# Patient Record
Sex: MALE | Race: OTHER | HISPANIC OR LATINO | Employment: OTHER | URBAN - METROPOLITAN AREA
[De-identification: names, ages, dates, MRNs, and addresses within clinical notes are randomized per-mention and may not be internally consistent; named-entity substitution may affect disease eponyms.]

---

## 2017-12-11 ENCOUNTER — APPOINTMENT (EMERGENCY)
Dept: RADIOLOGY | Facility: HOSPITAL | Age: 82
End: 2017-12-11

## 2017-12-11 ENCOUNTER — HOSPITAL ENCOUNTER (EMERGENCY)
Facility: HOSPITAL | Age: 82
Discharge: HOME | End: 2017-12-11

## 2017-12-11 VITALS
TEMPERATURE: 99 F | WEIGHT: 218 LBS | OXYGEN SATURATION: 95 % | HEART RATE: 84 BPM | RESPIRATION RATE: 24 BRPM | BODY MASS INDEX: 34.21 KG/M2 | SYSTOLIC BLOOD PRESSURE: 137 MMHG | HEIGHT: 67 IN | DIASTOLIC BLOOD PRESSURE: 63 MMHG

## 2017-12-11 DIAGNOSIS — J40 BRONCHITIS: Primary | ICD-10-CM

## 2017-12-11 LAB
ALBUMIN SERPL BCP-MCNC: 3.1 G/DL (ref 3.5–5)
ALP SERPL-CCNC: 71 U/L (ref 46–116)
ALT SERPL W P-5'-P-CCNC: 39 U/L (ref 12–78)
ANION GAP SERPL CALCULATED.3IONS-SCNC: 10 MMOL/L (ref 4–13)
AST SERPL W P-5'-P-CCNC: 14 U/L (ref 5–45)
BASOPHILS # BLD AUTO: 0.04 THOUSANDS/ΜL (ref 0–0.1)
BASOPHILS NFR BLD AUTO: 0 % (ref 0–1)
BILIRUB SERPL-MCNC: 0.4 MG/DL (ref 0.2–1)
BUN SERPL-MCNC: 12 MG/DL (ref 5–25)
CALCIUM SERPL-MCNC: 9.2 MG/DL (ref 8.3–10.1)
CHLORIDE SERPL-SCNC: 100 MMOL/L (ref 100–108)
CO2 SERPL-SCNC: 27 MMOL/L (ref 21–32)
CREAT SERPL-MCNC: 0.83 MG/DL (ref 0.6–1.3)
EOSINOPHIL # BLD AUTO: 0.39 THOUSAND/ΜL (ref 0–0.61)
EOSINOPHIL NFR BLD AUTO: 4 % (ref 0–6)
ERYTHROCYTE [DISTWIDTH] IN BLOOD BY AUTOMATED COUNT: 13.5 % (ref 11.6–15.1)
GFR SERPL CREATININE-BSD FRML MDRD: 83 ML/MIN/1.73SQ M
GLUCOSE SERPL-MCNC: 192 MG/DL (ref 65–140)
GLUCOSE SERPL-MCNC: 280 MG/DL (ref 65–140)
HCT VFR BLD AUTO: 36.9 % (ref 36.5–49.3)
HGB BLD-MCNC: 12.4 G/DL (ref 12–17)
LYMPHOCYTES # BLD AUTO: 1.3 THOUSANDS/ΜL (ref 0.6–4.47)
LYMPHOCYTES NFR BLD AUTO: 15 % (ref 14–44)
MCH RBC QN AUTO: 30.2 PG (ref 26.8–34.3)
MCHC RBC AUTO-ENTMCNC: 33.6 G/DL (ref 31.4–37.4)
MCV RBC AUTO: 90 FL (ref 82–98)
MONOCYTES # BLD AUTO: 1.06 THOUSAND/ΜL (ref 0.17–1.22)
MONOCYTES NFR BLD AUTO: 12 % (ref 4–12)
NEUTROPHILS # BLD AUTO: 6.1 THOUSANDS/ΜL (ref 1.85–7.62)
NEUTS SEG NFR BLD AUTO: 69 % (ref 43–75)
NT-PROBNP SERPL-MCNC: 443 PG/ML
PLATELET # BLD AUTO: 373 THOUSANDS/UL (ref 149–390)
PMV BLD AUTO: 10 FL (ref 8.9–12.7)
POTASSIUM SERPL-SCNC: 4.4 MMOL/L (ref 3.5–5.3)
PROT SERPL-MCNC: 7.8 G/DL (ref 6.4–8.2)
RBC # BLD AUTO: 4.11 MILLION/UL (ref 3.88–5.62)
SODIUM SERPL-SCNC: 137 MMOL/L (ref 136–145)
SPECIMEN SOURCE: NORMAL
TROPONIN I BLD-MCNC: 0 NG/ML (ref 0–0.08)
WBC # BLD AUTO: 8.89 THOUSAND/UL (ref 4.31–10.16)

## 2017-12-11 PROCEDURE — 99285 EMERGENCY DEPT VISIT HI MDM: CPT

## 2017-12-11 PROCEDURE — 71020 HB CHEST X-RAY 2VW FRONTAL&LATL: CPT

## 2017-12-11 PROCEDURE — 93005 ELECTROCARDIOGRAM TRACING: CPT

## 2017-12-11 PROCEDURE — 82948 REAGENT STRIP/BLOOD GLUCOSE: CPT

## 2017-12-11 PROCEDURE — 85025 COMPLETE CBC W/AUTO DIFF WBC: CPT | Performed by: NURSE PRACTITIONER

## 2017-12-11 PROCEDURE — 36415 COLL VENOUS BLD VENIPUNCTURE: CPT | Performed by: NURSE PRACTITIONER

## 2017-12-11 PROCEDURE — 80053 COMPREHEN METABOLIC PANEL: CPT | Performed by: NURSE PRACTITIONER

## 2017-12-11 PROCEDURE — 84484 ASSAY OF TROPONIN QUANT: CPT

## 2017-12-11 PROCEDURE — 83880 ASSAY OF NATRIURETIC PEPTIDE: CPT | Performed by: NURSE PRACTITIONER

## 2017-12-11 RX ORDER — AZITHROMYCIN 250 MG/1
250 TABLET, FILM COATED ORAL EVERY 24 HOURS
Qty: 6 TABLET | Refills: 0 | Status: SHIPPED | OUTPATIENT
Start: 2017-12-11 | End: 2017-12-16

## 2017-12-11 RX ORDER — ALBUTEROL SULFATE 90 UG/1
2 AEROSOL, METERED RESPIRATORY (INHALATION) EVERY 4 HOURS PRN
Qty: 1 INHALER | Refills: 0 | Status: SHIPPED | OUTPATIENT
Start: 2017-12-11

## 2017-12-11 RX ORDER — CLOPIDOGREL BISULFATE 75 MG/1
75 TABLET ORAL DAILY
COMMUNITY
End: 2020-11-03 | Stop reason: SDUPTHER

## 2017-12-11 RX ORDER — TAMSULOSIN HYDROCHLORIDE 0.4 MG/1
0.4 CAPSULE ORAL
COMMUNITY
End: 2020-07-24 | Stop reason: SDUPTHER

## 2017-12-11 RX ORDER — IRBESARTAN 150 MG/1
150 TABLET ORAL
COMMUNITY
End: 2020-11-03 | Stop reason: SDUPTHER

## 2017-12-11 RX ORDER — GLIPIZIDE 5 MG/1
5 TABLET ORAL
Status: ON HOLD | COMMUNITY
End: 2020-08-16

## 2017-12-11 RX ORDER — RANITIDINE 150 MG/1
150 TABLET ORAL 2 TIMES DAILY
Status: ON HOLD | COMMUNITY
End: 2020-08-16

## 2017-12-11 RX ORDER — GABAPENTIN 300 MG/1
100 CAPSULE ORAL 3 TIMES DAILY
Status: ON HOLD | COMMUNITY
End: 2020-08-16 | Stop reason: DRUGHIGH

## 2017-12-11 RX ORDER — AMLODIPINE BESYLATE 5 MG/1
5 TABLET ORAL DAILY
Status: ON HOLD | COMMUNITY
End: 2020-08-16

## 2017-12-11 RX ORDER — ASPIRIN 325 MG
325 TABLET ORAL DAILY
COMMUNITY
End: 2020-09-29 | Stop reason: ALTCHOICE

## 2017-12-11 RX ORDER — FINASTERIDE 1 MG/1
1 TABLET, FILM COATED ORAL DAILY
COMMUNITY
End: 2020-07-24

## 2017-12-11 RX ORDER — CARVEDILOL 12.5 MG/1
12.5 TABLET ORAL 2 TIMES DAILY WITH MEALS
Status: ON HOLD | COMMUNITY
End: 2020-08-16 | Stop reason: DRUGHIGH

## 2017-12-11 RX ORDER — FUROSEMIDE 20 MG/1
20 TABLET ORAL 2 TIMES DAILY
COMMUNITY
End: 2020-09-21 | Stop reason: HOSPADM

## 2017-12-11 RX ORDER — MAG HYDROX/ALUMINUM HYD/SIMETH 400-400-40
1 SUSPENSION, ORAL (FINAL DOSE FORM) ORAL
COMMUNITY

## 2017-12-11 RX ORDER — GUAIFENESIN/DEXTROMETHORPHAN 100-10MG/5
5 SYRUP ORAL 3 TIMES DAILY PRN
Qty: 118 ML | Refills: 0 | Status: ON HOLD | OUTPATIENT
Start: 2017-12-11 | End: 2020-08-16

## 2017-12-11 RX ORDER — ALBUTEROL SULFATE 2.5 MG/3ML
2.5 SOLUTION RESPIRATORY (INHALATION) EVERY 6 HOURS PRN
Qty: 75 ML | Refills: 0 | Status: ON HOLD | OUTPATIENT
Start: 2017-12-11 | End: 2020-08-16

## 2017-12-11 RX ORDER — GUAIFENESIN/DEXTROMETHORPHAN 100-10MG/5
10 SYRUP ORAL ONCE
Status: COMPLETED | OUTPATIENT
Start: 2017-12-11 | End: 2017-12-11

## 2017-12-11 RX ADMIN — GUAIFENESIN AND DEXTROMETHORPHAN 10 ML: 100; 10 SYRUP ORAL at 13:16

## 2017-12-11 NOTE — ED PROVIDER NOTES
History  Chief Complaint   Patient presents with    Shortness of Breath     Patient reports SOB x20 days  States that he recently traveled from [place] (3 days ago) and had "a cold vent blowing on me " admits to cough productive of yellow sputum  Denies CP, n/v/d, fever  Reports left sided rib pain with cough      [de-identified]year old male with a PMH significant for MI x 3, DM, and asthma presents with a 20 day history of cough with green mucus production, SOB , denies chest pain, syncope, dizziness  C/o LUQ pain in abdomen with mild nausea intermittently  Just came 20 days ago from [place]  Has been using inhaler with no improvement  Denies fever             Prior to Admission Medications   Prescriptions Last Dose Informant Patient Reported? Taking?    Cholecalciferol (VITAMIN D3) 5000 units CAPS   Yes Yes   Sig: Take 1 capsule by mouth   Cyanocobalamin (VITAMIN B12 PO)   Yes Yes   Sig: Take 5,000 mcg by mouth   amLODIPine (NORVASC) 5 mg tablet   Yes Yes   Sig: Take 5 mg by mouth daily   aspirin 325 mg tablet   Yes Yes   Sig: Take 325 mg by mouth daily   carvedilol (COREG) 12 5 mg tablet   Yes Yes   Sig: Take 12 5 mg by mouth 2 (two) times a day with meals   clopidogrel (PLAVIX) 75 mg tablet   Yes Yes   Sig: Take 75 mg by mouth daily   finasteride (PROPECIA) 1 MG tablet   Yes Yes   Sig: Take 1 mg by mouth daily   furosemide (LASIX) 20 mg tablet   Yes Yes   Sig: Take 20 mg by mouth 2 (two) times a day   gabapentin (NEURONTIN) 300 mg capsule   Yes Yes   Sig: Take 100 mg by mouth 3 (three) times a day   glipiZIDE (GLUCOTROL) 5 mg tablet   Yes Yes   Sig: Take 5 mg by mouth 2 (two) times a day before meals   irbesartan (AVAPRO) 150 mg tablet   Yes Yes   Sig: Take 150 mg by mouth daily at bedtime   metFORMIN (GLUCOPHAGE) 1000 MG tablet   Yes Yes   Sig: Take 1,000 mg by mouth 2 (two) times a day with meals   ranitidine (ZANTAC) 150 mg tablet   Yes Yes   Sig: Take 150 mg by mouth 2 (two) times a day   tamsulosin (FLOMAX) 0 4 mg   Yes Yes   Sig: Take 0 4 mg by mouth daily with dinner      Facility-Administered Medications: None       Past Medical History:   Diagnosis Date    Cardiac disease     CHF (congestive heart failure) (HCC)     Diabetes mellitus (HCC)     GERD (gastroesophageal reflux disease)     Hyperlipidemia     Hypertension     MI (myocardial infarction)        Past Surgical History:   Procedure Laterality Date    EYE SURGERY         History reviewed  No pertinent family history  I have reviewed and agree with the history as documented  Social History   Substance Use Topics    Smoking status: Never Smoker    Smokeless tobacco: Never Used    Alcohol use No        Review of Systems   Respiratory: Positive for cough and shortness of breath  Negative for wheezing  Cardiovascular: Negative for chest pain and leg swelling  Gastrointestinal: Positive for abdominal pain  Negative for constipation, diarrhea and vomiting  Neurological: Negative for dizziness, syncope and weakness  Physical Exam  ED Triage Vitals [12/11/17 1130]   Temperature Pulse Respirations Blood Pressure SpO2   99 °F (37 2 °C) 97 19 158/76 95 %      Temp Source Heart Rate Source Patient Position - Orthostatic VS BP Location FiO2 (%)   Tympanic Monitor Lying Right arm --      Pain Score       6           Orthostatic Vital Signs  Vitals:    12/11/17 1130 12/11/17 1145 12/11/17 1215   BP: 158/76 120/56 130/70   Pulse: 97 88 84   Patient Position - Orthostatic VS: Lying         Physical Exam   Constitutional: He is oriented to person, place, and time  He appears well-developed and well-nourished  HENT:   Head: Normocephalic and atraumatic  Eyes: EOM are normal  Pupils are equal, round, and reactive to light  Neck: Normal range of motion  Neck supple  Cardiovascular: Normal rate and regular rhythm  Pulmonary/Chest: Effort normal    Abdominal: Soft  Musculoskeletal: Normal range of motion     Neurological: He is alert and oriented to person, place, and time  Skin: Skin is warm and dry  ED Medications  Medications   dextromethorphan-guaiFENesin (ROBITUSSIN DM)  mg/5 mL oral syrup 10 mL (not administered)       Diagnostic Studies  Results Reviewed     Procedure Component Value Units Date/Time    B-type natriuretic peptide [[id number]]  (Normal) Collected:  12/11/17 1143    Lab Status:  Final result Specimen:  Blood from Arm, Left Updated:  12/11/17 1225     NT-proBNP 443 pg/mL     Comprehensive metabolic panel [[id number]]  (Abnormal) Collected:  12/11/17 1143    Lab Status:  Final result Specimen:  Blood from Arm, Left Updated:  12/11/17 1220     Sodium 137 mmol/L      Potassium 4 4 mmol/L      Chloride 100 mmol/L      CO2 27 mmol/L      Anion Gap 10 mmol/L      BUN 12 mg/dL      Creatinine 0 83 mg/dL      Glucose 280 (H) mg/dL      Calcium 9 2 mg/dL      AST 14 U/L      ALT 39 U/L      Alkaline Phosphatase 71 U/L      Total Protein 7 8 g/dL      Albumin 3 1 (L) g/dL      Total Bilirubin 0 40 mg/dL      eGFR 83 ml/min/1 73sq m     Narrative:         National Kidney Disease Education Program recommendations are as follows:  GFR calculation is accurate only with a steady state creatinine  Chronic Kidney disease less than 60 ml/min/1 73 sq  meters  Kidney failure less than 15 ml/min/1 73 sq  meters  POCT troponin [[id number]]  (Normal) Collected:  12/11/17 1151    Lab Status:  Final result Updated:  12/11/17 1205     POC Troponin I 0 00 ng/ml      Specimen Type VENOUS    Narrative:         [employer] i-Stat handheld analyzer 99% cutoff is > 0 08ng/mL in [facility] Emergency Departments    o cTnI 99% cutoff is useful only when applied to patients in the clinical setting of myocardial ischemia  o cTnI 99% cutoff should be interpreted in the context of clinical history, ECG findings and possibly cardiac imaging to establish correct diagnosis    o cTnI 99% cutoff may be suggestive but clearly not indicative of a coronary event without the clinical setting of myocardial ischemia  CBC and differential [[id number]]  (Normal) Collected:  12/11/17 1143    Lab Status:  Final result Specimen:  Blood from Arm, Left Updated:  12/11/17 1157     WBC 8 89 Thousand/uL      RBC 4 11 Million/uL      Hemoglobin 12 4 g/dL      Hematocrit 36 9 %      MCV 90 fL      MCH 30 2 pg      MCHC 33 6 g/dL      RDW 13 5 %      MPV 10 0 fL      Platelets 344 Thousands/uL      Neutrophils Relative 69 %      Lymphocytes Relative 15 %      Monocytes Relative 12 %      Eosinophils Relative 4 %      Basophils Relative 0 %      Neutrophils Absolute 6 10 Thousands/µL      Lymphocytes Absolute 1 30 Thousands/µL      Monocytes Absolute 1 06 Thousand/µL      Eosinophils Absolute 0 39 Thousand/µL      Basophils Absolute 0 04 Thousands/µL     POCT urinalysis dipstick [[id number]]     Lab Status:  No result Specimen:  Urine                  XR chest 2 views   ED Interpretation by JAYCE Mantilla (12/11 7321)   No active disease      Final Result by Terry Perez MD (12/11 1226)      No active pulmonary disease           Workstation performed: [id number]                    Procedures  ECG 12 Lead Documentation  Date/Time: 12/11/2017 11:52 AM  Performed by: Batsheva Dickey  Authorized by: Batsheva Dickey     ECG reviewed by me, the ED Provider: yes    Patient location:  ED  Previous ECG:     Previous ECG:  Unavailable    Comparison to cardiac monitor: Yes    Interpretation:     Interpretation: normal    Rate:     ECG rate:  88    ECG rate assessment: normal    Rhythm:     Rhythm: sinus rhythm    Ectopy:     Ectopy: none    QRS:     QRS axis:  Normal  Conduction:     Conduction: normal    ST segments:     ST segments:  Normal  T waves:     T waves: normal               Phone Contacts  ED Phone Contact    ED Course  ED Course                                MDM  Number of Diagnoses or Management Options  Diagnosis management comments: Pt stable, still struggling with residual cough but is feeling better than he was a few days ago  cxr neg, labs, ekg neg  D/c to home with decongestants  Amount and/or Complexity of Data Reviewed  Clinical lab tests: ordered and reviewed  Tests in the radiology section of CPT®: ordered and reviewed  Tests in the medicine section of CPT®: reviewed and ordered  Independent visualization of images, tracings, or specimens: yes    Patient Progress  Patient progress: stable    CritCare Time    Disposition  Final diagnoses:   Bronchitis     Time reflects when diagnosis was documented in both MDM as applicable and the Disposition within this note     Time User Action Codes Description Comment    12/11/2017 12:59 PM Salvador, [address] Giovana Vieyra Bronchitis       ED Disposition     ED Disposition Condition Comment    Discharge  Merly Fiore discharge to home/self care  Condition at discharge: Good        Follow-up Information    None       Patient's Medications   Discharge Prescriptions    DEXTROMETHORPHAN-GUAIFENESIN (ROBITUSSIN DM)  MG/5 ML SYRUP    Take 5 mL by mouth 3 (three) times a day as needed for cough or congestion       Start Date: 12/11/2017End Date: --       Order Dose: 5 mL       Quantity: 118 mL    Refills: 0     No discharge procedures on file      ED Provider  Electronically Signed by           JAYCE Faulkner  12/11/17 9240 [address] [place] [address],  Leonel Mix  12/11/17 0184

## 2017-12-11 NOTE — DISCHARGE INSTRUCTIONS
Bronquitis aguda   LO QUE NECESITA SABER:   La bronquitis aguda es la inflamación e irritación de peg vías respiratorias  Esta irritación puede provocar que tosa o que tenga otros problemas de respiración  La bronquitis aguda suele comenzar debido a otra enfermedad, jaswant un resfriado o la gripe  La enfermedad se propaga de major nariz y garganta a major tráquea y Charles Carrier  La bronquitis a menudo es conocida jaswant resfriado de pecho  La bronquitis aguda generalmente dura alrededor de 3 a 6 semanas y no es can enfermedad grave  La tos podría durar por varias semanas  INSTRUCCIONES SOBRE [facility]:   Regrese a la nupur de emergencias si:   · Usted expectora azael  · Peg labios o uñas de los dedos se ponen azules  · Usted siente que no está recibiendo suficiente aire cuando respira  Pregúntele a major Jerome Heft vitaminas y minerales son adecuados para usted  · Usted tiene fiebre  · Peg problemas respiratorios no desaparecen o empeoran  · Major tos no mejora dentro de 4 semanas  · Usted tiene preguntas o inquietudes acerca de major condición o cuidado  Cuidados personales:   · Descanse más  El descanso ayuda a major cuerpo a sanar  Empiece a hacer un poco más día a día  Descanse cuando usted sienta que es necesario  · Evite irritantes en el aire  Evite productos químicos, gases y polvo  Use can mascarilla si tiene que trabajar alrededor de polvo o gases  Permanezca dentro cuando los niveles de contaminación renetta altos  Si tiene alergias, permanezca adentro cuando el conteo de polen sea CROSSCANONBY  No use productos en aerosol, jaswant desodorante, aerosol contra los insectos y aerosol para el keri  · No fume o esté alrededor de personas que fuman  La nicotina y otros químicos en los cigarrillos y cigarros dañan la cilia que saca la mucosidad de peg pulmones  Pida información a major médico si usted actualmente fuma y necesita ayuda para dejar de fumar   Los cigarrillos electrónicos o tabaco sin humo todavía contienen nicotina  Consulte con major médico antes de [employer]  · [address] se le haya indicado  Los líquidos ayudan a mantener humectadas myron vías respiratorias y ayudarle a eliminar las flemas por medio de la tos  Es posible que usted necesite marlin más líquidos si tiene bronquitis [place]  Pregunte cuánto líquido debe marlin cada día y cuáles líquidos son los más adecuados para usted  · Use un humidificador o vaporizador  Use un humidificador de tre frío o un vaporizador para elevar la humedad en major casa  [place] podría ayudarle a respirar más fácilmente y al mismo tiempo disminuir la tos  Disminuya major riesgo para la bronquitis aguda:   · Vaya a que le pongan las vacunas necesarias  Pregúntele a major médico si usted debería ser vacunado contra la gripe o la neumonía  · Evite la propagación de gérmenes  Usted puede disminuir major riesgo de bronquitis [place] y otras enfermedades de la siguiente manera:     ¨ [place] frecuentemente con agua y [place]  Lleve can loción o gel antiséptico para las jess  Usted puede usar la loción o el gel para limpiar myron jess cuando no haya agua disponible  ¨ No se toque los ojos, la nariz o la boca a menos que se haya lavado las jess cathleen  ¨ Siempre cubra major boca al toser para evitar la propagación de gérmenes  Lo mejor es toser en un pañuelo desechable o en la manga de major camisa, en lugar de en major mano  [place] a los que le rodean que cubran myron bocas al toser  ¨ Trate de evitar a las personas que están resfriadas o tienen gripe  Si usted está enfermo, manténgase alejado de otras personas lo más que pueda  Medicamentos:  Major médico podría  darle cualquiera de lo siguiente:  · El ibuprofeno o el acetaminofeno  son medicamentos que pueden ayudar a bajar major fiebre  Están disponibles sin receta médica  Consulte con major médico cuál medicamento es el adecuado para usted  Pregunte la cantidad y la frecuencia con que debe tomarlos  [address]  Estos medicamentos pueden provocar sangrado estomacal si no se dewayne correctamente  El ibuprofeno puede provocar daño al Amber Baker  No tome ibuprofeno si usted tiene enfermedad de los riñones, [place] o si es alérgico a la aspirina  El acetaminofeno puede dañar el hígado  No tome más de 4,000 miligramos en 24 horas  · Los descongestionantes  ayudan a despejar la mucosidad en myron pulmones y que sea más fácil expectorarla  [place] puede ayudarle a respirar mejor  · Los jarabes para la tos  disminuyen major necesidad de toser  Si major tos produce mucosidad, no tome un supresor de la tos a menos que major médico se lo indique  Major médico podría sugerirle que tome un supresor de la tos en la noche para que pueda descansar  · Inhaladores  podrían ser [place]  Major médico podría darle gill o más inhaladores para ayudarle a respirar más fácil y Erich Mil[place] menos tos  Un inhalador le administra medicamento para abrir myron vías aéreas  Pídale a major médico que le muestre cómo usar major inhalador correctamente  · [place] myron medicamentos jaswant se le haya indicado  Consulte con major médico si usted hi que major medicamento no le está ayudando o si presenta efectos secundarios  Infórmele si es alérgico a algún medicamento  Mantenga can lista actualizada de los [place], las vitaminas y los productos herbales que cathy  Incluya los siguientes datos de los medicamentos: cantidad, frecuencia y motivo de administración  Traiga con usted la lista o los envases de la píldoras a myron citas de seguimiento  Lleve la lista de los medicamentos con usted en fausto de can emergencia  Acuda a myron consultas de control con major médico según le indicaron  Escriba las preguntas que tenga para que recuerde hacerlas carlos myron citas de seguimiento  © 2017 [address] Information is for End User's use only and may not be sold, redistributed or otherwise used for commercial purposes   All illustrations and images included in [employer] are the copyrighted property of [employer] JONH A M [employer]  or Bruce Guerrero  Esta información es sólo para uso en educación  Major intención no es darle un consejo médico sobre enfermedades o tratamientos  Colsulte con major Jorge Reddy farmacéutico antes de seguir cualquier régimen médico para saber si es seguro y efectivo para usted

## 2017-12-12 LAB
ATRIAL RATE: 88 BPM
P AXIS: 65 DEGREES
PR INTERVAL: 158 MS
QRS AXIS: 8 DEGREES
QRSD INTERVAL: 90 MS
QT INTERVAL: 380 MS
QTC INTERVAL: 459 MS
T WAVE AXIS: 13 DEGREES
VENTRICULAR RATE: 88 BPM

## 2017-12-18 ENCOUNTER — ALLSCRIPTS OFFICE VISIT (OUTPATIENT)
Dept: OTHER | Facility: OTHER | Age: 82
End: 2017-12-18

## 2017-12-18 DIAGNOSIS — E11.9 TYPE 2 DIABETES MELLITUS WITHOUT COMPLICATIONS (HCC): ICD-10-CM

## 2017-12-18 LAB
BILIRUB UR QL STRIP: NORMAL
CLARITY UR: NORMAL
COLOR UR: CLEAR
GLUCOSE (HISTORICAL): NORMAL
HBA1C MFR BLD HPLC: NORMAL %
HGB UR QL STRIP.AUTO: NORMAL
KETONES UR STRIP-MCNC: NORMAL MG/DL
LEUKOCYTE ESTERASE UR QL STRIP: NORMAL
NITRITE UR QL STRIP: NORMAL
PH UR STRIP.AUTO: 5 [PH]
PROT UR STRIP-MCNC: NORMAL MG/DL
SP GR UR STRIP.AUTO: 1
UROBILINOGEN UR QL STRIP.AUTO: NORMAL

## 2017-12-19 NOTE — PROGRESS NOTES
Assessment  1  Abdominal pain of multiple sites (789 09) (R10 9)   2  Poorly controlled type 2 diabetes mellitus (250 00) (E11 65)   3  CAD (coronary artery disease) (414 00) (I25 10)   4  HTN (hypertension) (401 9) (I10)    Plan  Type 2 diabetes mellitus    · MetFORMIN HCl - 1000 MG Oral Tablet   · Lantus 100 UNIT/ML Subcutaneous Solution; inject 10 units SQ qhs day one, then20 units qhs   · (1) HEMOGLOBIN A1C; Status:Active; Requested for:[id number];    · Hemoglobin A1c- POC; Status:Complete;   Done: [id number] 02:54PM  Urinary frequency    · Urine Dip Non-Automated- POC; Status:Complete;   Done: 99WJV5629 02:56PM    Discussion/Summary  Discussion Summary:   UA shows glucose, HgA1C 8 4, discussed with patient, and son  Recommend patient stop Metformin, and start Lantus 10 units, qhs  Start OTC probiotics, and urged to monitor glucose, call office Thursday or Friday this week with results, and update  RTO as needed  Medication SE Review and Pt Understands Tx: Possible side effects of new medications were reviewed with the patient/guardian today  The treatment plan was reviewed with the patient/guardian  The patient/guardian understands and agrees with the treatment plan   Self Referrals:   Self Referrals: Yes      Chief Complaint  Chief Complaint Free Text Note Form: Pt here as NP today  Was in ER  dklast saw doctor in [facility]       History of Present Illness  HPI: [de-identified]year old  male c/o ongoing cough, was in ER on December 11th, and was given antibiotic  Has c/o frequent bowel movements, and bladder pain  Has frequent urination recently  History of DM taking oral meds  visiting son who lives in this area, from [place]  Has a hx  of BPH  Presents with son/  Review of Systems  Complete-Male:  Constitutional: no fever-- and-- no chills  Respiratory: cough, but-- no shortness of breath  Gastrointestinal: abdominal pain        Active Problems  1  CAD (coronary artery disease) (414 00) (I25 10)   2  CHF (congestive heart failure) (428 0) (I50 9)   3  GERD (gastroesophageal reflux disease) (530 81) (K21 9)   4  HTN (hypertension) (401 9) (I10)   5  Hyperlipidemia (272 4) (E78 5)   6  Myocardial infarction (410 90) (I21 9)    Surgical History  Surgical History Reviewed: The surgical history was reviewed and updated today  Family History  Family History Reviewed: The family history was reviewed and updated today  Social History   · Never a smoker    Current Meds   1  AmLODIPine Besylate 5 MG Oral Tablet; 1 qd; Therapy: [id number] to Recorded   2  Aspirin 325 MG Oral Tablet; 1 qd; Therapy: [id number] to Recorded   3  B-12 5000 MCG Oral Capsule; 1 qd; Therapy: [id number] to Recorded   4  Carvedilol 12 5 MG Oral Tablet; 1 BID; Therapy: [id number] to Recorded   5  Finasteride 1 MG Oral Tablet; TAKE 1 TABLET DAILY; Therapy: [id number] to Recorded   6  Gabapentin 300 MG Oral Capsule; 1 tid; Therapy: 97LOM6907 to Recorded   7  GlipiZIDE 5 MG Oral Tablet; 1 BID; Therapy: [id number] to Recorded   8  Irbesartan 150 MG Oral Tablet; 1 qd; Therapy: 43YPC6970 to Recorded   9  Lasix 20 MG Oral Tablet; 1 BID; Therapy: [id number] to Recorded   10  MetFORMIN HCl - 1000 MG Oral Tablet; 1 BID; Therapy: 82CVM6976 to Recorded   11  Plavix 75 MG Oral Tablet; 1 qd; Therapy: [id number] to Recorded   12  RaNITidine HCl - 150 MG Oral Tablet; 1 BID; Therapy: [id number] to Recorded   13  Tamsulosin HCl - 0 4 MG Oral Capsule; 1 qd; Therapy: [id number] to Recorded   14  Vitamin D3 5000 UNIT Oral Capsule; 1 DAILY; Therapy: [id number] to Recorded    Allergies  1  Demerol SOLN    Vitals  Signs   Recorded: 62LUQ9780 01:26PM   Temperature: 94 1 F  Heart Rate: 88  Respiration: 16  Systolic: 870, LUE, Sitting  Diastolic: 60, LUE, Sitting  Height: 5 ft 7 in  Weight: 215 lb   BMI Calculated: 33 67  BSA Calculated: 2 09    Physical Exam   Constitutional  General appearance: Abnormal  -- overweight    Eyes  Conjunctiva and lids: No swelling, erythema, or discharge  Pupils and irises: Equal, round and reactive to light  Pulmonary  Respiratory effort: No increased work of breathing or signs of respiratory distress  Auscultation of lungs: Clear to auscultation, equal breath sounds bilaterally, no wheezes, no rales, no rhonci  Cardiovascular  Auscultation of heart: Normal rate and rhythm, normal S1 and S2, without murmurs  Examination of extremities for edema and/or varicosities: Normal    Abdomen  Abdomen: Non-tender, no masses  Liver and spleen: No hepatomegaly or splenomegaly  Musculoskeletal  Gait and station: Normal    Digits and nails: Normal without clubbing or cyanosis     Inspection/palpation of joints, bones, and muscles: Normal    Psychiatric  Orientation to person, place and time: Normal    Mood and affect: Normal          Results/Data  Urine Dip Non-Automated- POC [id number] 02:56PM Valere Brockton     Test Name Result Flag Reference   Color Clear     Clarity Transparent     Leukocytes norm     Nitrite neg     Blood neg     Bilirubin neg     Urobilinogen norm     Protein neg     Ph 5     Specific [place] 1 005     Ketone neg     Glucose 150 ++       Hemoglobin A1c- POC [id number] 02:54PM Valere Brockton     Test Name Result Flag Reference   HEMOGLOBIN A1C      8 4%  waver was signed       Signatures   Electronically signed by : Kassandra Valencia, [facility]; Dec 18 2017  3:45PM EST                       (Author)    Electronically signed by : KRISHNA Junior ; Dec 18 2017  5:38PM EST

## 2018-01-23 VITALS
DIASTOLIC BLOOD PRESSURE: 60 MMHG | SYSTOLIC BLOOD PRESSURE: 142 MMHG | BODY MASS INDEX: 33.74 KG/M2 | HEIGHT: 67 IN | TEMPERATURE: 94.1 F | HEART RATE: 88 BPM | RESPIRATION RATE: 16 BRPM | WEIGHT: 215 LBS

## 2020-07-17 ENCOUNTER — HOSPITAL ENCOUNTER (EMERGENCY)
Facility: HOSPITAL | Age: 85
Discharge: HOME | End: 2020-07-17
Attending: EMERGENCY MEDICINE | Admitting: EMERGENCY MEDICINE
Payer: MEDICARE

## 2020-07-17 VITALS
TEMPERATURE: 98.4 F | DIASTOLIC BLOOD PRESSURE: 60 MMHG | SYSTOLIC BLOOD PRESSURE: 121 MMHG | RESPIRATION RATE: 16 BRPM | OXYGEN SATURATION: 99 % | HEIGHT: 66 IN | WEIGHT: 220 LBS | BODY MASS INDEX: 35.36 KG/M2 | HEART RATE: 61 BPM

## 2020-07-17 DIAGNOSIS — R81 GLUCOSURIA: ICD-10-CM

## 2020-07-17 DIAGNOSIS — R33.9 URINARY RETENTION: Primary | ICD-10-CM

## 2020-07-17 LAB
BACTERIA UR QL AUTO: ABNORMAL /HPF
BILIRUB UR QL STRIP: NEGATIVE
CLARITY UR: CLEAR
COLOR UR: ABNORMAL
GLUCOSE UR STRIP-MCNC: ABNORMAL MG/DL
HGB UR QL STRIP.AUTO: ABNORMAL
KETONES UR STRIP-MCNC: NEGATIVE MG/DL
LEUKOCYTE ESTERASE UR QL STRIP: NEGATIVE
NITRITE UR QL STRIP: NEGATIVE
NON-SQ EPI CELLS URNS QL MICRO: ABNORMAL /HPF
PH UR STRIP.AUTO: 5.5 [PH]
PROT UR STRIP-MCNC: NEGATIVE MG/DL
RBC #/AREA URNS AUTO: ABNORMAL /HPF
SP GR UR STRIP.AUTO: 1.01 (ref 1–1.03)
UROBILINOGEN UR QL STRIP.AUTO: 0.2 E.U./DL
WBC #/AREA URNS AUTO: ABNORMAL /HPF

## 2020-07-17 PROCEDURE — 99283 EMERGENCY DEPT VISIT LOW MDM: CPT

## 2020-07-17 PROCEDURE — 99283 EMERGENCY DEPT VISIT LOW MDM: CPT | Performed by: EMERGENCY MEDICINE

## 2020-07-17 PROCEDURE — 81001 URINALYSIS AUTO W/SCOPE: CPT | Performed by: EMERGENCY MEDICINE

## 2020-07-18 NOTE — ED PROVIDER NOTES
History  Chief Complaint   Patient presents with    Urinary Retention     Pt arrived from [facility] yesterday morning and has not voided since  81 yo M with PMH of CAD, HTN, CHF, GERD, DM2 presents to ED with inability to void for over 24 hours  Recent travel from [place] (yesterday)  Has had this issue in past, has known prostate issues  No fevers/chills/nausea  Had some suprapubic pressure, relieved with insertion of winslow on arrival to ED  Chronic lower back pain, nothing new or different  No numbness/tingling/weakness  No sacral numbness/tingling  No trauma  History provided by:  Medical records and patient   used: No    Difficulty Urinating   Presenting symptoms: dysuria    Relieved by:  Nothing  Worsened by:  Nothing  Ineffective treatments:  None tried  Associated symptoms: no abdominal pain, no diarrhea, no fever, no flank pain, no hematuria, no nausea, no scrotal swelling, no urinary frequency, no urinary hesitation, no urinary incontinence, no urinary retention and no vomiting        Prior to Admission Medications   Prescriptions Last Dose Informant Patient Reported? Taking?    Cholecalciferol (VITAMIN D3) 5000 units CAPS   Yes No   Sig: Take 1 capsule by mouth   Cyanocobalamin (VITAMIN B12 PO)   Yes No   Sig: Take 5,000 mcg by mouth   albuterol (2 5 mg/3 mL) 0 083 % nebulizer solution   No No   Sig: Take 3 mL by nebulization every 6 (six) hours as needed for wheezing   albuterol (PROVENTIL HFA,VENTOLIN HFA) 90 mcg/act inhaler   No No   Sig: Inhale 2 puffs every 4 (four) hours as needed for wheezing   amLODIPine (NORVASC) 5 mg tablet   Yes No   Sig: Take 5 mg by mouth daily   aspirin 325 mg tablet   Yes No   Sig: Take 325 mg by mouth daily   carvedilol (COREG) 12 5 mg tablet   Yes No   Sig: Take 12 5 mg by mouth 2 (two) times a day with meals   clopidogrel (PLAVIX) 75 mg tablet   Yes No   Sig: Take 75 mg by mouth daily   dextromethorphan-guaiFENesin (ROBITUSSIN DM)  mg/5 mL syrup   No No   Sig: Take 5 mL by mouth 3 (three) times a day as needed for cough or congestion   finasteride (PROPECIA) 1 MG tablet   Yes No   Sig: Take 1 mg by mouth daily   furosemide (LASIX) 20 mg tablet   Yes No   Sig: Take 20 mg by mouth 2 (two) times a day   gabapentin (NEURONTIN) 300 mg capsule   Yes No   Sig: Take 100 mg by mouth 3 (three) times a day   glipiZIDE (GLUCOTROL) 5 mg tablet   Yes No   Sig: Take 5 mg by mouth 2 (two) times a day before meals   irbesartan (AVAPRO) 150 mg tablet   Yes No   Sig: Take 150 mg by mouth daily at bedtime   metFORMIN (GLUCOPHAGE) 1000 MG tablet   Yes No   Sig: Take 1,000 mg by mouth 2 (two) times a day with meals   ranitidine (ZANTAC) 150 mg tablet   Yes No   Sig: Take 150 mg by mouth 2 (two) times a day   tamsulosin (FLOMAX) 0 4 mg   Yes No   Sig: Take 0 4 mg by mouth daily with dinner      Facility-Administered Medications: None       Past Medical History:   Diagnosis Date    Cardiac disease     [facility]     Diabetes mellitus (HCC)     GERD (gastroesophageal reflux disease)     Hyperlipidemia     Hypertension     MI (myocardial infarction) ([facility] [address] )        Past Surgical History:   Procedure Laterality Date    EYE SURGERY         History reviewed  No pertinent family history  I have reviewed and agree with the history as documented  E-Cigarette/Vaping     E-Cigarette/Vaping Substances     Social History     Tobacco Use    Smoking status: Never Smoker    Smokeless tobacco: Never Used   Substance Use Topics    Alcohol use: No    Drug use: No       Review of Systems   Constitutional: Negative for chills, diaphoresis, fatigue, fever and unexpected weight change  HENT: Negative for congestion, ear pain, rhinorrhea, sore throat, trouble swallowing and voice change  Eyes: Negative for pain and visual disturbance  Respiratory: Negative for cough, chest tightness and shortness of breath      Cardiovascular: Negative for chest pain, palpitations and leg swelling  Gastrointestinal: Negative for abdominal pain, blood in stool, constipation, diarrhea, nausea and vomiting  Genitourinary: Positive for difficulty urinating and dysuria  Negative for bladder incontinence, flank pain, frequency, hematuria, hesitancy and scrotal swelling  Musculoskeletal: Negative for arthralgias, back pain and neck pain  Skin: Negative for rash  Neurological: Negative for dizziness, syncope, light-headedness and headaches  Psychiatric/Behavioral: Negative for confusion and suicidal ideas  The patient is not nervous/anxious  Physical Exam  Physical Exam   Constitutional: He is oriented to person, place, and time  He appears well-developed and well-nourished  No distress  HENT:   Head: Normocephalic and atraumatic  Right Ear: External ear normal    Left Ear: External ear normal    Nose: Nose normal    Mouth/Throat: Oropharynx is clear and moist    Eyes: Pupils are equal, round, and reactive to light  Conjunctivae and EOM are normal  Right eye exhibits no discharge  Left eye exhibits no discharge  No scleral icterus  Neck: Normal range of motion  Neck supple  No JVD present  No tracheal deviation present  Cardiovascular: Normal rate, regular rhythm, normal heart sounds and intact distal pulses  Exam reveals no gallop and no friction rub  No murmur heard  Pulses:       Dorsalis pedis pulses are 2+ on the right side, and 2+ on the left side  Pulmonary/Chest: Effort normal and breath sounds normal  No stridor  No respiratory distress  He has no wheezes  He has no rales  He exhibits no tenderness  Abdominal: Soft  Bowel sounds are normal  He exhibits no distension  There is no tenderness  There is no rebound and no guarding  No CVAT   Musculoskeletal: Normal range of motion  He exhibits no edema, tenderness or deformity  Lymphadenopathy:     He has no cervical adenopathy     Neurological: He is alert and oriented to person, place, and time  He has normal strength  No cranial nerve deficit or sensory deficit  Coordination normal  GCS eye subscore is 4  GCS verbal subscore is 5  GCS motor subscore is 6  Skin: Skin is warm and dry  No rash noted  He is not diaphoretic  Psychiatric: He has a normal mood and affect  His behavior is normal    Nursing note and vitals reviewed  Vital Signs  ED Triage Vitals [07/17/20 2145]   Temperature Pulse Respirations Blood Pressure SpO2   98 4 °F (36 9 °C) 67 20 147/84 99 %      Temp Source Heart Rate Source Patient Position - Orthostatic VS BP Location FiO2 (%)   Temporal Monitor Lying Right arm --      Pain Score       6           Vitals:    07/17/20 2145 07/17/20 2209   BP: 147/84 121/60   Pulse: 67 61   Patient Position - Orthostatic VS: Lying Lying         Visual Acuity      ED Medications  Medications - No data to display    Diagnostic Studies  Results Reviewed     Procedure Component Value Units Date/Time    Urine Microscopic [[id number]]  (Abnormal) Collected:  07/17/20 2209    Lab Status:  Final result Specimen:  Urine, Indwelling Estrella Catheter Updated:  07/17/20 2239     RBC, UA 1-2 /hpf      WBC, UA None Seen /hpf      Epithelial Cells Occasional /hpf      Bacteria, UA None Seen /hpf     UA w Reflex to Microscopic w Reflex to Culture [[id number]]  (Abnormal) Collected:  07/17/20 2209    Lab Status:  Final result Specimen:  Urine, Indwelling Estrella Catheter Updated:  07/17/20 2239     Color, UA Straw     Clarity, UA Clear     Specific Gravity, UA 1 015     pH, UA 5 5     Leukocytes, UA Negative     Nitrite, UA Negative     Protein, UA Negative mg/dl      Glucose,  (1/10%) mg/dl      Ketones, UA Negative mg/dl      Urobilinogen, UA 0 2 E U /dl      Bilirubin, UA Negative     Blood, UA Small                 No orders to display              Procedures  Procedures         ED Course       US AUDIT      Most Recent Value   Initial Alcohol Screen: US AUDIT-C    1   How often do you have a drink containing alcohol?  0 Filed at: 07/17/2020 2146   2  How many drinks containing alcohol do you have on a typical day you are drinking? 0 Filed at: 07/17/2020 2146   3a  Male UNDER 65: How often do you have five or more drinks on one occasion? 0 Filed at: 07/17/2020 2146   3b  FEMALE Any Age, or MALE 65+: How often do you have 4 or more drinks on one occassion? 0 Filed at: 07/17/2020 2146   Audit-C Score  0 Filed at: 07/17/2020 2146                  MERY/DAST-10      Most Recent Value   How many times in the past year have you    Used an illegal drug or used a prescription medication for non-medical reasons? Never Filed at: 07/17/2020 2146                                MDM  Number of Diagnoses or Management Options  Glucosuria:   Urinary retention: new and requires workup  Diagnosis management comments: U/A unremarkable  No concerning sx for cauda equina  Estrella placed  Will d/c home, recommend f/u with PCP and urology  RTED instructions given  Amount and/or Complexity of Data Reviewed  Clinical lab tests: ordered and reviewed  Review and summarize past medical records: yes    Risk of Complications, Morbidity, and/or Mortality  Presenting problems: moderate  Diagnostic procedures: low  Management options: low    Patient Progress  Patient progress: improved        Disposition  Final diagnoses:   Urinary retention   Glucosuria     Time reflects when diagnosis was documented in both MDM as applicable and the Disposition within this note     Time User Action Codes Description Comment    7/17/2020 10:44 PM Robin Ontiveros Add [R33 9] Urinary retention     7/17/2020 10:44 PM Nagi Carrillo [address]       ED Disposition     ED Disposition Condition Date/Time Comment    Discharge Stable Fri Jul 17, 2020 10:44 PM Merly Hall discharge to home/self care              Follow-up Information     Follow up With Specialties Details Why Contact Info Additional Information     [facility] Emergency Department Emergency Medicine  If symptoms worsen [address] [place],[address] [address]  [phone]  ED, [address] [place], [facility], [address]    [facility] Urology [facility] Urology Schedule an appointment as soon as possible for a visit   [address] [address] [address] [facility] [address]  [address]  [facility] [place] [address], [place], [place], [facility]          Patient's Medications   Discharge Prescriptions    No medications on file     No discharge procedures on file      PDMP Review     None          ED Provider  Electronically Signed by           Rebel Awad MD  07/17/20 2315

## 2020-07-20 ENCOUNTER — TELEPHONE (OUTPATIENT)
Dept: UROLOGY | Facility: MEDICAL CENTER | Age: 85
End: 2020-07-20

## 2020-07-20 NOTE — TELEPHONE ENCOUNTER
Call returned to patient's son  Scheduled for 7/24 at [facility]  Covid screening complete

## 2020-07-20 NOTE — TELEPHONE ENCOUNTER
Er FU Please Triage    Son calling     What is the reason for the patients appointment? Retention       Do we accept the patient's insurance or is the patient Self-Pay? Medicare [id number]     Has the patient had any previous urologist(s)? 6 years ago in [facility] for Enlarged Prostate     Have patient records been requested? no     Has the patient had any outside testing done? no       What is the patients location preference for an office visit? [place]        Does the patient have a personal history of cancer? no

## 2020-07-21 ENCOUNTER — HOSPITAL ENCOUNTER (EMERGENCY)
Facility: HOSPITAL | Age: 85
Discharge: HOME | End: 2020-07-22
Attending: EMERGENCY MEDICINE | Admitting: EMERGENCY MEDICINE
Payer: MEDICARE

## 2020-07-21 DIAGNOSIS — N40.0 ENLARGED PROSTATE: Primary | ICD-10-CM

## 2020-07-21 DIAGNOSIS — N43.3 BILATERAL HYDROCELE: ICD-10-CM

## 2020-07-21 DIAGNOSIS — N30.91 HEMORRHAGIC CYSTITIS: ICD-10-CM

## 2020-07-21 DIAGNOSIS — R31.9 HEMATURIA: ICD-10-CM

## 2020-07-21 PROCEDURE — 99284 EMERGENCY DEPT VISIT MOD MDM: CPT

## 2020-07-22 ENCOUNTER — APPOINTMENT (EMERGENCY)
Dept: CT IMAGING | Facility: HOSPITAL | Age: 85
End: 2020-07-22
Payer: MEDICARE

## 2020-07-22 VITALS
HEIGHT: 66 IN | WEIGHT: 220 LBS | BODY MASS INDEX: 35.36 KG/M2 | DIASTOLIC BLOOD PRESSURE: 65 MMHG | TEMPERATURE: 98.2 F | OXYGEN SATURATION: 96 % | RESPIRATION RATE: 18 BRPM | SYSTOLIC BLOOD PRESSURE: 148 MMHG | HEART RATE: 88 BPM

## 2020-07-22 LAB
ALBUMIN SERPL BCP-MCNC: 3.2 G/DL (ref 3.5–5)
ALP SERPL-CCNC: 44 U/L (ref 46–116)
ALT SERPL W P-5'-P-CCNC: 25 U/L (ref 12–78)
ANION GAP SERPL CALCULATED.3IONS-SCNC: 9 MMOL/L (ref 4–13)
AST SERPL W P-5'-P-CCNC: 24 U/L (ref 5–45)
BACTERIA UR QL AUTO: ABNORMAL /HPF
BASOPHILS # BLD AUTO: 0.02 THOUSANDS/ΜL (ref 0–0.1)
BASOPHILS NFR BLD AUTO: 0 % (ref 0–1)
BILIRUB SERPL-MCNC: 0.4 MG/DL (ref 0.2–1)
BILIRUB UR QL STRIP: NEGATIVE
BUN SERPL-MCNC: 17 MG/DL (ref 5–25)
CALCIUM SERPL-MCNC: 8.6 MG/DL (ref 8.3–10.1)
CHLORIDE SERPL-SCNC: 100 MMOL/L (ref 100–108)
CLARITY UR: CLEAR
CO2 SERPL-SCNC: 26 MMOL/L (ref 21–32)
COLOR UR: COLORLESS
CREAT SERPL-MCNC: 0.81 MG/DL (ref 0.6–1.3)
EOSINOPHIL # BLD AUTO: 0.34 THOUSAND/ΜL (ref 0–0.61)
EOSINOPHIL NFR BLD AUTO: 4 % (ref 0–6)
ERYTHROCYTE [DISTWIDTH] IN BLOOD BY AUTOMATED COUNT: 14.6 % (ref 11.6–15.1)
GFR SERPL CREATININE-BSD FRML MDRD: 82 ML/MIN/1.73SQ M
GLUCOSE SERPL-MCNC: 178 MG/DL (ref 65–140)
GLUCOSE UR STRIP-MCNC: NEGATIVE MG/DL
HCT VFR BLD AUTO: 33.2 % (ref 36.5–49.3)
HGB BLD-MCNC: 10.9 G/DL (ref 12–17)
HGB UR QL STRIP.AUTO: ABNORMAL
IMM GRANULOCYTES # BLD AUTO: 0.03 THOUSAND/UL (ref 0–0.2)
IMM GRANULOCYTES NFR BLD AUTO: 0 % (ref 0–2)
INR PPP: 0.99 (ref 0.84–1.19)
KETONES UR STRIP-MCNC: NEGATIVE MG/DL
LEUKOCYTE ESTERASE UR QL STRIP: ABNORMAL
LYMPHOCYTES # BLD AUTO: 1.03 THOUSANDS/ΜL (ref 0.6–4.47)
LYMPHOCYTES NFR BLD AUTO: 11 % (ref 14–44)
MCH RBC QN AUTO: 30.2 PG (ref 26.8–34.3)
MCHC RBC AUTO-ENTMCNC: 32.8 G/DL (ref 31.4–37.4)
MCV RBC AUTO: 92 FL (ref 82–98)
MONOCYTES # BLD AUTO: 0.77 THOUSAND/ΜL (ref 0.17–1.22)
MONOCYTES NFR BLD AUTO: 8 % (ref 4–12)
NEUTROPHILS # BLD AUTO: 7.36 THOUSANDS/ΜL (ref 1.85–7.62)
NEUTS SEG NFR BLD AUTO: 77 % (ref 43–75)
NITRITE UR QL STRIP: NEGATIVE
NON-SQ EPI CELLS URNS QL MICRO: ABNORMAL /HPF
NRBC BLD AUTO-RTO: 0 /100 WBCS
PH UR STRIP.AUTO: 6.5 [PH]
PLATELET # BLD AUTO: 203 THOUSANDS/UL (ref 149–390)
PMV BLD AUTO: 11.1 FL (ref 8.9–12.7)
POTASSIUM SERPL-SCNC: 4.6 MMOL/L (ref 3.5–5.3)
PROT SERPL-MCNC: 7 G/DL (ref 6.4–8.2)
PROT UR STRIP-MCNC: NEGATIVE MG/DL
PROTHROMBIN TIME: 12.8 SECONDS (ref 11.6–14.5)
RBC # BLD AUTO: 3.61 MILLION/UL (ref 3.88–5.62)
RBC #/AREA URNS AUTO: ABNORMAL /HPF
SODIUM SERPL-SCNC: 135 MMOL/L (ref 136–145)
SP GR UR STRIP.AUTO: <=1.005 (ref 1–1.03)
UROBILINOGEN UR QL STRIP.AUTO: 0.2 E.U./DL
WBC # BLD AUTO: 9.55 THOUSAND/UL (ref 4.31–10.16)
WBC #/AREA URNS AUTO: ABNORMAL /HPF

## 2020-07-22 PROCEDURE — 87077 CULTURE AEROBIC IDENTIFY: CPT | Performed by: EMERGENCY MEDICINE

## 2020-07-22 PROCEDURE — 85025 COMPLETE CBC W/AUTO DIFF WBC: CPT | Performed by: EMERGENCY MEDICINE

## 2020-07-22 PROCEDURE — 87186 SC STD MICRODIL/AGAR DIL: CPT | Performed by: EMERGENCY MEDICINE

## 2020-07-22 PROCEDURE — 81001 URINALYSIS AUTO W/SCOPE: CPT | Performed by: EMERGENCY MEDICINE

## 2020-07-22 PROCEDURE — 99284 EMERGENCY DEPT VISIT MOD MDM: CPT | Performed by: EMERGENCY MEDICINE

## 2020-07-22 PROCEDURE — 87086 URINE CULTURE/COLONY COUNT: CPT | Performed by: EMERGENCY MEDICINE

## 2020-07-22 PROCEDURE — 85610 PROTHROMBIN TIME: CPT | Performed by: EMERGENCY MEDICINE

## 2020-07-22 PROCEDURE — 74177 CT ABD & PELVIS W/CONTRAST: CPT

## 2020-07-22 PROCEDURE — 36415 COLL VENOUS BLD VENIPUNCTURE: CPT | Performed by: EMERGENCY MEDICINE

## 2020-07-22 PROCEDURE — 80053 COMPREHEN METABOLIC PANEL: CPT | Performed by: EMERGENCY MEDICINE

## 2020-07-22 RX ORDER — CEPHALEXIN 250 MG/1
500 CAPSULE ORAL ONCE
Status: COMPLETED | OUTPATIENT
Start: 2020-07-22 | End: 2020-07-22

## 2020-07-22 RX ORDER — CEPHALEXIN 500 MG/1
500 CAPSULE ORAL EVERY 12 HOURS SCHEDULED
Qty: 14 CAPSULE | Refills: 0 | Status: SHIPPED | OUTPATIENT
Start: 2020-07-22 | End: 2020-07-29

## 2020-07-22 RX ORDER — INSULIN GLARGINE 100 [IU]/ML
35 INJECTION, SOLUTION SUBCUTANEOUS
COMMUNITY
End: 2020-11-03 | Stop reason: SDUPTHER

## 2020-07-22 RX ADMIN — CEPHALEXIN 500 MG: 250 CAPSULE ORAL at 02:41

## 2020-07-22 RX ADMIN — IOHEXOL 100 ML: 350 INJECTION, SOLUTION INTRAVENOUS at 01:46

## 2020-07-22 NOTE — DISCHARGE INSTRUCTIONS
Prostate enlargement  Left ureter is prominent however there is no enhancement or periureteral fat stranding  No stones are seen  I suspect this may resolve when the bladder is emptied  Circumferential wall thickening with focal areas of increased thickening; favor bladder wall hypertrophy however if this has not been examined by urologist, elective urologic consultation is recommended     A Estrella catheter seen with its tip in the   bladder  Bilateral hydroceles are noted

## 2020-07-22 NOTE — ED PROVIDER NOTES
History  Chief Complaint   Patient presents with    Urinary Catheter Problem     has been having increase in pain, reports pain between anus and testicles  Feels like he has the urgency to pee  Reports blood in urine since this am       82-year-old male presents for evaluation of urinary retention after Estrella catheter insertion 2 days ago  Patient was evaluated here and fully was inserted  Patient has a known history of BPH  Patient states he has had intermittent groin and perineal pain  Patient noted to have a leg bag it which she states he gets a full and then and backs up into his Estrella catheter  Patient also noted dark bloody color output from Estrella  Patient has appointment with urologist this Friday  Prior to Admission Medications   Prescriptions Last Dose Informant Patient Reported? Taking?    Cholecalciferol (VITAMIN D3) 5000 units CAPS 7/22/2020 at Unknown time  Yes Yes   Sig: Take 1 capsule by mouth   Cyanocobalamin (VITAMIN B12 PO) 7/22/2020 at Unknown time  Yes Yes   Sig: Take 5,000 mcg by mouth   albuterol (2 5 mg/3 mL) 0 083 % nebulizer solution   No No   Sig: Take 3 mL by nebulization every 6 (six) hours as needed for wheezing   albuterol (PROVENTIL HFA,VENTOLIN HFA) 90 mcg/act inhaler   No No   Sig: Inhale 2 puffs every 4 (four) hours as needed for wheezing   amLODIPine (NORVASC) 5 mg tablet 7/22/2020 at Unknown time  Yes Yes   Sig: Take 5 mg by mouth daily   aspirin 325 mg tablet 7/22/2020 at Unknown time  Yes Yes   Sig: Take 325 mg by mouth daily   carvedilol (COREG) 12 5 mg tablet 7/22/2020 at Unknown time  Yes Yes   Sig: Take 12 5 mg by mouth 2 (two) times a day with meals   clopidogrel (PLAVIX) 75 mg tablet 7/22/2020 at Unknown time  Yes Yes   Sig: Take 75 mg by mouth daily   dextromethorphan-guaiFENesin (ROBITUSSIN DM)  mg/5 mL syrup   No No   Sig: Take 5 mL by mouth 3 (three) times a day as needed for cough or congestion   finasteride (PROPECIA) 1 MG tablet 7/22/2020 at Unknown time  Yes Yes   Sig: Take 1 mg by mouth daily   furosemide (LASIX) 20 mg tablet 7/22/2020 at Unknown time  Yes Yes   Sig: Take 20 mg by mouth 2 (two) times a day   gabapentin (NEURONTIN) 300 mg capsule   Yes No   Sig: Take 100 mg by mouth 3 (three) times a day   glipiZIDE (GLUCOTROL) 5 mg tablet 7/22/2020 at Unknown time  Yes Yes   Sig: Take 5 mg by mouth 2 (two) times a day before meals   insulin glargine (LANTUS) 100 units/mL subcutaneous injection 7/22/2020 at Unknown time  Yes Yes   Sig: Inject 20 Units under the skin daily at bedtime   irbesartan (AVAPRO) 150 mg tablet 7/22/2020 at Unknown time  Yes Yes   Sig: Take 150 mg by mouth daily at bedtime   metFORMIN (GLUCOPHAGE) 1000 MG tablet 7/22/2020 at Unknown time  Yes Yes   Sig: Take 1,000 mg by mouth 2 (two) times a day with meals   ranitidine (ZANTAC) 150 mg tablet Not Taking at Unknown time  Yes No   Sig: Take 150 mg by mouth 2 (two) times a day   tamsulosin (FLOMAX) 0 4 mg 7/22/2020 at Unknown time  Yes Yes   Sig: Take 0 4 mg by mouth daily with dinner      Facility-Administered Medications: None       Past Medical History:   Diagnosis Date    Cardiac disease     CHF (congestive heart failure) ([place] [address] )     Diabetes mellitus ([place] [address] )     GERD (gastroesophageal reflux disease)     Hyperlipidemia     Hypertension     MI (myocardial infarction) ([place] [address] )        Past Surgical History:   Procedure Laterality Date    EYE SURGERY         History reviewed  No pertinent family history  I have reviewed and agree with the history as documented  E-Cigarette/Vaping    E-Cigarette Use Never User      E-Cigarette/Vaping Substances    Nicotine No     THC No     CBD No     Flavoring No     Other No     Unknown No      Social History     Tobacco Use    Smoking status: Never Smoker    Smokeless tobacco: Never Used   Substance Use Topics    Alcohol use: No    Drug use: No       Review of Systems   Constitutional: Negative for chills, diaphoresis and fever  HENT: Negative for congestion and rhinorrhea  Eyes: Negative for pain and visual disturbance  Respiratory: Negative for cough, shortness of breath and wheezing  Cardiovascular: Negative for chest pain and leg swelling  Gastrointestinal: Negative for abdominal pain, diarrhea, nausea and vomiting  Genitourinary: Positive for hematuria  Negative for difficulty urinating, dysuria, frequency, testicular pain and urgency  Groin pain   Musculoskeletal: Negative for back pain and neck pain  Skin: Negative for color change and rash  Neurological: Negative for syncope, numbness and headaches  All other systems reviewed and are negative  Physical Exam  Physical Exam   Constitutional: He is oriented to person, place, and time  He appears well-developed and well-nourished  HENT:   Head: Normocephalic and atraumatic  Eyes: Conjunctivae and EOM are normal    Neck: Normal range of motion  Neck supple  Cardiovascular: Normal rate and regular rhythm  Pulmonary/Chest: Effort normal  No respiratory distress  Abdominal: Soft  There is no tenderness  Genitourinary:   Genitourinary Comments: Estrella in place  No gross hematuria noted around meatus  Dark brown-colored urine output   Musculoskeletal: Normal range of motion  He exhibits no tenderness  Neurological: He is alert and oriented to person, place, and time  Skin: Skin is warm  No erythema  Psychiatric: He has a normal mood and affect  His behavior is normal    Nursing note and vitals reviewed        Vital Signs  ED Triage Vitals [07/22/20 0004]   Temperature Pulse Respirations Blood Pressure SpO2   98 2 °F (36 8 °C) 79 18 156/76 97 %      Temp Source Heart Rate Source Patient Position - Orthostatic VS BP Location FiO2 (%)   Tympanic Monitor Sitting Right arm --      Pain Score       --           Vitals:    07/22/20 0004 07/22/20 0145   BP: 156/76 148/65   Pulse: 79 88   Patient Position - Orthostatic VS: Sitting Sitting Visual Acuity      ED Medications  Medications   iohexol (OMNIPAQUE) 350 MG/ML injection (SINGLE-DOSE) 100 mL (100 mL Intravenous Given 7/22/20 0146)   cephalexin (KEFLEX) capsule 500 mg (500 mg Oral Given 7/22/20 0241)       Diagnostic Studies  Results Reviewed     Procedure Component Value Units Date/Time    Urine Microscopic [[id number]]  (Abnormal) Collected:  07/22/20 0151    Lab Status:  Final result Specimen:  Urine, Indwelling Estrella Catheter Updated:  07/22/20 0243     RBC, UA 10-20 /hpf      WBC, UA 10-20 /hpf      Epithelial Cells Occasional /hpf      Bacteria, UA Occasional /hpf     Urine culture [[id number]] Collected:  07/22/20 0151    Lab Status:   In process Specimen:  Urine, Indwelling Estrella Catheter Updated:  07/22/20 0243    UA w Reflex to Microscopic w Reflex to Culture [[id number]]  (Abnormal) Collected:  07/22/20 0151    Lab Status:  Final result Specimen:  Urine, Indwelling Estrella Catheter Updated:  07/22/20 0200     Color, UA Colorless     Clarity, UA Clear     Specific Gravity, UA <=1 005     pH, UA 6 5     Leukocytes, UA Small     Nitrite, UA Negative     Protein, UA Negative mg/dl      Glucose, UA Negative mg/dl      Ketones, UA Negative mg/dl      Urobilinogen, UA 0 2 E U /dl      Bilirubin, UA Negative     Blood, UA Large    Comprehensive metabolic panel [[id number]]  (Abnormal) Collected:  07/22/20 0036    Lab Status:  Final result Specimen:  Blood from Arm, Right Updated:  07/22/20 0122     Sodium 135 mmol/L      Potassium 4 6 mmol/L      Chloride 100 mmol/L      CO2 26 mmol/L      ANION GAP 9 mmol/L      BUN 17 mg/dL      Creatinine 0 81 mg/dL      Glucose 178 mg/dL      Calcium 8 6 mg/dL      AST 24 U/L      ALT 25 U/L      Alkaline Phosphatase 44 U/L      Total Protein 7 0 g/dL      Albumin 3 2 g/dL      Total Bilirubin 0 40 mg/dL      eGFR 82 ml/min/1 73sq m     Narrative:       Nikki guidelines for Chronic Kidney Disease (CKD):     Stage 1 with normal or high GFR (GFR > 90 mL/min/1 73 square meters)    Stage 2 Mild CKD (GFR = 60-89 mL/min/1 73 square meters)    Stage 3A Moderate CKD (GFR = 45-59 mL/min/1 73 square meters)    Stage 3B Moderate CKD (GFR = 30-44 mL/min/1 73 square meters)    Stage 4 Severe CKD (GFR = 15-29 mL/min/1 73 square meters)    Stage 5 End Stage CKD (GFR <15 mL/min/1 73 square meters)  Note: GFR calculation is accurate only with a steady state creatinine    Protime-INR [[id number]]  (Normal) Collected:  07/22/20 0057    Lab Status:  Final result Specimen:  Blood from Arm, Right Updated:  07/22/20 0117     Protime 12 8 seconds      INR 0 99    CBC and differential [[id number]]  (Abnormal) Collected:  07/22/20 0036    Lab Status:  Final result Specimen:  Blood from Arm, Right Updated:  07/22/20 0042     WBC 9 55 Thousand/uL      RBC 3 61 Million/uL      Hemoglobin 10 9 g/dL      Hematocrit 33 2 %      MCV 92 fL      MCH 30 2 pg      MCHC 32 8 g/dL      RDW 14 6 %      MPV 11 1 fL      Platelets 734 Thousands/uL      nRBC 0 /100 WBCs      Neutrophils Relative 77 %      Immat GRANS % 0 %      Lymphocytes Relative 11 %      Monocytes Relative 8 %      Eosinophils Relative 4 %      Basophils Relative 0 %      Neutrophils Absolute 7 36 Thousands/µL      Immature Grans Absolute 0 03 Thousand/uL      Lymphocytes Absolute 1 03 Thousands/µL      Monocytes Absolute 0 77 Thousand/µL      Eosinophils Absolute 0 34 Thousand/µL      Basophils Absolute 0 02 Thousands/µL                  CT abdomen pelvis with contrast   Final Result by Len Smart MD (07/22 0206)   Bilateral hydroceles are noted  Prostate enlargement  Left ureter is prominent however there is no enhancement or periureteral fat stranding  No stones are seen  I suspect this may resolve when the bladder is emptied        Circumferential wall thickening with focal areas of increased thickening; favor bladder wall hypertrophy however if this has not been examined by urologist, elective urologic consultation is recommended     A Estrella catheter seen with its tip in the    bladder  Bilateral hydroceles are noted  Workstation performed: [id number]                    Procedures  Procedures         ED Course       US AUDIT      Most Recent Value   Initial Alcohol Screen: US AUDIT-C    1  How often do you have a drink containing alcohol?  0 Filed at: 07/22/2020 0005   2  How many drinks containing alcohol do you have on a typical day you are drinking? 0 Filed at: 07/22/2020 0005   3a  Male UNDER 65: How often do you have five or more drinks on one occasion? 0 Filed at: 07/22/2020 0005   3b  FEMALE Any Age, or MALE 65+: How often do you have 4 or more drinks on one occassion? 0 Filed at: 07/22/2020 0005   Audit-C Score  0 Filed at: 07/22/2020 0005                  MERY/DAST-10      Most Recent Value   How many times in the past year have you    Used an illegal drug or used a prescription medication for non-medical reasons? Never Filed at: 07/22/2020 0006                                MDM  Number of Diagnoses or Management Options  Bilateral hydrocele:   Enlarged prostate:   Hematuria:   Hemorrhagic cystitis:   Diagnosis management comments: 61-year-old male presenting with hematuria and urinary retention  Will flush Estrella and use large irrigation as needed  Obtain urinalysis, CT abdomen pelvis given groin pain  Patient has significant improvement of symptoms after clots removed with manual flushing  Urine cleared with very small amount of pink tinge  I suspect urinary retention from small bag which may have led to hemorrhagic cystitis/urinary tract infection  Will prescribe Keflex  Advised to follow up with urologist as scheduled in 2 days  Return precautions provided  Education provided to patient and family member regarding flushing Estrella catheter          Disposition  Final diagnoses:   Enlarged prostate   Bilateral hydrocele   Hematuria   Hemorrhagic cystitis     Time reflects when diagnosis was documented in both MDM as applicable and the Disposition within this note     Time User Action Codes Description Comment    7/22/2020  2:31 AM Jessica Miracle Add [N40 0] Enlarged prostate     7/22/2020  2:32 AM Jessica Miracle Add [N43 3] Bilateral hydrocele     7/22/2020  2:32 AM Jessica Miracle Add [R31 9] Hematuria     7/22/2020  2:32 AM Jessica Miracle Add [N30 91] Hemorrhagic cystitis       ED Disposition     ED Disposition Condition Date/Time Comment    Discharge Stable Wed Jul 22, 2020  2:33 AM Merly Hall discharge to home/self care              Follow-up Information     Follow up With Specialties Details Why Contact Info Additional [address] For Urology [facility] Urology On 7/24/2020 As scheduled [address] [address] [address] [facility] [address]  [phone] [facility] [place] [address], [place], [place], [address]     [address] Emergency Department Emergency Medicine  If symptoms worsen [address] [address]  [phone] [address] ED, [address] [facility], [facility] [address]          Discharge Medication List as of 7/22/2020  2:34 AM      START taking these medications    Details   cephalexin (KEFLEX) 500 mg capsule Take 1 capsule (500 mg total) by mouth every 12 (twelve) hours for 7 days, Starting Wed 7/22/2020, Until Wed 7/29/2020, Print         CONTINUE these medications which have NOT CHANGED    Details   amLODIPine (NORVASC) 5 mg tablet Take 5 mg by mouth daily, Historical Med      aspirin 325 mg tablet Take 325 mg by mouth daily, Historical Med      carvedilol (COREG) 12 5 mg tablet Take 12 5 mg by mouth 2 (two) times a day with meals, Historical Med      Cholecalciferol (VITAMIN D3) 5000 units CAPS Take 1 capsule by mouth, Historical Med      clopidogrel (PLAVIX) 75 mg tablet Take 75 mg by mouth daily, Historical Med      Cyanocobalamin (VITAMIN B12 PO) Take 5,000 mcg by mouth, Historical Med      finasteride (PROPECIA) 1 MG tablet Take 1 mg by mouth daily, Historical Med      furosemide (LASIX) 20 mg tablet Take 20 mg by mouth 2 (two) times a day, Historical Med      glipiZIDE (GLUCOTROL) 5 mg tablet Take 5 mg by mouth 2 (two) times a day before meals, Historical Med      insulin glargine (LANTUS) 100 units/mL subcutaneous injection Inject 20 Units under the skin daily at bedtime, Historical Med      irbesartan (AVAPRO) 150 mg tablet Take 150 mg by mouth daily at bedtime, Historical Med      metFORMIN (GLUCOPHAGE) 1000 MG tablet Take 1,000 mg by mouth 2 (two) times a day with meals, Historical Med      tamsulosin (FLOMAX) 0 4 mg Take 0 4 mg by mouth daily with dinner, Historical Med      albuterol (2 5 mg/3 mL) 0 083 % nebulizer solution Take 3 mL by nebulization every 6 (six) hours as needed for wheezing, Starting Mon 12/11/2017, Print      albuterol (PROVENTIL HFA,VENTOLIN HFA) 90 mcg/act inhaler Inhale 2 puffs every 4 (four) hours as needed for wheezing, Starting Mon 12/11/2017, Print      dextromethorphan-guaiFENesin (ROBITUSSIN DM)  mg/5 mL syrup Take 5 mL by mouth 3 (three) times a day as needed for cough or congestion, Starting Mon 12/11/2017, Print      gabapentin (NEURONTIN) 300 mg capsule Take 100 mg by mouth 3 (three) times a day, Historical Med      ranitidine (ZANTAC) 150 mg tablet Take 150 mg by mouth 2 (two) times a day, Historical Med           No discharge procedures on file      PDMP Review     None          ED Provider  Electronically Signed by           Santa Wade DO  07/22/20 8239

## 2020-07-22 NOTE — ED NOTES
Education given to patient and son regarding winslow irrigation, demonstration performed  Expressed understanding of the same  No questions or concerns at this time        Dalila Holbrook RN  07/22/20 2986

## 2020-07-24 ENCOUNTER — OFFICE VISIT (OUTPATIENT)
Dept: UROLOGY | Facility: HOSPITAL | Age: 85
End: 2020-07-24
Payer: MEDICARE

## 2020-07-24 VITALS
HEART RATE: 90 BPM | TEMPERATURE: 97.2 F | BODY MASS INDEX: 35.36 KG/M2 | SYSTOLIC BLOOD PRESSURE: 128 MMHG | HEIGHT: 66 IN | WEIGHT: 220 LBS | DIASTOLIC BLOOD PRESSURE: 58 MMHG

## 2020-07-24 DIAGNOSIS — N40.1 BENIGN PROSTATIC HYPERPLASIA WITH LOWER URINARY TRACT SYMPTOMS, SYMPTOM DETAILS UNSPECIFIED: Primary | ICD-10-CM

## 2020-07-24 LAB — BACTERIA UR CULT: ABNORMAL

## 2020-07-24 PROCEDURE — 99204 OFFICE O/P NEW MOD 45 MIN: CPT | Performed by: UROLOGY

## 2020-07-24 RX ORDER — FINASTERIDE 5 MG/1
5 TABLET, FILM COATED ORAL DAILY
Qty: 90 TABLET | Refills: 3 | Status: SHIPPED | OUTPATIENT
Start: 2020-07-24 | End: 2020-11-03 | Stop reason: SDUPTHER

## 2020-07-24 RX ORDER — TAMSULOSIN HYDROCHLORIDE 0.4 MG/1
0.8 CAPSULE ORAL
Qty: 180 CAPSULE | Refills: 3 | Status: SHIPPED | OUTPATIENT
Start: 2020-07-24 | End: 2020-10-15

## 2020-07-24 NOTE — PROGRESS NOTES
Assessment/Plan:    Benign prostatic hyperplasia with lower urinary tract symptoms  We will make some adjustments to his medications  I will increase his tamsulosin to 0 8 mg daily  I will also increase his finasteride 5 mg daily  We discussed that this is the appropriate dose for affect on the prostate  We will bring the patient back next week for a void trial   If he fails this he will need cystoscopy and a discussion regarding surgical treatments  If he passes the void trial he could stay on dual medication therapy or still proceed with cystoscopy and surgery if he wishes to get off of medications  Diagnoses and all orders for this visit:    Benign prostatic hyperplasia with lower urinary tract symptoms, symptom details unspecified  -     tamsulosin (FLOMAX) 0 4 mg; Take 2 capsules (0 8 mg total) by mouth daily with dinner  -     finasteride (PROSCAR) 5 mg tablet; Take 1 tablet (5 mg total) by mouth daily          Total visit time was 60 minutes of which over 50% was spent on counseling  Subjective:     Patient ID: Kandi Mcgowan is a 82 y o  male    82-year-old male presents for evaluation after being seen in the emergency department for urinary retention  The patient has a history of BPH and was already taking tamsulosin and 1 mg of finasteride  Patient denies any previous history of urinary retention however  Estrella catheter was placed in the emergency room  The patient re-presented to the ER 1-2 days later complaining of gross hematuria  He was irrigated and discharged on Keflex  Urine culture did grow E coli  Patient is currently taking the antibiotic  He denies any further hematuria or issues with the catheter          The following portions of the patient's history were reviewed and updated as appropriate: allergies, current medications, past family history, past medical history, past social history, past surgical history and problem list     Review of Systems   Constitutional: Negative  HENT: Negative  Eyes: Negative  Respiratory: Negative  Cardiovascular: Negative  Gastrointestinal: Negative  Endocrine: Negative  Genitourinary:        As noted per HPI   Musculoskeletal: Negative  Skin: Negative  Allergic/Immunologic: Negative  Neurological: Negative  Hematological: Negative  Psychiatric/Behavioral: Negative  Objective:    Physical Exam   Constitutional: He is oriented to person, place, and time  He appears well-developed and well-nourished  HENT:   Head: Normocephalic and atraumatic  Neck: Normal range of motion  Cardiovascular: Intact distal pulses  Pulmonary/Chest: Effort normal    Abdominal: Soft  Bowel sounds are normal  He exhibits no distension and no mass  There is no tenderness  There is no rebound and no guarding  Genitourinary:   Genitourinary Comments: Estrella catheter with clear yellow urine  Musculoskeletal: Normal range of motion  Neurological: He is alert and oriented to person, place, and time  Skin: Skin is warm and dry  Psychiatric: He has a normal mood and affect  His behavior is normal    Vitals reviewed           Results  No results found for: PSA  Lab Results   Component Value Date    CALCIUM 8 6 07/22/2020    K 4 6 07/22/2020    CO2 26 07/22/2020     07/22/2020    BUN 17 07/22/2020    CREATININE 0 81 07/22/2020     Lab Results   Component Value Date    WBC 9 55 07/22/2020    HGB 10 9 (L) 07/22/2020    HCT 33 2 (L) 07/22/2020    MCV 92 07/22/2020     07/22/2020       No results found for this or any previous visit (from the past 1 hour(s)) ]

## 2020-07-27 PROBLEM — N40.1 BENIGN PROSTATIC HYPERPLASIA WITH LOWER URINARY TRACT SYMPTOMS: Status: ACTIVE | Noted: 2020-07-27

## 2020-07-27 NOTE — ASSESSMENT & PLAN NOTE
We will make some adjustments to his medications  I will increase his tamsulosin to 0 8 mg daily  I will also increase his finasteride 5 mg daily  We discussed that this is the appropriate dose for affect on the prostate  We will bring the patient back next week for a void trial   If he fails this he will need cystoscopy and a discussion regarding surgical treatments  If he passes the void trial he could stay on dual medication therapy or still proceed with cystoscopy and surgery if he wishes to get off of medications

## 2020-07-28 ENCOUNTER — CLINICAL SUPPORT (OUTPATIENT)
Dept: UROLOGY | Facility: HOSPITAL | Age: 85
End: 2020-07-28
Payer: MEDICARE

## 2020-07-28 VITALS
TEMPERATURE: 97.2 F | DIASTOLIC BLOOD PRESSURE: 60 MMHG | SYSTOLIC BLOOD PRESSURE: 110 MMHG | WEIGHT: 222.8 LBS | BODY MASS INDEX: 35.81 KG/M2 | HEIGHT: 66 IN | HEART RATE: 60 BPM

## 2020-07-28 DIAGNOSIS — R33.9 URINARY RETENTION: Primary | ICD-10-CM

## 2020-07-28 PROCEDURE — 51798 US URINE CAPACITY MEASURE: CPT

## 2020-07-29 LAB — POST-VOID RESIDUAL VOLUME, ML POC: 632 ML

## 2020-07-29 NOTE — PROGRESS NOTES
7/28/2020    Merly Lawler  1935  [id number]    Diagnosis  Chief Complaint     Urinary Retention          Patient presents for void trial managed by Dr Daiana Sheffield  1 month winslow change and Cystoscopy    Procedure Winslow removal/voiding trial    Winslow catheter removed after deflation of an intact balloon  Patient tolerated well  Encouraged patient to hydrate well and return this afternoon for post void residual   He knows he may return early if uncomfortable and unable to urinate  Patient agrees to this plan  Patient had son present to help with interpetation    Patient returned this afternoon  Patient states able to void  Patient did not void while in office  Bladder ultrasound performed and PVR measured 632ml      Recent Results (from the past 4 hour(s))   POCT Measure PVR    Collection Time: 07/29/20  7:52 AM   Result Value Ref Range    POST-VOID RESIDUAL VOLUME, ML  mL           Vitals:    07/28/20 0757   BP: 110/60   Pulse: 60   Temp: (!) 97 2 °F (36 2 °C)   TempSrc: Temporal   Weight: 101 kg (222 lb 12 8 oz)   Height: 5' 6" (1 676 m)           Shiloh Mcgrath RN

## 2020-08-16 ENCOUNTER — HOSPITAL ENCOUNTER (INPATIENT)
Facility: HOSPITAL | Age: 85
LOS: 3 days | Discharge: HOME | DRG: 698 | End: 2020-08-19
Attending: EMERGENCY MEDICINE | Admitting: INTERNAL MEDICINE
Payer: MEDICARE

## 2020-08-16 ENCOUNTER — APPOINTMENT (EMERGENCY)
Dept: CT IMAGING | Facility: HOSPITAL | Age: 85
DRG: 698 | End: 2020-08-16
Payer: MEDICARE

## 2020-08-16 ENCOUNTER — APPOINTMENT (EMERGENCY)
Dept: RADIOLOGY | Facility: HOSPITAL | Age: 85
DRG: 698 | End: 2020-08-16
Payer: MEDICARE

## 2020-08-16 DIAGNOSIS — N39.0 URINARY TRACT INFECTION ASSOCIATED WITH INDWELLING URETHRAL CATHETER, INITIAL ENCOUNTER (HCC): ICD-10-CM

## 2020-08-16 DIAGNOSIS — T83.511A URINARY TRACT INFECTION ASSOCIATED WITH INDWELLING URETHRAL CATHETER, INITIAL ENCOUNTER (HCC): ICD-10-CM

## 2020-08-16 DIAGNOSIS — A41.9 SEPSIS (HCC): Primary | ICD-10-CM

## 2020-08-16 DIAGNOSIS — N40.1 BENIGN PROSTATIC HYPERPLASIA WITH LOWER URINARY TRACT SYMPTOMS, SYMPTOM DETAILS UNSPECIFIED: ICD-10-CM

## 2020-08-16 DIAGNOSIS — R78.81 BACTEREMIA: ICD-10-CM

## 2020-08-16 DIAGNOSIS — I50.9 CONGESTIVE HEART FAILURE, UNSPECIFIED HF CHRONICITY, UNSPECIFIED HEART FAILURE TYPE (HCC): ICD-10-CM

## 2020-08-16 PROBLEM — E78.5 HYPERLIPIDEMIA: Status: ACTIVE | Noted: 2017-12-18

## 2020-08-16 PROBLEM — R10.9 ABDOMINAL PAIN OF MULTIPLE SITES: Status: ACTIVE | Noted: 2017-12-18

## 2020-08-16 PROBLEM — I25.10 CAD (CORONARY ARTERY DISEASE): Status: ACTIVE | Noted: 2017-12-18

## 2020-08-16 PROBLEM — R52 PAIN: Status: ACTIVE | Noted: 2017-12-18

## 2020-08-16 PROBLEM — K21.9 GERD (GASTROESOPHAGEAL REFLUX DISEASE): Status: ACTIVE | Noted: 2017-12-18

## 2020-08-16 PROBLEM — E11.65 POORLY CONTROLLED TYPE 2 DIABETES MELLITUS (HCC): Status: ACTIVE | Noted: 2017-12-18

## 2020-08-16 PROBLEM — I21.9 MYOCARDIAL INFARCTION (HCC): Status: ACTIVE | Noted: 2017-12-18

## 2020-08-16 PROBLEM — N40.0 ENLARGED PROSTATE: Status: ACTIVE | Noted: 2017-12-18

## 2020-08-16 PROBLEM — R35.0 INCREASED FREQUENCY OF URINATION: Status: ACTIVE | Noted: 2017-12-18

## 2020-08-16 PROBLEM — I10 HTN (HYPERTENSION): Status: ACTIVE | Noted: 2017-12-18

## 2020-08-16 LAB
ALBUMIN SERPL BCP-MCNC: 3 G/DL (ref 3.5–5)
ALP SERPL-CCNC: 62 U/L (ref 46–116)
ALT SERPL W P-5'-P-CCNC: 23 U/L (ref 12–78)
AMORPH PHOS CRY URNS QL MICRO: ABNORMAL /HPF
ANION GAP SERPL CALCULATED.3IONS-SCNC: 7 MMOL/L (ref 4–13)
AST SERPL W P-5'-P-CCNC: 15 U/L (ref 5–45)
BACTERIA UR QL AUTO: ABNORMAL /HPF
BASOPHILS # BLD MANUAL: 0 THOUSAND/UL (ref 0–0.1)
BASOPHILS NFR MAR MANUAL: 0 % (ref 0–1)
BILIRUB SERPL-MCNC: 0.4 MG/DL (ref 0.2–1)
BILIRUB UR QL STRIP: NEGATIVE
BUN SERPL-MCNC: 15 MG/DL (ref 5–25)
CALCIUM SERPL-MCNC: 8.5 MG/DL (ref 8.3–10.1)
CHLORIDE SERPL-SCNC: 103 MMOL/L (ref 100–108)
CLARITY UR: CLEAR
CO2 SERPL-SCNC: 26 MMOL/L (ref 21–32)
COLOR UR: YELLOW
CREAT SERPL-MCNC: 1.08 MG/DL (ref 0.6–1.3)
EOSINOPHIL # BLD MANUAL: 0.08 THOUSAND/UL (ref 0–0.4)
EOSINOPHIL NFR BLD MANUAL: 2 % (ref 0–6)
ERYTHROCYTE [DISTWIDTH] IN BLOOD BY AUTOMATED COUNT: 13.7 % (ref 11.6–15.1)
GFR SERPL CREATININE-BSD FRML MDRD: 63 ML/MIN/1.73SQ M
GLUCOSE SERPL-MCNC: 105 MG/DL (ref 65–140)
GLUCOSE SERPL-MCNC: 154 MG/DL (ref 65–140)
GLUCOSE SERPL-MCNC: 200 MG/DL (ref 65–140)
GLUCOSE SERPL-MCNC: 230 MG/DL (ref 65–140)
GLUCOSE SERPL-MCNC: 417 MG/DL (ref 65–140)
GLUCOSE UR STRIP-MCNC: NEGATIVE MG/DL
HCT VFR BLD AUTO: 37.8 % (ref 36.5–49.3)
HGB BLD-MCNC: 12.5 G/DL (ref 12–17)
HGB UR QL STRIP.AUTO: ABNORMAL
KETONES UR STRIP-MCNC: NEGATIVE MG/DL
LACTATE SERPL-SCNC: 1.8 MMOL/L (ref 0.5–2)
LACTATE SERPL-SCNC: 2.1 MMOL/L (ref 0.5–2)
LEUKOCYTE ESTERASE UR QL STRIP: ABNORMAL
LG PLATELETS BLD QL SMEAR: PRESENT
LYMPHOCYTES # BLD AUTO: 0.32 THOUSAND/UL (ref 0.6–4.47)
LYMPHOCYTES # BLD AUTO: 8 % (ref 14–44)
MCH RBC QN AUTO: 30.3 PG (ref 26.8–34.3)
MCHC RBC AUTO-ENTMCNC: 33.1 G/DL (ref 31.4–37.4)
MCV RBC AUTO: 92 FL (ref 82–98)
MONOCYTES # BLD AUTO: 0.08 THOUSAND/UL (ref 0–1.22)
MONOCYTES NFR BLD: 2 % (ref 4–12)
NEUTROPHILS # BLD MANUAL: 3.41 THOUSAND/UL (ref 1.85–7.62)
NEUTS SEG NFR BLD AUTO: 86 % (ref 43–75)
NITRITE UR QL STRIP: POSITIVE
NON-SQ EPI CELLS URNS QL MICRO: ABNORMAL /HPF
NRBC BLD AUTO-RTO: 0 /100 WBCS
OTHER STN SPEC: ABNORMAL
OVALOCYTES BLD QL SMEAR: PRESENT
PH UR STRIP.AUTO: 6.5 [PH]
PLATELET # BLD AUTO: 215 THOUSANDS/UL (ref 149–390)
PLATELET BLD QL SMEAR: ADEQUATE
PMV BLD AUTO: 10.8 FL (ref 8.9–12.7)
POTASSIUM SERPL-SCNC: 4.4 MMOL/L (ref 3.5–5.3)
PROT SERPL-MCNC: 7 G/DL (ref 6.4–8.2)
PROT UR STRIP-MCNC: ABNORMAL MG/DL
RBC # BLD AUTO: 4.13 MILLION/UL (ref 3.88–5.62)
RBC #/AREA URNS AUTO: ABNORMAL /HPF
RBC MORPH BLD: PRESENT
SARS-COV-2 RNA RESP QL NAA+PROBE: NEGATIVE
SODIUM SERPL-SCNC: 136 MMOL/L (ref 136–145)
SP GR UR STRIP.AUTO: <=1.005 (ref 1–1.03)
TOTAL CELLS COUNTED SPEC: 100
UROBILINOGEN UR QL STRIP.AUTO: 0.2 E.U./DL
VARIANT LYMPHS # BLD AUTO: 2 %
WBC # BLD AUTO: 3.97 THOUSAND/UL (ref 4.31–10.16)
WBC #/AREA URNS AUTO: ABNORMAL /HPF

## 2020-08-16 PROCEDURE — 87635 SARS-COV-2 COVID-19 AMP PRB: CPT | Performed by: EMERGENCY MEDICINE

## 2020-08-16 PROCEDURE — 87077 CULTURE AEROBIC IDENTIFY: CPT | Performed by: EMERGENCY MEDICINE

## 2020-08-16 PROCEDURE — 85027 COMPLETE CBC AUTOMATED: CPT | Performed by: EMERGENCY MEDICINE

## 2020-08-16 PROCEDURE — 87040 BLOOD CULTURE FOR BACTERIA: CPT | Performed by: EMERGENCY MEDICINE

## 2020-08-16 PROCEDURE — 85007 BL SMEAR W/DIFF WBC COUNT: CPT | Performed by: EMERGENCY MEDICINE

## 2020-08-16 PROCEDURE — 71260 CT THORAX DX C+: CPT

## 2020-08-16 PROCEDURE — 87186 SC STD MICRODIL/AGAR DIL: CPT | Performed by: EMERGENCY MEDICINE

## 2020-08-16 PROCEDURE — 80053 COMPREHEN METABOLIC PANEL: CPT | Performed by: EMERGENCY MEDICINE

## 2020-08-16 PROCEDURE — 96360 HYDRATION IV INFUSION INIT: CPT

## 2020-08-16 PROCEDURE — 71045 X-RAY EXAM CHEST 1 VIEW: CPT

## 2020-08-16 PROCEDURE — 99285 EMERGENCY DEPT VISIT HI MDM: CPT

## 2020-08-16 PROCEDURE — 99223 1ST HOSP IP/OBS HIGH 75: CPT | Performed by: INTERNAL MEDICINE

## 2020-08-16 PROCEDURE — 81001 URINALYSIS AUTO W/SCOPE: CPT | Performed by: EMERGENCY MEDICINE

## 2020-08-16 PROCEDURE — 36415 COLL VENOUS BLD VENIPUNCTURE: CPT | Performed by: EMERGENCY MEDICINE

## 2020-08-16 PROCEDURE — 99285 EMERGENCY DEPT VISIT HI MDM: CPT | Performed by: EMERGENCY MEDICINE

## 2020-08-16 PROCEDURE — 83605 ASSAY OF LACTIC ACID: CPT | Performed by: EMERGENCY MEDICINE

## 2020-08-16 PROCEDURE — 82948 REAGENT STRIP/BLOOD GLUCOSE: CPT

## 2020-08-16 PROCEDURE — 93005 ELECTROCARDIOGRAM TRACING: CPT

## 2020-08-16 PROCEDURE — 74177 CT ABD & PELVIS W/CONTRAST: CPT

## 2020-08-16 PROCEDURE — G1004 CDSM NDSC: HCPCS

## 2020-08-16 PROCEDURE — 96365 THER/PROPH/DIAG IV INF INIT: CPT

## 2020-08-16 PROCEDURE — 87086 URINE CULTURE/COLONY COUNT: CPT | Performed by: EMERGENCY MEDICINE

## 2020-08-16 RX ORDER — ASPIRIN 325 MG
325 TABLET ORAL DAILY
Status: DISCONTINUED | OUTPATIENT
Start: 2020-08-16 | End: 2020-08-19 | Stop reason: HOSPADM

## 2020-08-16 RX ORDER — GABAPENTIN 300 MG/1
300 CAPSULE ORAL 2 TIMES DAILY
COMMUNITY
End: 2021-04-27 | Stop reason: SDUPTHER

## 2020-08-16 RX ORDER — TAMSULOSIN HYDROCHLORIDE 0.4 MG/1
0.8 CAPSULE ORAL
Status: DISCONTINUED | OUTPATIENT
Start: 2020-08-16 | End: 2020-08-19 | Stop reason: HOSPADM

## 2020-08-16 RX ORDER — ACETAMINOPHEN 325 MG/1
650 TABLET ORAL ONCE
Status: COMPLETED | OUTPATIENT
Start: 2020-08-16 | End: 2020-08-16

## 2020-08-16 RX ORDER — FAMOTIDINE 20 MG/1
20 TABLET, FILM COATED ORAL DAILY
Status: DISCONTINUED | OUTPATIENT
Start: 2020-08-16 | End: 2020-08-19 | Stop reason: HOSPADM

## 2020-08-16 RX ORDER — GABAPENTIN 300 MG/1
300 CAPSULE ORAL 2 TIMES DAILY
Status: DISCONTINUED | OUTPATIENT
Start: 2020-08-16 | End: 2020-08-19 | Stop reason: HOSPADM

## 2020-08-16 RX ORDER — FINASTERIDE 5 MG/1
5 TABLET, FILM COATED ORAL DAILY
Status: DISCONTINUED | OUTPATIENT
Start: 2020-08-16 | End: 2020-08-19 | Stop reason: HOSPADM

## 2020-08-16 RX ORDER — NITROGLYCERIN 0.4 MG/1
0.4 TABLET SUBLINGUAL
COMMUNITY
End: 2020-11-03 | Stop reason: SDUPTHER

## 2020-08-16 RX ORDER — NITROGLYCERIN 0.4 MG/1
0.4 TABLET SUBLINGUAL
Status: DISCONTINUED | OUTPATIENT
Start: 2020-08-16 | End: 2020-08-19 | Stop reason: HOSPADM

## 2020-08-16 RX ORDER — CEFEPIME HYDROCHLORIDE 2 G/50ML
2000 INJECTION, SOLUTION INTRAVENOUS ONCE
Status: COMPLETED | OUTPATIENT
Start: 2020-08-16 | End: 2020-08-16

## 2020-08-16 RX ORDER — ONDANSETRON 2 MG/ML
1 INJECTION INTRAMUSCULAR; INTRAVENOUS ONCE
Status: COMPLETED | OUTPATIENT
Start: 2020-08-16 | End: 2020-08-16

## 2020-08-16 RX ORDER — CARVEDILOL 25 MG/1
25 TABLET ORAL 2 TIMES DAILY WITH MEALS
Status: ON HOLD | COMMUNITY
End: 2020-08-19 | Stop reason: SDUPTHER

## 2020-08-16 RX ORDER — ISOSORBIDE MONONITRATE 30 MG/1
30 TABLET, EXTENDED RELEASE ORAL DAILY
COMMUNITY
End: 2020-09-29 | Stop reason: SINTOL

## 2020-08-16 RX ORDER — CEFEPIME HYDROCHLORIDE 2 G/50ML
2000 INJECTION, SOLUTION INTRAVENOUS EVERY 12 HOURS
Status: DISCONTINUED | OUTPATIENT
Start: 2020-08-16 | End: 2020-08-19

## 2020-08-16 RX ORDER — MELOXICAM 15 MG/1
15 TABLET ORAL DAILY
COMMUNITY

## 2020-08-16 RX ORDER — SODIUM CHLORIDE, SODIUM GLUCONATE, SODIUM ACETATE, POTASSIUM CHLORIDE, MAGNESIUM CHLORIDE, SODIUM PHOSPHATE, DIBASIC, AND POTASSIUM PHOSPHATE .53; .5; .37; .037; .03; .012; .00082 G/100ML; G/100ML; G/100ML; G/100ML; G/100ML; G/100ML; G/100ML
100 INJECTION, SOLUTION INTRAVENOUS CONTINUOUS
Status: DISCONTINUED | OUTPATIENT
Start: 2020-08-16 | End: 2020-08-18

## 2020-08-16 RX ORDER — ALBUTEROL SULFATE 90 UG/1
2 AEROSOL, METERED RESPIRATORY (INHALATION) EVERY 4 HOURS PRN
Status: DISCONTINUED | OUTPATIENT
Start: 2020-08-16 | End: 2020-08-19 | Stop reason: HOSPADM

## 2020-08-16 RX ORDER — FAMOTIDINE 40 MG/1
40 TABLET, FILM COATED ORAL DAILY
COMMUNITY
End: 2020-11-03 | Stop reason: SDUPTHER

## 2020-08-16 RX ORDER — SIMVASTATIN 20 MG
20 TABLET ORAL
COMMUNITY
End: 2020-11-03 | Stop reason: SDUPTHER

## 2020-08-16 RX ORDER — CLOPIDOGREL BISULFATE 75 MG/1
75 TABLET ORAL DAILY
Status: DISCONTINUED | OUTPATIENT
Start: 2020-08-16 | End: 2020-08-19 | Stop reason: HOSPADM

## 2020-08-16 RX ORDER — INSULIN GLARGINE 100 [IU]/ML
20 INJECTION, SOLUTION SUBCUTANEOUS
Status: DISCONTINUED | OUTPATIENT
Start: 2020-08-16 | End: 2020-08-19 | Stop reason: HOSPADM

## 2020-08-16 RX ORDER — PRAVASTATIN SODIUM 40 MG
40 TABLET ORAL
Status: DISCONTINUED | OUTPATIENT
Start: 2020-08-16 | End: 2020-08-19 | Stop reason: HOSPADM

## 2020-08-16 RX ADMIN — GABAPENTIN 300 MG: 300 CAPSULE ORAL at 14:41

## 2020-08-16 RX ADMIN — INSULIN LISPRO 5 UNITS: 100 INJECTION, SOLUTION INTRAVENOUS; SUBCUTANEOUS at 22:01

## 2020-08-16 RX ADMIN — GABAPENTIN 300 MG: 300 CAPSULE ORAL at 17:12

## 2020-08-16 RX ADMIN — ASPIRIN 325 MG ORAL TABLET 325 MG: 325 PILL ORAL at 14:40

## 2020-08-16 RX ADMIN — CEFEPIME HYDROCHLORIDE 2000 MG: 2 INJECTION, SOLUTION INTRAVENOUS at 21:21

## 2020-08-16 RX ADMIN — SODIUM CHLORIDE 500 ML: 0.9 INJECTION, SOLUTION INTRAVENOUS at 09:29

## 2020-08-16 RX ADMIN — IOHEXOL 100 ML: 350 INJECTION, SOLUTION INTRAVENOUS at 09:20

## 2020-08-16 RX ADMIN — ACETAMINOPHEN 650 MG: 325 TABLET, FILM COATED ORAL at 08:20

## 2020-08-16 RX ADMIN — ENOXAPARIN SODIUM 40 MG: 40 INJECTION SUBCUTANEOUS at 14:41

## 2020-08-16 RX ADMIN — FINASTERIDE 5 MG: 5 TABLET, FILM COATED ORAL at 14:41

## 2020-08-16 RX ADMIN — TAMSULOSIN HYDROCHLORIDE 0.8 MG: 0.4 CAPSULE ORAL at 17:12

## 2020-08-16 RX ADMIN — PRAVASTATIN SODIUM 40 MG: 40 TABLET ORAL at 17:12

## 2020-08-16 RX ADMIN — CLOPIDOGREL BISULFATE 75 MG: 75 TABLET ORAL at 14:40

## 2020-08-16 RX ADMIN — SODIUM CHLORIDE, SODIUM GLUCONATE, SODIUM ACETATE, POTASSIUM CHLORIDE, MAGNESIUM CHLORIDE, SODIUM PHOSPHATE, DIBASIC, AND POTASSIUM PHOSPHATE 100 ML/HR: .53; .5; .37; .037; .03; .012; .00082 INJECTION, SOLUTION INTRAVENOUS at 17:12

## 2020-08-16 RX ADMIN — INSULIN LISPRO 3 UNITS: 100 INJECTION, SOLUTION INTRAVENOUS; SUBCUTANEOUS at 17:12

## 2020-08-16 RX ADMIN — CEFEPIME HYDROCHLORIDE 2000 MG: 2 INJECTION, SOLUTION INTRAVENOUS at 08:44

## 2020-08-16 RX ADMIN — INSULIN GLARGINE 20 UNITS: 100 INJECTION, SOLUTION SUBCUTANEOUS at 21:20

## 2020-08-16 RX ADMIN — FAMOTIDINE 20 MG: 20 TABLET ORAL at 14:40

## 2020-08-16 NOTE — ASSESSMENT & PLAN NOTE
History of BPH, on finasteride and tamsulosin status post Estrella catheter placement  Continue with home meds  Continue with scheduled outpatient urology appointment

## 2020-08-16 NOTE — PLAN OF CARE
Problem: Potential for Falls  Goal: Patient will remain free of falls  Description: INTERVENTIONS:  - Assess patient frequently for physical needs  -  Identify cognitive and physical deficits and behaviors that affect risk of falls    -  [place] fall precautions as indicated by assessment   - Educate patient/family on patient safety including physical limitations  - Instruct patient to call for assistance with activity based on assessment  - Modify environment to reduce risk of injury  - Consider OT/PT consult to assist with strengthening/mobility  Outcome: Progressing     Problem: Prexisting or High Potential for Compromised Skin Integrity  Goal: Skin integrity is maintained or improved  Description: INTERVENTIONS:  - Identify patients at risk for skin breakdown  - Assess and monitor skin integrity  - Assess and monitor nutrition and hydration status  - Monitor labs   - Assess for incontinence   - Turn and reposition patient  - Assist with mobility/ambulation  - Relieve pressure over bony prominences  - Avoid friction and shearing  - Provide appropriate hygiene as needed including keeping skin clean and dry  - Evaluate need for skin moisturizer/barrier cream  - Collaborate with interdisciplinary team   - Patient/family teaching  - Consider wound care consult   Outcome: Progressing     Problem: PAIN - ADULT  Goal: Verbalizes/displays adequate comfort level or baseline comfort level  Description: Interventions:  - Encourage patient to monitor pain and request assistance  - Assess pain using appropriate pain scale  - Administer analgesics based on type and severity of pain and evaluate response  - Implement non-pharmacological measures as appropriate and evaluate response  - Consider cultural and social influences on pain and pain management  - Notify physician/advanced practitioner if interventions unsuccessful or patient reports new pain  Outcome: Progressing     Problem: INFECTION - ADULT  Goal: Absence or prevention of progression during hospitalization  Description: INTERVENTIONS:  - Assess and monitor for signs and symptoms of infection  - Monitor lab/diagnostic results  - Monitor all insertion sites, i e  indwelling lines, tubes, and drains  - Monitor endotracheal if appropriate and nasal secretions for changes in amount and color  - [place] appropriate cooling/warming therapies per order  - Administer medications as ordered  - Instruct and encourage patient and family to use good hand hygiene technique  - Identify and instruct in appropriate isolation precautions for identified infection/condition  Outcome: Progressing     Problem: SAFETY ADULT  Goal: Patient will remain free of falls  Description: INTERVENTIONS:  - Assess patient frequently for physical needs  -  Identify cognitive and physical deficits and behaviors that affect risk of falls    -  [place] fall precautions as indicated by assessment   - Educate patient/family on patient safety including physical limitations  - Instruct patient to call for assistance with activity based on assessment  - Modify environment to reduce risk of injury  - Consider OT/PT consult to assist with strengthening/mobility  Outcome: Progressing     Problem: SAFETY ADULT  Goal: Maintain or return to baseline ADL function  Description: INTERVENTIONS:  -  Assess patient's ability to carry out ADLs; assess patient's baseline for ADL function and identify physical deficits which impact ability to perform ADLs (bathing, care of mouth/teeth, toileting, grooming, dressing, etc )  - Assess/evaluate cause of self-care deficits   - Assess range of motion  - Assess patient's mobility; develop plan if impaired  - Assess patient's need for assistive devices and provide as appropriate  - Encourage maximum independence but intervene and supervise when necessary  - Involve family in performance of ADLs  - Assess for home care needs following discharge   - Consider OT consult to assist with ADL evaluation and planning for discharge  - Provide patient education as appropriate  Outcome: Progressing     Problem: SAFETY ADULT  Goal: Maintain or return mobility status to optimal level  Description: INTERVENTIONS:  - Assess patient's baseline mobility status (ambulation, transfers, stairs, etc )    - Identify cognitive and physical deficits and behaviors that affect mobility  - Identify mobility aids required to assist with transfers and/or ambulation (gait belt, sit-to-stand, lift, walker, cane, etc )  - [place] fall precautions as indicated by assessment  - Record patient progress and toleration of activity level on Mobility SBAR; progress patient to next Phase/Stage  - Instruct patient to call for assistance with activity based on assessment  - Consider rehabilitation consult to assist with strengthening/weightbearing, etc   Outcome: Progressing     Problem: DISCHARGE PLANNING  Goal: Discharge to home or other facility with appropriate resources  Description: INTERVENTIONS:  - Identify barriers to discharge w/patient and caregiver  - Arrange for needed discharge resources and transportation as appropriate  - Identify discharge learning needs (meds, wound care, etc )  - Arrange for interpretive services to assist at discharge as needed  - Refer to Case Management Department for coordinating discharge planning if the patient needs post-hospital services based on physician/advanced practitioner order or complex needs related to functional status, cognitive ability, or social support system  Outcome: Progressing     Problem: Knowledge Deficit  Goal: Patient/family/caregiver demonstrates understanding of disease process, treatment plan, medications, and discharge instructions  Description: Complete learning assessment and assess knowledge base    Interventions:  - Provide teaching at level of understanding  - Provide teaching via preferred learning methods  Outcome: Progressing

## 2020-08-16 NOTE — ASSESSMENT & PLAN NOTE
Lab Results   Component Value Date    HGBA1C 8 4%   waver was signed 12/18/2017       Recent Labs     08/16/20  1047 08/16/20  1159   POCGLU 154* 105       Blood Sugar Average: Last 72 hrs:  (P) 129 5   Check A1c, last recorded A1c was 8 4  Continue with home insulin regimen, 20 units Lantus at bedtime and add on 5 units lispro pre meals  Accu-Cheks and sliding scale [] : The components of the vaccine(s) to be administered today are listed in the plan of care. The disease(s) for which the vaccine(s) are intended to prevent and the risks have been discussed with the caretaker.  The risks are also included in the appropriate vaccination information statements which have been provided to the patient's caregiver.  The caregiver has given consent to vaccinate. [FreeTextEntry1] : Call for fever or problems\par

## 2020-08-16 NOTE — ED PROVIDER NOTES
History  Chief Complaint   Patient presents with    Fever - 75 years or older     To ED via EMS for evaluation of decreased LOC with fever  Family states that he had some nausea and vomiting this morning as well  HX of recent UTI with catheter placement  History provided by:  Patient   used: No    Fever - 75 years or older   Associated symptoms: nausea and vomiting    Associated symptoms: no chest pain, no chills, no cough, no diarrhea, no dysuria, no headaches and no rash      Patient is a 65-year-old male presenting to emergency department with fever and altered mental status  Family states he was vomiting today morning  History of UTI with catheter placement a month ago  No abdominal pain  No chest pain  No shortness of breath  No neck pain  Head is supple  MDM septic evaluation, antibiotics, covid swab    Prior to Admission Medications   Prescriptions Last Dose Informant Patient Reported? Taking?    Cholecalciferol (VITAMIN D3) 5000 units CAPS 8/15/2020 at Unknown time Child Yes Yes   Sig: Take 1 capsule by mouth   Cyanocobalamin (VITAMIN B12 PO) 8/15/2020 at Unknown time Child Yes Yes   Sig: Take 5,000 mcg by mouth   albuterol (PROVENTIL HFA,VENTOLIN HFA) 90 mcg/act inhaler Past Month at Unknown time Child No Yes   Sig: Inhale 2 puffs every 4 (four) hours as needed for wheezing   aspirin 325 mg tablet 8/15/2020 at Unknown time Child Yes Yes   Sig: Take 325 mg by mouth daily   carvedilol (COREG) 25 mg tablet 8/15/2020 at Unknown time  Yes Yes   Sig: Take 25 mg by mouth 2 (two) times a day with meals   clopidogrel (PLAVIX) 75 mg tablet 8/15/2020 at Unknown time Child Yes Yes   Sig: Take 75 mg by mouth daily   famotidine (PEPCID) 40 MG tablet 8/15/2020 at Unknown time  Yes Yes   Sig: Take 40 mg by mouth daily   finasteride (PROSCAR) 5 mg tablet 8/15/2020 at Unknown time  No Yes   Sig: Take 1 tablet (5 mg total) by mouth daily   furosemide (LASIX) 20 mg tablet 8/15/2020 at Unknown time Child Yes Yes   Sig: Take 20 mg by mouth 2 (two) times a day   gabapentin (NEURONTIN) 300 mg capsule 8/15/2020 at Unknown time  Yes Yes   Sig: Take 300 mg by mouth 2 (two) times a day   insulin glargine (LANTUS) 100 units/mL subcutaneous injection 8/15/2020 at Unknown time Child Yes Yes   Sig: Inject 20 Units under the skin daily at bedtime   irbesartan (AVAPRO) 150 mg tablet 8/16/2020 at Unknown time Child Yes Yes   Sig: Take 150 mg by mouth daily at bedtime   isosorbide mononitrate (IMDUR) 30 mg 24 hr tablet 8/15/2020 at Unknown time  Yes Yes   Sig: Take 30 mg by mouth daily   meloxicam (MOBIC) 15 mg tablet 8/15/2020 at Unknown time  Yes Yes   Sig: Take 15 mg by mouth daily   metFORMIN (GLUCOPHAGE) 1000 MG tablet 8/16/2020 at Unknown time Child Yes Yes   Sig: Take 1,000 mg by mouth 2 (two) times a day with meals   nitroglycerin (NITROSTAT) 0 4 mg SL tablet More than a month at Unknown time  Yes No   Sig: Place 0 4 mg under the tongue every 5 (five) minutes as needed for chest pain   simvastatin (ZOCOR) 20 mg tablet 8/15/2020 at Unknown time  Yes Yes   Sig: Take 20 mg by mouth daily at bedtime   sitaGLIPtin (JANUVIA) 100 mg tablet 8/15/2020 at Unknown time  Yes Yes   Sig: Take 100 mg by mouth daily   tamsulosin (FLOMAX) 0 4 mg 8/15/2020 at Unknown time  No Yes   Sig: Take 2 capsules (0 8 mg total) by mouth daily with dinner      Facility-Administered Medications: None       Past Medical History:   Diagnosis Date    Cardiac disease     CHF (congestive heart failure) (HCC)     Diabetes mellitus ([facility] [address] )     GERD (gastroesophageal reflux disease)     Hyperlipidemia     Hypertension     MI (myocardial infarction) ([facility] [address] )        Past Surgical History:   Procedure Laterality Date    EYE SURGERY         Family History   Problem Relation Age of Onset    Diabetes Mother     Hypertension Mother     Cancer Father     Hypertension Father     Cancer Sister     Hypertension Sister      I have reviewed and agree with the history as documented  E-Cigarette/Vaping    E-Cigarette Use Never User      E-Cigarette/Vaping Substances    Nicotine No     THC No     CBD No     Flavoring No     Other No     Unknown No      Social History     Tobacco Use    Smoking status: Never Smoker    Smokeless tobacco: Never Used   Substance Use Topics    Alcohol use: No     Frequency: Never     Binge frequency: Never    Drug use: No       Review of Systems   Constitutional: Positive for fever  Negative for chills and diaphoresis  Respiratory: Negative for cough, shortness of breath, wheezing and stridor  Cardiovascular: Negative for chest pain, palpitations and leg swelling  Gastrointestinal: Positive for nausea and vomiting  Negative for abdominal pain, blood in stool and diarrhea  Genitourinary: Negative for dysuria, frequency and urgency  Musculoskeletal: Negative for neck pain and neck stiffness  Skin: Negative for pallor and rash  Neurological: Negative for dizziness, syncope, weakness, light-headedness and headaches  All other systems reviewed and are negative  Physical Exam  Physical Exam  Vitals signs reviewed  Constitutional:       Appearance: He is well-developed  HENT:      Head: Normocephalic and atraumatic  Eyes:      Pupils: Pupils are equal, round, and reactive to light  Neck:      Musculoskeletal: Neck supple  Cardiovascular:      Rate and Rhythm: Regular rhythm  Tachycardia present  Heart sounds: Normal heart sounds  Pulmonary:      Effort: Pulmonary effort is normal  No respiratory distress  Breath sounds: Normal breath sounds  Abdominal:      General: Bowel sounds are normal       Palpations: Abdomen is soft  Tenderness: There is no abdominal tenderness  Musculoskeletal: Normal range of motion  Comments: Indwelling Estrella catheter   Skin:     General: Skin is warm and dry  Capillary Refill: Capillary refill takes less than 2 seconds  Neurological:      Mental Status: He is alert and oriented to person, place, and time           Vital Signs  ED Triage Vitals   Temperature Pulse Respirations Blood Pressure SpO2   08/16/20 0807 08/16/20 0807 08/16/20 0807 08/16/20 0807 08/16/20 0807   99 6 °F (37 6 °C) (!) 115 20 149/68 100 %      Temp Source Heart Rate Source Patient Position - Orthostatic VS BP Location FiO2 (%)   08/16/20 0807 08/16/20 0807 08/16/20 0807 08/16/20 0807 --   Tympanic Monitor Sitting Right arm       Pain Score       08/16/20 0820       Med Not Given for Pain - for MAR use only           Vitals:    08/16/20 1024 08/16/20 1030 08/16/20 1100 08/16/20 1124   BP: 102/51 (!) 88/50 91/55 (!) 94/44   Pulse:  100 98 96   Patient Position - Orthostatic VS:    Sitting         Visual Acuity  Visual Acuity      Most Recent Value   L Pupil Size (mm)  3   R Pupil Size (mm)  3          ED Medications  Medications   aspirin tablet 325 mg (has no administration in time range)   clopidogrel (PLAVIX) tablet 75 mg (has no administration in time range)   albuterol (PROVENTIL HFA,VENTOLIN HFA) inhaler 2 puff (has no administration in time range)   insulin glargine (LANTUS) subcutaneous injection 20 Units 0 2 mL (has no administration in time range)   tamsulosin (FLOMAX) capsule 0 8 mg (has no administration in time range)   finasteride (PROSCAR) tablet 5 mg (has no administration in time range)   gabapentin (NEURONTIN) capsule 300 mg (has no administration in time range)   famotidine (PEPCID) tablet 20 mg (has no administration in time range)   pravastatin (PRAVACHOL) tablet 40 mg (has no administration in time range)   nitroglycerin (NITROSTAT) SL tablet 0 4 mg (has no administration in time range)   enoxaparin (LOVENOX) subcutaneous injection 40 mg (has no administration in time range)   insulin lispro (HumaLOG) 100 units/mL subcutaneous injection 5 Units (has no administration in time range)   insulin lispro (HumaLOG) 100 units/mL subcutaneous injection 1-6 Units (has no administration in time range)   cefepime (MAXIPIME) IVPB (premix) 2,000 mg 50 mL (has no administration in time range)   ondansetron (FOR EMS ONLY) (ZOFRAN) 4 mg/2 mL injection 4 mg (0 mg Does not apply Given to EMS 8/16/20 0828)   acetaminophen (TYLENOL) tablet 650 mg (650 mg Oral Given 8/16/20 0820)   cefepime (MAXIPIME) IVPB (premix) 2,000 mg 50 mL (0 mg Intravenous Stopped 8/16/20 0916)   sodium chloride 0 9 % bolus 500 mL (0 mL Intravenous Stopped 8/16/20 1101)   iohexol (OMNIPAQUE) 350 MG/ML injection (MULTI-DOSE) 100 mL (100 mL Intravenous Given 8/16/20 0920)       Diagnostic Studies  Results Reviewed     Procedure Component Value Units Date/Time    Blood culture #1 [[id number]] Collected:  08/16/20 0806    Lab Status:  Preliminary result Specimen:  Blood from Arm, Left Updated:  08/16/20 1301     Blood Culture Received in Microbiology Lab  Culture in Progress  Blood culture #2 [[id number]] Collected:  08/16/20 0806    Lab Status:  Preliminary result Specimen:  Blood from Arm, Right Updated:  08/16/20 1301     Blood Culture Received in Microbiology Lab  Culture in Progress  Urine Microscopic [[id number]]  (Abnormal) Collected:  08/16/20 1049    Lab Status:  Final result Specimen:  Urine, Indwelling Estrella Catheter Updated:  08/16/20 1105     RBC, UA 2-4 /hpf      WBC, UA 10-20 /hpf      Epithelial Cells Occasional /hpf      Bacteria, UA Innumerable /hpf      AMORPH PHOSPATES Occasional /hpf      OTHER OBSERVATIONS WBCs Clumped    Urine culture [[id number]] Collected:  08/16/20 1049    Lab Status:   In process Specimen:  Urine, Indwelling Estrella Catheter Updated:  08/16/20 1105    UA w Reflex to Microscopic w Reflex to Culture [[id number]]  (Abnormal) Collected:  08/16/20 1049    Lab Status:  Final result Specimen:  Urine, Indwelling Estrella Catheter Updated:  08/16/20 1057     Color, UA Yellow     Clarity, UA Clear     Specific Gravity, UA <=1 005     pH, UA 6 5     Leukocytes, UA Small     Nitrite, UA Positive     Protein, UA Trace mg/dl      Glucose, UA Negative mg/dl      Ketones, UA Negative mg/dl      Urobilinogen, UA 0 2 E U /dl      Bilirubin, UA Negative     Blood, UA Large    Fingerstick Glucose (POCT) [[id number]]  (Abnormal) Collected:  08/16/20 1047    Lab Status:  Final result Updated:  08/16/20 1050     POC Glucose 154 mg/dl     Lactic acid 2 Hours [[id number]]  (Normal) Collected:  08/16/20 0949    Lab Status:  Final result Specimen:  Blood from Arm, Right Updated:  08/16/20 1022     LACTIC ACID 1 8 mmol/L     Narrative:       Result may be elevated if tourniquet was used during collection  Novel Coronavirus [facility] P[facility] [[id number]]  (Normal) Collected:  08/16/20 0823    Lab Status:  Final result Specimen:  Nares from Nose Updated:  08/16/20 0927     SARS-CoV-2 Negative    Narrative: The specimen collection materials, transport medium, and/or testing methodology utilized in the production of these test results have been proven to be reliable in a limited validation with an abbreviated program under the Emergency Utilization Authorization provided by the FDA  Testing reported as "Presumptive positive" will be confirmed with secondary testing with a reference laboratory to ensure result accuracy  Clinical caution and judgement should be used with the interpretation of these results with consideration of the clinical impression and other laboratory testing  Testing reported as "Positive" or "Negative" has been proven to be accurate according to standard laboratory validation requirements  All testing is performed with control materials showing appropriate reactivity at standard intervals        CBC and differential [[id number]]  (Abnormal) Collected:  08/16/20 0806    Lab Status:  Final result Specimen:  Blood from Arm, Right Updated:  08/16/20 0859     WBC 3 97 Thousand/uL      RBC 4 13 Million/uL      Hemoglobin 12 5 g/dL      Hematocrit 37 8 %      MCV 92 fL MCH 30 3 pg      MCHC 33 1 g/dL      RDW 13 7 %      MPV 10 8 fL      Platelets 732 Thousands/uL      nRBC 0 /100 WBCs     Narrative: This is an appended report  These results have been appended to a previously verified report  Lactic acid [[id number]]  (Abnormal) Collected:  08/16/20 0806    Lab Status:  Final result Specimen:  Blood from Arm, Right Updated:  08/16/20 0845     LACTIC ACID 2 1 mmol/L     Narrative:       Result may be elevated if tourniquet was used during collection  Comprehensive metabolic panel [[id number]]  (Abnormal) Collected:  08/16/20 0806    Lab Status:  Final result Specimen:  Blood from Arm, Right Updated:  08/16/20 0181     Sodium 136 mmol/L      Potassium 4 4 mmol/L      Chloride 103 mmol/L      CO2 26 mmol/L      ANION GAP 7 mmol/L      BUN 15 mg/dL      Creatinine 1 08 mg/dL      Glucose 200 mg/dL      Calcium 8 5 mg/dL      AST 15 U/L      ALT 23 U/L      Alkaline Phosphatase 62 U/L      Total Protein 7 0 g/dL      Albumin 3 0 g/dL      Total Bilirubin 0 40 mg/dL      eGFR 63 ml/min/1 73sq m     Narrative:       [place] guidelines for Chronic Kidney Disease (CKD):     Stage 1 with normal or high GFR (GFR > 90 mL/min/1 73 square meters)    Stage 2 Mild CKD (GFR = 60-89 mL/min/1 73 square meters)    Stage 3A Moderate CKD (GFR = 45-59 mL/min/1 73 square meters)    Stage 3B Moderate CKD (GFR = 30-44 mL/min/1 73 square meters)    Stage 4 Severe CKD (GFR = 15-29 mL/min/1 73 square meters)    Stage 5 End Stage CKD (GFR <15 mL/min/1 73 square meters)  Note: GFR calculation is accurate only with a steady state creatinine                 CT chest abdomen pelvis w contrast   Final Result by Viki Howell MD (08/16 4408)      No acute abnormality in the chest, abdomen, or pelvis  Chronic prostatomegaly with bladder wall thickening  Pancolonic diverticulosis              Workstation performed: [id number]         XR chest portable (Results Pending)              Procedures  Procedures         ED Course       US AUDIT      Most Recent Value   Initial Alcohol Screen: US AUDIT-C    1  How often do you have a drink containing alcohol?  0 Filed at: 08/16/2020 0811   2  How many drinks containing alcohol do you have on a typical day you are drinking? 0 Filed at: 08/16/2020 0811   3a  Male UNDER 65: How often do you have five or more drinks on one occasion? 0 Filed at: 08/16/2020 0811   3b  FEMALE Any Age, or MALE 65+: How often do you have 4 or more drinks on one occassion? 0 Filed at: 08/16/2020 0811   Audit-C Score  0 Filed at: 08/16/2020 0303                  MERY/DAST-10      Most Recent Value   How many times in the past year have you    Used an illegal drug or used a prescription medication for non-medical reasons? Never Filed at: 08/16/2020 9595              Initial Sepsis Screening     Row Name 08/16/20 0920                Is the patient's history suggestive of a new or worsening infection?         Suspected source of infection  suspect infection, source unknown  -AT        Are two or more of the following signs & symptoms of infection both present and new to the patient? (!) Yes (Proceed)  -AT        Indicate SIRS criteria  Tachycardia > 90 bpm;Leukopenia (WBC < 4000 IJL); Hyperthemia > 38 3C (100 9F)  -AT        If the answer is yes to both questions, suspicion of sepsis is present          If severe sepsis is present AND tissue hypoperfusion perists in the hour after fluid resuscitation or lactate > 4, the patient meets criteria for SEPTIC SHOCK          Are any of the following organ dysfunction criteria present within 6 hours of suspected infection and SIRS criteria that are NOT considered to be chronic conditions?   (!) Yes  -AT        Organ dysfunction  Lactate > 2 0 mmol/L  -AT        Date of presentation of severe sepsis          Time of presentation of severe sepsis          Tissue hypoperfusion persists in the hour after crystalloid fluid administration, evidenced, by either:          Was hypotension present within one hour of the conclusion of crystalloid fluid administration?         Date of presentation of septic shock          Time of presentation of septic shock            User Key  (r) = Recorded By, (t) = Taken By, (c) = Cosigned By    [address] Name Provider Type    AT Vane Browne MD Physician                        MDM      Disposition  Final diagnoses:   Sepsis ([address] )     Time reflects when diagnosis was documented in both MDM as applicable and the Disposition within this note     Time User Action Codes Description Comment    8/16/2020 10:42 AM Cecile Vera [A41 9] Sepsis [facility])       ED Disposition     ED Disposition Condition Date/Time Comment    Admit Stable Sun Aug 16, 2020 10:42 AM Case was discussed with medicine and the patient's admission status was agreed to be Admission Status: inpatient status to the service of Dr Sarita Howard           Follow-up Information    None         Current Discharge Medication List      CONTINUE these medications which have NOT CHANGED    Details   albuterol (PROVENTIL HFA,VENTOLIN HFA) 90 mcg/act inhaler Inhale 2 puffs every 4 (four) hours as needed for wheezing  Qty: 1 Inhaler, Refills: 0      aspirin 325 mg tablet Take 325 mg by mouth daily      carvedilol (COREG) 25 mg tablet Take 25 mg by mouth 2 (two) times a day with meals      Cholecalciferol (VITAMIN D3) 5000 units CAPS Take 1 capsule by mouth      clopidogrel (PLAVIX) 75 mg tablet Take 75 mg by mouth daily      Cyanocobalamin (VITAMIN B12 PO) Take 5,000 mcg by mouth      famotidine (PEPCID) 40 MG tablet Take 40 mg by mouth daily      finasteride (PROSCAR) 5 mg tablet Take 1 tablet (5 mg total) by mouth daily  Qty: 90 tablet, Refills: 3    Associated Diagnoses: Benign prostatic hyperplasia with lower urinary tract symptoms, symptom details unspecified      furosemide (LASIX) 20 mg tablet Take 20 mg by mouth 2 (two) times a day      gabapentin (NEURONTIN) 300 mg capsule Take 300 mg by mouth 2 (two) times a day      insulin glargine (LANTUS) 100 units/mL subcutaneous injection Inject 20 Units under the skin daily at bedtime      irbesartan (AVAPRO) 150 mg tablet Take 150 mg by mouth daily at bedtime      isosorbide mononitrate (IMDUR) 30 mg 24 hr tablet Take 30 mg by mouth daily      meloxicam (MOBIC) 15 mg tablet Take 15 mg by mouth daily      metFORMIN (GLUCOPHAGE) 1000 MG tablet Take 1,000 mg by mouth 2 (two) times a day with meals      simvastatin (ZOCOR) 20 mg tablet Take 20 mg by mouth daily at bedtime      sitaGLIPtin (JANUVIA) 100 mg tablet Take 100 mg by mouth daily      tamsulosin (FLOMAX) 0 4 mg Take 2 capsules (0 8 mg total) by mouth daily with dinner  Qty: 180 capsule, Refills: 3    Associated Diagnoses: Benign prostatic hyperplasia with lower urinary tract symptoms, symptom details unspecified      nitroglycerin (NITROSTAT) 0 4 mg SL tablet Place 0 4 mg under the tongue every 5 (five) minutes as needed for chest pain           No discharge procedures on file      PDMP Review     None          ED Provider  Electronically Signed by           Charla Medina MD  08/16/20 4272

## 2020-08-16 NOTE — ASSESSMENT & PLAN NOTE
Sepsis, present on admission, as evidenced by fever, 103 1, tachycardia 112, tachypnea 22 secondary to catheter associated urinary tract infection  Lactic acid initially 2 1  UA- Innumerable bacteria, 10-20 WBC, positive nitrite and large blood  Estrella catheter changed in the ED  IV cefepime started in the ED, continue with that for now  Follow blood culture results  Lactic acidosis resolved with IV fluids    Tylenol p r n  For fever  Zofran p r n   For nausea/vomiting  Trend CBC and temperature curve  Follow urine culture results  Gentle hydration with IV fluids for now to blood pressure stabilizes - given history of CHF, watch for signs of fluid overload

## 2020-08-16 NOTE — ASSESSMENT & PLAN NOTE
Catheter associated UTI, present on admission  UA-positive nitrite, 10-20 WBC and innumerable bacteria  On IV cefepime, urine culture

## 2020-08-16 NOTE — SEPSIS NOTE
Sepsis Note   Merly Valenzuela 82 y o  male MRN: [id number]  Unit/Bed#: ED 06 Encounter: [id number]      qSOFA     [address] Name 08/16/20 0900 08/16/20 0810 08/16/20 0807          Altered mental status GCS < 15    0        Respiratory Rate > / =22  0    0      Systolic BP < / =232  0    0      Q Sofa Score  0    0          Initial Sepsis Screening     Row Name 08/16/20 0920                Is the patient's history suggestive of a new or worsening infection?         Suspected source of infection  suspect infection, source unknown  -AT        Are two or more of the following signs & symptoms of infection both present and new to the patient? (!) Yes (Proceed)  -AT        Indicate SIRS criteria  Tachycardia > 90 bpm;Leukopenia (WBC < 4000 IJL); Hyperthemia > 38 3C (100 9F)  -AT        If the answer is yes to both questions, suspicion of sepsis is present          If severe sepsis is present AND tissue hypoperfusion perists in the hour after fluid resuscitation or lactate > 4, the patient meets criteria for SEPTIC SHOCK          Are any of the following organ dysfunction criteria present within 6 hours of suspected infection and SIRS criteria that are NOT considered to be chronic conditions? (!) Yes  -AT        Organ dysfunction  Lactate > 2 0 mmol/L  -AT        Date of presentation of severe sepsis          Time of presentation of severe sepsis          Tissue hypoperfusion persists in the hour after crystalloid fluid administration, evidenced, by either:          Was hypotension present within one hour of the conclusion of crystalloid fluid administration?           Date of presentation of septic shock          Time of presentation of septic shock            User Key  (r) = Recorded By, (t) = Taken By, (c) = Cosigned By    Initials Name Provider Type    AT Vane Browne MD Physician

## 2020-08-16 NOTE — ASSESSMENT & PLAN NOTE
Wt Readings from Last 3 Encounters:   08/16/20 96 6 kg (212 lb 15 4 oz)   07/28/20 101 kg (222 lb 12 8 oz)   07/24/20 99 8 kg (220 lb)       History of CHF, unknown EF  Not in acute exacerbation  Weight stable at 212 lb  Daily weights  Strict Is/Os  Antihypertensives held right now given sepsis management to avoid hypotension

## 2020-08-16 NOTE — ASSESSMENT & PLAN NOTE
History of hypertension on 25 mg carvedilol twice daily, Lasix 20 mg twice daily and Avapro 150 mg at bedtime  Blood pressure ranging from 88/50 to 149/68, Hold antihypertensives for now given sepsis  Monitor on IV fluid, DC once sepsis resolves  Monitor BP per unit protocol

## 2020-08-16 NOTE — H&P
H&P- Merly Smith 1935, 82 y o  male MRN: [id number]    Unit/Bed#: -01 Encounter: [id number]    Primary Care Provider: No primary care provider on file  Date and time admitted to hospital: 8/16/2020  8:02 AM        * Sepsis [facility])  Assessment & Plan  Sepsis, present on admission, as evidenced by fever, 103 1, tachycardia 112, tachypnea 22 secondary to catheter associated urinary tract infection  Lactic acid initially 2 1  UA- Innumerable bacteria, 10-20 WBC, positive nitrite and large blood  Estrella catheter changed in the ED  IV cefepime started in the ED, continue with that for now  Follow blood culture results  Lactic acidosis resolved with IV fluids    Tylenol p r n  For fever  Zofran p r n   For nausea/vomiting  Trend CBC and temperature curve  Follow urine culture results  Gentle hydration with IV fluids for now to blood pressure stabilizes - given history of CHF, watch for signs of fluid overload    Urinary tract infection associated with indwelling urethral catheter [facility])  Assessment & Plan  Catheter associated UTI, present on admission  UA-positive nitrite, 10-20 WBC and innumerable bacteria  On IV cefepime, urine culture    Poorly controlled type 2 diabetes mellitus [facility])  Assessment & Plan  Lab Results   Component Value Date    HGBA1C 8 4%   waver was signed 12/18/2017       Recent Labs     08/16/20  1047 08/16/20  1159   POCGLU 154* 105       Blood Sugar Average: Last 72 hrs:  (P) 129 5   Check A1c, last recorded A1c was 8 4  Continue with home insulin regimen, 20 units Lantus at bedtime and add on 5 units lispro pre meals  Accu-Cheks and sliding scale    Hyperlipidemia  Assessment & Plan  History of hyperlipidemia on simvastatin, switch to pravastatin while inpatient    HTN (hypertension)  Assessment & Plan  History of hypertension on 25 mg carvedilol twice daily, Lasix 20 mg twice daily and Avapro 150 mg at bedtime  Blood pressure ranging from 88/50 to 149/68, Hold antihypertensives for now given sepsis  Monitor on IV fluid, DC once sepsis resolves  Monitor BP per unit protocol    GERD (gastroesophageal reflux disease)  Assessment & Plan  History of GERD on Pepcid, continue with home meds    CHF (congestive heart failure) ([facility])  Assessment & Plan  Wt Readings from Last 3 Encounters:   08/16/20 96 6 kg (212 lb 15 4 oz)   07/28/20 101 kg (222 lb 12 8 oz)   07/24/20 99 8 kg (220 lb)       History of CHF, unknown EF  Not in acute exacerbation  Weight stable at 212 lb  Daily weights  Strict Is/Os  Antihypertensives held right now given sepsis management to avoid hypotension      Benign prostatic hyperplasia with lower urinary tract symptoms  Assessment & Plan  History of BPH, on finasteride and tamsulosin status post Estrella catheter placement  Continue with home meds  Continue with scheduled outpatient urology appointment      VTE Prophylaxis: Enoxaparin (Lovenox)  / sequential compression device   Code Status:  Level 1  POLST: There is no POLST form on file for this patient (pre-hospital)  Discussion with family:  I discussed with patient's son-in-law at bedside    Anticipated Length of Stay:  Patient will be admitted on an Inpatient basis with an anticipated length of stay of  greater than 2 midnights  Justification for Hospital Stay:  Sepsis secondary to urinary tract infection    Total Time for Visit, including Counseling / Coordination of Care: 1 hour  Greater than 50% of this total time spent on direct patient counseling and coordination of care  Chief Complaint:   Fever    History of Present Illness:    Merly Lawler is a 82 y o  male with past medical history of GERD, diabetes, BPH status post chronic Estrella who presents with fever of 1 day duration  Patient is Belarusian-speaking and a poor historian and history is gotten from family at bedside  They also report that they noticed altered mental status  Patient's symptoms were associated with nausea and vomiting  Patient has a history of urinary tract infection with urinary retention and had a Winslow catheter placed a month ago  Patient has been taking care of the Winslow by himself, flushing, and is scheduled for a Winslow change within 2 weeks  He denies hematuria, abdominal pain, shortness of breath, chest pain  In the ED, patient was febrile, T-max 103°, urinalysis showed positive nitrites, innumerable bacteria and 10-20 WBC  He is being admitted for sepsis secondary to UTI      Review of Systems:    Review of Systems   Constitutional: Positive for activity change, chills, fatigue and fever  Respiratory: Negative for cough, chest tightness, shortness of breath and stridor  Cardiovascular: Negative for chest pain, palpitations and leg swelling  Gastrointestinal: Negative for abdominal pain, anal bleeding, blood in stool, constipation and diarrhea  Endocrine: Negative for polydipsia  Genitourinary: Negative for hematuria  Chronic winslow   Musculoskeletal: Negative for back pain, gait problem and joint swelling  Neurological: Negative for seizures, light-headedness, numbness and headaches  Hematological: Negative for adenopathy  Does not bruise/bleed easily  Psychiatric/Behavioral: Negative for agitation, behavioral problems, confusion, decreased concentration and dysphoric mood  All other systems reviewed and are negative  Past Medical and Surgical History:     Past Medical History:   Diagnosis Date    Cardiac disease     [facility]congest[facility] failure) ([facility] )     Diabetes mellitus ([place] [address] )     GERD (gastroesophageal reflux disease)     Hyperlipidemia     Hypertension     MI (myocardial infarction) ([place] [address] )        Past Surgical History:   Procedure Laterality Date    EYE SURGERY         Meds/Allergies:    Prior to Admission medications    Medication Sig Start Date End Date Taking?  Authorizing Provider   albuterol (PROVENTIL HFA,VENTOLIN HFA) 90 mcg/act inhaler Inhale 2 puffs every 4 (four) hours as needed for wheezing 12/11/17  Yes JAYCE Arriola   aspirin 325 mg tablet Take 325 mg by mouth daily   Yes Historical Provider, MD   carvedilol (COREG) 25 mg tablet Take 25 mg by mouth 2 (two) times a day with meals   Yes Historical Provider, MD   Cholecalciferol (VITAMIN D3) 5000 units CAPS Take 1 capsule by mouth   Yes Historical Provider, MD   clopidogrel (PLAVIX) 75 mg tablet Take 75 mg by mouth daily   Yes Historical Provider, MD   Cyanocobalamin (VITAMIN B12 PO) Take 5,000 mcg by mouth   Yes Historical Provider, MD   famotidine (PEPCID) 40 MG tablet Take 40 mg by mouth daily   Yes Historical Provider, MD   finasteride (PROSCAR) 5 mg tablet Take 1 tablet (5 mg total) by mouth daily 7/24/20  Yes Rubin Robb MD   furosemide (LASIX) 20 mg tablet Take 20 mg by mouth 2 (two) times a day   Yes Historical Provider, MD   gabapentin (NEURONTIN) 300 mg capsule Take 300 mg by mouth 2 (two) times a day   Yes Historical Provider, MD   insulin glargine (LANTUS) 100 units/mL subcutaneous injection Inject 20 Units under the skin daily at bedtime   Yes Historical Provider, MD   irbesartan (AVAPRO) 150 mg tablet Take 150 mg by mouth daily at bedtime   Yes Historical Provider, MD   isosorbide mononitrate (IMDUR) 30 mg 24 hr tablet Take 30 mg by mouth daily   Yes Historical Provider, MD   meloxicam (MOBIC) 15 mg tablet Take 15 mg by mouth daily   Yes Historical Provider, MD   metFORMIN (GLUCOPHAGE) 1000 MG tablet Take 1,000 mg by mouth 2 (two) times a day with meals   Yes Historical Provider, MD   simvastatin (ZOCOR) 20 mg tablet Take 20 mg by mouth daily at bedtime   Yes Historical Provider, MD   sitaGLIPtin (JANUVIA) 100 mg tablet Take 100 mg by mouth daily   Yes Historical Provider, MD   tamsulosin (FLOMAX) 0 4 mg Take 2 capsules (0 8 mg total) by mouth daily with dinner 7/24/20 10/22/20 Yes Rubin Robb MD   carvedilol (COREG) 12 5 mg tablet Take 12 5 mg by mouth 2 (two) times a day with meals  8/16/20 Yes Historical Provider, MD   gabapentin (NEURONTIN) 300 mg capsule Take 100 mg by mouth 3 (three) times a day  8/16/20 Yes Historical Provider, MD   glipiZIDE (GLUCOTROL) 5 mg tablet Take 5 mg by mouth 2 (two) times a day before meals  8/16/20 Yes Historical Provider, MD   ranitidine (ZANTAC) 150 mg tablet Take 150 mg by mouth 2 (two) times a day  8/16/20 Yes Historical Provider, MD   nitroglycerin (NITROSTAT) 0 4 mg SL tablet Place 0 4 mg under the tongue every 5 (five) minutes as needed for chest pain    Historical Provider, MD   albuterol (2 5 mg/3 mL) 0 083 % nebulizer solution Take 3 mL by nebulization every 6 (six) hours as needed for wheezing  Patient not taking: Reported on 8/16/2020 12/11/17 8/16/20  JAYCE Nolasco   amLODIPine (NORVASC) 5 mg tablet Take 5 mg by mouth daily  8/16/20  Historical Provider, MD   dextromethorphan-guaiFENesin (ROBITUSSIN DM)  mg/5 mL syrup Take 5 mL by mouth 3 (three) times a day as needed for cough or congestion  Patient not taking: Reported on 8/16/2020 12/11/17 8/16/20  JAYCE Tariq     I have reviewed home medications using allscripts  Allergies:    Allergies   Allergen Reactions    Demerol [Meperidine]        Social History:     Marital Status: /Civil Union   Occupation: Retired  Patient Pre-hospital Living Situation: lives with family  Patient Pre-hospital Level of Mobility: independent  Patient Pre-hospital Diet Restrictions:  None  Substance Use History:   Social History     Substance and Sexual Activity   Alcohol Use No    Alcohol/week: 0 0 standard drinks    Frequency: Never    Binge frequency: Never     Social History     Tobacco Use   Smoking Status Never Smoker   Smokeless Tobacco Never Used     Social History     Substance and Sexual Activity   Drug Use No       Family History:    Family History   Problem Relation Age of Onset    Diabetes Mother     Hypertension Mother     Cancer Father     Hypertension Father     Cancer Sister     Hypertension Sister        Physical Exam:     Vitals:   Blood Pressure: 99/54 (08/16/20 1557)  Pulse: 97 (08/16/20 1557)  Temperature: 98 2 °F (36 8 °C) (08/16/20 1557)  Temp Source: Oral (08/16/20 1124)  Respirations: 18 (08/16/20 1557)  Height: 5' 6" (167 6 cm) (08/16/20 1100)  Weight - Scale: 96 6 kg (212 lb 15 4 oz) (08/16/20 1100)  SpO2: 97 % (08/16/20 1557)    Physical Exam  Vitals signs and nursing note reviewed  Constitutional:       General: He is not in acute distress  Appearance: Normal appearance  He is ill-appearing  He is not toxic-appearing or diaphoretic  HENT:      Head: Normocephalic and atraumatic  Cardiovascular:      Rate and Rhythm: Normal rate and regular rhythm  Pulses: Normal pulses  Heart sounds: No murmur  No friction rub  No gallop  Pulmonary:      Effort: Pulmonary effort is normal  No respiratory distress  Breath sounds: Normal breath sounds  No stridor  No wheezing, rhonchi or rales  Chest:      Chest wall: No tenderness  Abdominal:      General: Bowel sounds are normal  There is no distension  Palpations: Abdomen is soft  There is no mass  Tenderness: There is no abdominal tenderness  There is no right CVA tenderness, left CVA tenderness, guarding or rebound  Hernia: No hernia is present  Comments: Estrella in place, urine clear   Musculoskeletal: Normal range of motion  General: No swelling, tenderness, deformity or signs of injury  Right lower leg: No edema  Left lower leg: No edema  Skin:     General: Skin is warm and dry  Capillary Refill: Capillary refill takes less than 2 seconds  Coloration: Skin is not jaundiced or pale  Findings: No bruising, erythema, lesion or rash  Neurological:      General: No focal deficit present  Mental Status: He is alert and oriented to person, place, and time  Mental status is at baseline  Cranial Nerves: No cranial nerve deficit        Motor: No weakness  Psychiatric:         Mood and Affect: Mood normal          Behavior: Behavior normal          Thought Content: Thought content normal          Judgment: Judgment normal          Additional Data:     Lab Results: I have personally reviewed pertinent reports  Results from last 7 days   Lab Units 08/16/20  0806   WBC Thousand/uL 3 97*   HEMOGLOBIN g/dL 12 5   HEMATOCRIT % 37 8   PLATELETS Thousands/uL 215   LYMPHO PCT % 8*   MONO PCT % 2*   EOS PCT % 2     Results from last 7 days   Lab Units 08/16/20  0806   SODIUM mmol/L 136   POTASSIUM mmol/L 4 4   CHLORIDE mmol/L 103   CO2 mmol/L 26   BUN mg/dL 15   CREATININE mg/dL 1 08   ANION GAP mmol/L 7   CALCIUM mg/dL 8 5   ALBUMIN g/dL 3 0*   TOTAL BILIRUBIN mg/dL 0 40   ALK PHOS U/L 62   ALT U/L 23   AST U/L 15   GLUCOSE RANDOM mg/dL 200*         Results from last 7 days   Lab Units 08/16/20  1159 08/16/20  1047   POC GLUCOSE mg/dl 105 154*         Results from last 7 days   Lab Units 08/16/20  0949 08/16/20  0806   LACTIC ACID mmol/L 1 8 2 1*       Imaging: I have personally reviewed pertinent reports  CT chest abdomen pelvis w contrast   Final Result by Tyree Bui MD (08/16 3112)      No acute abnormality in the chest, abdomen, or pelvis  Chronic prostatomegaly with bladder wall thickening  Pancolonic diverticulosis  Workstation performed: [id number]         XR chest portable   Final Result by Anusha Parker MD (08/16 1431)      No acute cardiopulmonary disease  Workstation performed: [id number]             EKG, Pathology, and Other Studies Reviewed on Admission:   · EKG:  None carried in the ED    Allscripts / Epic Records Reviewed: Yes     ** Please Note: This note has been constructed using a voice recognition system   **

## 2020-08-17 PROBLEM — R78.81 BACTEREMIA: Status: ACTIVE | Noted: 2020-08-17

## 2020-08-17 LAB
ANION GAP SERPL CALCULATED.3IONS-SCNC: 6 MMOL/L (ref 4–13)
BUN SERPL-MCNC: 22 MG/DL (ref 5–25)
CALCIUM SERPL-MCNC: 8.4 MG/DL (ref 8.3–10.1)
CHLORIDE SERPL-SCNC: 104 MMOL/L (ref 100–108)
CO2 SERPL-SCNC: 27 MMOL/L (ref 21–32)
CREAT SERPL-MCNC: 1.34 MG/DL (ref 0.6–1.3)
ERYTHROCYTE [DISTWIDTH] IN BLOOD BY AUTOMATED COUNT: 14.1 % (ref 11.6–15.1)
EST. AVERAGE GLUCOSE BLD GHB EST-MCNC: 174 MG/DL
GFR SERPL CREATININE-BSD FRML MDRD: 49 ML/MIN/1.73SQ M
GLUCOSE SERPL-MCNC: 151 MG/DL (ref 65–140)
GLUCOSE SERPL-MCNC: 158 MG/DL (ref 65–140)
GLUCOSE SERPL-MCNC: 204 MG/DL (ref 65–140)
GLUCOSE SERPL-MCNC: 242 MG/DL (ref 65–140)
GLUCOSE SERPL-MCNC: 288 MG/DL (ref 65–140)
HBA1C MFR BLD: 7.7 %
HCT VFR BLD AUTO: 32.2 % (ref 36.5–49.3)
HGB BLD-MCNC: 10.5 G/DL (ref 12–17)
MCH RBC QN AUTO: 30.2 PG (ref 26.8–34.3)
MCHC RBC AUTO-ENTMCNC: 32.6 G/DL (ref 31.4–37.4)
MCV RBC AUTO: 93 FL (ref 82–98)
PLATELET # BLD AUTO: 157 THOUSANDS/UL (ref 149–390)
PMV BLD AUTO: 11 FL (ref 8.9–12.7)
POTASSIUM SERPL-SCNC: 4.5 MMOL/L (ref 3.5–5.3)
RBC # BLD AUTO: 3.48 MILLION/UL (ref 3.88–5.62)
SODIUM SERPL-SCNC: 137 MMOL/L (ref 136–145)
TROPONIN I SERPL-MCNC: <0.02 NG/ML
WBC # BLD AUTO: 21.81 THOUSAND/UL (ref 4.31–10.16)

## 2020-08-17 PROCEDURE — 80048 BASIC METABOLIC PNL TOTAL CA: CPT | Performed by: INTERNAL MEDICINE

## 2020-08-17 PROCEDURE — 83036 HEMOGLOBIN GLYCOSYLATED A1C: CPT | Performed by: INTERNAL MEDICINE

## 2020-08-17 PROCEDURE — 99232 SBSQ HOSP IP/OBS MODERATE 35: CPT | Performed by: INTERNAL MEDICINE

## 2020-08-17 PROCEDURE — 82948 REAGENT STRIP/BLOOD GLUCOSE: CPT

## 2020-08-17 PROCEDURE — 93005 ELECTROCARDIOGRAM TRACING: CPT

## 2020-08-17 PROCEDURE — 84484 ASSAY OF TROPONIN QUANT: CPT | Performed by: NURSE PRACTITIONER

## 2020-08-17 PROCEDURE — 85027 COMPLETE CBC AUTOMATED: CPT | Performed by: INTERNAL MEDICINE

## 2020-08-17 RX ORDER — DOCUSATE SODIUM 100 MG/1
100 CAPSULE, LIQUID FILLED ORAL ONCE
Status: COMPLETED | OUTPATIENT
Start: 2020-08-17 | End: 2020-08-17

## 2020-08-17 RX ORDER — AMOXICILLIN 250 MG
1 CAPSULE ORAL
Status: DISCONTINUED | OUTPATIENT
Start: 2020-08-17 | End: 2020-08-19 | Stop reason: HOSPADM

## 2020-08-17 RX ADMIN — SENNOSIDES AND DOCUSATE SODIUM 1 TABLET: 8.6; 5 TABLET ORAL at 22:15

## 2020-08-17 RX ADMIN — DOCUSATE SODIUM 100 MG: 100 CAPSULE, LIQUID FILLED ORAL at 18:20

## 2020-08-17 RX ADMIN — INSULIN LISPRO 2 UNITS: 100 INJECTION, SOLUTION INTRAVENOUS; SUBCUTANEOUS at 16:43

## 2020-08-17 RX ADMIN — PRAVASTATIN SODIUM 40 MG: 40 TABLET ORAL at 16:46

## 2020-08-17 RX ADMIN — TAMSULOSIN HYDROCHLORIDE 0.8 MG: 0.4 CAPSULE ORAL at 16:46

## 2020-08-17 RX ADMIN — INSULIN GLARGINE 20 UNITS: 100 INJECTION, SOLUTION SUBCUTANEOUS at 22:15

## 2020-08-17 RX ADMIN — GABAPENTIN 300 MG: 300 CAPSULE ORAL at 17:13

## 2020-08-17 RX ADMIN — CLOPIDOGREL BISULFATE 75 MG: 75 TABLET ORAL at 08:40

## 2020-08-17 RX ADMIN — INSULIN LISPRO 1 UNITS: 100 INJECTION, SOLUTION INTRAVENOUS; SUBCUTANEOUS at 22:15

## 2020-08-17 RX ADMIN — INSULIN LISPRO 5 UNITS: 100 INJECTION, SOLUTION INTRAVENOUS; SUBCUTANEOUS at 08:38

## 2020-08-17 RX ADMIN — INSULIN LISPRO 4 UNITS: 100 INJECTION, SOLUTION INTRAVENOUS; SUBCUTANEOUS at 11:11

## 2020-08-17 RX ADMIN — FAMOTIDINE 20 MG: 20 TABLET ORAL at 08:40

## 2020-08-17 RX ADMIN — CEFEPIME HYDROCHLORIDE 2000 MG: 2 INJECTION, SOLUTION INTRAVENOUS at 08:40

## 2020-08-17 RX ADMIN — SODIUM CHLORIDE 500 ML: 0.9 INJECTION, SOLUTION INTRAVENOUS at 11:26

## 2020-08-17 RX ADMIN — CEFEPIME HYDROCHLORIDE 2000 MG: 2 INJECTION, SOLUTION INTRAVENOUS at 22:13

## 2020-08-17 RX ADMIN — SODIUM CHLORIDE, SODIUM GLUCONATE, SODIUM ACETATE, POTASSIUM CHLORIDE, MAGNESIUM CHLORIDE, SODIUM PHOSPHATE, DIBASIC, AND POTASSIUM PHOSPHATE 100 ML/HR: .53; .5; .37; .037; .03; .012; .00082 INJECTION, SOLUTION INTRAVENOUS at 05:29

## 2020-08-17 RX ADMIN — INSULIN LISPRO 1 UNITS: 100 INJECTION, SOLUTION INTRAVENOUS; SUBCUTANEOUS at 08:38

## 2020-08-17 RX ADMIN — ASPIRIN 325 MG ORAL TABLET 325 MG: 325 PILL ORAL at 08:39

## 2020-08-17 RX ADMIN — FINASTERIDE 5 MG: 5 TABLET, FILM COATED ORAL at 08:40

## 2020-08-17 RX ADMIN — GABAPENTIN 300 MG: 300 CAPSULE ORAL at 08:40

## 2020-08-17 RX ADMIN — ENOXAPARIN SODIUM 40 MG: 40 INJECTION SUBCUTANEOUS at 08:39

## 2020-08-17 NOTE — SOCIAL WORK
LOS: 1 day  Pt is not a documented bundle  Pt is not a 30 day readmission  Unplanned readmission score is 11 and green  Met with Pt and Pt's son(Niles) at bedside  Acknowledge Pt is poor historian per chart and is German speaking  Discussed role of , discharge planning, identifying help at home and discharge preference  Pt's son reports that Pt and his wife live with him in [address] and stays on 1st floor, few steps to enter  Pt's son reports Pt and Pt's wife just moved in with him a month ago from [facility]  Pt's son reports Pt has managed Medicare from [facility] and he is working on transitioning Pt and his wife to another insurance  Pt's son informed Pt that he will also be working on getting Pt a PCP in the area once he obtains new insurance for Pt  Pt's son reports Pt will [facility] for his prescriptions  Pt's son reports Pt does not have POA or living will and declines information  Pt's son reports he helps Pt and Pt's wife at home  Pt's son reports he drives and does grocery shopping  Pt's son denies hx of SNF and VNA for Pt  Pt's son denies hx of mental health and drug and alcohol for Pt  Pt's son reports he will transport Pt home upon discharge  [employer] card given to Pt's son for assistance with finding PCP once Pt obtains new insurance  CM to follow

## 2020-08-17 NOTE — UTILIZATION REVIEW
Initial Clinical Review    Admission: Date/Time/Statement:   Admission Orders (From admission, onward)     Ordered        08/16/20 1042  Inpatient Admission  Once                   Orders Placed This Encounter   Procedures    Inpatient Admission     Standing Status:   Standing     Number of Occurrences:   1     Order Specific Question:   Admitting Physician     Answer:   Yoli Gonzalez     Order Specific Question:   Level of Care     Answer:   Med Surg [16]     Order Specific Question:   Estimated length of stay     Answer:   More than 2 Midnights     Order Specific Question:   Certification     Answer:   I certify that inpatient services are medically necessary for this patient for a duration of greater than two midnights  See H&P and MD Progress Notes for additional information about the patient's course of treatment  ED Arrival Information     Expected Arrival Acuity Means of Arrival Escorted By Service Admission Type    - 8/16/2020 07:59 Urgent Ambulance [facility] Hospitalist Urgent    Arrival Complaint    fever        Chief Complaint   Patient presents with    Fever - 75 years or older     To ED via EMS for evaluation of decreased LOC with fever  Family states that he had some nausea and vomiting this morning as well  HX of recent UTI with catheter placement  Assessment/Plan: 81 yo male to ED from home w/ chronic winslow and fever for 1 day   Noticed altered mental status , N/V   In ED T 103, ua + nitrates , bacteria and WBC   Admitted IP status for sepsis sec to UTI   Plan for iv cefepime , IVF , trend cbc and temp , f/u ua cx , gentle IVF   8/17  IM note   Bacteremia blood cultures growing gram-negative rods likely source is UTI, cont on IV abx and fluids   Cont home insulin , accu checks and sliding scale   Blood pressure ranging from 88/50 to 149/68, Hold antihypertensives for now given sepsis, 500 ml bolus now and cont IVF and monitor      ED Triage Vitals   Temperature Pulse Respirations Blood Pressure SpO2   08/16/20 0807 08/16/20 0807 08/16/20 0807 08/16/20 0807 08/16/20 0807   99 6 °F (37 6 °C) (!) 115 20 149/68 100 %      Temp Source Heart Rate Source Patient Position - Orthostatic VS BP Location FiO2 (%)   08/16/20 0807 08/16/20 0807 08/16/20 0807 08/16/20 0807 --   Tympanic Monitor Sitting Right arm       Pain Score       08/16/20 0820       Med Not Given for Pain - for MAR use only          Wt Readings from Last 1 Encounters:   08/16/20 96 6 kg (212 lb 15 4 oz)     Additional Vital Signs:   08/16/20 23:32:09   97 3 °F (36 3 °C)Abnormal     92   17   93/45Abnormal     61   95 %         None (Room air)   Lying    08/16/20 15:57:01   98 2 °F (36 8 °C)   97   18   99/54   69   97 %                08/16/20 11:24:04   100 7 °F (38 2 °C)Abnormal     96   21   94/44Abnormal     61   97 %            Sitting    08/16/20 1100      98   25Abnormal     91/55   71   97 %                08/16/20 1030      100   25Abnormal     88/50Abnormal     67   99 %                08/16/20 1024   98 7 °F (37 1 °C)         102/51                      08/16/20 1000      109Abnormal     20   102/51   71   98 %                08/16/20 0945      110Abnormal     20   117/59   84   98 %                08/16/20 0900      110Abnormal     20   124/58   83   95 %   28   2 L/min   Nasal cannula       08/16/20 0821   103 1 °F (39 5 °C)Abnormal                                 08/16/20 0810                                  Pertinent Labs/Diagnostic Test Results:   8/16  CT chest   No acute abnormality in the chest, abdomen, or pelvis        Chronic prostatomegaly with bladder wall thickening        Pancolonic diverticulosis     8/16 PCXR wnl     Results from last 7 days   Lab Units 08/16/20  0823   SARS-COV-2  Negative     Results from last 7 days   Lab Units 08/17/20  0529 08/16/20  0806   WBC Thousand/uL 21 81* 3 97*   HEMOGLOBIN g/dL 10 5* 12 5   HEMATOCRIT % 32 2* 37 8   PLATELETS Thousands/uL 157 215     Results from last 7 days   Lab Units 08/17/20  0529 08/16/20  0806   SODIUM mmol/L 137 136   POTASSIUM mmol/L 4 5 4 4   CHLORIDE mmol/L 104 103   CO2 mmol/L 27 26   ANION GAP mmol/L 6 7   BUN mg/dL 22 15   CREATININE mg/dL 1 34* 1 08   EGFR ml/min/1 73sq m 49 63   CALCIUM mg/dL 8 4 8 5     Results from last 7 days   Lab Units 08/16/20  0806   AST U/L 15   ALT U/L 23   ALK PHOS U/L 62   TOTAL PROTEIN g/dL 7 0   ALBUMIN g/dL 3 0*   TOTAL BILIRUBIN mg/dL 0 40     Results from last 7 days   Lab Units 08/17/20  0757 08/16/20  2120 08/16/20  1603 08/16/20  1159 08/16/20  1047   POC GLUCOSE mg/dl 158* 417* 230* 105 154*     Results from last 7 days   Lab Units 08/17/20  0529 08/16/20  0806   GLUCOSE RANDOM mg/dL 204* 200*     Results from last 7 days   Lab Units 08/16/20  0949 08/16/20  0806   LACTIC ACID mmol/L 1 8 2 1*     Results from last 7 days   Lab Units 08/16/20  1049   CLARITY UA  Clear   COLOR UA  Yellow   SPEC GRAV UA  <=1 005   PH UA  6 5   GLUCOSE UA mg/dl Negative   KETONES UA mg/dl Negative   BLOOD UA  Large*   PROTEIN UA mg/dl Trace*   NITRITE UA  Positive*   BILIRUBIN UA  Negative   UROBILINOGEN UA E U /dl 0 2   LEUKOCYTES UA  Small*   WBC UA /hpf 10-20*   RBC UA /hpf 2-4*   BACTERIA UA /hpf Innumerable*   EPITHELIAL CELLS WET PREP /hpf Occasional     Results from last 7 days   Lab Units 08/16/20  0806   GRAM STAIN RESULT  Gram negative rods*  Gram negative rods*     Results from last 7 days   Lab Units 08/16/20  0806   TOTAL COUNTED  100     ED Treatment:   Medication Administration from 08/16/2020 0758 to 08/16/2020 1112       Date/Time Order Dose Route Action     08/16/2020 0828 ondansetron (FOR EMS ONLY) (ZOFRAN) 4 mg/2 mL injection 4 mg 0 mg Does not apply Given to EMS     08/16/2020 0820 acetaminophen (TYLENOL) tablet 650 mg 650 mg Oral Given     08/16/2020 0844 cefepime (MAXIPIME) IVPB (premix) 2,000 mg 50 mL 2,000 mg Intravenous New Bag 08/16/2020 0929 sodium chloride 0 9 % bolus 500 mL 500 mL Intravenous New Bag        Past Medical History:   Diagnosis Date    Cardiac disease     [facility]     Diabetes mellitus (HCC)     GERD (gastroesophageal reflux disease)     Hyperlipidemia     Hypertension     MI (myocardial infarction) ([place] [address] )      Present on Admission:   Benign prostatic hyperplasia with lower urinary tract symptoms   CHF (congestive heart failure) (HCC)   GERD (gastroesophageal reflux disease)   HTN (hypertension)   Hyperlipidemia   Poorly controlled type 2 diabetes mellitus ([facility])      Admitting Diagnosis: Fever [R50 9]  Sepsis ([place] [address] ) [A41 9]  Age/Sex: 82 y o  male  Admission Orders:  Scheduled Medications:  aspirin, 325 mg, Oral, Daily  cefepime, 2,000 mg, Intravenous, Q12H  clopidogrel, 75 mg, Oral, Daily  enoxaparin, 40 mg, Subcutaneous, Daily  famotidine, 20 mg, Oral, Daily  finasteride, 5 mg, Oral, Daily  gabapentin, 300 mg, Oral, BID  insulin glargine, 20 Units, Subcutaneous, HS  insulin lispro, 1-5 Units, Subcutaneous, HS  insulin lispro, 1-6 Units, Subcutaneous, TID AC  insulin lispro, 5 Units, Subcutaneous, Daily With Breakfast  pravastatin, 40 mg, Oral, Daily With Dinner  tamsulosin, 0 8 mg, Oral, Daily With Dinner      Continuous IV Infusions:  multi-electrolyte, 100 mL/hr, Intravenous, Continuous      PRN Meds:  albuterol, 2 puff, Inhalation, Q4H PRN  nitroglycerin, 0 4 mg, Sublingual, Q5 Min PRN    fingerstick ac and hs   Up and OOB   Cons carb diet       Network Utilization Review Department  [email or web address]  ATTENTION: Please call with any questions or concerns to [phone] and carefully listen to the prompts so that you are directed to the right person   All voicemails are confidential   [facility] all requests for admission clinical reviews, approved or denied determinations and any other requests to dedicated fax number below belonging to the campus where the patient is receiving treatment   List of dedicated fax numbers for the Facilities:  [address] DENIALS (Administrative/Medical Necessity) [phone]   [address] (Maternity/NICU/Pediatrics) [phone]   Toby Robles [phone]   Dalila Bryant [phone]   Shea Heady [phone]   Hettie Lips [phone]   [address] [facility] [address] [place] [address] [phone]   [facility]  [phone]   [address] [facility] [address]  [address] [place] [address] [address] [facility] [phone]

## 2020-08-17 NOTE — PROGRESS NOTES
Progress Note - Merly Fortune 1935, 82 y o  male MRN: [id number]    Unit/Bed#: -Joe Encounter: [id number]    Primary Care Provider: No primary care provider on file     Date and time admitted to hospital: 8/16/2020  8:02 AM        Bacteremia  Assessment & Plan  Patient blood cultures growing gram-negative rods likely source is UTI  Continue on IV antibiotics and fluids  See above    Urinary tract infection associated with indwelling urethral catheter [facility])  Assessment & Plan  Catheter associated UTI, present on admission  UA-positive nitrite, 10-20 WBC and innumerable bacteria  On IV cefepime, urine culture    Poorly controlled type 2 diabetes mellitus [facility])  Assessment & Plan  Lab Results   Component Value Date    HGBA1C 8 4%   waver was signed 12/18/2017       Recent Labs     08/16/20  1159 08/16/20  1603 08/16/20  2120 08/17/20  0757   POCGLU 105 230* 417* 158*       Blood Sugar Average: Last 72 hrs:  (P) 212 8   Check A1c, last recorded A1c was 8 4  Continue with home insulin regimen, 20 units Lantus at bedtime and add on 5 units lispro pre meals  Accu-Cheks and sliding scale    Hyperlipidemia  Assessment & Plan  History of hyperlipidemia on simvastatin, switch to pravastatin while inpatient    HTN (hypertension)  Assessment & Plan  History of hypertension on 25 mg carvedilol twice daily, Lasix 20 mg twice daily and Avapro 150 mg at bedtime  Blood pressure ranging from 88/50 to 149/68, Hold antihypertensives for now given sepsis  Monitor on IV fluid, DC once sepsis resolves  Will give normal saline bolus 500 cc now and continue IV fluids  Monitor BP per unit protocol    GERD (gastroesophageal reflux disease)  Assessment & Plan  History of GERD on Pepcid, continue with home meds    CHF (congestive heart failure) ([facility])  Assessment & Plan  Wt Readings from Last 3 Encounters:   08/16/20 96 6 kg (212 lb 15 4 oz)   07/28/20 101 kg (222 lb 12 8 oz)   07/24/20 99 8 kg (220 lb)       History of CHF, unknown EF  Not in acute exacerbation  Weight stable at 212 lb  Daily weights  Strict Is/Os  Antihypertensives held right now given sepsis management to avoid hypotension      Benign prostatic hyperplasia with lower urinary tract symptoms  Assessment & Plan  History of BPH, on finasteride and tamsulosin status post Estrella catheter placement  Continue with home meds  Continue with scheduled outpatient urology appointment on September 4 for cystoscopy    * Sepsis [facility])  Assessment & Plan  Sepsis, present on admission, as evidenced by fever, 103 1, tachycardia 112, tachypnea 22 secondary to catheter associated urinary tract infection  Lactic acid initially 2 1  UA- Innumerable bacteria, 10-20 WBC, positive nitrite and large blood  Estrella catheter changed in the ED  Continue on cefepime  Blood cultures growing gram-negative rods  Follow-up final culture and sensitivity  Lactic acidosis resolved with IV fluids    Tylenol p r n  For fever  Zofran p r n  For nausea/vomiting  Trend CBC and temperature curve  Follow urine culture results  Patient has chronic Estrella catheter in place        Labs & Imaging: I have personally reviewed pertinent reports  VTE Pharmacologic Prophylaxis: Enoxaparin (Lovenox)  VTE Mechanical Prophylaxis: sequential compression device    Code Status:   Level 1 - Full Code    Patient Centered Rounds: I have performed bedside rounds with nursing staff today  Discussions with Specialists or Other Care Team Provider: ID     Education and Discussions with Family / Patient:  Son at bedside    Current Length of Stay: 1 day(s)    Current Patient Status: Inpatient   Certification Statement: The patient will continue to require additional inpatient hospital stay due to see my assessment and plan  Subjective:   Patient is seen and examined at bedside  Complains of some suprapubic pain    Denies any CVA tenderness, shortness of breath, nausea, vomiting, chest pain  Afebrile  All other ROS are negative  Objective:    Vitals: Blood pressure (!) 93/45, pulse 92, temperature (!) 97 3 °F (36 3 °C), temperature source Oral, resp  rate 17, height 5' 6" (1 676 m), weight 96 6 kg (212 lb 15 4 oz), SpO2 95 %  ,Body mass index is 34 37 kg/m²  SPO2 RA Rest      ED to Hosp-Admission (Current) from 8/16/2020 in [address] Med Surg Unit   SpO2  95 %   SpO2 Activity  At Rest   O2 Device  None (Room air)   O2 Flow Rate          I&O:     Intake/Output Summary (Last 24 hours) at 8/17/2020 1052  Last data filed at 8/17/2020 7316  Gross per 24 hour   Intake 500 ml   Output 1525 ml   Net -1025 ml       Physical Exam:    General- Alert, sitting comfortably in chair  Not in any acute distress  HEENT- MARJORIE, EOM intact  Neck- Supple, No JVD  CVS- regular, S1 and S2 normal   Chest- Bilateral Air entry, No rhochi, crackles or wheezing present  Abdomen- soft, minimal suprapubic tenderness, not distended, no guarding or rigidity, BS+  Extremities-  No pedal edema, No calf tenderness  CNS-   Alert, awake and orientedx3  No focal deficits present      Invasive Devices     Peripheral Intravenous Line            Peripheral IV 08/16/20 Right Antecubital 1 day          Drain            Urethral Catheter Coude;Non-latex 12 Fr  1 day                      Social History  reviewed  Family History   Problem Relation Age of Onset    Diabetes Mother     Hypertension Mother     Cancer Father     Hypertension Father     Cancer Sister     Hypertension Sister     reviewed    Meds:  Current Facility-Administered Medications   Medication Dose Route Frequency Provider Last Rate Last Dose    albuterol (PROVENTIL HFA,VENTOLIN HFA) inhaler 2 puff  2 puff Inhalation Q4H PRN Ruby Hsieh MD        aspirin tablet 325 mg  325 mg Oral Daily Ruby Hsieh MD   325 mg at 08/17/20 0839    cefepime (MAXIPIME) IVPB (premix) 2,000 mg 50 mL  2,000 mg Intravenous Q12H Ruby Hsieh  mL/hr at 08/17/20 0840 2,000 mg at 08/17/20 0840    clopidogrel (PLAVIX) tablet 75 mg  75 mg Oral Daily Katie Staples MD   75 mg at 08/17/20 0840    enoxaparin (LOVENOX) subcutaneous injection 40 mg  40 mg Subcutaneous Daily Katie Staples MD   40 mg at 08/17/20 0839    famotidine (PEPCID) tablet 20 mg  20 mg Oral Daily Katie Staples MD   20 mg at 08/17/20 0840    finasteride (PROSCAR) tablet 5 mg  5 mg Oral Daily Katie Staples MD   5 mg at 08/17/20 0840    gabapentin (NEURONTIN) capsule 300 mg  300 mg Oral BID Katie Staples MD   300 mg at 08/17/20 0840    insulin glargine (LANTUS) subcutaneous injection 20 Units 0 2 mL  20 Units Subcutaneous HS Katie Staples MD   20 Units at 08/16/20 2120    insulin lispro (HumaLOG) 100 units/mL subcutaneous injection 1-5 Units  1-5 Units Subcutaneous HS JAYCE Vaz   5 Units at 08/16/20 2201    insulin lispro (HumaLOG) 100 units/mL subcutaneous injection 1-6 Units  1-6 Units Subcutaneous TID AC JAYCE Vaz   1 Units at 08/17/20 5345    insulin lispro (HumaLOG) 100 units/mL subcutaneous injection 5 Units  5 Units Subcutaneous Daily With Breakfast Katie Staples MD   5 Units at 08/17/20 0838    multi-electrolyte (PLASMALYTE-A/ISOLYTE-S PH 7 4) IV solution  100 mL/hr Intravenous Continuous Katie Staples  mL/hr at 08/17/20 0529 100 mL/hr at 08/17/20 0529    nitroglycerin (NITROSTAT) SL tablet 0 4 mg  0 4 mg Sublingual Q5 Min PRN Katie Staples MD        pravastatin (PRAVACHOL) tablet 40 mg  40 mg Oral Daily With Mariya Knott MD   40 mg at 08/16/20 1712    sodium chloride 0 9 % bolus 500 mL  500 mL Intravenous Once Elodia Suarez MD        tamsulosin (FLOMAX) capsule 0 8 mg  0 8 mg Oral Daily With Mariya Knott MD   0 8 mg at 08/16/20 1712      Medications Prior to Admission   Medication    albuterol (PROVENTIL HFA,VENTOLIN HFA) 90 mcg/act inhaler    aspirin 325 mg tablet    carvedilol (COREG) 25 mg tablet    Cholecalciferol (VITAMIN D3) 5000 units CAPS    clopidogrel (PLAVIX) 75 mg tablet    Cyanocobalamin (VITAMIN B12 PO)    famotidine (PEPCID) 40 MG tablet    finasteride (PROSCAR) 5 mg tablet    furosemide (LASIX) 20 mg tablet    gabapentin (NEURONTIN) 300 mg capsule    insulin glargine (LANTUS) 100 units/mL subcutaneous injection    irbesartan (AVAPRO) 150 mg tablet    isosorbide mononitrate (IMDUR) 30 mg 24 hr tablet    meloxicam (MOBIC) 15 mg tablet    metFORMIN (GLUCOPHAGE) 1000 MG tablet    simvastatin (ZOCOR) 20 mg tablet    sitaGLIPtin (JANUVIA) 100 mg tablet    tamsulosin (FLOMAX) 0 4 mg    nitroglycerin (NITROSTAT) 0 4 mg SL tablet       Labs:  Results from last 7 days   Lab Units 08/17/20  0529 08/16/20  0806   WBC Thousand/uL 21 81* 3 97*   HEMOGLOBIN g/dL 10 5* 12 5   HEMATOCRIT % 32 2* 37 8   PLATELETS Thousands/uL 157 215   LYMPHO PCT %  --  8*   MONO PCT %  --  2*   EOS PCT %  --  2     Results from last 7 days   Lab Units 08/17/20  0529 08/16/20  0806   POTASSIUM mmol/L 4 5 4 4   CHLORIDE mmol/L 104 103   CO2 mmol/L 27 26   BUN mg/dL 22 15   CREATININE mg/dL 1 34* 1 08   CALCIUM mg/dL 8 4 8 5   ALK PHOS U/L  --  62   ALT U/L  --  23   AST U/L  --  15     No results found for: TROPONINI, CKMB, CKTOTAL      Lab Results   Component Value Date    URINECX >100,000 cfu/ml Escherichia coli (A) 07/22/2020         Imaging:  Results for orders placed during the hospital encounter of 08/16/20   XR chest portable    Narrative CHEST     INDICATION:   fever  COMPARISON:  Chest radiograph from 12/17/2017  Chest CT from today  EXAM PERFORMED/VIEWS:  XR CHEST PORTABLE      FINDINGS:    Cardiomegaly  The lungs are clear  No pneumothorax or pleural effusion  Healed posterior left 5th rib fracture  Impression No acute cardiopulmonary disease          Workstation performed: [id number]       Results for orders placed during the hospital encounter of 12/11/17   XR chest 2 views    Narrative CHEST - DUAL ENERGY    INDICATION:  Chest pain and shortness of breath    COMPARISON:  None    VIEWS:  PA (including soft tissue/bone algorithms) and lateral projections    IMAGES:  4    FINDINGS:         The aorta is atherosclerotic  Heart size is unremarkable  The lungs are clear  No pneumothorax or pleural effusion  Visualized osseous structures appear within normal limits for the patient's age  Impression No active pulmonary disease        Workstation performed: [id number]         Last 24 Hours Medication List:   Current Facility-Administered Medications   Medication Dose Route Frequency Provider Last Rate    albuterol  2 puff Inhalation Q4H PRN Louisa Elliott MD      aspirin  325 mg Oral Daily Louisa Elliott MD      cefepime  2,000 mg Intravenous Q12H Louisa Elliott MD 2,000 mg (08/17/20 0840)    clopidogrel  75 mg Oral Daily Louisa Elliott MD      enoxaparin  40 mg Subcutaneous Daily Louisa Elliott MD      famotidine  20 mg Oral Daily Louisa Elliott MD      finasteride  5 mg Oral Daily Louisa Elliott MD      gabapentin  300 mg Oral BID Louisa Elliott MD      insulin glargine  20 Units Subcutaneous HS Louisa Elliott MD      insulin lispro  1-5 Units Subcutaneous HS JAYCE Vaz      insulin lispro  1-6 Units Subcutaneous TID AC JAYCE Vaz      insulin lispro  5 Units Subcutaneous Daily With Breakfast Louisa Elliott MD      multi-electrolyte  100 mL/hr Intravenous Continuous Louisa Elliott  mL/hr (08/17/20 0529)    nitroglycerin  0 4 mg Sublingual Q5 Min PRN Louisa Elliott MD      pravastatin  40 mg Oral Daily With Malissa Bryan MD      sodium chloride  500 mL Intravenous Once Shamika Norwood MD      tamsulosin  0 8 mg Oral Daily With Malissa Bryan MD          Today, Patient Was Seen By: Shamika Norwood MD    ** Please Note: Dictation voice to text software may have been used in the creation of this document   **

## 2020-08-17 NOTE — ASSESSMENT & PLAN NOTE
History of BPH, on finasteride and tamsulosin status post Estrella catheter placement  Continue with home meds  Continue with scheduled outpatient urology appointment on September 4 for cystoscopy

## 2020-08-17 NOTE — UTILIZATION REVIEW
Notification of Inpatient Admission/Inpatient Authorization Request   This is a Notification of Inpatient Admission for [address]  Be advised that this patient was admitted to our facility under Inpatient Status  Contact Zayra Hartman at [phone] for additional admission information  [facility] DEPT  DEDICATED [phone]  Patient Name:   Federico Leiva   YOB: 1935       [address]   [address]:   [address]  Tax ID: [id number]  NPI: [id number] Attending Provider/NPI:  Phone:  Address: Matt Reno Md [[id number]]  [address] [place] [address] Service Code: 24 Place of Service Name:  [address] [place] [address]   Start Date: 8/16/20 1042 Discharge Date & Time: No discharge date for patient encounter  Type of Admission: Inpatient Status Discharge Disposition (if discharged): Home/Self Care   Patient Diagnoses: Fever [R50 9]  Sepsis ([address] ) [A41 9]     Orders: Admission Orders (From admission, onward)     Ordered        08/16/20 1042  Inpatient Admission  Once                    Assigned Utilization Review Contact: Judge Johnnie Hartman  Utilization   Network Utilization Review Department  Phone: [phone]; Fax [phone]  Email: [email or web address]   ATTENTION PAYERS: Please call the assigned Utilization  directly with any questions or concerns ALL voicemails in the department are confidential  Send all requests for admission clinical reviews, approved or denied determinations and any other requests to dedicated fax number belonging to the campus where the patient is receiving treatment

## 2020-08-17 NOTE — PLAN OF CARE
Problem: Potential for Falls  Goal: Patient will remain free of falls  Description: INTERVENTIONS:  - Assess patient frequently for physical needs  -  Identify cognitive and physical deficits and behaviors that affect risk of falls    -  [place] fall precautions as indicated by assessment   - Educate patient/family on patient safety including physical limitations  - Instruct patient to call for assistance with activity based on assessment  - Modify environment to reduce risk of injury  - Consider OT/PT consult to assist with strengthening/mobility  Outcome: Progressing     Problem: Prexisting or High Potential for Compromised Skin Integrity  Goal: Skin integrity is maintained or improved  Description: INTERVENTIONS:  - Identify patients at risk for skin breakdown  - Assess and monitor skin integrity  - Assess and monitor nutrition and hydration status  - Monitor labs   - Assess for incontinence   - Turn and reposition patient  - Assist with mobility/ambulation  - Relieve pressure over bony prominences  - Avoid friction and shearing  - Provide appropriate hygiene as needed including keeping skin clean and dry  - Evaluate need for skin moisturizer/barrier cream  - Collaborate with interdisciplinary team   - Patient/family teaching  - Consider wound care consult   Outcome: Progressing     Problem: PAIN - ADULT  Goal: Verbalizes/displays adequate comfort level or baseline comfort level  Description: Interventions:  - Encourage patient to monitor pain and request assistance  - Assess pain using appropriate pain scale  - Administer analgesics based on type and severity of pain and evaluate response  - Implement non-pharmacological measures as appropriate and evaluate response  - Consider cultural and social influences on pain and pain management  - Notify physician/advanced practitioner if interventions unsuccessful or patient reports new pain  Outcome: Progressing     Problem: INFECTION - ADULT  Goal: Absence or prevention of progression during hospitalization  Description: INTERVENTIONS:  - Assess and monitor for signs and symptoms of infection  - Monitor lab/diagnostic results  - Monitor all insertion sites, i e  indwelling lines, tubes, and drains  - Monitor endotracheal if appropriate and nasal secretions for changes in amount and color  - [place] appropriate cooling/warming therapies per order  - Administer medications as ordered  - Instruct and encourage patient and family to use good hand hygiene technique  - Identify and instruct in appropriate isolation precautions for identified infection/condition  Outcome: Progressing     Problem: SAFETY ADULT  Goal: Patient will remain free of falls  Description: INTERVENTIONS:  - Assess patient frequently for physical needs  -  Identify cognitive and physical deficits and behaviors that affect risk of falls    -  [place] fall precautions as indicated by assessment   - Educate patient/family on patient safety including physical limitations  - Instruct patient to call for assistance with activity based on assessment  - Modify environment to reduce risk of injury  - Consider OT/PT consult to assist with strengthening/mobility  Outcome: Progressing  Goal: Maintain or return to baseline ADL function  Description: INTERVENTIONS:  -  Assess patient's ability to carry out ADLs; assess patient's baseline for ADL function and identify physical deficits which impact ability to perform ADLs (bathing, care of mouth/teeth, toileting, grooming, dressing, etc )  - Assess/evaluate cause of self-care deficits   - Assess range of motion  - Assess patient's mobility; develop plan if impaired  - Assess patient's need for assistive devices and provide as appropriate  - Encourage maximum independence but intervene and supervise when necessary  - Involve family in performance of ADLs  - Assess for home care needs following discharge   - Consider OT consult to assist with ADL evaluation and planning for discharge  - Provide patient education as appropriate  Outcome: Progressing  Goal: Maintain or return mobility status to optimal level  Description: INTERVENTIONS:  - Assess patient's baseline mobility status (ambulation, transfers, stairs, etc )    - Identify cognitive and physical deficits and behaviors that affect mobility  - Identify mobility aids required to assist with transfers and/or ambulation (gait belt, sit-to-stand, lift, walker, cane, etc )  - [place] fall precautions as indicated by assessment  - Record patient progress and toleration of activity level on Mobility SBAR; progress patient to next Phase/Stage  - Instruct patient to call for assistance with activity based on assessment  - Consider rehabilitation consult to assist with strengthening/weightbearing, etc   Outcome: Progressing     Problem: DISCHARGE PLANNING  Goal: Discharge to home or other facility with appropriate resources  Description: INTERVENTIONS:  - Identify barriers to discharge w/patient and caregiver  - Arrange for needed discharge resources and transportation as appropriate  - Identify discharge learning needs (meds, wound care, etc )  - Arrange for interpretive services to assist at discharge as needed  - Refer to Case Management Department for coordinating discharge planning if the patient needs post-hospital services based on physician/advanced practitioner order or complex needs related to functional status, cognitive ability, or social support system  Outcome: Progressing     Problem: Knowledge Deficit  Goal: Patient/family/caregiver demonstrates understanding of disease process, treatment plan, medications, and discharge instructions  Description: Complete learning assessment and assess knowledge base    Interventions:  - Provide teaching at level of understanding  - Provide teaching via preferred learning methods  Outcome: Progressing

## 2020-08-17 NOTE — ASSESSMENT & PLAN NOTE
Patient blood cultures growing gram-negative rods likely source is UTI  Continue on IV antibiotics and fluids  See above

## 2020-08-17 NOTE — ASSESSMENT & PLAN NOTE
History of hypertension on 25 mg carvedilol twice daily, Lasix 20 mg twice daily and Avapro 150 mg at bedtime  Blood pressure ranging from 88/50 to 149/68, Hold antihypertensives for now given sepsis  Monitor on IV fluid, DC once sepsis resolves  Will give normal saline bolus 500 cc now and continue IV fluids  Monitor BP per unit protocol

## 2020-08-17 NOTE — ASSESSMENT & PLAN NOTE
Lab Results   Component Value Date    HGBA1C 8 4%   waver was signed 12/18/2017       Recent Labs     08/16/20  1159 08/16/20  1603 08/16/20  2120 08/17/20  0757   POCGLU 105 230* 417* 158*       Blood Sugar Average: Last 72 hrs:  (P) 212 8   Check A1c, last recorded A1c was 8 4  Continue with home insulin regimen, 20 units Lantus at bedtime and add on 5 units lispro pre meals  Accu-Cheks and sliding scale

## 2020-08-17 NOTE — CONSULTS
Consultation - Infectious Disease   Merly Pollard 82 y o  male MRN: [id number]  Unit/Bed#: -01 Encounter: [id number]      Assessment/Plan   1  Sepsis/Gm neg septicemia/Fever/Leukocytosis/TME: Pt presented with fever and decreased MS c/w TME  He had fever but was leukopenic which could be due to bone marrow suppression from sepsis  Lactic acid was elevated c/w sepsis  Bld cx's are positive for GNR's  UA showed pyuria so UTI could be source for his septicemia in setting of chronic winslow  Urine cx is pending  Abd CT showed enlarged prostate with thick walled bladder - probably sequelae of QUEEN and no hydro  No evidence of pneumonia  COVID19 test is neg  WBC count has recovered to 21K this AM    A  Cont Cefepime for now  B  Awaiting final cx results - will re-adjust abx according to these results  C  If not done on admission, would change winslow catheter  D  Will follow WBC count  E  Would consider Urology eval if Pt has not been seen since winslow catheter was placed    History of Present Illness   Physician Requesting Consult: Evelio Dubon MD  Reason for Consult / Principal Problem:     HPI: Mary Shahid is a 82y o  year old male with H/O CAD, CHF, DM, GERD, HLD, HTN, BPH with urinary retention requiring placement of winslow catheter approx one mos PTA who was admitted on 8/16 with c/o fever and decreased MS x 1 day  Pt had one episode of N/V  On admission, he had fever with leukopenia  He was hypotensive and lactic acid was elevated  UA showed pyuria  He was placed on Cefepime  CXR was neg  Chest CT showed minimal atelectasis  Abd CT showed enlarged prostate with thick walled bladder - likely sequela of QUEEN but no hydro  Bld cx's are positive for GNR's  Urine cx is pending  This AM, WBC count has increased to 21K      Pt denied cough, SOB, CP, diarrhea, abd pain, or dysuria    Inpatient consult to Infectious Diseases  Consult performed by: Gloria Paulson MD  Consult ordered by: Evelio Dubon MD          ROS: 12 systems reviewed, remainder is neg      Historical Information   Past Medical History:   Diagnosis Date    Cardiac disease     [facility]congest[facility]) ([facility] )     Diabetes mellitus ([place] [address] )     GERD (gastroesophageal reflux disease)     Hyperlipidemia     Hypertension     MI (myocardial infarction) ([place] [address] )      Past Surgical History:   Procedure Laterality Date    EYE SURGERY       Social History   Social History     Substance and Sexual Activity   Alcohol Use No    Alcohol/week: 0 0 standard drinks    Frequency: Never    Binge frequency: Never     Social History     Substance and Sexual Activity   Drug Use No     Social History     Tobacco Use   Smoking Status Never Smoker   Smokeless Tobacco Never Used     Family History   Problem Relation Age of Onset    Diabetes Mother     Hypertension Mother     Cancer Father     Hypertension Father     Cancer Sister     Hypertension Sister        Meds/Allergies   MEDS:  Cefepime: #2      Current Facility-Administered Medications:     albuterol (PROVENTIL HFA,VENTOLIN HFA) inhaler 2 puff, 2 puff, Inhalation, Q4H PRN, Ok Saleem MD    aspirin tablet 325 mg, 325 mg, Oral, Daily, Ok Saleem MD, 325 mg at 08/17/20 0839    cefepime (MAXIPIME) IVPB (premix) 2,000 mg 50 mL, 2,000 mg, Intravenous, Q12H, Ok Saleem MD, Last Rate: 100 mL/hr at 08/17/20 0840, 2,000 mg at 08/17/20 0840    clopidogrel (PLAVIX) tablet 75 mg, 75 mg, Oral, Daily, Ok Saleem MD, 75 mg at 08/17/20 0840    enoxaparin (LOVENOX) subcutaneous injection 40 mg, 40 mg, Subcutaneous, Daily, Ok Saleem MD, 40 mg at 08/17/20 0839    famotidine (PEPCID) tablet 20 mg, 20 mg, Oral, Daily, Ok Saleem MD, 20 mg at 08/17/20 0840    finasteride (PROSCAR) tablet 5 mg, 5 mg, Oral, Daily, Ok Saleem MD, 5 mg at 08/17/20 0840    gabapentin (NEURONTIN) capsule 300 mg, 300 mg, Oral, BID, Ok Saleem MD, 300 mg at 08/17/20 0840    insulin glargine (LANTUS) subcutaneous injection 20 Units 0 2 mL, 20 Units, Subcutaneous, HS, Linda Del Real MD, 20 Units at 08/16/20 2120    insulin lispro (HumaLOG) 100 units/mL subcutaneous injection 1-5 Units, 1-5 Units, Subcutaneous, HS, JAYCE Vaz, 5 Units at 08/16/20 2201    insulin lispro (HumaLOG) 100 units/mL subcutaneous injection 1-6 Units, 1-6 Units, Subcutaneous, TID AC, 4 Units at 08/17/20 1111 **AND** Fingerstick Glucose (POCT), , , TID AC, JAYCE Vaz    insulin lispro (HumaLOG) 100 units/mL subcutaneous injection 5 Units, 5 Units, Subcutaneous, Daily With Breakfast, Linda Del Real MD, 5 Units at 08/17/20 0838    multi-electrolyte (PLASMALYTE-A/ISOLYTE-S PH 7 4) IV solution, 100 mL/hr, Intravenous, Continuous, Linda Del Real MD, Last Rate: 100 mL/hr at 08/17/20 1416, 100 mL/hr at 08/17/20 1416    nitroglycerin (NITROSTAT) SL tablet 0 4 mg, 0 4 mg, Sublingual, Q5 Min PRN, Linda Del Real MD    pravastatin (PRAVACHOL) tablet 40 mg, 40 mg, Oral, Daily With Brenda Davison MD, 40 mg at 08/16/20 1712    tamsulosin (FLOMAX) capsule 0 8 mg, 0 8 mg, Oral, Daily With Brenda Davison MD, 0 8 mg at 08/16/20 1712    Allergies   Allergen Reactions    Demerol [Meperidine]          Intake/Output Summary (Last 24 hours) at 8/17/2020 1443  Last data filed at 8/17/2020 1417  Gross per 24 hour   Intake 500 ml   Output 1975 ml   Net -1475 ml       PE:  WD, WN, HM in NAD  VSS, Tmax: 103 1  HEENT: No scleral icterus, pharynx clear  NECK: Supple  CARDIAC:  RRR, nml S1, S2  LUNGS:  Clear  ABDOMEN:  +BS, soft, nontender  BACK: No CVAT  EXTREMITIES:  No calf tenderness  SKIN: No rash  NEURO: Grossly nonfocal    Invasive Devices:   Peripheral IV 08/16/20 Right Antecubital (Active)   Site Assessment Clean;Dry; Intact 08/17/20 0900   Dressing Type Transparent 08/17/20 0900   Line Status Infusing;Flushed 08/17/20 0900   Dressing Status Clean;Dry; Intact 08/17/20 0900       Urethral Catheter Coude;Non-latex 16 Fr   (Active)   Reasons to continue Urinary Catheter  Acute urinary retention/obstruction failing urinary retention protocol 08/16/20 1815   Goal for Removal N/A- discharging with winslow 08/16/20 1815   Site Assessment Skin intact 08/17/20 0230   Collection Container Standard drainage bag 08/17/20 0230   Securement Method Securing device (Describe) 08/17/20 0230   Output (mL) 475 mL 08/17/20 0637           Lab Results:   Admission on 08/16/2020   Component Date Value    Gram Stain Result 08/16/2020 Gram negative rods*    Gram Stain Result 08/16/2020 Gram negative rods*    WBC 08/16/2020 3 97*    RBC 08/16/2020 4 13     Hemoglobin 08/16/2020 12 5     Hematocrit 08/16/2020 37 8     MCV 08/16/2020 92     MCH 08/16/2020 30 3     MCHC 08/16/2020 33 1     RDW 08/16/2020 13 7     MPV 08/16/2020 10 8     Platelets 48/01/6233 215     nRBC 08/16/2020 0     Sodium 08/16/2020 136     Potassium 08/16/2020 4 4     Chloride 08/16/2020 103     CO2 08/16/2020 26     ANION GAP 08/16/2020 7     BUN 08/16/2020 15     Creatinine 08/16/2020 1 08     Glucose 08/16/2020 200*    Calcium 08/16/2020 8 5     AST 08/16/2020 15     ALT 08/16/2020 23     Alkaline Phosphatase 08/16/2020 62     Total Protein 08/16/2020 7 0     Albumin 08/16/2020 3 0*    Total Bilirubin 08/16/2020 0 40     eGFR 08/16/2020 63     LACTIC ACID 08/16/2020 2 1*    SARS-CoV-2 08/16/2020 Negative     LACTIC ACID 08/16/2020 1 8     Segmented % 08/16/2020 86*    Lymphocytes % 08/16/2020 8*    Monocytes % 08/16/2020 2*    Eosinophils, % 08/16/2020 2     Basophils % 08/16/2020 0     Atypical Lymphocytes % 08/16/2020 2*    Absolute Neutrophils 08/16/2020 3 41     Lymphocytes Absolute 08/16/2020 0 32*    Monocytes Absolute 08/16/2020 0 08     Eosinophils Absolute 08/16/2020 0 08     Basophils Absolute 08/16/2020 0 00     Total Counted 08/16/2020 100     RBC Morphology 08/16/2020 Present     Ovalocytes 08/16/2020 Present     Platelet Estimate 70/99/1709 Adequate     Large Platelet 18/89/6755 Present     Color, UA 08/16/2020 Yellow     Clarity, UA 08/16/2020 Clear     Specific Gravity, UA 08/16/2020 <=1 005     pH, UA 08/16/2020 6 5     Leukocytes, UA 08/16/2020 Small*    Nitrite, UA 08/16/2020 Positive*    Protein, UA 08/16/2020 Trace*    Glucose, UA 08/16/2020 Negative     Ketones, UA 08/16/2020 Negative     Urobilinogen, UA 08/16/2020 0 2     Bilirubin, UA 08/16/2020 Negative     Blood, UA 08/16/2020 Large*    POC Glucose 08/16/2020 154*    RBC, UA 08/16/2020 2-4*    WBC, UA 08/16/2020 10-20*    Epithelial Cells 08/16/2020 Occasional     Bacteria, UA 08/16/2020 Innumerable*    AMORPH PHOSPATES 08/16/2020 Occasional     OTHER OBSERVATIONS 08/16/2020 WBCs Clumped     POC Glucose 08/16/2020 105     POC Glucose 08/16/2020 230*    POC Glucose 08/16/2020 417*    Hemoglobin A1C 08/17/2020 7 7*    EAG 08/17/2020 174     Sodium 08/17/2020 137     Potassium 08/17/2020 4 5     Chloride 08/17/2020 104     CO2 08/17/2020 27     ANION GAP 08/17/2020 6     BUN 08/17/2020 22     Creatinine 08/17/2020 1 34*    Glucose 08/17/2020 204*    Calcium 08/17/2020 8 4     eGFR 08/17/2020 49     WBC 08/17/2020 21 81*    RBC 08/17/2020 3 48*    Hemoglobin 08/17/2020 10 5*    Hematocrit 08/17/2020 32 2*    MCV 08/17/2020 93     MCH 08/17/2020 30 2     MCHC 08/17/2020 32 6     RDW 08/17/2020 14 1     Platelets 57/29/3929 157     MPV 08/17/2020 11 0     POC Glucose 08/17/2020 158*    POC Glucose 08/17/2020 288*     Imaging Studies: I have personally reviewed pertinent reports  EKG, Pathology, and Other Studies: I have personally reviewed pertinent reports        Culture  No results found for: BLOODCX  No results found for: [facility]   Lab Results   Component Value Date    URINECX >100,000 cfu/ml Escherichia coli (A) 07/22/2020     No results found for: SPUTUMCULTUR    Principal Problem:    Sepsis ([place] [address] )  Active Problems:    Benign prostatic hyperplasia with lower urinary tract symptoms    CHF (congestive heart failure) ([facility])    GERD (gastroesophageal reflux disease)    HTN (hypertension)    Hyperlipidemia    Poorly controlled type 2 diabetes mellitus ([place] [address] )    Urinary tract infection associated with indwelling urethral catheter ([facility])    Bacteremia

## 2020-08-17 NOTE — ASSESSMENT & PLAN NOTE
Sepsis, present on admission, as evidenced by fever, 103 1, tachycardia 112, tachypnea 22 secondary to catheter associated urinary tract infection  Lactic acid initially 2 1  UA- Innumerable bacteria, 10-20 WBC, positive nitrite and large blood  Estrella catheter changed in the ED  Continue on cefepime  Blood cultures growing gram-negative rods  Follow-up final culture and sensitivity  Lactic acidosis resolved with IV fluids    Tylenol p r n  For fever  Zofran p r n   For nausea/vomiting  Trend CBC and temperature curve  Follow urine culture results  Patient has chronic Estrella catheter in place

## 2020-08-18 ENCOUNTER — TELEPHONE (OUTPATIENT)
Dept: OTHER | Facility: HOSPITAL | Age: 85
End: 2020-08-18

## 2020-08-18 ENCOUNTER — APPOINTMENT (INPATIENT)
Dept: RADIOLOGY | Facility: HOSPITAL | Age: 85
DRG: 698 | End: 2020-08-18
Payer: MEDICARE

## 2020-08-18 PROBLEM — E87.1 HYPONATREMIA: Status: ACTIVE | Noted: 2020-08-18

## 2020-08-18 LAB
ALBUMIN SERPL BCP-MCNC: 2.6 G/DL (ref 3.5–5)
ALP SERPL-CCNC: 83 U/L (ref 46–116)
ALT SERPL W P-5'-P-CCNC: 30 U/L (ref 12–78)
ANION GAP SERPL CALCULATED.3IONS-SCNC: 0 MMOL/L (ref 4–13)
ANION GAP SERPL CALCULATED.3IONS-SCNC: 8 MMOL/L (ref 4–13)
AST SERPL W P-5'-P-CCNC: 20 U/L (ref 5–45)
ATRIAL RATE: 115 BPM
ATRIAL RATE: 87 BPM
ATRIAL RATE: 88 BPM
ATRIAL RATE: 93 BPM
ATRIAL RATE: 95 BPM
ATRIAL RATE: 97 BPM
BASOPHILS # BLD AUTO: 0.04 THOUSANDS/ΜL (ref 0–0.1)
BASOPHILS NFR BLD AUTO: 0 % (ref 0–1)
BILIRUB SERPL-MCNC: 0.2 MG/DL (ref 0.2–1)
BUN SERPL-MCNC: 14 MG/DL (ref 5–25)
BUN SERPL-MCNC: 15 MG/DL (ref 5–25)
CALCIUM SERPL-MCNC: 8.3 MG/DL (ref 8.3–10.1)
CALCIUM SERPL-MCNC: 8.4 MG/DL (ref 8.3–10.1)
CHLORIDE SERPL-SCNC: 101 MMOL/L (ref 100–108)
CHLORIDE SERPL-SCNC: 104 MMOL/L (ref 100–108)
CO2 SERPL-SCNC: 25 MMOL/L (ref 21–32)
CO2 SERPL-SCNC: 26 MMOL/L (ref 21–32)
CREAT SERPL-MCNC: 0.98 MG/DL (ref 0.6–1.3)
CREAT SERPL-MCNC: 1.01 MG/DL (ref 0.6–1.3)
EOSINOPHIL # BLD AUTO: 0.43 THOUSAND/ΜL (ref 0–0.61)
EOSINOPHIL NFR BLD AUTO: 3 % (ref 0–6)
ERYTHROCYTE [DISTWIDTH] IN BLOOD BY AUTOMATED COUNT: 14 % (ref 11.6–15.1)
GFR SERPL CREATININE-BSD FRML MDRD: 68 ML/MIN/1.73SQ M
GFR SERPL CREATININE-BSD FRML MDRD: 71 ML/MIN/1.73SQ M
GLUCOSE SERPL-MCNC: 103 MG/DL (ref 65–140)
GLUCOSE SERPL-MCNC: 126 MG/DL (ref 65–140)
GLUCOSE SERPL-MCNC: 147 MG/DL (ref 65–140)
GLUCOSE SERPL-MCNC: 210 MG/DL (ref 65–140)
GLUCOSE SERPL-MCNC: 215 MG/DL (ref 65–140)
GLUCOSE SERPL-MCNC: 93 MG/DL (ref 65–140)
HCT VFR BLD AUTO: 32.9 % (ref 36.5–49.3)
HGB BLD-MCNC: 11.1 G/DL (ref 12–17)
IMM GRANULOCYTES # BLD AUTO: 0.12 THOUSAND/UL (ref 0–0.2)
IMM GRANULOCYTES NFR BLD AUTO: 1 % (ref 0–2)
LYMPHOCYTES # BLD AUTO: 0.71 THOUSANDS/ΜL (ref 0.6–4.47)
LYMPHOCYTES NFR BLD AUTO: 5 % (ref 14–44)
MAGNESIUM SERPL-MCNC: 2.2 MG/DL (ref 1.6–2.6)
MCH RBC QN AUTO: 30.7 PG (ref 26.8–34.3)
MCHC RBC AUTO-ENTMCNC: 33.7 G/DL (ref 31.4–37.4)
MCV RBC AUTO: 91 FL (ref 82–98)
MONOCYTES # BLD AUTO: 0.38 THOUSAND/ΜL (ref 0.17–1.22)
MONOCYTES NFR BLD AUTO: 3 % (ref 4–12)
NEUTROPHILS # BLD AUTO: 13.29 THOUSANDS/ΜL (ref 1.85–7.62)
NEUTS SEG NFR BLD AUTO: 88 % (ref 43–75)
NRBC BLD AUTO-RTO: 0 /100 WBCS
P AXIS: 52 DEGREES
P AXIS: 57 DEGREES
P AXIS: 58 DEGREES
P AXIS: 62 DEGREES
P AXIS: 65 DEGREES
P AXIS: 75 DEGREES
PHOSPHATE SERPL-MCNC: 2 MG/DL (ref 2.3–4.1)
PLATELET # BLD AUTO: 151 THOUSANDS/UL (ref 149–390)
PMV BLD AUTO: 11 FL (ref 8.9–12.7)
POTASSIUM SERPL-SCNC: 3.6 MMOL/L (ref 3.5–5.3)
POTASSIUM SERPL-SCNC: 4.2 MMOL/L (ref 3.5–5.3)
PR INTERVAL: 154 MS
PR INTERVAL: 156 MS
PR INTERVAL: 160 MS
PR INTERVAL: 160 MS
PR INTERVAL: 162 MS
PR INTERVAL: 162 MS
PROT SERPL-MCNC: 6.6 G/DL (ref 6.4–8.2)
QRS AXIS: 10 DEGREES
QRS AXIS: 11 DEGREES
QRS AXIS: 13 DEGREES
QRS AXIS: 14 DEGREES
QRS AXIS: 17 DEGREES
QRS AXIS: 29 DEGREES
QRSD INTERVAL: 86 MS
QRSD INTERVAL: 92 MS
QRSD INTERVAL: 96 MS
QRSD INTERVAL: 98 MS
QT INTERVAL: 314 MS
QT INTERVAL: 364 MS
QT INTERVAL: 378 MS
QT INTERVAL: 378 MS
QT INTERVAL: 380 MS
QT INTERVAL: 392 MS
QTC INTERVAL: 434 MS
QTC INTERVAL: 459 MS
QTC INTERVAL: 462 MS
QTC INTERVAL: 469 MS
QTC INTERVAL: 471 MS
QTC INTERVAL: 475 MS
RBC # BLD AUTO: 3.62 MILLION/UL (ref 3.88–5.62)
SODIUM SERPL-SCNC: 127 MMOL/L (ref 136–145)
SODIUM SERPL-SCNC: 137 MMOL/L (ref 136–145)
T WAVE AXIS: 12 DEGREES
T WAVE AXIS: 12 DEGREES
T WAVE AXIS: 27 DEGREES
T WAVE AXIS: 35 DEGREES
T WAVE AXIS: 39 DEGREES
T WAVE AXIS: 54 DEGREES
TROPONIN I SERPL-MCNC: <0.02 NG/ML
TROPONIN I SERPL-MCNC: <0.02 NG/ML
VENTRICULAR RATE: 115 BPM
VENTRICULAR RATE: 87 BPM
VENTRICULAR RATE: 88 BPM
VENTRICULAR RATE: 93 BPM
VENTRICULAR RATE: 95 BPM
VENTRICULAR RATE: 97 BPM
WBC # BLD AUTO: 14.97 THOUSAND/UL (ref 4.31–10.16)

## 2020-08-18 PROCEDURE — 93010 ELECTROCARDIOGRAM REPORT: CPT | Performed by: INTERNAL MEDICINE

## 2020-08-18 PROCEDURE — 84484 ASSAY OF TROPONIN QUANT: CPT | Performed by: NURSE PRACTITIONER

## 2020-08-18 PROCEDURE — 80053 COMPREHEN METABOLIC PANEL: CPT | Performed by: NURSE PRACTITIONER

## 2020-08-18 PROCEDURE — 80048 BASIC METABOLIC PNL TOTAL CA: CPT | Performed by: INTERNAL MEDICINE

## 2020-08-18 PROCEDURE — 84100 ASSAY OF PHOSPHORUS: CPT | Performed by: NURSE PRACTITIONER

## 2020-08-18 PROCEDURE — 82948 REAGENT STRIP/BLOOD GLUCOSE: CPT

## 2020-08-18 PROCEDURE — 85025 COMPLETE CBC W/AUTO DIFF WBC: CPT | Performed by: NURSE PRACTITIONER

## 2020-08-18 PROCEDURE — 83735 ASSAY OF MAGNESIUM: CPT | Performed by: NURSE PRACTITIONER

## 2020-08-18 PROCEDURE — 99232 SBSQ HOSP IP/OBS MODERATE 35: CPT | Performed by: INTERNAL MEDICINE

## 2020-08-18 PROCEDURE — 93005 ELECTROCARDIOGRAM TRACING: CPT

## 2020-08-18 PROCEDURE — 99223 1ST HOSP IP/OBS HIGH 75: CPT | Performed by: UROLOGY

## 2020-08-18 PROCEDURE — 71045 X-RAY EXAM CHEST 1 VIEW: CPT

## 2020-08-18 RX ORDER — OXYCODONE HYDROCHLORIDE 5 MG/1
5 TABLET ORAL EVERY 4 HOURS PRN
Status: DISCONTINUED | OUTPATIENT
Start: 2020-08-18 | End: 2020-08-19 | Stop reason: HOSPADM

## 2020-08-18 RX ORDER — OXYCODONE HYDROCHLORIDE 5 MG/1
10 TABLET ORAL EVERY 6 HOURS PRN
Status: DISCONTINUED | OUTPATIENT
Start: 2020-08-18 | End: 2020-08-19 | Stop reason: HOSPADM

## 2020-08-18 RX ORDER — FUROSEMIDE 20 MG/1
20 TABLET ORAL 2 TIMES DAILY
Status: DISCONTINUED | OUTPATIENT
Start: 2020-08-18 | End: 2020-08-19 | Stop reason: HOSPADM

## 2020-08-18 RX ORDER — HYDRALAZINE HYDROCHLORIDE 20 MG/ML
10 INJECTION INTRAMUSCULAR; INTRAVENOUS EVERY 6 HOURS PRN
Status: DISCONTINUED | OUTPATIENT
Start: 2020-08-18 | End: 2020-08-19 | Stop reason: HOSPADM

## 2020-08-18 RX ADMIN — FINASTERIDE 5 MG: 5 TABLET, FILM COATED ORAL at 08:28

## 2020-08-18 RX ADMIN — INSULIN LISPRO 2 UNITS: 100 INJECTION, SOLUTION INTRAVENOUS; SUBCUTANEOUS at 21:42

## 2020-08-18 RX ADMIN — Medication 1 TABLET: at 16:11

## 2020-08-18 RX ADMIN — GABAPENTIN 300 MG: 300 CAPSULE ORAL at 08:28

## 2020-08-18 RX ADMIN — CEFEPIME HYDROCHLORIDE 2000 MG: 2 INJECTION, SOLUTION INTRAVENOUS at 08:35

## 2020-08-18 RX ADMIN — SENNOSIDES AND DOCUSATE SODIUM 1 TABLET: 8.6; 5 TABLET ORAL at 21:42

## 2020-08-18 RX ADMIN — ASPIRIN 325 MG ORAL TABLET 325 MG: 325 PILL ORAL at 08:28

## 2020-08-18 RX ADMIN — INSULIN LISPRO 2 UNITS: 100 INJECTION, SOLUTION INTRAVENOUS; SUBCUTANEOUS at 17:31

## 2020-08-18 RX ADMIN — PRAVASTATIN SODIUM 40 MG: 40 TABLET ORAL at 16:11

## 2020-08-18 RX ADMIN — INSULIN LISPRO 5 UNITS: 100 INJECTION, SOLUTION INTRAVENOUS; SUBCUTANEOUS at 08:33

## 2020-08-18 RX ADMIN — INSULIN GLARGINE 20 UNITS: 100 INJECTION, SOLUTION SUBCUTANEOUS at 21:42

## 2020-08-18 RX ADMIN — FUROSEMIDE 20 MG: 20 TABLET ORAL at 17:31

## 2020-08-18 RX ADMIN — TAMSULOSIN HYDROCHLORIDE 0.8 MG: 0.4 CAPSULE ORAL at 16:11

## 2020-08-18 RX ADMIN — CEFEPIME HYDROCHLORIDE 2000 MG: 2 INJECTION, SOLUTION INTRAVENOUS at 21:42

## 2020-08-18 RX ADMIN — ENOXAPARIN SODIUM 40 MG: 40 INJECTION SUBCUTANEOUS at 08:34

## 2020-08-18 RX ADMIN — Medication 1 TABLET: at 11:55

## 2020-08-18 RX ADMIN — CLOPIDOGREL BISULFATE 75 MG: 75 TABLET ORAL at 08:28

## 2020-08-18 RX ADMIN — OXYCODONE HYDROCHLORIDE 5 MG: 5 TABLET ORAL at 20:26

## 2020-08-18 RX ADMIN — FAMOTIDINE 20 MG: 20 TABLET ORAL at 08:28

## 2020-08-18 RX ADMIN — GABAPENTIN 300 MG: 300 CAPSULE ORAL at 17:31

## 2020-08-18 NOTE — PROGRESS NOTES
Called to evaluate patient secondary to chest heaviness  Patient is Bengali-speaking only primarily understands sheryl Ocasio  Son was on the telephone interpreting loss was at bedside  Patient complains of left-sided chest heaviness not associated with movement/pressure or deep inspiration  Constant in nature  Lungs clear  Heart tones distant  Abdomen soft nontender  Musculoskeletal no edema    Chest pain-EKG without acute ST changes, troponin negative, will trend troponins and check EKGs x2  At this time treat with Tylenol will check chest x-ray

## 2020-08-18 NOTE — ASSESSMENT & PLAN NOTE
Lab Results   Component Value Date    HGBA1C 7 7 (H) 08/17/2020       Recent Labs     08/17/20  1110 08/17/20  1615 08/17/20  2209 08/18/20  0734   POCGLU 288* 242* 151* 93       Blood Sugar Average: Last 72 hrs:  (P) 204 [id number]   HB A1c is 7 7  Continue with home insulin regimen, 20 units Lantus at bedtime and 5 units lispro pre meals  Accu-Cheks and sliding scale

## 2020-08-18 NOTE — PROGRESS NOTES
Pt complaining about "chest heaviness"  On ausculation, lungs are diminished with no adventitious sounds  Pt has no complaints of SOB  Pt does have a history of CHF and received a 500mL NSS bolus and is currently receiving isolyte at 100 mL/hr  EKG showed no ST changes  AP aware  Awaiting orders  Will continue to monitor

## 2020-08-18 NOTE — PROGRESS NOTES
Hortensio Marcianne Lesches  82 y o   male  1935  mrn [id number]    Assessment/Plan: 1  Sepsis/E  coli septicemia/UTI/Fever/Leukocytosis/TME: No further fever over the past 24 hrs and WBC count has decreased to 14K  Bld cx's are positive for E coli  UA showed pyuria  Urine cx is growing 10K GNR's c/w UTI  He is improving clinically  Abd CT showed enlarged prostate with thick walled bladder - probably sequelae of QUEEN and no hydro  COVID19 test is neg  Estrella was changed on admission  Urology has evaluated the Pt and he is scheduled for cystoscopy on 9/4    Pt presented with fever and decreased MS c/w TME  He had fever but was leukopenic which was probably due to bone marrow suppression from sepsis  Lactic acid was elevated c/w sepsis  No evidence of pneumonia  WBC count recovered to 21K following abx tx      A  Cont Cefepime for now  B  Awaiting final cx results - will re-adjust abx according to these results  C  Agree with plans for cystoscopy as an out-Pt on 9/4  D  Will follow WBC count  E   When Pt is ready for D/C, planning to switch to po abx and cont tx until Pt undergoes cystoscopy on 9/4 as recommended by Urology       Subjective: Pt conts to have some suprapubic pain and discomfort when he urinates    Objective:  Tmax: 98 8  Lungs: Clear  Abd: +BS, soft, +mild suprapubic discomfort with palpation  Ext: No calf tenderness    Labs:  CBC w/diff  Recent Labs     08/18/20  0247   WBC 14 97*   HGB 11 1*   HCT 32 9*      NEUTOPHILPCT 88*   LYMPHOPCT 5*   MONOPCT 3*   EOSPCT 3     BMP  Recent Labs     08/18/20  0923   K 4 2      CO2 25   BUN 14   CREATININE 1 01   CALCIUM 8 4     CMP  Recent Labs     08/18/20  0247 08/18/20  0923   K 3 6 4 2    104   CO2 26 25   BUN 15 14   CREATININE 0 98 1 01   CALCIUM 8 3 8 4   ALKPHOS 83  --    ALT 30  --    AST 20  --         labrc    Cultures:  Lab Results   Component Value Date    BLOODCX Escherichia coli (A) 08/16/2020    BLOODCX Escherichia coli (A) 08/16/2020     No results found for: [facility]   Lab Results   Component Value Date    URINECX 10,000-19,000 cfu/ml  08/16/2020    URINECX <10,000 cfu/ml Gram Negative Danilo (A) 08/16/2020    URINECX >100,000 cfu/ml Escherichia coli (A) 07/22/2020     No results found for: SPUTUMCULTUR    MED:  Cefepime: #3      Current Facility-Administered Medications:     albuterol (PROVENTIL HFA,VENTOLIN HFA) inhaler 2 puff, 2 puff, Inhalation, Q4H PRN, Chapito Rooney MD    aspirin tablet 325 mg, 325 mg, Oral, Daily, Chapito Rooney MD, 325 mg at 08/18/20 0828    cefepime (MAXIPIME) IVPB (premix) 2,000 mg 50 mL, 2,000 mg, Intravenous, Q12H, Chapito Rooney MD, Stopped at 08/18/20 0955    clopidogrel (PLAVIX) tablet 75 mg, 75 mg, Oral, Daily, Chapito Rooney MD, 75 mg at 08/18/20 0828    enoxaparin (LOVENOX) subcutaneous injection 40 mg, 40 mg, Subcutaneous, Daily, Chapito Rooney MD, 40 mg at 08/18/20 0834    famotidine (PEPCID) tablet 20 mg, 20 mg, Oral, Daily, Chapito Rooney MD, 20 mg at 08/18/20 1681    finasteride (PROSCAR) tablet 5 mg, 5 mg, Oral, Daily, Chapito Rooney MD, 5 mg at 08/18/20 8441    furosemide (LASIX) tablet 20 mg, 20 mg, Oral, BID, Anabell Berg MD    gabapentin (NEURONTIN) capsule 300 mg, 300 mg, Oral, BID, Chapito Rooney MD, 300 mg at 08/18/20 1709    hydrALAZINE (APRESOLINE) injection 10 mg, 10 mg, Intravenous, Q6H PRN, Anabell Berg MD    insulin glargine (LANTUS) subcutaneous injection 20 Units 0 2 mL, 20 Units, Subcutaneous, HS, Chapito Rooney MD, 20 Units at 08/17/20 2215    insulin lispro (HumaLOG) 100 units/mL subcutaneous injection 1-5 Units, 1-5 Units, Subcutaneous, HS, JAYCE Vaz, 1 Units at 08/17/20 2215    insulin lispro (HumaLOG) 100 units/mL subcutaneous injection 1-6 Units, 1-6 Units, Subcutaneous, TID AC, 2 Units at 08/17/20 1643 **AND** Fingerstick Glucose (POCT), , , TID AC, JAYCE Chung   insulin lispro (HumaLOG) 100 units/mL subcutaneous injection 5 Units, 5 Units, Subcutaneous, Daily With Breakfast, Bayron Dixon MD, 5 Units at 08/18/20 5256    nitroglycerin (NITROSTAT) SL tablet 0 4 mg, 0 4 mg, Sublingual, Q5 Min PRN, Bayron Dixon MD    potassium-sodium phosphateS (K-PHOS,PHOSPHA 250) -250 mg tablet 1 tablet, 1 tablet, Oral, TID With Meals, Aaron Gabriel MD, 1 tablet at 08/18/20 1155    pravastatin (PRAVACHOL) tablet 40 mg, 40 mg, Oral, Daily With Zenon Martin MD, 40 mg at 08/17/20 1646    senna-docusate sodium (SENOKOT S) 8 6-50 mg per tablet 1 tablet, 1 tablet, Oral, HS, Jose D Alfaro MD, 1 tablet at 08/17/20 2215    tamsulosin (FLOMAX) capsule 0 8 mg, 0 8 mg, Oral, Daily With Zenon Martin MD, 0 8 mg at 08/17/20 1646    Principal Problem:    Sepsis ([facility] )  Active Problems:    Benign prostatic hyperplasia with lower urinary tract symptoms    CHF (congestive heart failure) ([facility])    GERD (gastroesophageal reflux disease)    HTN (hypertension)    Hyperlipidemia    Poorly controlled type 2 diabetes mellitus ([facility] )    Urinary tract infection associated with indwelling urethral catheter ([facility] )    Bacteremia      Vivi Fay MD

## 2020-08-18 NOTE — ASSESSMENT & PLAN NOTE
History of BPH, on finasteride and tamsulosin status post Estrella catheter placement  Continue with home meds  Estrella catheter was replaced in ER  Urology input  Patient has outpatient urology appointment on September 4 for cystoscopy

## 2020-08-18 NOTE — ASSESSMENT & PLAN NOTE
History of hypertension on 25 mg carvedilol twice daily, Lasix 20 mg twice daily and Avapro 150 mg at bedtime  Antihypertensives on hold  Will reassess and start antihypertensives as tolerated  Monitor BP per unit protocol

## 2020-08-18 NOTE — TELEPHONE ENCOUNTER
Ella Rodriguez is an 82-year-old male known to the [facility] office for BPH with chronic indwelling catheter  He was seen in consultation at [place] [address] for UTI  Please note, patient is scheduled for office cystoscopy 9/4 with Pavel  He is also scheduled for routine catheter exchange with nurse several days prior  This communication serves to inform that catheter was exchanged during emergency room visit 08/17  Patient will not require catheter insertion until cystoscopy is performed  Please adjust schedule accordingly and contact patient with office follow-up appointment date in time reminder  Thank you

## 2020-08-18 NOTE — PROGRESS NOTES
Progress Note - Merly Mera Runner 1935, 82 y o  male MRN: [id number]    Unit/Bed#: -Joe Encounter: [id number]    Primary Care Provider: No primary care provider on file     Date and time admitted to hospital: 8/16/2020  8:02 AM        Bacteremia  Assessment & Plan  Patient blood cultures growing gram-negative rods likely source is UTI  Continue on IV antibiotics  See above    Urinary tract infection associated with indwelling urethral catheter [facility])  Assessment & Plan  Catheter associated UTI, present on admission  UA-positive nitrite, 10-20 WBC and innumerable bacteria  On IV cefepime, urine culture    Poorly controlled type 2 diabetes mellitus [facility])  Assessment & Plan  Lab Results   Component Value Date    HGBA1C 7 7 (H) 08/17/2020       Recent Labs     08/17/20  1110 08/17/20  1615 08/17/20  2209 08/18/20  0734   POCGLU 288* 242* 151* 93       Blood Sugar Average: Last 72 hrs:  (P) 204 [id number]   HB A1c is 7 7  Continue with home insulin regimen, 20 units Lantus at bedtime and 5 units lispro pre meals  Accu-Cheks and sliding scale    Hyperlipidemia  Assessment & Plan  History of hyperlipidemia on simvastatin, switch to pravastatin while inpatient    HTN (hypertension)  Assessment & Plan  History of hypertension on 25 mg carvedilol twice daily, Lasix 20 mg twice daily and Avapro 150 mg at bedtime  Antihypertensives on hold  Will reassess and start antihypertensives as tolerated  Monitor BP per unit protocol    GERD (gastroesophageal reflux disease)  Assessment & Plan  History of GERD on Pepcid, continue with home meds    CHF (congestive heart failure) (HCC)  Assessment & Plan  Wt Readings from Last 3 Encounters:   08/16/20 96 6 kg (212 lb 15 4 oz)   07/28/20 101 kg (222 lb 12 8 oz)   07/24/20 99 8 kg (220 lb)       History of CHF, unknown EF  Not in acute exacerbation  Weight stable at 212 lb  Daily weights  Strict Is/Os  Monitor vitals as per protocol    Benign prostatic hyperplasia with lower urinary tract symptoms  Assessment & Plan  History of BPH, on finasteride and tamsulosin status post Estrella catheter placement  Continue with home meds  Estrella catheter was replaced in ER  Urology input  Patient has outpatient urology appointment on September 4 for cystoscopy    * Sepsis [facility])  Assessment & Plan  Sepsis, present on admission, as evidenced by fever, 103 1, tachycardia 112, tachypnea 22 secondary to catheter associated urinary tract infection  Lactic acid initially 2 1  UA- Innumerable bacteria, 10-20 WBC, positive nitrite and large blood  Estrella catheter changed in the ED  Continue on cefepime  Blood cultures growing gram-negative rods  Follow-up final culture and sensitivity  Lactic acidosis resolved with IV fluids    Tylenol p r n  For fever  Zofran p r n  For nausea/vomiting  Trend CBC and temperature curve  Follow urine culture results  Patient has chronic Estrella catheter in place    Labs & Imaging: I have personally reviewed pertinent reports  VTE Pharmacologic Prophylaxis: Enoxaparin (Lovenox)  VTE Mechanical Prophylaxis: sequential compression device    Code Status:   Level 1 - Full Code    Patient Centered Rounds: I have performed bedside rounds with nursing staff today  Discussions with Specialists or Other Care Team Provider:     Education and Discussions with Family / Patient:  Son at bedside    Current Length of Stay: 2 day(s)    Current Patient Status: Inpatient   Certification Statement: The patient will continue to require additional inpatient hospital stay due to see my assessment and plan  Subjective:   Patient is seen and examined at bedside  Overnight events noted  Patient did complain of chest pain  It has resolved now  Denies any pain  Afebrile  No nausea no vomiting, dizziness, lightheadedness, abdominal pain and shortness of breath  All other ROS are negative      Objective:    Vitals: Blood pressure 165/68, pulse (!) 107, temperature 98 8 °F (37 1 °C), temperature source Tympanic, resp  rate 22, height 5' 6" (1 676 m), weight 96 6 kg (212 lb 15 4 oz), SpO2 93 %  ,Body mass index is 34 37 kg/m²  SPO2 RA Rest      ED to Hosp-Admission (Current) from 8/16/2020 in [address] Med [address]   SpO2  93 %   SpO2 Activity  At Rest   O2 Device  None (Room air)   O2 Flow Rate          I&O:     Intake/Output Summary (Last 24 hours) at 8/18/2020 0853  Last data filed at 8/18/2020 0606  Gross per 24 hour   Intake 500 ml   Output 2950 ml   Net -2450 ml       Physical Exam:    General- Alert, lying comfortably in bed  Not in any acute distress  HEENT- MARJORIE, EOM intact  Neck- Supple, No JVD  CVS- regular, S1 and S2 normal   Chest- Bilateral Air entry, No rhochi, crackles or wheezing present  Abdomen- soft, nontender, not distended, no guarding or rigidity, BS+  Extremities-  No pedal edema, No calf tenderness                         Normal ROM in all extremities  CNS-   Alert, awake and orientedx3  No focal deficits present      Invasive Devices     Peripheral Intravenous Line            Peripheral IV 08/16/20 Right Antecubital 1 day          Drain            Urethral Catheter Coude;Non-latex 12 Fr  1 day                      Social History  reviewed  Family History   Problem Relation Age of Onset    Diabetes Mother     Hypertension Mother     Cancer Father     Hypertension Father     Cancer Sister     Hypertension Sister     reviewed    Meds:  Current Facility-Administered Medications   Medication Dose Route Frequency Provider Last Rate Last Dose    albuterol (PROVENTIL HFA,VENTOLIN HFA) inhaler 2 puff  2 puff Inhalation Q4H PRN Mira Saucedo MD        aspirin tablet 325 mg  325 mg Oral Daily Mira Saucedo MD   325 mg at 08/18/20 0828    cefepime (MAXIPIME) IVPB (premix) 2,000 mg 50 mL  2,000 mg Intravenous Q12H Mira Saucedo  mL/hr at 08/18/20 0835 2,000 mg at 08/18/20 0835    clopidogrel (PLAVIX) tablet 75 mg  75 mg Oral Daily Lizzy Bain MD   75 mg at 08/18/20 0828    enoxaparin (LOVENOX) subcutaneous injection 40 mg  40 mg Subcutaneous Daily Lizzy Bain MD   40 mg at 08/18/20 0834    famotidine (PEPCID) tablet 20 mg  20 mg Oral Daily Lizzy Bain MD   20 mg at 08/18/20 0828    finasteride (PROSCAR) tablet 5 mg  5 mg Oral Daily Lizzy Bain MD   5 mg at 08/18/20 5784    gabapentin (NEURONTIN) capsule 300 mg  300 mg Oral BID Lizzy Bain MD   300 mg at 08/18/20 0828    insulin glargine (LANTUS) subcutaneous injection 20 Units 0 2 mL  20 Units Subcutaneous HS Lizzy Bain MD   20 Units at 08/17/20 2215    insulin lispro (HumaLOG) 100 units/mL subcutaneous injection 1-5 Units  1-5 Units Subcutaneous HS JAYCE Vaz   1 Units at 08/17/20 2215    insulin lispro (HumaLOG) 100 units/mL subcutaneous injection 1-6 Units  1-6 Units Subcutaneous TID AC JAYCE Vaz   2 Units at 08/17/20 1643    insulin lispro (HumaLOG) 100 units/mL subcutaneous injection 5 Units  5 Units Subcutaneous Daily With Breakfast Lizzy Bain MD   5 Units at 08/18/20 0833    multi-electrolyte (PLASMALYTE-A/ISOLYTE-S PH 7 4) IV solution  100 mL/hr Intravenous Continuous Lizzy Bain MD   Stopped at 08/18/20 0842    nitroglycerin (NITROSTAT) SL tablet 0 4 mg  0 4 mg Sublingual Q5 Min PRN Lizzy Bain MD        pravastatin (PRAVACHOL) tablet 40 mg  40 mg Oral Daily With Ashley Patrick MD   40 mg at 08/17/20 1646    senna-docusate sodium (SENOKOT S) 8 6-50 mg per tablet 1 tablet  1 tablet Oral HS Arlene Caal MD   1 tablet at 08/17/20 2215    tamsulosin (FLOMAX) capsule 0 8 mg  0 8 mg Oral Daily With Ashley Patrick MD   0 8 mg at 08/17/20 1646      Medications Prior to Admission   Medication    albuterol (PROVENTIL HFA,VENTOLIN HFA) 90 mcg/act inhaler    aspirin 325 mg tablet    carvedilol (COREG) 25 mg tablet    Cholecalciferol (VITAMIN D3) 5000 units CAPS    clopidogrel (PLAVIX) 75 mg tablet    Cyanocobalamin (VITAMIN B12 PO)    famotidine (PEPCID) 40 MG tablet    finasteride (PROSCAR) 5 mg tablet    furosemide (LASIX) 20 mg tablet    gabapentin (NEURONTIN) 300 mg capsule    insulin glargine (LANTUS) 100 units/mL subcutaneous injection    irbesartan (AVAPRO) 150 mg tablet    isosorbide mononitrate (IMDUR) 30 mg 24 hr tablet    meloxicam (MOBIC) 15 mg tablet    metFORMIN (GLUCOPHAGE) 1000 MG tablet    simvastatin (ZOCOR) 20 mg tablet    sitaGLIPtin (JANUVIA) 100 mg tablet    tamsulosin (FLOMAX) 0 4 mg    nitroglycerin (NITROSTAT) 0 4 mg SL tablet       Labs:  Results from last 7 days   Lab Units 08/18/20  0247 08/17/20  0529 08/16/20  0806   WBC Thousand/uL 14 97* 21 81* 3 97*   HEMOGLOBIN g/dL 11 1* 10 5* 12 5   HEMATOCRIT % 32 9* 32 2* 37 8   PLATELETS Thousands/uL 151 157 215   NEUTROS PCT % 88*  --   --    LYMPHS PCT % 5*  --   --    LYMPHO PCT %  --   --  8*   MONOS PCT % 3*  --   --    MONO PCT %  --   --  2*   EOS PCT % 3  --  2     Results from last 7 days   Lab Units 08/18/20  0247 08/17/20  0529 08/16/20  0806   POTASSIUM mmol/L 3 6 4 5 4 4   CHLORIDE mmol/L 101 104 103   CO2 mmol/L 26 27 26   BUN mg/dL 15 22 15   CREATININE mg/dL 0 98 1 34* 1 08   CALCIUM mg/dL 8 3 8 4 8 5   ALK PHOS U/L 83  --  62   ALT U/L 30  --  23   AST U/L 20  --  15     Lab Results   Component Value Date    TROPONINI <0 02 08/18/2020    TROPONINI <0 02 08/17/2020    TROPONINI <0 02 08/17/2020         Lab Results   Component Value Date    URINECX 10,000-19,000 cfu/ml  08/16/2020    URINECX >100,000 cfu/ml Escherichia coli (A) 07/22/2020         Imaging:  Results for orders placed during the hospital encounter of 08/16/20   XR chest portable    Narrative CHEST     INDICATION:   fever  COMPARISON:  Chest radiograph from 12/17/2017  Chest CT from today      EXAM PERFORMED/VIEWS:  XR CHEST PORTABLE      FINDINGS:    Cardiomegaly  The lungs are clear  No pneumothorax or pleural effusion  Healed posterior left 5th rib fracture  Impression No acute cardiopulmonary disease  Workstation performed: [id number]       Results for orders placed during the hospital encounter of 12/11/17   XR chest 2 views    Narrative CHEST - DUAL ENERGY    INDICATION:  Chest pain and shortness of breath    COMPARISON:  None    VIEWS:  PA (including soft tissue/bone algorithms) and lateral projections    IMAGES:  4    FINDINGS:         The aorta is atherosclerotic  Heart size is unremarkable  The lungs are clear  No pneumothorax or pleural effusion  Visualized osseous structures appear within normal limits for the patient's age  Impression No active pulmonary disease        Workstation performed: [id number]         Last 24 Hours Medication List:   Current Facility-Administered Medications   Medication Dose Route Frequency Provider Last Rate    albuterol  2 puff Inhalation Q4H PRN Catherine Garcia MD      aspirin  325 mg Oral Daily Catherine Garcia MD      cefepime  2,000 mg Intravenous Q12H Catherine Garcia MD 2,000 mg (08/18/20 1700)    clopidogrel  75 mg Oral Daily Catherine Garcia MD      enoxaparin  40 mg Subcutaneous Daily Catherine Garcia MD      famotidine  20 mg Oral Daily Catherine Garcia MD      finasteride  5 mg Oral Daily Catherine Garcia MD      gabapentin  300 mg Oral BID Catherine Garcia MD      insulin glargine  20 Units Subcutaneous HS Catherine Garcia MD      insulin lispro  1-5 Units Subcutaneous HS JAYCE Vaz      insulin lispro  1-6 Units Subcutaneous TID AC JAYCE Vaz      insulin lispro  5 Units Subcutaneous Daily With Breakfast Catherine Garcia MD      multi-electrolyte  100 mL/hr Intravenous Continuous Catherine Garcia MD Stopped (08/18/20 8681)    nitroglycerin  0 4 mg Sublingual Q5 Min JEFE Garcia MD      pravastatin  40 mg Oral Daily With Janell Muhammad MD      senna-docusate sodium  1 tablet Oral HS Sanket Tanner MD      tamsulosin  0 8 mg Oral Daily With Janell Muhammad MD          Today, Patient Was Seen By: Shari Cornejo MD    ** Please Note: Dictation voice to text software may have been used in the creation of this document   **

## 2020-08-18 NOTE — PLAN OF CARE
Problem: Potential for Falls  Goal: Patient will remain free of falls  Description: INTERVENTIONS:  - Assess patient frequently for physical needs  -  Identify cognitive and physical deficits and behaviors that affect risk of falls    -  [place] fall precautions as indicated by assessment   - Educate patient/family on patient safety including physical limitations  - Instruct patient to call for assistance with activity based on assessment  - Modify environment to reduce risk of injury  - Consider OT/PT consult to assist with strengthening/mobility  Outcome: Progressing     Problem: Prexisting or High Potential for Compromised Skin Integrity  Goal: Skin integrity is maintained or improved  Description: INTERVENTIONS:  - Identify patients at risk for skin breakdown  - Assess and monitor skin integrity  - Assess and monitor nutrition and hydration status  - Monitor labs   - Assess for incontinence   - Turn and reposition patient  - Assist with mobility/ambulation  - Relieve pressure over bony prominences  - Avoid friction and shearing  - Provide appropriate hygiene as needed including keeping skin clean and dry  - Evaluate need for skin moisturizer/barrier cream  - Collaborate with interdisciplinary team   - Patient/family teaching  - Consider wound care consult   Outcome: Progressing     Problem: PAIN - ADULT  Goal: Verbalizes/displays adequate comfort level or baseline comfort level  Description: Interventions:  - Encourage patient to monitor pain and request assistance  - Assess pain using appropriate pain scale  - Administer analgesics based on type and severity of pain and evaluate response  - Implement non-pharmacological measures as appropriate and evaluate response  - Consider cultural and social influences on pain and pain management  - Notify physician/advanced practitioner if interventions unsuccessful or patient reports new pain  Outcome: Progressing     Problem: INFECTION - ADULT  Goal: Absence or prevention of progression during hospitalization  Description: INTERVENTIONS:  - Assess and monitor for signs and symptoms of infection  - Monitor lab/diagnostic results  - Monitor all insertion sites, i e  indwelling lines, tubes, and drains  - Monitor endotracheal if appropriate and nasal secretions for changes in amount and color  - [place] appropriate cooling/warming therapies per order  - Administer medications as ordered  - Instruct and encourage patient and family to use good hand hygiene technique  - Identify and instruct in appropriate isolation precautions for identified infection/condition  Outcome: Progressing     Problem: SAFETY ADULT  Goal: Patient will remain free of falls  Description: INTERVENTIONS:  - Assess patient frequently for physical needs  -  Identify cognitive and physical deficits and behaviors that affect risk of falls    -  [place] fall precautions as indicated by assessment   - Educate patient/family on patient safety including physical limitations  - Instruct patient to call for assistance with activity based on assessment  - Modify environment to reduce risk of injury  - Consider OT/PT consult to assist with strengthening/mobility  Outcome: Progressing  Goal: Maintain or return to baseline ADL function  Description: INTERVENTIONS:  -  Assess patient's ability to carry out ADLs; assess patient's baseline for ADL function and identify physical deficits which impact ability to perform ADLs (bathing, care of mouth/teeth, toileting, grooming, dressing, etc )  - Assess/evaluate cause of self-care deficits   - Assess range of motion  - Assess patient's mobility; develop plan if impaired  - Assess patient's need for assistive devices and provide as appropriate  - Encourage maximum independence but intervene and supervise when necessary  - Involve family in performance of ADLs  - Assess for home care needs following discharge   - Consider OT consult to assist with ADL evaluation and planning for discharge  - Provide patient education as appropriate  Outcome: Progressing  Goal: Maintain or return mobility status to optimal level  Description: INTERVENTIONS:  - Assess patient's baseline mobility status (ambulation, transfers, stairs, etc )    - Identify cognitive and physical deficits and behaviors that affect mobility  - Identify mobility aids required to assist with transfers and/or ambulation (gait belt, sit-to-stand, lift, walker, cane, etc )  - [place] fall precautions as indicated by assessment  - Record patient progress and toleration of activity level on Mobility SBAR; progress patient to next Phase/Stage  - Instruct patient to call for assistance with activity based on assessment  - Consider rehabilitation consult to assist with strengthening/weightbearing, etc   Outcome: Progressing     Problem: DISCHARGE PLANNING  Goal: Discharge to home or other facility with appropriate resources  Description: INTERVENTIONS:  - Identify barriers to discharge w/patient and caregiver  - Arrange for needed discharge resources and transportation as appropriate  - Identify discharge learning needs (meds, wound care, etc )  - Arrange for interpretive services to assist at discharge as needed  - Refer to Case Management Department for coordinating discharge planning if the patient needs post-hospital services based on physician/advanced practitioner order or complex needs related to functional status, cognitive ability, or social support system  Outcome: Progressing     Problem: Knowledge Deficit  Goal: Patient/family/caregiver demonstrates understanding of disease process, treatment plan, medications, and discharge instructions  Description: Complete learning assessment and assess knowledge base    Interventions:  - Provide teaching at level of understanding  - Provide teaching via preferred learning methods  Outcome: Progressing

## 2020-08-18 NOTE — PROGRESS NOTES
Patient had a bowel movement today with a lot of pain, he also had some blood mixed in with his urine (pt has a chronic winslow)  I spoke to [facility] and she stated to keep an eye on the urine and let her know if the blood clears out of the urine

## 2020-08-18 NOTE — CONSULTS
Consultation - Urology   Merly Austin 82 y o  male MRN: [id number]  Unit/Bed#: -01 Encounter: [id number]      Assessment/Plan     Assessment:  28-year-old male with a past medical history of BPH, UTI hx secondary to indwelling catheter, hypertension, hyperlipidemia, diabetes mellitus, CHF, GERD, and previous myocardial infarction presented to the ED by EMS with sepsis present on admission after loss of consciousness with fever  Plan:  1  Sepsis, POA secondary to UTI associated with indwelling urethral catheter  -Evidenced by UA on admission showed innumerable bacteria, 10-20 wbc's, positive nitrites, and large blood  Lactic acid of 2 1, febrile at 103 1, tachycardic at 112  and tachypnic at 22 on admission  -CT scan with evidence of chronic prostatomegaly with bladder wall thickening  -Currently afebrile, lactic acidosis resolved, leukocytosis trending down from 21 81 yesterday to 14 97 today, no tachycardia or tachypnea noted on physical exam  -Blood cultures with evidence of gram-negative rods, urine culture with mixed contaminants  -Since Estrella catheter was changed in the ED on arrival, no need for Estrella catheter exchange at this time   -Continue IV cefepime per Infectious Disease, once cleared for discharge would recommend transition to oral antibiotics for long-term course up until date of cystoscopy which is September 4, 2020  -Discussion will need to take place for long-term planning secondary to chronic BPH with recurrent episodes of UTIs    2  Urinary tract infection secondary to indwelling urethral catheter  -Please refer to plan above    History of Present Illness   Attending: Dell Godinez MD  Reason for Consult / Principal Problem:  BPH, history of UTI with indwelling catheter    HPI: Ulysses Villar is a 82y o  year old male with a past medical history of BPH, UTI hx secondary to indwelling catheter, hypertension, hyperlipidemia, diabetes mellitus, CHF, GERD, and previous myocardial infarction presented to the ED by EMS with sepsis present on admission after loss of consciousness with fever  Associated symptoms also included nausea and multiple episodes of emesis prior to arrival   UA on admission showed innumerable bacteria, 10-20 wbc's, positive nitrites, and large blood  He had a lactic acid of 2 1, febrile at 103 1, tachycardic at 112  and tachypnic at 22  His Estrella catheter was changed in the ED on arrival, and he was started on IV cefepime on admission  Patient had a history of UTI previously a month ago on 07/21/2020 for which a Estrella catheter was placed and the patient was started on Keflex secondary to UTI caused by growth of E coli  Patient was seen by Dr Di Arcos on 07/24/2020 in his outpatient office in follow-up from his recent ER visit  Patient's medication tamsulosin and finasteride doses were increased  Patient was brought back to the Urology office on 07/28/2020 for a voiding trial   It was discussed with the patient that if he failed a voiding trial he will need a cystoscopy which is scheduled for September 4th  On examination today he currently denies any fevers, chills, chest pain, shortness of breath, change in bowel habits  Estrella catheter in place draining clear yellow urine  Approximately 3 L output in the last 24 hours  Inpatient consult to Urology  Consult performed by: Elle Walker PA-C  Consult ordered by: Shakeel Weaver MD          Review of Systems   Constitutional: Positive for chills and fever  Negative for activity change  HENT: Negative for congestion and sore throat  Respiratory: Negative for cough, shortness of breath and wheezing  Cardiovascular: Negative for chest pain and palpitations  Gastrointestinal: Positive for nausea and vomiting  Negative for abdominal pain and diarrhea  Genitourinary: Positive for difficulty urinating   Negative for decreased urine volume, dysuria, flank pain, hematuria and urgency  Estrella catheter in place     Musculoskeletal: Negative for back pain and neck pain  Skin: Negative for color change and rash  Neurological: Negative for weakness, light-headedness and headaches  Psychiatric/Behavioral: Negative for agitation and behavioral problems         Historical Information   Past Medical History:   Diagnosis Date    Cardiac disease     [place]congestive heart failure) ([place] [address] )     Diabetes mellitus ([place] [address] )     GERD (gastroesophageal reflux disease)     Hyperlipidemia     Hypertension     MI (myocardial infarction) ([place] [address] )      Past Surgical History:   Procedure Laterality Date    EYE SURGERY       Social History   Social History     Substance and Sexual Activity   Alcohol Use No    Alcohol/week: 0 0 standard drinks    Frequency: Never    Binge frequency: Never     @DRUGHX  E-Cigarette/Vaping    E-Cigarette Use Never User      E-Cigarette/Vaping Substances    Nicotine No     THC No     CBD No     Flavoring No     Other No     Unknown No      Social History     Tobacco Use   Smoking Status Never Smoker   Smokeless Tobacco Never Used     Family History:   Family History   Problem Relation Age of Onset    Diabetes Mother     Hypertension Mother     Cancer Father     Hypertension Father     Cancer Sister     Hypertension Sister        Meds/Allergies   all current active meds have been reviewed, current meds:   Current Facility-Administered Medications   Medication Dose Route Frequency    albuterol (PROVENTIL HFA,VENTOLIN HFA) inhaler 2 puff  2 puff Inhalation Q4H PRN    aspirin tablet 325 mg  325 mg Oral Daily    cefepime (MAXIPIME) IVPB (premix) 2,000 mg 50 mL  2,000 mg Intravenous Q12H    clopidogrel (PLAVIX) tablet 75 mg  75 mg Oral Daily    enoxaparin (LOVENOX) subcutaneous injection 40 mg  40 mg Subcutaneous Daily    famotidine (PEPCID) tablet 20 mg  20 mg Oral Daily    finasteride (PROSCAR) tablet 5 mg  5 mg Oral Daily    furosemide (LASIX) tablet 20 mg  20 mg Oral BID    gabapentin (NEURONTIN) capsule 300 mg  300 mg Oral BID    hydrALAZINE (APRESOLINE) injection 10 mg  10 mg Intravenous Q6H PRN    insulin glargine (LANTUS) subcutaneous injection 20 Units 0 2 mL  20 Units Subcutaneous HS    insulin lispro (HumaLOG) 100 units/mL subcutaneous injection 1-5 Units  1-5 Units Subcutaneous HS    insulin lispro (HumaLOG) 100 units/mL subcutaneous injection 1-6 Units  1-6 Units Subcutaneous TID AC    insulin lispro (HumaLOG) 100 units/mL subcutaneous injection 5 Units  5 Units Subcutaneous Daily With Breakfast    nitroglycerin (NITROSTAT) SL tablet 0 4 mg  0 4 mg Sublingual Q5 Min PRN    potassium-sodium phosphateS (K-PHOS,PHOSPHA 250) -250 mg tablet 1 tablet  1 tablet Oral TID With Meals    pravastatin (PRAVACHOL) tablet 40 mg  40 mg Oral Daily With Dinner    senna-docusate sodium (SENOKOT S) 8 6-50 mg per tablet 1 tablet  1 tablet Oral HS    tamsulosin (FLOMAX) capsule 0 8 mg  0 8 mg Oral Daily With Dinner    and PTA meds:   Prior to Admission Medications   Prescriptions Last Dose Informant Patient Reported? Taking?    Cholecalciferol (VITAMIN D3) 5000 units CAPS 8/15/2020 at Unknown time Child Yes Yes   Sig: Take 1 capsule by mouth   Cyanocobalamin (VITAMIN B12 PO) 8/15/2020 at Unknown time Child Yes Yes   Sig: Take 5,000 mcg by mouth   albuterol (PROVENTIL HFA,VENTOLIN HFA) 90 mcg/act inhaler Past Month at Unknown time Child No Yes   Sig: Inhale 2 puffs every 4 (four) hours as needed for wheezing   aspirin 325 mg tablet 8/15/2020 at Unknown time Child Yes Yes   Sig: Take 325 mg by mouth daily   carvedilol (COREG) 25 mg tablet 8/15/2020 at Unknown time  Yes Yes   Sig: Take 25 mg by mouth 2 (two) times a day with meals   clopidogrel (PLAVIX) 75 mg tablet 8/15/2020 at Unknown time Child Yes Yes   Sig: Take 75 mg by mouth daily   famotidine (PEPCID) 40 MG tablet 8/15/2020 at Unknown time  Yes Yes   Sig: Take 40 mg by mouth daily   finasteride (PROSCAR) 5 mg tablet 8/15/2020 at Unknown time  No Yes   Sig: Take 1 tablet (5 mg total) by mouth daily   furosemide (LASIX) 20 mg tablet 8/15/2020 at Unknown time Child Yes Yes   Sig: Take 20 mg by mouth 2 (two) times a day   gabapentin (NEURONTIN) 300 mg capsule 8/15/2020 at Unknown time  Yes Yes   Sig: Take 300 mg by mouth 2 (two) times a day   insulin glargine (LANTUS) 100 units/mL subcutaneous injection 8/15/2020 at Unknown time Child Yes Yes   Sig: Inject 20 Units under the skin daily at bedtime   irbesartan (AVAPRO) 150 mg tablet 8/16/2020 at Unknown time Child Yes Yes   Sig: Take 150 mg by mouth daily at bedtime   isosorbide mononitrate (IMDUR) 30 mg 24 hr tablet 8/15/2020 at Unknown time  Yes Yes   Sig: Take 30 mg by mouth daily   meloxicam (MOBIC) 15 mg tablet 8/15/2020 at Unknown time  Yes Yes   Sig: Take 15 mg by mouth daily   metFORMIN (GLUCOPHAGE) 1000 MG tablet 8/16/2020 at Unknown time Child Yes Yes   Sig: Take 1,000 mg by mouth 2 (two) times a day with meals   nitroglycerin (NITROSTAT) 0 4 mg SL tablet More than a month at Unknown time  Yes No   Sig: Place 0 4 mg under the tongue every 5 (five) minutes as needed for chest pain   simvastatin (ZOCOR) 20 mg tablet 8/15/2020 at Unknown time  Yes Yes   Sig: Take 20 mg by mouth daily at bedtime   sitaGLIPtin (JANUVIA) 100 mg tablet 8/15/2020 at Unknown time  Yes Yes   Sig: Take 100 mg by mouth daily   tamsulosin (FLOMAX) 0 4 mg 8/15/2020 at Unknown time  No Yes   Sig: Take 2 capsules (0 8 mg total) by mouth daily with dinner      Facility-Administered Medications: None     Allergies   Allergen Reactions    Demerol [Meperidine]        Objective   Vitals: Blood pressure 136/63, pulse 98, temperature 98 8 °F (37 1 °C), temperature source Tympanic, resp  rate 20, height 5' 6" (1 676 m), weight 96 6 kg (212 lb 15 4 oz), SpO2 96 %  I/O last 24 hours:   In: 65 [P O :120; IV Piggyback:500]  Out: 3400 [Urine:3400]    Invasive Devices     Peripheral Intravenous Line Peripheral IV 08/16/20 Right Antecubital 2 days          Drain            Urethral Catheter Coude;Non-latex 16 Fr  2 days                Physical Exam   General: Pt is AAOx3, lying down in bed side chair in NAD  HEENT: normocephalic, no scleral icterus, moist mucous membranes   Neck: Supple, non-tender, no JVD, ROM intact, no tracheal deviation   CV: RRR, no murmurs, gallops, rubs  S1 and S2  Resp: Lung sounds clear to auscultation B/L, normal respiratory effort no  wheezes, rhonchi, rhales   Abd: Soft, with no tenderness, non-distended, non-tympanitic  Normoactive  bowelsounds all 4 quadrants  No rebound or guarding  No CVA tenderness  : Urinary catheter in place, clear yellow urine in Estrella bag, 3 L output over 24 hours  Ext: Warm with no cyanosis, no edema, no deformities  ROM intact   Skin: No rashes, bruises, ulcers  Neuro: Sensation intact all 4 extremities  5+ strength all 4 extremities  Lab Results:   I have personally reviewed pertinent reports  CBC:   Lab Results   Component Value Date    WBC 14 97 (H) 08/18/2020    HGB 11 1 (L) 08/18/2020    HCT 32 9 (L) 08/18/2020    MCV 91 08/18/2020     08/18/2020    MCH 30 7 08/18/2020    MCHC 33 7 08/18/2020    RDW 14 0 08/18/2020    MPV 11 0 08/18/2020    NRBC 0 08/18/2020     CMP:   Lab Results   Component Value Date    SODIUM 137 08/18/2020     08/18/2020    CO2 25 08/18/2020    BUN 14 08/18/2020    CREATININE 1 01 08/18/2020    CALCIUM 8 4 08/18/2020    AST 20 08/18/2020    ALT 30 08/18/2020    ALKPHOS 83 08/18/2020    EGFR 68 08/18/2020     Urinalysis: No results found for: Rajeev Ice, SPECGRAV, PHUR, LEUKOCYTESUR, NITRITE, PROTEINUA, GLUCOSEU, KETONESU, BILIRUBINUR, BLOODU  Urine Culture: No results found for: URINECX  PSA: No results found for: PSA  Imaging Studies: I have personally reviewed pertinent reports  EKG, Pathology, and Other Studies: I have personally reviewed pertinent reports      VTE Prophylaxis: Enoxaparin (Lovenox)    Code Status: Level 1 - Full Code  Advance Directive and Living Will:      Power of :    POLST:      Counseling / Coordination of Care  Total floor / unit time spent today 35 minutes  Greater than 50% of total time was spent with the patient and / or family counseling and / or coordination of care  A description of the counseling / coordination of care      Anais Castro PA-C

## 2020-08-18 NOTE — ASSESSMENT & PLAN NOTE
Wt Readings from Last 3 Encounters:   08/16/20 96 6 kg (212 lb 15 4 oz)   07/28/20 101 kg (222 lb 12 8 oz)   07/24/20 99 8 kg (220 lb)       History of CHF, unknown EF  Not in acute exacerbation  Weight stable at 212 lb  Daily weights  Strict Is/Os  Monitor vitals as per protocol

## 2020-08-19 VITALS
SYSTOLIC BLOOD PRESSURE: 125 MMHG | HEIGHT: 66 IN | OXYGEN SATURATION: 92 % | DIASTOLIC BLOOD PRESSURE: 59 MMHG | HEART RATE: 93 BPM | TEMPERATURE: 98 F | WEIGHT: 219.6 LBS | RESPIRATION RATE: 18 BRPM | BODY MASS INDEX: 35.29 KG/M2

## 2020-08-19 PROBLEM — R78.81 BACTEREMIA: Status: RESOLVED | Noted: 2020-08-17 | Resolved: 2020-08-19

## 2020-08-19 LAB
ANION GAP SERPL CALCULATED.3IONS-SCNC: 7 MMOL/L (ref 4–13)
BACTERIA UR CULT: ABNORMAL
BACTERIA UR CULT: ABNORMAL
BASOPHILS # BLD AUTO: 0.03 THOUSANDS/ΜL (ref 0–0.1)
BASOPHILS NFR BLD AUTO: 0 % (ref 0–1)
BUN SERPL-MCNC: 13 MG/DL (ref 5–25)
CALCIUM SERPL-MCNC: 8.2 MG/DL (ref 8.3–10.1)
CHLORIDE SERPL-SCNC: 106 MMOL/L (ref 100–108)
CO2 SERPL-SCNC: 26 MMOL/L (ref 21–32)
CREAT SERPL-MCNC: 1.05 MG/DL (ref 0.6–1.3)
EOSINOPHIL # BLD AUTO: 0.83 THOUSAND/ΜL (ref 0–0.61)
EOSINOPHIL NFR BLD AUTO: 9 % (ref 0–6)
ERYTHROCYTE [DISTWIDTH] IN BLOOD BY AUTOMATED COUNT: 14.1 % (ref 11.6–15.1)
GFR SERPL CREATININE-BSD FRML MDRD: 65 ML/MIN/1.73SQ M
GLUCOSE SERPL-MCNC: 164 MG/DL (ref 65–140)
GLUCOSE SERPL-MCNC: 99 MG/DL (ref 65–140)
HCT VFR BLD AUTO: 31.7 % (ref 36.5–49.3)
HGB BLD-MCNC: 10.7 G/DL (ref 12–17)
IMM GRANULOCYTES # BLD AUTO: 0.06 THOUSAND/UL (ref 0–0.2)
IMM GRANULOCYTES NFR BLD AUTO: 1 % (ref 0–2)
LYMPHOCYTES # BLD AUTO: 1.25 THOUSANDS/ΜL (ref 0.6–4.47)
LYMPHOCYTES NFR BLD AUTO: 13 % (ref 14–44)
MCH RBC QN AUTO: 30.4 PG (ref 26.8–34.3)
MCHC RBC AUTO-ENTMCNC: 33.8 G/DL (ref 31.4–37.4)
MCV RBC AUTO: 90 FL (ref 82–98)
MONOCYTES # BLD AUTO: 0.64 THOUSAND/ΜL (ref 0.17–1.22)
MONOCYTES NFR BLD AUTO: 7 % (ref 4–12)
NEUTROPHILS # BLD AUTO: 6.5 THOUSANDS/ΜL (ref 1.85–7.62)
NEUTS SEG NFR BLD AUTO: 70 % (ref 43–75)
NRBC BLD AUTO-RTO: 0 /100 WBCS
PHOSPHATE SERPL-MCNC: 3.2 MG/DL (ref 2.3–4.1)
PLATELET # BLD AUTO: 151 THOUSANDS/UL (ref 149–390)
PMV BLD AUTO: 10.4 FL (ref 8.9–12.7)
POTASSIUM SERPL-SCNC: 3.6 MMOL/L (ref 3.5–5.3)
RBC # BLD AUTO: 3.52 MILLION/UL (ref 3.88–5.62)
SODIUM SERPL-SCNC: 139 MMOL/L (ref 136–145)
WBC # BLD AUTO: 9.31 THOUSAND/UL (ref 4.31–10.16)

## 2020-08-19 PROCEDURE — 84100 ASSAY OF PHOSPHORUS: CPT | Performed by: INTERNAL MEDICINE

## 2020-08-19 PROCEDURE — 80048 BASIC METABOLIC PNL TOTAL CA: CPT | Performed by: INTERNAL MEDICINE

## 2020-08-19 PROCEDURE — 82948 REAGENT STRIP/BLOOD GLUCOSE: CPT

## 2020-08-19 PROCEDURE — 85025 COMPLETE CBC W/AUTO DIFF WBC: CPT | Performed by: INTERNAL MEDICINE

## 2020-08-19 PROCEDURE — 99239 HOSP IP/OBS DSCHRG MGMT >30: CPT | Performed by: INTERNAL MEDICINE

## 2020-08-19 RX ORDER — CEPHALEXIN 500 MG/1
500 CAPSULE ORAL EVERY 6 HOURS SCHEDULED
Qty: 68 CAPSULE | Refills: 0 | Status: SHIPPED | OUTPATIENT
Start: 2020-08-19 | End: 2020-09-05

## 2020-08-19 RX ORDER — OXYCODONE HYDROCHLORIDE AND ACETAMINOPHEN 5; 325 MG/1; MG/1
1 TABLET ORAL EVERY 8 HOURS PRN
Qty: 12 TABLET | Refills: 0 | Status: SHIPPED | OUTPATIENT
Start: 2020-08-19 | End: 2020-08-19 | Stop reason: SDUPTHER

## 2020-08-19 RX ORDER — CEPHALEXIN 250 MG/1
500 CAPSULE ORAL EVERY 6 HOURS SCHEDULED
Status: DISCONTINUED | OUTPATIENT
Start: 2020-08-19 | End: 2020-08-19 | Stop reason: HOSPADM

## 2020-08-19 RX ORDER — CARVEDILOL 12.5 MG/1
25 TABLET ORAL 2 TIMES DAILY WITH MEALS
Status: DISCONTINUED | OUTPATIENT
Start: 2020-08-19 | End: 2020-08-19 | Stop reason: HOSPADM

## 2020-08-19 RX ORDER — CARVEDILOL 25 MG/1
12.5 TABLET ORAL 2 TIMES DAILY WITH MEALS
Refills: 0
Start: 2020-08-19 | End: 2020-09-21 | Stop reason: HOSPADM

## 2020-08-19 RX ORDER — OXYCODONE HYDROCHLORIDE AND ACETAMINOPHEN 5; 325 MG/1; MG/1
1 TABLET ORAL EVERY 8 HOURS PRN
Qty: 12 TABLET | Refills: 0 | Status: SHIPPED | OUTPATIENT
Start: 2020-08-19 | End: 2020-08-24

## 2020-08-19 RX ADMIN — CLOPIDOGREL BISULFATE 75 MG: 75 TABLET ORAL at 08:16

## 2020-08-19 RX ADMIN — INSULIN LISPRO 5 UNITS: 100 INJECTION, SOLUTION INTRAVENOUS; SUBCUTANEOUS at 08:14

## 2020-08-19 RX ADMIN — CEFEPIME HYDROCHLORIDE 2000 MG: 2 INJECTION, SOLUTION INTRAVENOUS at 08:18

## 2020-08-19 RX ADMIN — FINASTERIDE 5 MG: 5 TABLET, FILM COATED ORAL at 08:16

## 2020-08-19 RX ADMIN — FAMOTIDINE 20 MG: 20 TABLET ORAL at 08:16

## 2020-08-19 RX ADMIN — FUROSEMIDE 20 MG: 20 TABLET ORAL at 08:16

## 2020-08-19 RX ADMIN — INSULIN LISPRO 1 UNITS: 100 INJECTION, SOLUTION INTRAVENOUS; SUBCUTANEOUS at 08:14

## 2020-08-19 RX ADMIN — ENOXAPARIN SODIUM 40 MG: 40 INJECTION SUBCUTANEOUS at 08:15

## 2020-08-19 RX ADMIN — ASPIRIN 325 MG ORAL TABLET 325 MG: 325 PILL ORAL at 08:16

## 2020-08-19 RX ADMIN — OXYCODONE HYDROCHLORIDE 10 MG: 5 TABLET ORAL at 07:23

## 2020-08-19 RX ADMIN — GABAPENTIN 300 MG: 300 CAPSULE ORAL at 08:16

## 2020-08-19 RX ADMIN — Medication 1 TABLET: at 07:23

## 2020-08-19 NOTE — ASSESSMENT & PLAN NOTE
Lab Results   Component Value Date    HGBA1C 7 7 (H) 08/17/2020       Recent Labs     08/18/20  1123 08/18/20  1642 08/18/20  2134 08/19/20  0752   POCGLU 103 215* 210* 164*       Blood Sugar Average: Last 72 hrs:  (P) 194 [id number]   HB A1c is 7 7  Continue with home insulin regimen, 20 units Lantus at bedtime and 5 units lispro pre meals  Accu-Cheks and sliding scale

## 2020-08-19 NOTE — DISCHARGE SUMMARY
Discharge- Bree Hoskins 1935, 82 y o  male MRN: [id number]    Unit/Bed#: -01 Encounter: [id number]    Primary Care Provider: No primary care provider on file  Date and time admitted to hospital: 8/16/2020  8:02 AM        Bacteremia  Assessment & Plan  Patient blood cultures growing gram-negative rods likely source is UTI  Patient was on IV cefepime  Will switch to Keflex to complete the course  See above    Urinary tract infection associated with indwelling urethral catheter [facility])  Assessment & Plan  Catheter associated UTI, present on admission  UA-positive nitrite, 10-20 WBC and innumerable bacteria  Urine culture grew Klebsiella  See above    Poorly controlled type 2 diabetes mellitus [facility])  Assessment & Plan  Lab Results   Component Value Date    HGBA1C 7 7 (H) 08/17/2020       Recent Labs     08/18/20  1123 08/18/20  1642 08/18/20  2134 08/19/20  0752   POCGLU 103 215* 210* 164*       Blood Sugar Average: Last 72 hrs:  (P) 194 [id number]   HB A1c is 7 7  Continue with home insulin regimen, 20 units Lantus at bedtime and 5 units lispro pre meals  Accu-Cheks and sliding scale    Hyperlipidemia  Assessment & Plan  History of hyperlipidemia on simvastatin, switch to pravastatin while inpatient    HTN (hypertension)  Assessment & Plan  History of hypertension on 25 mg carvedilol twice daily, Lasix 20 mg twice daily and Avapro 150 mg at bedtime  Restarted on home medications  Will decrease the dose of Coreg to 12 5 twice daily  Monitor vitals at home and if appreciated is increased can go back to Coreg 25 mg twice daily    Discussed with patient and his son at bedside and they are in agreement with the plan    GERD (gastroesophageal reflux disease)  Assessment & Plan  History of GERD on Pepcid, continue with home meds    CHF (congestive heart failure) ([facility])  Assessment & Plan  Wt Readings from Last 3 Encounters:   08/19/20 99 6 kg (219 lb 9 6 oz)   07/28/20 101 kg (222 lb 12 8 oz) 07/24/20 99 8 kg (220 lb)       History of CHF, unknown EF  Not in acute exacerbation  Weight stable at 212 lb  Daily weights  Strict Is/Os  Monitor vitals as per protocol    Benign prostatic hyperplasia with lower urinary tract symptoms  Assessment & Plan  History of BPH, on finasteride and tamsulosin status post Estrella catheter placement  Continue with home meds  Estrella catheter was replaced in ER  Urology input  Patient has outpatient urology appointment on September 4 for cystoscopy  * Sepsis ([facility] [address] )  Assessment & Plan  Sepsis, present on admission, as evidenced by fever, 103 1, tachycardia 112, tachypnea 22 secondary to catheter associated urinary tract infection  Lactic acid initially 2 1  UA- Innumerable bacteria, 10-20 WBC, positive nitrite and large blood  Estrella catheter changed in the ED  Blood culture grew E coli  Urine culture grew Klebsiella  Patient will be switched to Keflex at discharge and will continue antibiotics till September 4th, when he will get his cystoscopy  Patient remains afebrile  Patient has chronic Estrella catheter in place  Discussed with ID and patient is stable to be discharged on oral antibiotics        Hospital Course:     Miki Wilson is a 82 y o  male patient who originally presented to the hospital on   Admission Orders (From admission, onward)     Ordered        08/16/20 1042  Inpatient Admission  Once                  due to fever  Patient is Yi-speaking and a poor historian and history is gotten from family at bedside  They also report that they noticed altered mental status  Patient's symptoms were associated with nausea and vomiting  Patient has a history of urinary tract infection with urinary retention and had a Estrella catheter placed a month ago  Patient was found to be septic and became bacteremic  Patient blood cultures grew E coli  Urine culture came back less than 10,000 Klebsiella pneumoniae    Patient was seen by Infectious Disease and Urology during this admission  Patient was initially started on cefepime and leukocytosis resolved  Patient remained afebrile  Infectious Disease recommended patient can be switched to Keflex and continue until his appointment on September 4th urologist   Discussed with patient and son at length and he is in agreement with the discharge plan  Patient suprapubic pain has improved a lot  Patient was seen by  and did not require any home care services  Patient is ambulating without any discomfort  Patient Estrella catheter was replaced in ED  On exam  Chest-clear to auscultation  Abdomen-soft, minimal suprapubic tenderness on deep palpation  Neuro-alert awake oriented x3  No focal deficits  Heart-S1-S2 regular  Please see above list of diagnoses and related plan for additional information  Follow-up with PCP as outpatient  Follow-up with urology on September 4th for cystoscopy  Continue on antibiotics till September 4th  Return to ER with any worsening fever, abdominal pain, nausea, vomiting, shortness of breath or any other alarming symptoms  Condition at Discharge:  good      Discharge instructions/Information to patient and family:   See after visit summary for information provided to patient and family  Provisions for Follow-Up Care:  See after visit summary for information related to follow-up care and any pertinent home health orders  Disposition:     Home       Discharge Statement:  I spent 45 minutes discharging the patient  This time was spent on the day of discharge  I had direct contact with the patient on the day of discharge  Greater than 50% of the total time was spent examining patient, answering all patient questions, arranging and discussing plan of care with patient as well as directly providing post-discharge instructions  Additional time then spent on discharge activities      Discharge Medications:  See after visit summary for reconciled discharge medications provided to patient and family        ** Please Note: This note has been constructed using a voice recognition system **

## 2020-08-19 NOTE — ASSESSMENT & PLAN NOTE
Wt Readings from Last 3 Encounters:   08/19/20 99 6 kg (219 lb 9 6 oz)   07/28/20 101 kg (222 lb 12 8 oz)   07/24/20 99 8 kg (220 lb)       History of CHF, unknown EF  Not in acute exacerbation  Weight stable at 212 lb  Daily weights  Strict Is/Os  Monitor vitals as per protocol

## 2020-08-19 NOTE — SOCIAL WORK
Continuing to follow patient  Met with patient and his son, Fortino present  Patient is for discharge and his son informed SM he would be returning home with him and no services anticipates  Patient reports the indwelling winslow for a month  He is for cysto on 9/4/20  Provided information and directions to the Social Security office in [facility] at [address], [facility] ,phone number [phone]  Fortino will assist patient in obtaining Social Security and Medicare coverage in the [address] [place] [address],[address] as he has been living in [place]  Son to transport patient home

## 2020-08-19 NOTE — DISCHARGE INSTRUCTIONS
Follow-up with PCP as outpatient  Follow-up with urology on September 4th for cystoscopy  Continue on antibiotics till September 4th  Return to ER with any worsening fever, abdominal pain, nausea, vomiting, shortness of breath or any other alarming symptoms

## 2020-08-19 NOTE — ASSESSMENT & PLAN NOTE
History of hypertension on 25 mg carvedilol twice daily, Lasix 20 mg twice daily and Avapro 150 mg at bedtime  Restarted on home medications  Will decrease the dose of Coreg to 12 5 twice daily  Monitor vitals at home and if appreciated is increased can go back to Coreg 25 mg twice daily    Discussed with patient and his son at bedside and they are in agreement with the plan

## 2020-08-19 NOTE — ASSESSMENT & PLAN NOTE
Catheter associated UTI, present on admission  UA-positive nitrite, 10-20 WBC and innumerable bacteria  Urine culture grew Klebsiella  See above

## 2020-08-19 NOTE — ASSESSMENT & PLAN NOTE
Sepsis, present on admission, as evidenced by fever, 103 1, tachycardia 112, tachypnea 22 secondary to catheter associated urinary tract infection  Lactic acid initially 2 1  UA- Innumerable bacteria, 10-20 WBC, positive nitrite and large blood  Estrella catheter changed in the ED  Blood culture grew E coli  Urine culture grew Klebsiella  Patient will be switched to Keflex at discharge and will continue antibiotics till September 4th, when he will get his cystoscopy  Patient remains afebrile  Patient has chronic Estrella catheter in place  Discussed with ID and patient is stable to be discharged on oral antibiotics

## 2020-08-19 NOTE — PLAN OF CARE
Problem: Potential for Falls  Goal: Patient will remain free of falls  Description: INTERVENTIONS:  - Assess patient frequently for physical needs  -  Identify cognitive and physical deficits and behaviors that affect risk of falls    -  [place] fall precautions as indicated by assessment   - Educate patient/family on patient safety including physical limitations  - Instruct patient to call for assistance with activity based on assessment  - Modify environment to reduce risk of injury  - Consider OT/PT consult to assist with strengthening/mobility  Outcome: Progressing     Problem: Prexisting or High Potential for Compromised Skin Integrity  Goal: Skin integrity is maintained or improved  Description: INTERVENTIONS:  - Identify patients at risk for skin breakdown  - Assess and monitor skin integrity  - Assess and monitor nutrition and hydration status  - Monitor labs   - Assess for incontinence   - Turn and reposition patient  - Assist with mobility/ambulation  - Relieve pressure over bony prominences  - Avoid friction and shearing  - Provide appropriate hygiene as needed including keeping skin clean and dry  - Evaluate need for skin moisturizer/barrier cream  - Collaborate with interdisciplinary team   - Patient/family teaching  - Consider wound care consult   Outcome: Progressing     Problem: PAIN - ADULT  Goal: Verbalizes/displays adequate comfort level or baseline comfort level  Description: Interventions:  - Encourage patient to monitor pain and request assistance  - Assess pain using appropriate pain scale  - Administer analgesics based on type and severity of pain and evaluate response  - Implement non-pharmacological measures as appropriate and evaluate response  - Consider cultural and social influences on pain and pain management  - Notify physician/advanced practitioner if interventions unsuccessful or patient reports new pain  Outcome: Progressing     Problem: INFECTION - ADULT  Goal: Absence or prevention of progression during hospitalization  Description: INTERVENTIONS:  - Assess and monitor for signs and symptoms of infection  - Monitor lab/diagnostic results  - Monitor all insertion sites, i e  indwelling lines, tubes, and drains  - Monitor endotracheal if appropriate and nasal secretions for changes in amount and color  - [place] appropriate cooling/warming therapies per order  - Administer medications as ordered  - Instruct and encourage patient and family to use good hand hygiene technique  - Identify and instruct in appropriate isolation precautions for identified infection/condition  Outcome: Progressing     Problem: SAFETY ADULT  Goal: Patient will remain free of falls  Description: INTERVENTIONS:  - Assess patient frequently for physical needs  -  Identify cognitive and physical deficits and behaviors that affect risk of falls    -  [place] fall precautions as indicated by assessment   - Educate patient/family on patient safety including physical limitations  - Instruct patient to call for assistance with activity based on assessment  - Modify environment to reduce risk of injury  - Consider OT/PT consult to assist with strengthening/mobility  Outcome: Progressing  Goal: Maintain or return to baseline ADL function  Description: INTERVENTIONS:  -  Assess patient's ability to carry out ADLs; assess patient's baseline for ADL function and identify physical deficits which impact ability to perform ADLs (bathing, care of mouth/teeth, toileting, grooming, dressing, etc )  - Assess/evaluate cause of self-care deficits   - Assess range of motion  - Assess patient's mobility; develop plan if impaired  - Assess patient's need for assistive devices and provide as appropriate  - Encourage maximum independence but intervene and supervise when necessary  - Involve family in performance of ADLs  - Assess for home care needs following discharge   - Consider OT consult to assist with ADL evaluation and planning for discharge  - Provide patient education as appropriate  Outcome: Progressing  Goal: Maintain or return mobility status to optimal level  Description: INTERVENTIONS:  - Assess patient's baseline mobility status (ambulation, transfers, stairs, etc )    - Identify cognitive and physical deficits and behaviors that affect mobility  - Identify mobility aids required to assist with transfers and/or ambulation (gait belt, sit-to-stand, lift, walker, cane, etc )  - [place] fall precautions as indicated by assessment  - Record patient progress and toleration of activity level on Mobility SBAR; progress patient to next Phase/Stage  - Instruct patient to call for assistance with activity based on assessment  - Consider rehabilitation consult to assist with strengthening/weightbearing, etc   Outcome: Progressing     Problem: DISCHARGE PLANNING  Goal: Discharge to home or other facility with appropriate resources  Description: INTERVENTIONS:  - Identify barriers to discharge w/patient and caregiver  - Arrange for needed discharge resources and transportation as appropriate  - Identify discharge learning needs (meds, wound care, etc )  - Arrange for interpretive services to assist at discharge as needed  - Refer to Case Management Department for coordinating discharge planning if the patient needs post-hospital services based on physician/advanced practitioner order or complex needs related to functional status, cognitive ability, or social support system  Outcome: Progressing     Problem: Knowledge Deficit  Goal: Patient/family/caregiver demonstrates understanding of disease process, treatment plan, medications, and discharge instructions  Description: Complete learning assessment and assess knowledge base    Interventions:  - Provide teaching at level of understanding  - Provide teaching via preferred learning methods  Outcome: Progressing

## 2020-08-19 NOTE — ASSESSMENT & PLAN NOTE
Patient blood cultures growing gram-negative rods likely source is UTI  Patient was on IV cefepime  Will switch to Keflex to complete the course  See above

## 2020-08-20 LAB
BACTERIA BLD CULT: ABNORMAL
GRAM STN SPEC: ABNORMAL
GRAM STN SPEC: ABNORMAL

## 2020-08-20 NOTE — TELEPHONE ENCOUNTER
Called and spoke to patients nimco Moreland who speaks [place]  Advised that appointment for 8/26 is cancelled and he confirmed appointment for the cysto

## 2020-09-01 ENCOUNTER — HOSPITAL ENCOUNTER (EMERGENCY)
Facility: HOSPITAL | Age: 85
Discharge: HOME | End: 2020-09-01
Attending: EMERGENCY MEDICINE | Admitting: EMERGENCY MEDICINE
Payer: MEDICARE

## 2020-09-01 VITALS
DIASTOLIC BLOOD PRESSURE: 77 MMHG | SYSTOLIC BLOOD PRESSURE: 169 MMHG | WEIGHT: 219 LBS | RESPIRATION RATE: 20 BRPM | BODY MASS INDEX: 35.35 KG/M2 | OXYGEN SATURATION: 97 % | TEMPERATURE: 97.6 F | HEART RATE: 96 BPM

## 2020-09-01 DIAGNOSIS — T83.091A OBSTRUCTION OF FOLEY CATHETER, INITIAL ENCOUNTER (HCC): ICD-10-CM

## 2020-09-01 DIAGNOSIS — R31.9 HEMATURIA: Primary | ICD-10-CM

## 2020-09-01 DIAGNOSIS — R81 GLUCOSURIA: ICD-10-CM

## 2020-09-01 DIAGNOSIS — K59.00 CONSTIPATION: ICD-10-CM

## 2020-09-01 LAB
ALBUMIN SERPL BCP-MCNC: 3 G/DL (ref 3.5–5)
ALP SERPL-CCNC: 59 U/L (ref 46–116)
ALT SERPL W P-5'-P-CCNC: 25 U/L (ref 12–78)
ANION GAP SERPL CALCULATED.3IONS-SCNC: 7 MMOL/L (ref 4–13)
APTT PPP: 30 SECONDS (ref 23–37)
AST SERPL W P-5'-P-CCNC: 17 U/L (ref 5–45)
BACTERIA UR QL AUTO: ABNORMAL /HPF
BASOPHILS # BLD AUTO: 0.04 THOUSANDS/ΜL (ref 0–0.1)
BASOPHILS NFR BLD AUTO: 1 % (ref 0–1)
BILIRUB SERPL-MCNC: 0.4 MG/DL (ref 0.2–1)
BILIRUB UR QL STRIP: NEGATIVE
BUN SERPL-MCNC: 14 MG/DL (ref 5–25)
CALCIUM SERPL-MCNC: 7.9 MG/DL (ref 8.3–10.1)
CHLORIDE SERPL-SCNC: 104 MMOL/L (ref 100–108)
CLARITY UR: CLEAR
CO2 SERPL-SCNC: 22 MMOL/L (ref 21–32)
COLOR UR: YELLOW
CREAT SERPL-MCNC: 1.03 MG/DL (ref 0.6–1.3)
EOSINOPHIL # BLD AUTO: 0.64 THOUSAND/ΜL (ref 0–0.61)
EOSINOPHIL NFR BLD AUTO: 7 % (ref 0–6)
ERYTHROCYTE [DISTWIDTH] IN BLOOD BY AUTOMATED COUNT: 14.4 % (ref 11.6–15.1)
GFR SERPL CREATININE-BSD FRML MDRD: 67 ML/MIN/1.73SQ M
GLUCOSE SERPL-MCNC: 172 MG/DL (ref 65–140)
GLUCOSE UR STRIP-MCNC: ABNORMAL MG/DL
HCT VFR BLD AUTO: 33 % (ref 36.5–49.3)
HGB BLD-MCNC: 11 G/DL (ref 12–17)
HGB UR QL STRIP.AUTO: ABNORMAL
HYALINE CASTS #/AREA URNS LPF: ABNORMAL /LPF
IMM GRANULOCYTES # BLD AUTO: 0.04 THOUSAND/UL (ref 0–0.2)
IMM GRANULOCYTES NFR BLD AUTO: 1 % (ref 0–2)
INR PPP: 0.97 (ref 0.84–1.19)
KETONES UR STRIP-MCNC: NEGATIVE MG/DL
LEUKOCYTE ESTERASE UR QL STRIP: NEGATIVE
LYMPHOCYTES # BLD AUTO: 1.18 THOUSANDS/ΜL (ref 0.6–4.47)
LYMPHOCYTES NFR BLD AUTO: 13 % (ref 14–44)
MCH RBC QN AUTO: 30.8 PG (ref 26.8–34.3)
MCHC RBC AUTO-ENTMCNC: 33.3 G/DL (ref 31.4–37.4)
MCV RBC AUTO: 92 FL (ref 82–98)
MONOCYTES # BLD AUTO: 0.84 THOUSAND/ΜL (ref 0.17–1.22)
MONOCYTES NFR BLD AUTO: 10 % (ref 4–12)
NEUTROPHILS # BLD AUTO: 6.12 THOUSANDS/ΜL (ref 1.85–7.62)
NEUTS SEG NFR BLD AUTO: 68 % (ref 43–75)
NITRITE UR QL STRIP: NEGATIVE
NON-SQ EPI CELLS URNS QL MICRO: ABNORMAL /HPF
NRBC BLD AUTO-RTO: 0 /100 WBCS
OTHER STN SPEC: ABNORMAL
PH UR STRIP.AUTO: 5.5 [PH]
PLATELET # BLD AUTO: 298 THOUSANDS/UL (ref 149–390)
PMV BLD AUTO: 10.4 FL (ref 8.9–12.7)
POTASSIUM SERPL-SCNC: 4.3 MMOL/L (ref 3.5–5.3)
PROT SERPL-MCNC: 6.7 G/DL (ref 6.4–8.2)
PROT UR STRIP-MCNC: NEGATIVE MG/DL
PROTHROMBIN TIME: 12.9 SECONDS (ref 11.6–14.5)
RBC # BLD AUTO: 3.57 MILLION/UL (ref 3.88–5.62)
RBC #/AREA URNS AUTO: ABNORMAL /HPF
SODIUM SERPL-SCNC: 133 MMOL/L (ref 136–145)
SP GR UR STRIP.AUTO: 1.01 (ref 1–1.03)
UROBILINOGEN UR QL STRIP.AUTO: 0.2 E.U./DL
WBC # BLD AUTO: 8.86 THOUSAND/UL (ref 4.31–10.16)
WBC #/AREA URNS AUTO: ABNORMAL /HPF

## 2020-09-01 PROCEDURE — 80053 COMPREHEN METABOLIC PANEL: CPT | Performed by: EMERGENCY MEDICINE

## 2020-09-01 PROCEDURE — 85610 PROTHROMBIN TIME: CPT | Performed by: EMERGENCY MEDICINE

## 2020-09-01 PROCEDURE — 99284 EMERGENCY DEPT VISIT MOD MDM: CPT | Performed by: EMERGENCY MEDICINE

## 2020-09-01 PROCEDURE — 85025 COMPLETE CBC W/AUTO DIFF WBC: CPT | Performed by: EMERGENCY MEDICINE

## 2020-09-01 PROCEDURE — 81001 URINALYSIS AUTO W/SCOPE: CPT | Performed by: EMERGENCY MEDICINE

## 2020-09-01 PROCEDURE — 36415 COLL VENOUS BLD VENIPUNCTURE: CPT | Performed by: EMERGENCY MEDICINE

## 2020-09-01 PROCEDURE — 99283 EMERGENCY DEPT VISIT LOW MDM: CPT

## 2020-09-01 PROCEDURE — 85730 THROMBOPLASTIN TIME PARTIAL: CPT | Performed by: EMERGENCY MEDICINE

## 2020-09-01 RX ORDER — DOCUSATE SODIUM 100 MG/1
100 CAPSULE, LIQUID FILLED ORAL EVERY 12 HOURS
Qty: 60 CAPSULE | Refills: 0 | Status: SHIPPED | OUTPATIENT
Start: 2020-09-01

## 2020-09-01 NOTE — ED PROVIDER NOTES
History  Chief Complaint   Patient presents with    Urinary Catheter Problem     pt states his catheter is blocked and whenever he has to have a bm he urinates blood     83 yo M presents to ED with blood in his chronic winslow  Recent admission for sepsis for UTI, d/c 8/19  Was sent home on keflex, which he is to continue until 9/4 - has cysto scheduled  Is on plavix  Winslow catheter changed in the ED during last admission  Blood culture grew E coli  Urine culture grew Klebsiella  Tonight, noticed there was blood in his winslow, and it seemed clogged  He had some suprapubic pressure  No N/V  Has been doing well since d/c  Is compliant with abx  He also notes for past several weeks, he has pain with defectation, and it seems like more blood goes into winslow when defecating  No blood in stools, no black stools  Had a CT last admission which showed diverticulosis and other incidental findings  PMH of CHF, CAD, BPH, DM2, diverticulosis  History provided by:  Patient and medical records   used: Yes (pt's son is bedside, translated )    Urinary Catheter Problem   Severity:  Moderate  Onset quality:  Gradual  Timing:  Intermittent  Progression:  Worsening  Chronicity:  Recurrent  Associated symptoms: no abdominal pain, no chest pain, no congestion, no cough, no diarrhea, no ear pain, no fatigue, no fever, no headaches, no nausea, no rash, no rhinorrhea, no shortness of breath, no sore throat and no vomiting        Prior to Admission Medications   Prescriptions Last Dose Informant Patient Reported? Taking?    Cholecalciferol (VITAMIN D3) 5000 units CAPS  Child Yes No   Sig: Take 1 capsule by mouth   Cyanocobalamin (VITAMIN B12 PO)  Child Yes No   Sig: Take 5,000 mcg by mouth   albuterol (PROVENTIL HFA,VENTOLIN HFA) 90 mcg/act inhaler  Child No No   Sig: Inhale 2 puffs every 4 (four) hours as needed for wheezing   aspirin 325 mg tablet  Child Yes No   Sig: Take 325 mg by mouth daily   carvedilol (COREG) 25 mg tablet   No No   Sig: Take 0 5 tablets (12 5 mg total) by mouth 2 (two) times a day with meals   cephalexin (KEFLEX) 500 mg capsule   No No   Sig: Take 1 capsule (500 mg total) by mouth every 6 (six) hours for 17 days   clopidogrel (PLAVIX) 75 mg tablet  Child Yes No   Sig: Take 75 mg by mouth daily   famotidine (PEPCID) 40 MG tablet   Yes No   Sig: Take 40 mg by mouth daily   finasteride (PROSCAR) 5 mg tablet   No No   Sig: Take 1 tablet (5 mg total) by mouth daily   furosemide (LASIX) 20 mg tablet  Child Yes No   Sig: Take 20 mg by mouth 2 (two) times a day   gabapentin (NEURONTIN) 300 mg capsule   Yes No   Sig: Take 300 mg by mouth 2 (two) times a day   insulin glargine (LANTUS) 100 units/mL subcutaneous injection  Child Yes No   Sig: Inject 20 Units under the skin daily at bedtime   irbesartan (AVAPRO) 150 mg tablet  Child Yes No   Sig: Take 150 mg by mouth daily at bedtime   isosorbide mononitrate (IMDUR) 30 mg 24 hr tablet   Yes No   Sig: Take 30 mg by mouth daily   meloxicam (MOBIC) 15 mg tablet   Yes No   Sig: Take 15 mg by mouth daily   metFORMIN (GLUCOPHAGE) 1000 MG tablet  Child Yes No   Sig: Take 1,000 mg by mouth 2 (two) times a day with meals   nitroglycerin (NITROSTAT) 0 4 mg SL tablet   Yes No   Sig: Place 0 4 mg under the tongue every 5 (five) minutes as needed for chest pain   simvastatin (ZOCOR) 20 mg tablet   Yes No   Sig: Take 20 mg by mouth daily at bedtime   sitaGLIPtin (JANUVIA) 100 mg tablet   Yes No   Sig: Take 100 mg by mouth daily   tamsulosin (FLOMAX) 0 4 mg   No No   Sig: Take 2 capsules (0 8 mg total) by mouth daily with dinner      Facility-Administered Medications: None       Past Medical History:   Diagnosis Date    Cardiac disease     CHF (congestive heart failure) (HCC)     Diabetes mellitus (HCC)     GERD (gastroesophageal reflux disease)     Hyperlipidemia     Hypertension     MI (myocardial infarction) ([facility] [address] )        Past Surgical History:   Procedure Laterality Date    EYE SURGERY         Family History   Problem Relation Age of Onset    Diabetes Mother     Hypertension Mother     Cancer Father     Hypertension Father     Cancer Sister     Hypertension Sister      I have reviewed and agree with the history as documented  E-Cigarette/Vaping    E-Cigarette Use Never User      E-Cigarette/Vaping Substances    Nicotine No     THC No     CBD No     Flavoring No     Other No     Unknown No      Social History     Tobacco Use    Smoking status: Never Smoker    Smokeless tobacco: Never Used   Substance Use Topics    Alcohol use: No     Alcohol/week: 0 0 standard drinks     Frequency: Never     Binge frequency: Never    Drug use: No       Review of Systems   Constitutional: Negative for chills, diaphoresis, fatigue, fever and unexpected weight change  HENT: Negative for congestion, ear pain, rhinorrhea, sore throat, trouble swallowing and voice change  Eyes: Negative for pain and visual disturbance  Respiratory: Negative for cough, chest tightness and shortness of breath  Cardiovascular: Negative for chest pain, palpitations and leg swelling  Gastrointestinal: Negative for abdominal pain, blood in stool, constipation, diarrhea, nausea and vomiting  Genitourinary: Positive for difficulty urinating and hematuria  Musculoskeletal: Negative for arthralgias, back pain and neck pain  Skin: Negative for rash  Neurological: Negative for dizziness, syncope, light-headedness and headaches  Psychiatric/Behavioral: Negative for confusion and suicidal ideas  The patient is not nervous/anxious  Physical Exam  Physical Exam  Vitals signs and nursing note reviewed  Constitutional:       General: He is not in acute distress  Appearance: He is well-developed  He is not diaphoretic  HENT:      Head: Normocephalic and atraumatic        Right Ear: External ear normal       Left Ear: External ear normal       Nose: Nose normal    Eyes: General: No scleral icterus  Right eye: No discharge  Left eye: No discharge  Conjunctiva/sclera: Conjunctivae normal       Pupils: Pupils are equal, round, and reactive to light  Neck:      Musculoskeletal: Normal range of motion and neck supple  Vascular: No JVD  Trachea: No tracheal deviation  Cardiovascular:      Rate and Rhythm: Normal rate and regular rhythm  Heart sounds: Normal heart sounds  No murmur  No friction rub  No gallop  Pulmonary:      Effort: Pulmonary effort is normal  No respiratory distress  Breath sounds: Normal breath sounds  No stridor  No wheezing or rales  Chest:      Chest wall: No tenderness  Abdominal:      General: Bowel sounds are normal  There is no distension  Palpations: Abdomen is soft  Tenderness: There is no abdominal tenderness  There is no guarding or rebound  Genitourinary:     Rectum: Guaiac result negative (no hemorrhoids, fissures, masses palpated  nontender exam )  Comments: Irrigation winslow in place  Pankaj hematuria, clearing with irrigation  Musculoskeletal: Normal range of motion  General: No tenderness or deformity  Lymphadenopathy:      Cervical: No cervical adenopathy  Skin:     General: Skin is warm and dry  Findings: No rash  Neurological:      Mental Status: He is alert and oriented to person, place, and time  Cranial Nerves: No cranial nerve deficit  Sensory: No sensory deficit        Coordination: Coordination normal    Psychiatric:         Behavior: Behavior normal          Vital Signs  ED Triage Vitals [09/01/20 0430]   Temperature Pulse Respirations Blood Pressure SpO2   97 6 °F (36 4 °C) 96 20 169/77 97 %      Temp Source Heart Rate Source Patient Position - Orthostatic VS BP Location FiO2 (%)   Temporal -- Lying Right arm --      Pain Score       Worst Possible Pain           Vitals:    09/01/20 0430   BP: 169/77   Pulse: 96   Patient Position - Orthostatic VS: Lying         Visual Acuity      ED Medications  Medications - No data to display    Diagnostic Studies  Results Reviewed     Procedure Component Value Units Date/Time    Comprehensive metabolic panel [[id number]]  (Abnormal) Collected:  09/01/20 0536    Lab Status:  Final result Specimen:  Blood from Arm, Right Updated:  09/01/20 4642     Sodium 133 mmol/L      Potassium 4 3 mmol/L      Chloride 104 mmol/L      CO2 22 mmol/L      ANION GAP 7 mmol/L      BUN 14 mg/dL      Creatinine 1 03 mg/dL      Glucose 172 mg/dL      Calcium 7 9 mg/dL      AST 17 U/L      ALT 25 U/L      Alkaline Phosphatase 59 U/L      Total Protein 6 7 g/dL      Albumin 3 0 g/dL      Total Bilirubin 0 40 mg/dL      eGFR 67 ml/min/1 73sq m     Narrative:       [place] guidelines for Chronic Kidney Disease (CKD):     Stage 1 with normal or high GFR (GFR > 90 mL/min/1 73 square meters)    Stage 2 Mild CKD (GFR = 60-89 mL/min/1 73 square meters)    Stage 3A Moderate CKD (GFR = 45-59 mL/min/1 73 square meters)    Stage 3B Moderate CKD (GFR = 30-44 mL/min/1 73 square meters)    Stage 4 Severe CKD (GFR = 15-29 mL/min/1 73 square meters)    Stage 5 End Stage CKD (GFR <15 mL/min/1 73 square meters)  Note: GFR calculation is accurate only with a steady state creatinine    UA w Reflex to Microscopic w Reflex to Culture [[id number]]  (Abnormal) Collected:  09/01/20 0538    Lab Status:  Final result Specimen:  Urine, Indwelling Estrella Catheter Updated:  09/01/20 0618     Color, UA Yellow     Clarity, UA Clear     Specific Gravity, UA 1 010     pH, UA 5 5     Leukocytes, UA Negative     Nitrite, UA Negative     Protein, UA Negative mg/dl      Glucose,  (1/10%) mg/dl      Ketones, UA Negative mg/dl      Urobilinogen, UA 0 2 E U /dl      Bilirubin, UA Negative     Blood, UA Large    Urine Microscopic [[id number]] Collected:  09/01/20 0538    Lab Status:   In process Specimen:  Urine, Indwelling Estrella Catheter Updated: 09/01/20 0618    Protime-INR [[id number]]  (Normal) Collected:  09/01/20 0536    Lab Status:  Final result Specimen:  Blood from Arm, Right Updated:  09/01/20 0608     Protime 12 9 seconds      INR 0 97    APTT [[id number]]  (Normal) Collected:  09/01/20 0536    Lab Status:  Final result Specimen:  Blood from Arm, Right Updated:  09/01/20 0608     PTT 30 seconds     CBC and differential [[id number]]  (Abnormal) Collected:  09/01/20 0536    Lab Status:  Final result Specimen:  Blood from Arm, Right Updated:  09/01/20 0551     WBC 8 86 Thousand/uL      RBC 3 57 Million/uL      Hemoglobin 11 0 g/dL      Hematocrit 33 0 %      MCV 92 fL      MCH 30 8 pg      MCHC 33 3 g/dL      RDW 14 4 %      MPV 10 4 fL      Platelets 296 Thousands/uL      nRBC 0 /100 WBCs      Neutrophils Relative 68 %      Immat GRANS % 1 %      Lymphocytes Relative 13 %      Monocytes Relative 10 %      Eosinophils Relative 7 %      Basophils Relative 1 %      Neutrophils Absolute 6 12 Thousands/µL      Immature Grans Absolute 0 04 Thousand/uL      Lymphocytes Absolute 1 18 Thousands/µL      Monocytes Absolute 0 84 Thousand/µL      Eosinophils Absolute 0 64 Thousand/µL      Basophils Absolute 0 04 Thousands/µL                  No orders to display              Procedures  Procedures         ED Course  ED Course as of Sep 01 0643   Tue Sep 01, 2020   0557 Pt feeling improved after winslow replaced w/ 3 way and irrigated  Urine flowed immediately after replacement, he did have 400cc + on bladder scan prior to replacement  Will check labs  Once urine clears, will d/c home  Has cysto scheduled on 9/4  Discussed supportive care, and RTED instructions                                                   MDM  Number of Diagnoses or Management Options  Hematuria: new and requires workup  Obstruction of Winslow catheter, initial encounter [facility]): new and requires workup     Amount and/or Complexity of Data Reviewed  Clinical lab tests: ordered and reviewed  Tests in the radiology section of CPT®: reviewed  Tests in the medicine section of CPT®: ordered and reviewed  Obtain history from someone other than the patient: yes  Review and summarize past medical records: yes  Independent visualization of images, tracings, or specimens: yes    Risk of Complications, Morbidity, and/or Mortality  Presenting problems: moderate  Diagnostic procedures: low  Management options: low    Patient Progress  Patient progress: improved        Disposition  Final diagnoses:   Hematuria   Obstruction of Estrella catheter, initial encounter ([place] [address] )   Constipation   Glucosuria     Time reflects when diagnosis was documented in both MDM as applicable and the Disposition within this note     Time User Action Codes Description Comment    9/1/2020  5:57 AM Lata Swift S Add [R31 9] Hematuria     9/1/2020  5:57 AM Jacque Fonseca Add [T83 091A] Obstruction of Estrella catheter, initial encounter ([facility] )     9/1/2020  5:59 AM Lata Kerriley S Add [K59 00] Constipation     9/1/2020  6:41 AM Nagi Carrillo E [facility]       ED Disposition     ED Disposition Condition Date/Time Comment    Discharge Stable Tue Sep 1, 2020  6:41 AM Merly Hall discharge to home/self care              Follow-up Information     Follow up With Specialties Details Why Contact Info Additional [address] [facility] [address] [place] [facility] Urology Call   [address] [address] Gopi FONSECA [facility] [address]  701  [facility] [place] [address], [place], [place], [address]     [address] Emergency Department Emergency Medicine  If symptoms worsen [address] [place], [address]  [phone]  ED, [address], [place], [address]          Patient's Medications   Discharge Prescriptions    DOCUSATE SODIUM (COLACE) 100 MG CAPSULE    Take 1 capsule (100 mg total) by mouth every 12 (twelve) hours       Start Date: 9/1/2020  End Date: --       Order Dose: 100 mg       Quantity: 60 capsule    Refills: 0     No discharge procedures on file      PDMP Review       Value Time User    PDMP Reviewed  Yes 8/19/2020 10:17 AM Mercedez Cochran MD          ED Provider  Electronically Signed by           Breana Salcedo MD  09/01/20 7914

## 2020-09-01 NOTE — ED NOTES
Pt states he has pain when he has bowel movement   And when he has bm he is urinating blood     Charmayne Earing, RN  09/01/20 8446

## 2020-09-03 NOTE — UTILIZATION REVIEW
Notification of Discharge  This is a Notification of Discharge from our facility [address]  Please be advised that this patient has been discharge from our facility  Below you will find the admission and discharge date and time including the patients disposition  PRESENTATION DATE: 8/16/2020  8:02 AM  OBS ADMISSION DATE:   IP ADMISSION DATE: 8/16/20 1042   DISCHARGE DATE: 8/19/2020 11:08 AM  DISPOSITION: Home/Self Care Home/Self Care   Admission Orders listed below:  Admission Orders (From admission, onward)     Ordered        08/16/20 1042  Inpatient Admission  Once                   Please contact the UR Department if additional information is required to close this patient's authorization/case  [facility]  [facility] Utilization Review Department  Main: [phone] x carefully listen to the prompts  All voicemails are confidential   [email or web address]  Send all requests for admission clinical reviews, approved or denied determinations and any other requests to dedicated fax number below belonging to the campus where the patient is receiving treatment   List of dedicated fax numbers:  [address] DENIALS (Administrative/Medical Necessity) [phone]   [address] (Maternity/NICU/Pediatrics) [phone]   [facility] [phone]   [facility] [phone]   [facility] [phone]   [address] [facility] [address] [place] [address] [phone]   [facility]  [phone]   [address] [facility] [address]  [address] [place] [address] [address] [facility] [phone]

## 2020-09-04 ENCOUNTER — PROCEDURE VISIT (OUTPATIENT)
Dept: UROLOGY | Facility: HOSPITAL | Age: 85
End: 2020-09-04
Payer: MEDICARE

## 2020-09-04 VITALS
HEART RATE: 101 BPM | SYSTOLIC BLOOD PRESSURE: 118 MMHG | HEIGHT: 66 IN | WEIGHT: 219 LBS | DIASTOLIC BLOOD PRESSURE: 58 MMHG | BODY MASS INDEX: 35.2 KG/M2 | TEMPERATURE: 97 F

## 2020-09-04 DIAGNOSIS — R33.9 URINARY RETENTION: Primary | ICD-10-CM

## 2020-09-04 PROCEDURE — 52000 CYSTOURETHROSCOPY: CPT | Performed by: UROLOGY

## 2020-09-04 PROCEDURE — 99213 OFFICE O/P EST LOW 20 MIN: CPT | Performed by: UROLOGY

## 2020-09-07 ENCOUNTER — HOSPITAL ENCOUNTER (EMERGENCY)
Facility: HOSPITAL | Age: 85
Discharge: HOME | End: 2020-09-07
Attending: EMERGENCY MEDICINE | Admitting: EMERGENCY MEDICINE
Payer: MEDICARE

## 2020-09-07 ENCOUNTER — HOSPITAL ENCOUNTER (EMERGENCY)
Facility: HOSPITAL | Age: 85
End: 2020-09-07
Attending: EMERGENCY MEDICINE | Admitting: EMERGENCY MEDICINE
Payer: MEDICARE

## 2020-09-07 ENCOUNTER — HOSPITAL ENCOUNTER (INPATIENT)
Facility: HOSPITAL | Age: 85
LOS: 12 days | Discharge: HOME | DRG: 653 | End: 2020-09-19
Attending: STUDENT IN AN ORGANIZED HEALTH CARE EDUCATION/TRAINING PROGRAM | Admitting: INTERNAL MEDICINE
Payer: MEDICARE

## 2020-09-07 VITALS
TEMPERATURE: 98.2 F | SYSTOLIC BLOOD PRESSURE: 128 MMHG | OXYGEN SATURATION: 98 % | HEART RATE: 84 BPM | DIASTOLIC BLOOD PRESSURE: 64 MMHG | RESPIRATION RATE: 16 BRPM

## 2020-09-07 VITALS
TEMPERATURE: 97.1 F | RESPIRATION RATE: 18 BRPM | BODY MASS INDEX: 34.86 KG/M2 | WEIGHT: 216 LBS | OXYGEN SATURATION: 98 % | SYSTOLIC BLOOD PRESSURE: 152 MMHG | DIASTOLIC BLOOD PRESSURE: 75 MMHG | HEART RATE: 90 BPM

## 2020-09-07 DIAGNOSIS — N40.0 ENLARGED PROSTATE: ICD-10-CM

## 2020-09-07 DIAGNOSIS — R31.9 HEMATURIA: Primary | ICD-10-CM

## 2020-09-07 DIAGNOSIS — T83.9XXA PROBLEM WITH FOLEY CATHETER, INITIAL ENCOUNTER (HCC): ICD-10-CM

## 2020-09-07 DIAGNOSIS — I50.9 CONGESTIVE HEART FAILURE, UNSPECIFIED HF CHRONICITY, UNSPECIFIED HEART FAILURE TYPE (HCC): ICD-10-CM

## 2020-09-07 DIAGNOSIS — N40.0 BPH (BENIGN PROSTATIC HYPERPLASIA): ICD-10-CM

## 2020-09-07 DIAGNOSIS — N40.1 BENIGN PROSTATIC HYPERPLASIA WITH LOWER URINARY TRACT SYMPTOMS, SYMPTOM DETAILS UNSPECIFIED: Primary | ICD-10-CM

## 2020-09-07 DIAGNOSIS — R31.0 GROSS HEMATURIA: ICD-10-CM

## 2020-09-07 DIAGNOSIS — E87.6 HYPOKALEMIA: ICD-10-CM

## 2020-09-07 DIAGNOSIS — R33.8 CLOT RETENTION OF URINE: ICD-10-CM

## 2020-09-07 DIAGNOSIS — N99.72 BLADDER PERFORATION, INTRAOPERATIVE: ICD-10-CM

## 2020-09-07 DIAGNOSIS — A41.9 SEPSIS (HCC): ICD-10-CM

## 2020-09-07 LAB
ANION GAP SERPL CALCULATED.3IONS-SCNC: 7 MMOL/L (ref 4–13)
APTT PPP: 29 SECONDS (ref 23–37)
BACTERIA UR QL AUTO: ABNORMAL /HPF
BASOPHILS # BLD AUTO: 0.05 THOUSANDS/ΜL (ref 0–0.1)
BASOPHILS NFR BLD AUTO: 1 % (ref 0–1)
BILIRUB UR QL STRIP: NEGATIVE
BUN SERPL-MCNC: 16 MG/DL (ref 5–25)
CALCIUM SERPL-MCNC: 8.6 MG/DL (ref 8.3–10.1)
CHLORIDE SERPL-SCNC: 102 MMOL/L (ref 100–108)
CLARITY UR: ABNORMAL
CO2 SERPL-SCNC: 26 MMOL/L (ref 21–32)
COLOR UR: ABNORMAL
CREAT SERPL-MCNC: 1 MG/DL (ref 0.6–1.3)
EOSINOPHIL # BLD AUTO: 0.29 THOUSAND/ΜL (ref 0–0.61)
EOSINOPHIL NFR BLD AUTO: 3 % (ref 0–6)
ERYTHROCYTE [DISTWIDTH] IN BLOOD BY AUTOMATED COUNT: 14.3 % (ref 11.6–15.1)
GFR SERPL CREATININE-BSD FRML MDRD: 69 ML/MIN/1.73SQ M
GLUCOSE SERPL-MCNC: 222 MG/DL (ref 65–140)
GLUCOSE SERPL-MCNC: 229 MG/DL (ref 65–140)
GLUCOSE SERPL-MCNC: 320 MG/DL (ref 65–140)
GLUCOSE UR STRIP-MCNC: NEGATIVE MG/DL
HCT VFR BLD AUTO: 33.7 % (ref 36.5–49.3)
HGB BLD-MCNC: 11.3 G/DL (ref 12–17)
HGB UR QL STRIP.AUTO: ABNORMAL
IMM GRANULOCYTES # BLD AUTO: 0.05 THOUSAND/UL (ref 0–0.2)
IMM GRANULOCYTES NFR BLD AUTO: 1 % (ref 0–2)
INR PPP: 0.98 (ref 0.84–1.19)
KETONES UR STRIP-MCNC: NEGATIVE MG/DL
LEUKOCYTE ESTERASE UR QL STRIP: ABNORMAL
LYMPHOCYTES # BLD AUTO: 1.5 THOUSANDS/ΜL (ref 0.6–4.47)
LYMPHOCYTES NFR BLD AUTO: 14 % (ref 14–44)
MCH RBC QN AUTO: 30.6 PG (ref 26.8–34.3)
MCHC RBC AUTO-ENTMCNC: 33.5 G/DL (ref 31.4–37.4)
MCV RBC AUTO: 91 FL (ref 82–98)
MONOCYTES # BLD AUTO: 0.88 THOUSAND/ΜL (ref 0.17–1.22)
MONOCYTES NFR BLD AUTO: 8 % (ref 4–12)
NEUTROPHILS # BLD AUTO: 8.2 THOUSANDS/ΜL (ref 1.85–7.62)
NEUTS SEG NFR BLD AUTO: 73 % (ref 43–75)
NITRITE UR QL STRIP: NEGATIVE
NON-SQ EPI CELLS URNS QL MICRO: ABNORMAL /HPF
NRBC BLD AUTO-RTO: 0 /100 WBCS
PH UR STRIP.AUTO: 6.5 [PH]
PLATELET # BLD AUTO: 257 THOUSANDS/UL (ref 149–390)
PMV BLD AUTO: 10.7 FL (ref 8.9–12.7)
POTASSIUM SERPL-SCNC: 4.3 MMOL/L (ref 3.5–5.3)
PROT UR STRIP-MCNC: ABNORMAL MG/DL
PROTHROMBIN TIME: 13 SECONDS (ref 11.6–14.5)
RBC # BLD AUTO: 3.69 MILLION/UL (ref 3.88–5.62)
RBC #/AREA URNS AUTO: ABNORMAL /HPF
SODIUM SERPL-SCNC: 135 MMOL/L (ref 136–145)
SP GR UR STRIP.AUTO: 1.01 (ref 1–1.03)
UROBILINOGEN UR QL STRIP.AUTO: 0.2 E.U./DL
WBC # BLD AUTO: 10.97 THOUSAND/UL (ref 4.31–10.16)
WBC #/AREA URNS AUTO: ABNORMAL /HPF

## 2020-09-07 PROCEDURE — 99283 EMERGENCY DEPT VISIT LOW MDM: CPT | Performed by: EMERGENCY MEDICINE

## 2020-09-07 PROCEDURE — 82948 REAGENT STRIP/BLOOD GLUCOSE: CPT

## 2020-09-07 PROCEDURE — 85025 COMPLETE CBC W/AUTO DIFF WBC: CPT | Performed by: PHYSICIAN ASSISTANT

## 2020-09-07 PROCEDURE — 80048 BASIC METABOLIC PNL TOTAL CA: CPT | Performed by: PHYSICIAN ASSISTANT

## 2020-09-07 PROCEDURE — 99283 EMERGENCY DEPT VISIT LOW MDM: CPT

## 2020-09-07 PROCEDURE — 99223 1ST HOSP IP/OBS HIGH 75: CPT | Performed by: STUDENT IN AN ORGANIZED HEALTH CARE EDUCATION/TRAINING PROGRAM

## 2020-09-07 PROCEDURE — 85610 PROTHROMBIN TIME: CPT | Performed by: PHYSICIAN ASSISTANT

## 2020-09-07 PROCEDURE — 85730 THROMBOPLASTIN TIME PARTIAL: CPT | Performed by: PHYSICIAN ASSISTANT

## 2020-09-07 PROCEDURE — 99284 EMERGENCY DEPT VISIT MOD MDM: CPT | Performed by: PHYSICIAN ASSISTANT

## 2020-09-07 PROCEDURE — 99284 EMERGENCY DEPT VISIT MOD MDM: CPT

## 2020-09-07 PROCEDURE — 36415 COLL VENOUS BLD VENIPUNCTURE: CPT | Performed by: PHYSICIAN ASSISTANT

## 2020-09-07 PROCEDURE — 81001 URINALYSIS AUTO W/SCOPE: CPT | Performed by: EMERGENCY MEDICINE

## 2020-09-07 RX ORDER — FAMOTIDINE 20 MG/1
20 TABLET, FILM COATED ORAL DAILY
Status: DISCONTINUED | OUTPATIENT
Start: 2020-09-08 | End: 2020-09-19 | Stop reason: HOSPADM

## 2020-09-07 RX ORDER — ATROPA BELLADONNA AND OPIUM 16.2; 3 MG/1; MG/1
30 SUPPOSITORY RECTAL EVERY 8 HOURS PRN
Status: DISCONTINUED | OUTPATIENT
Start: 2020-09-07 | End: 2020-09-19 | Stop reason: HOSPADM

## 2020-09-07 RX ORDER — PRAVASTATIN SODIUM 40 MG
40 TABLET ORAL
Status: DISCONTINUED | OUTPATIENT
Start: 2020-09-07 | End: 2020-09-15

## 2020-09-07 RX ORDER — DOCUSATE SODIUM 100 MG/1
100 CAPSULE, LIQUID FILLED ORAL EVERY 12 HOURS
Status: DISCONTINUED | OUTPATIENT
Start: 2020-09-07 | End: 2020-09-19 | Stop reason: HOSPADM

## 2020-09-07 RX ORDER — NITROGLYCERIN 0.4 MG/1
0.4 TABLET SUBLINGUAL
Status: DISCONTINUED | OUTPATIENT
Start: 2020-09-07 | End: 2020-09-19 | Stop reason: HOSPADM

## 2020-09-07 RX ORDER — FINASTERIDE 5 MG/1
5 TABLET, FILM COATED ORAL DAILY
Status: DISCONTINUED | OUTPATIENT
Start: 2020-09-08 | End: 2020-09-19 | Stop reason: HOSPADM

## 2020-09-07 RX ORDER — GABAPENTIN 300 MG/1
300 CAPSULE ORAL 2 TIMES DAILY
Status: DISCONTINUED | OUTPATIENT
Start: 2020-09-07 | End: 2020-09-15

## 2020-09-07 RX ORDER — ACETAMINOPHEN 325 MG/1
650 TABLET ORAL EVERY 6 HOURS PRN
Status: DISCONTINUED | OUTPATIENT
Start: 2020-09-07 | End: 2020-09-19 | Stop reason: HOSPADM

## 2020-09-07 RX ORDER — FUROSEMIDE 20 MG/1
20 TABLET ORAL 2 TIMES DAILY
Status: DISCONTINUED | OUTPATIENT
Start: 2020-09-07 | End: 2020-09-15

## 2020-09-07 RX ORDER — INSULIN GLARGINE 100 [IU]/ML
20 INJECTION, SOLUTION SUBCUTANEOUS
Status: DISCONTINUED | OUTPATIENT
Start: 2020-09-07 | End: 2020-09-15

## 2020-09-07 RX ORDER — LOSARTAN POTASSIUM 50 MG/1
50 TABLET ORAL DAILY
Status: DISCONTINUED | OUTPATIENT
Start: 2020-09-08 | End: 2020-09-15

## 2020-09-07 RX ORDER — ALBUTEROL SULFATE 90 UG/1
2 AEROSOL, METERED RESPIRATORY (INHALATION) EVERY 4 HOURS PRN
Status: DISCONTINUED | OUTPATIENT
Start: 2020-09-07 | End: 2020-09-19 | Stop reason: HOSPADM

## 2020-09-07 RX ORDER — TAMSULOSIN HYDROCHLORIDE 0.4 MG/1
0.8 CAPSULE ORAL
Status: DISCONTINUED | OUTPATIENT
Start: 2020-09-07 | End: 2020-09-15

## 2020-09-07 RX ORDER — OXYCODONE HYDROCHLORIDE AND ACETAMINOPHEN 5; 325 MG/1; MG/1
1 TABLET ORAL EVERY 6 HOURS PRN
Status: DISCONTINUED | OUTPATIENT
Start: 2020-09-07 | End: 2020-09-19 | Stop reason: HOSPADM

## 2020-09-07 RX ORDER — CARVEDILOL 12.5 MG/1
12.5 TABLET ORAL 2 TIMES DAILY WITH MEALS
Status: DISCONTINUED | OUTPATIENT
Start: 2020-09-08 | End: 2020-09-15

## 2020-09-07 RX ORDER — OXYBUTYNIN CHLORIDE 5 MG/1
5 TABLET, EXTENDED RELEASE ORAL DAILY
Status: DISCONTINUED | OUTPATIENT
Start: 2020-09-08 | End: 2020-09-19 | Stop reason: HOSPADM

## 2020-09-07 RX ORDER — ISOSORBIDE MONONITRATE 30 MG/1
30 TABLET, EXTENDED RELEASE ORAL DAILY
Status: DISCONTINUED | OUTPATIENT
Start: 2020-09-08 | End: 2020-09-19 | Stop reason: HOSPADM

## 2020-09-07 RX ORDER — OXYCODONE HYDROCHLORIDE AND ACETAMINOPHEN 5; 325 MG/1; MG/1
2 TABLET ORAL EVERY 6 HOURS PRN
Status: DISCONTINUED | OUTPATIENT
Start: 2020-09-07 | End: 2020-09-19 | Stop reason: HOSPADM

## 2020-09-07 RX ADMIN — FUROSEMIDE 20 MG: 20 TABLET ORAL at 20:39

## 2020-09-07 RX ADMIN — GABAPENTIN 300 MG: 300 CAPSULE ORAL at 20:38

## 2020-09-07 RX ADMIN — ATROPA BELLADONNA AND OPIUM 1 SUPPOSITORY: 16.2; 3 SUPPOSITORY RECTAL at 23:06

## 2020-09-07 RX ADMIN — INSULIN GLARGINE 20 UNITS: 100 INJECTION, SOLUTION SUBCUTANEOUS at 23:06

## 2020-09-07 RX ADMIN — DOCUSATE SODIUM 100 MG: 100 CAPSULE, LIQUID FILLED ORAL at 20:38

## 2020-09-07 RX ADMIN — PRAVASTATIN SODIUM 40 MG: 40 TABLET ORAL at 20:39

## 2020-09-07 RX ADMIN — TAMSULOSIN HYDROCHLORIDE 0.8 MG: 0.4 CAPSULE ORAL at 20:39

## 2020-09-07 RX ADMIN — OXYCODONE HYDROCHLORIDE AND ACETAMINOPHEN 2 TABLET: 5; 325 TABLET ORAL at 20:38

## 2020-09-07 RX ADMIN — INSULIN LISPRO 5 UNITS: 100 INJECTION, SOLUTION INTRAVENOUS; SUBCUTANEOUS at 19:13

## 2020-09-07 NOTE — DISCHARGE INSTRUCTIONS
Please follow-up with urology as soon as possible for further care, if symptoms worsen please return to the emergency department

## 2020-09-07 NOTE — ASSESSMENT & PLAN NOTE
Lab Results   Component Value Date    HGBA1C 7 7 (H) 08/17/2020       Recent Labs     09/07/20  1721   POCGLU 320*       Blood Sugar Average: Last 72 hrs:     IASS, Glargine 20U HS   Hold oral hypoglycemics (Glipizide, Metformin, and Januvia)  Fingerstick monitoring

## 2020-09-07 NOTE — ED NOTES
Irrigated bladder with 100 ml sterile fluid, 100 ml clear urine returned   Dr Andrey Sanchez made aware     Cecilia Valerio RN  09/07/20 0671

## 2020-09-07 NOTE — ASSESSMENT & PLAN NOTE
Wt Readings from Last 3 Encounters:   09/07/20 98 kg (216 lb)   09/04/20 99 3 kg (219 lb)   09/01/20 99 3 kg (219 lb)     Euvolemic  Not in exacerbation   Although complaining of dyspnea on exertion   - Echo requested

## 2020-09-07 NOTE — H&P
H&P- Merly Aguilar 1935, 82 y o  male MRN: [id number]    Unit/Bed#: E4 -01 Encounter: [id number]    Primary Care Provider: No primary care provider on file  Date and time admitted to hospital: 9/7/2020  4:48 PM        Gross hematuria  Assessment & Plan  82year old [address] speaking male admitted due to recurrent gross hematuria  - Monitor hemoglobin levels  - Awaiting urology evaluation  - CBI for the time being  - Aspirin and Plavix held for the time being  Spoke to son and patient (No history of stent)  Benign prostatic hyperplasia with lower urinary tract symptoms  Assessment & Plan  Symptomatic  With acute urinary retention   - Awaiting urology evaluation  - For possible cystoscopy and TURP  - Continue Flomax and Finasteride    CHF (congestive heart failure) (HCC)  Assessment & Plan  Wt Readings from Last 3 Encounters:   09/07/20 98 kg (216 lb)   09/04/20 99 3 kg (219 lb)   09/01/20 99 3 kg (219 lb)     Euvolemic  Not in exacerbation  Although complaining of dyspnea on exertion   - Echo requested        Poorly controlled type 2 diabetes mellitus [facility])  Assessment & Plan  Lab Results   Component Value Date    HGBA1C 7 7 (H) 08/17/2020       Recent Labs     09/07/20  1721   POCGLU 320*       Blood Sugar Average: Last 72 hrs:     IASS, Glargine 20U HS   Hold oral hypoglycemics (Glipizide, Metformin, and Januvia)  Fingerstick monitoring    Essential hypertension  Assessment & Plan  Controlled  - Continue monitoring BP  - Continue carvedilol, ARB, and lasix    Hyperlipidemia  Assessment & Plan  Continue statin    GERD (gastroesophageal reflux disease)  Assessment & Plan  Continue Famotidine      VTE Prophylaxis: Pharmacologic VTE Prophylaxis contraindicated due to Bleeding  / sequential compression device   Code Status: Full Code  POLST: None  Discussion with family: Son    Anticipated Length of Stay:  Patient will be admitted on an Inpatient basis with an anticipated length of stay of  > 2 midnights  Justification for Hospital Stay: Gross hematuria requiring urology evaluation, CBC monitoring, CBI, echo    Total Time for Visit, including Counseling / Coordination of Care: 75 minutes  Greater than 50% of this total time spent on direct patient counseling and coordination of care  Chief Complaint:   Gross hematuria    History of Present Illness:    Merly Salmeron is a 82 y o  male who presents with Gross hematuria  79-year-old male with history of CAD, hypertension, hyperlipidemia, and CHF (unknown ejection fraction) was admitted due to gross hematuria  He is primarily Frisian-speaking and son was at bedside who insisted to translate for the patient  He has a history of chronic indwelling catheter as a result of his BPH  One week prior to admission, he was evaluated in the ED due to urinary catheter problem  This was associated with pain which slowly increased in intensity  No associated symptoms such as fever or chills  No changes in mentation  Gross hematuria was noted  He was seen in the ED and he obtained relief after Estrella was replaced and irrigated  Over the last 24 hours, this issue again recurred  He was seen in the ED where his Estrella catheter was replaced  Irrigation was performed  Became clear  However, not long after that he went back to the ED due to the same issue  Urology was consulted who recommended transfer to [facility] for further evaluation  He is currently receiving continuous bladder irrigation  Review of Systems:    Review of Systems   Constitutional: Negative for activity change, chills, diaphoresis and fever  HENT: Negative for ear pain and postnasal drip  Eyes: Negative for pain  Respiratory: Positive for shortness of breath (Dyspnea on exertion)  Negative for apnea, cough, choking and chest tightness  Cardiovascular: Negative for chest pain and palpitations  Gastrointestinal: Positive for abdominal pain  Negative for abdominal distention, blood in stool, diarrhea, nausea and vomiting  Genitourinary: Positive for dysuria, frequency, hematuria and urgency  Musculoskeletal: Negative for neck pain  Skin: Negative for pallor  Neurological: Negative for dizziness, tremors, seizures, syncope, weakness, numbness and headaches  Psychiatric/Behavioral: Negative for agitation and confusion  Past Medical and Surgical History:     Past Medical History:   Diagnosis Date    Cardiac disease     CHF (congestive heart failure) ([place] [address] )     Diabetes mellitus ([place] [address] )     GERD (gastroesophageal reflux disease)     Hyperlipidemia     Hypertension     MI (myocardial infarction) ([place] [address] )        Past Surgical History:   Procedure Laterality Date    EYE SURGERY         Meds/Allergies:    Prior to Admission medications    Medication Sig Start Date End Date Taking?  Authorizing Provider   aspirin 325 mg tablet Take 325 mg by mouth daily   Yes Historical Provider, MD   carvedilol (COREG) 25 mg tablet Take 0 5 tablets (12 5 mg total) by mouth 2 (two) times a day with meals 8/19/20  Yes Dell Godinez MD   Cholecalciferol (VITAMIN D3) 5000 units CAPS Take 1 capsule by mouth   Yes Historical Provider, MD   clopidogrel (PLAVIX) 75 mg tablet Take 75 mg by mouth daily   Yes Historical Provider, MD   Cyanocobalamin (VITAMIN B12 PO) Take 5,000 mcg by mouth   Yes Historical Provider, MD   docusate sodium (COLACE) 100 mg capsule Take 1 capsule (100 mg total) by mouth every 12 (twelve) hours 9/1/20  Yes Tammy Segovia MD   famotidine (PEPCID) 40 MG tablet Take 40 mg by mouth daily   Yes Historical Provider, MD   finasteride (PROSCAR) 5 mg tablet Take 1 tablet (5 mg total) by mouth daily 7/24/20  Yes Zari Silva MD   furosemide (LASIX) 20 mg tablet Take 20 mg by mouth 2 (two) times a day   Yes Historical Provider, MD   gabapentin (NEURONTIN) 300 mg capsule Take 300 mg by mouth 2 (two) times a day   Yes Historical Provider, MD insulin glargine (LANTUS) 100 units/mL subcutaneous injection Inject 20 Units under the skin daily at bedtime   Yes Historical Provider, MD   irbesartan (AVAPRO) 150 mg tablet Take 150 mg by mouth daily at bedtime   Yes Historical Provider, MD   isosorbide mononitrate (IMDUR) 30 mg 24 hr tablet Take 30 mg by mouth daily   Yes Historical Provider, MD   meloxicam (MOBIC) 15 mg tablet Take 15 mg by mouth daily   Yes Historical Provider, MD   metFORMIN (GLUCOPHAGE) 1000 MG tablet Take 1,000 mg by mouth 2 (two) times a day with meals   Yes Historical Provider, MD   simvastatin (ZOCOR) 20 mg tablet Take 20 mg by mouth daily at bedtime   Yes Historical Provider, MD   tamsulosin (FLOMAX) 0 4 mg Take 2 capsules (0 8 mg total) by mouth daily with dinner 7/24/20 10/22/20 Yes Jacquelyn Morales MD   albuterol (PROVENTIL HFA,VENTOLIN HFA) 90 mcg/act inhaler Inhale 2 puffs every 4 (four) hours as needed for wheezing 12/11/17   Herschell Gerardo, CRNP   nitroglycerin (NITROSTAT) 0 4 mg SL tablet Place 0 4 mg under the tongue every 5 (five) minutes as needed for chest pain    Historical Provider, MD   sitaGLIPtin (JANUVIA) 100 mg tablet Take 100 mg by mouth daily    Historical Provider, MD     I have reviewed home medications with patient family member  Allergies:    Allergies   Allergen Reactions    Demerol [Meperidine]        Social History:     Marital Status: /Civil Union   Substance Use History:   Social History     Substance and Sexual Activity   Alcohol Use No    Alcohol/week: 0 0 standard drinks    Frequency: Never    Binge frequency: Never     Social History     Tobacco Use   Smoking Status Never Smoker   Smokeless Tobacco Never Used     Social History     Substance and Sexual Activity   Drug Use No       Family History:    Family History   Problem Relation Age of Onset    Diabetes Mother     Hypertension Mother     Cancer Father     Hypertension Father     Cancer Sister     Hypertension Sister Physical Exam:     Vitals:   Blood Pressure: 136/65 (09/07/20 1640)  Pulse: (!) 106 (09/07/20 1640)  Temperature: 97 8 °F (36 6 °C) (09/07/20 1640)  Temp Source: Temporal (09/07/20 1640)  Respirations: 17 (09/07/20 1640)  Height: 5' 7" (170 2 cm) (09/07/20 1640)  SpO2: 98 % (09/07/20 1640)    Physical Exam  Vitals signs reviewed  HENT:      Head: Normocephalic  Nose: Nose normal       Mouth/Throat:      Mouth: Mucous membranes are moist    Eyes:      General: No scleral icterus  Pupils: Pupils are equal, round, and reactive to light  Cardiovascular:      Rate and Rhythm: Normal rate and regular rhythm  Heart sounds: No murmur  No gallop  Pulmonary:      Effort: Pulmonary effort is normal  No respiratory distress  Breath sounds: No wheezing or rales  Abdominal:      General: There is no distension  Palpations: Abdomen is soft  Tenderness: There is abdominal tenderness (Discomfort suprapubic)  There is no guarding  Genitourinary:     Comments: Grossly bloody urine, blood surrounding urethral meatus, winslow catheter in place  Musculoskeletal:      Right lower leg: No edema  Left lower leg: No edema  Skin:     General: Skin is warm  Neurological:      Mental Status: He is alert  Psychiatric:         Mood and Affect: Mood normal          Behavior: Behavior normal        Additional Data:     Lab Results: I have personally reviewed pertinent reports        Results from last 7 days   Lab Units 09/07/20  1416   WBC Thousand/uL 10 97*   HEMOGLOBIN g/dL 11 3*   HEMATOCRIT % 33 7*   PLATELETS Thousands/uL 257   NEUTROS PCT % 73   LYMPHS PCT % 14   MONOS PCT % 8   EOS PCT % 3     Results from last 7 days   Lab Units 09/07/20  1416 09/01/20  0536   SODIUM mmol/L 135* 133*   POTASSIUM mmol/L 4 3 4 3   CHLORIDE mmol/L 102 104   CO2 mmol/L 26 22   BUN mg/dL 16 14   CREATININE mg/dL 1 00 1 03   ANION GAP mmol/L 7 7   CALCIUM mg/dL 8 6 7 9*   ALBUMIN g/dL  --  3 0*   TOTAL BILIRUBIN mg/dL  --  0 40   ALK PHOS U/L  --  59   ALT U/L  --  25   AST U/L  --  17   GLUCOSE RANDOM mg/dL 222* 172*     Results from last 7 days   Lab Units 09/07/20  1416   INR  0 98     Results from last 7 days   Lab Units 09/07/20  1721   POC GLUCOSE mg/dl 320*               Imaging: I have personally reviewed pertinent reports  No orders to display       EKG, Pathology, and Other Studies Reviewed on Admission:   · EKG: Awaiting    [facility] Reviewed: Yes     ** Please Note: This note has been constructed using a voice recognition system   **

## 2020-09-07 NOTE — ED PROVIDER NOTES
History  Chief Complaint   Patient presents with    Blood in Urine     To ED with c/o urinating around the catheter  Patient has bloody urine with clots  Was seen in ED for same in the past and required irrigation  51-year-old male with history of coronary artery disease, mi, hypertension, hyperlipidemia, and congestive heart failure presents emergency department for evaluation of recurrent hematuria  The patient was evaluated several hours ago this morning for similar complaint, at which time his Estrella catheter was hand irrigated and urine cleared  He returns with no urine in the leg bag and copious bloody urine leaking around the catheter  He is on asa and plavix  Currently denies fevers, chills, sweats or pain  Primarily Czech speaking, interpretation is provided by his son  On exam, pt is well appearing and afebrile in no distress  Estrella catheter bag has scant bloody urine, copious urine has leaked around catheter  Abdomen is soft and non distended  Otherwise exam is benign  A/P: Hematuria  Will consult urology as recurrence is concerning and pt may require urologic intervention  Will likely require 3 way placement  Prior to Admission Medications   Prescriptions Last Dose Informant Patient Reported? Taking?    Cholecalciferol (VITAMIN D3) 5000 units CAPS  Child Yes No   Sig: Take 1 capsule by mouth   Cyanocobalamin (VITAMIN B12 PO)  Child Yes No   Sig: Take 5,000 mcg by mouth   albuterol (PROVENTIL HFA,VENTOLIN HFA) 90 mcg/act inhaler  Child No No   Sig: Inhale 2 puffs every 4 (four) hours as needed for wheezing   aspirin 325 mg tablet  Child Yes No   Sig: Take 325 mg by mouth daily   carvedilol (COREG) 25 mg tablet  Child No No   Sig: Take 0 5 tablets (12 5 mg total) by mouth 2 (two) times a day with meals   clopidogrel (PLAVIX) 75 mg tablet  Child Yes No   Sig: Take 75 mg by mouth daily   docusate sodium (COLACE) 100 mg capsule  Child No No   Sig: Take 1 capsule (100 mg total) by mouth every 12 (twelve) hours   famotidine (PEPCID) 40 MG tablet  Child Yes No   Sig: Take 40 mg by mouth daily   finasteride (PROSCAR) 5 mg tablet  Child No No   Sig: Take 1 tablet (5 mg total) by mouth daily   furosemide (LASIX) 20 mg tablet  Child Yes No   Sig: Take 20 mg by mouth 2 (two) times a day   gabapentin (NEURONTIN) 300 mg capsule  Child Yes No   Sig: Take 300 mg by mouth 2 (two) times a day   insulin glargine (LANTUS) 100 units/mL subcutaneous injection  Child Yes No   Sig: Inject 20 Units under the skin daily at bedtime   irbesartan (AVAPRO) 150 mg tablet  Child Yes No   Sig: Take 150 mg by mouth daily at bedtime   isosorbide mononitrate (IMDUR) 30 mg 24 hr tablet  Child Yes No   Sig: Take 30 mg by mouth daily   meloxicam (MOBIC) 15 mg tablet  Child Yes No   Sig: Take 15 mg by mouth daily   metFORMIN (GLUCOPHAGE) 1000 MG tablet  Child Yes No   Sig: Take 1,000 mg by mouth 2 (two) times a day with meals   nitroglycerin (NITROSTAT) 0 4 mg SL tablet  Child Yes No   Sig: Place 0 4 mg under the tongue every 5 (five) minutes as needed for chest pain   simvastatin (ZOCOR) 20 mg tablet  Child Yes No   Sig: Take 20 mg by mouth daily at bedtime   sitaGLIPtin (JANUVIA) 100 mg tablet  Child Yes No   Sig: Take 100 mg by mouth daily   tamsulosin (FLOMAX) 0 4 mg  Child No No   Sig: Take 2 capsules (0 8 mg total) by mouth daily with dinner      Facility-Administered Medications: None       Past Medical History:   Diagnosis Date    Cardiac disease     CHF (congestive heart failure) (HCC)     Diabetes mellitus (HCC)     GERD (gastroesophageal reflux disease)     Hyperlipidemia     Hypertension     MI [facility]myocardial infarction) ([facility] [address] )        Past Surgical History:   Procedure Laterality Date    EYE SURGERY         Family History   Problem Relation Age of Onset    Diabetes Mother     Hypertension Mother     Cancer Father     Hypertension Father     Cancer Sister     Hypertension Sister      I have reviewed and agree with the history as documented  E-Cigarette/Vaping    E-Cigarette Use Never User      E-Cigarette/Vaping Substances    Nicotine No     THC No     CBD No     Flavoring No     Other No     Unknown No      Social History     Tobacco Use    Smoking status: Never Smoker    Smokeless tobacco: Never Used   Substance Use Topics    Alcohol use: No     Alcohol/week: 0 0 standard drinks     Frequency: Never     Binge frequency: Never    Drug use: No       Review of Systems   Constitutional: Negative for chills, diaphoresis and fever  Eyes: Negative for visual disturbance  Respiratory: Negative for cough and shortness of breath  Cardiovascular: Negative for chest pain and palpitations  Gastrointestinal: Negative for abdominal pain, diarrhea, nausea and vomiting  Genitourinary: Positive for difficulty urinating and hematuria  Negative for dysuria, flank pain and frequency  Musculoskeletal: Negative for arthralgias and myalgias  Skin: Negative for color change, rash and wound  Allergic/Immunologic: Negative for immunocompromised state  Neurological: Negative for dizziness and light-headedness  Hematological: Does not bruise/bleed easily  Psychiatric/Behavioral: Negative for confusion  The patient is not nervous/anxious  Physical Exam  Physical Exam  Vitals signs reviewed  Constitutional:       General: He is not in acute distress  Appearance: He is well-developed  He is not diaphoretic  HENT:      Head: Normocephalic and atraumatic  Mouth/Throat:      Mouth: Mucous membranes are moist    Eyes:      General: No scleral icterus  Pupils: Pupils are equal, round, and reactive to light  Neck:      Vascular: No JVD  Cardiovascular:      Rate and Rhythm: Normal rate and regular rhythm  Heart sounds: No murmur  No friction rub  No gallop  Pulmonary:      Effort: No respiratory distress  Breath sounds: No wheezing or rales     Abdominal:      General: Abdomen is flat  Bowel sounds are normal  There is no distension  Tenderness: There is no abdominal tenderness  Skin:     General: Skin is warm and dry  Capillary Refill: Capillary refill takes less than 2 seconds  Neurological:      Mental Status: He is alert and oriented to person, place, and time     Psychiatric:         Mood and Affect: Mood normal          Behavior: Behavior normal          Vital Signs  ED Triage Vitals [09/07/20 1129]   Temperature Pulse Respirations Blood Pressure SpO2   98 9 °F (37 2 °C) 102 18 145/63 98 %      Temp Source Heart Rate Source Patient Position - Orthostatic VS BP Location FiO2 (%)   Tympanic Monitor Lying Right arm --      Pain Score       No Pain           Vitals:    09/07/20 1245 09/07/20 1330 09/07/20 1400 09/07/20 1458   BP: 133/66 133/60  128/64   Pulse: 98 94 91 84   Patient Position - Orthostatic VS: Lying Lying  Lying         Visual Acuity      ED Medications  Medications - No data to display    Diagnostic Studies  Results Reviewed     Procedure Component Value Units Date/Time    Basic metabolic panel [[id number]]  (Abnormal) Collected:  09/07/20 1416    Lab Status:  Final result Specimen:  Blood from Arm, Right Updated:  09/07/20 1441     Sodium 135 mmol/L      Potassium 4 3 mmol/L      Chloride 102 mmol/L      CO2 26 mmol/L      ANION GAP 7 mmol/L      BUN 16 mg/dL      Creatinine 1 00 mg/dL      Glucose 222 mg/dL      Calcium 8 6 mg/dL      eGFR 69 ml/min/1 73sq m     Narrative:       [place] guidelines for Chronic Kidney Disease (CKD):     Stage 1 with normal or high GFR (GFR > 90 mL/min/1 73 square meters)    Stage 2 Mild CKD (GFR = 60-89 mL/min/1 73 square meters)    Stage 3A Moderate CKD (GFR = 45-59 mL/min/1 73 square meters)    Stage 3B Moderate CKD (GFR = 30-44 mL/min/1 73 square meters)    Stage 4 Severe CKD (GFR = 15-29 mL/min/1 73 square meters)    Stage 5 End Stage CKD (GFR <15 mL/min/1 73 square meters)  Note: GFR calculation is accurate only with a steady state creatinine    Protime-INR [[id number]]  (Normal) Collected:  09/07/20 1416    Lab Status:  Final result Specimen:  Blood from Arm, Right Updated:  09/07/20 1441     Protime 13 0 seconds      INR 0 98    APTT [[id number]]  (Normal) Collected:  09/07/20 1416    Lab Status:  Final result Specimen:  Blood from Arm, Right Updated:  09/07/20 1441     PTT 29 seconds     CBC and differential [[id number]]  (Abnormal) Collected:  09/07/20 1416    Lab Status:  Final result Specimen:  Blood from Arm, Right Updated:  09/07/20 1429     WBC 10 97 Thousand/uL      RBC 3 69 Million/uL      Hemoglobin 11 3 g/dL      Hematocrit 33 7 %      MCV 91 fL      MCH 30 6 pg      MCHC 33 5 g/dL      RDW 14 3 %      MPV 10 7 fL      Platelets 634 Thousands/uL      nRBC 0 /100 WBCs      Neutrophils Relative 73 %      Immat GRANS % 1 %      Lymphocytes Relative 14 %      Monocytes Relative 8 %      Eosinophils Relative 3 %      Basophils Relative 1 %      Neutrophils Absolute 8 20 Thousands/µL      Immature Grans Absolute 0 05 Thousand/uL      Lymphocytes Absolute 1 50 Thousands/µL      Monocytes Absolute 0 88 Thousand/µL      Eosinophils Absolute 0 29 Thousand/µL      Basophils Absolute 0 05 Thousands/µL                  No orders to display              Procedures  Procedures         ED Course  ED Course as of Sep 07 1518   [facility] Sep 07, 2020   1141 Discussed case with Dr Obed Burrell, urology on call  Recommends we insert 22-24Fr Estrella with CBI  If hematuria does not clear w/ CBI, transfer to [facility] for urologic intervention  If clearing, d/c to outpatient f/u      1219 Pt resting comfortably  Urine clearing  No complaints at this time      1437 Accepted at [facility] by [facility]          US AUDIT      Most Recent Value   Initial Alcohol Screen: US AUDIT-C    1  How often do you have a drink containing alcohol?  0 Filed at: 09/07/2020 1130   2   How many drinks containing alcohol do you have on a typical day you are drinking? 0 Filed at: 09/07/2020 1130   3a  Male UNDER 65: How often do you have five or more drinks on one occasion? 0 Filed at: 09/07/2020 1130   3b  FEMALE Any Age, or MALE 65+: How often do you have 4 or more drinks on one occassion? 0 Filed at: 09/07/2020 1130   Audit-C Score  0 Filed at: 09/07/2020 1130                  MERY/DAST-10      Most Recent Value   How many times in the past year have you    Used an illegal drug or used a prescription medication for non-medical reasons? Never Filed at: 09/07/2020 1131                                MDM  Number of Diagnoses or Management Options  BPH (benign prostatic hyperplasia):   Clot retention of urine: new and requires workup  Hematuria: new and requires workup  Diagnosis management comments: [address] Estrella was exchanged for a [address] and irrigated with 4 L normal saline  Patient's urine initially cleared however he experienced recurrent clot retention with return of copious hematuria    Continuous bladder irrigation was started and the patient will be transferred to [address] [id number] per the recommendations of urology       Amount and/or Complexity of Data Reviewed  Clinical lab tests: ordered and reviewed  Tests in the medicine section of CPT®: ordered and reviewed  Review and summarize past medical records: yes  Discuss the patient with other providers: yes  Independent visualization of images, tracings, or specimens: yes          Disposition  Final diagnoses:   Hematuria   Clot retention of urine   BPH (benign prostatic hyperplasia)     Time reflects when diagnosis was documented in both MDM as applicable and the Disposition within this note     Time User Action Codes Description Comment    9/7/2020  1:58 PM Noris  Add [R31 9] Hematuria     9/7/2020  1:59 PM Noris  Add [R33 8] Clot retention of urine     9/7/2020  1:59 PM Noris  Add [N40 0] BPH (benign prostatic hyperplasia)       ED Disposition     ED Disposition Condition Date/Time Comment    Transfer to [facility] Sep 7, 2020  1:58 PM Merly Garza should be transferred [facility]  Follow-up Information    None         Patient's Medications   Discharge Prescriptions    No medications on file     No discharge procedures on file      PDMP Review       Value Time User    PDMP Reviewed  Yes 8/19/2020 10:17 AM Demarcus Montgomery MD          ED Provider  Electronically Signed by           Rosalind Davison PA-C  09/07/20 1527

## 2020-09-07 NOTE — ED NOTES
Bladder scan showed 315mL in bladder  Attempted to irrigate pt's winslow catheter  Was able to flush but received no urine in return  Inserted new 18fr coude winslow and had Large amount of blood clots noted   Flushes easily and has urine return  Drained 800ml red urine  Pt reports relief from pain and abdominal distention   6mL post catheter on bladder scan     Oz Ramos RN  09/07/20 1316

## 2020-09-07 NOTE — ASSESSMENT & PLAN NOTE
82year old [place] sor[place] speaking male admitted due to recurrent gross hematuria  - Monitor hemoglobin levels  - Awaiting urology evaluation  - CBI for the time being  - Aspirin and Plavix held for the time being  Spoke to son and patient (No history of stent)

## 2020-09-07 NOTE — ED NOTES
[facility] here to pick pt up, now    Heading to [facility]  #[phone]     [facility]  09/07/20 1520

## 2020-09-07 NOTE — EMTALA/ACUTE CARE TRANSFER
[facility] EMERGENCY DEPARTMENT  [address]  [facility] [address]  Dept: [phone]      EMTALA TRANSFER CONSENT    NAME Merly Perez 1935                              MRN [id number]    I have been informed of my rights regarding examination, treatment, and transfer   by Dr Drake Gowers, DO    Benefits: Specialized equipment and/or services available at the receiving facility (Include comment)________________________(Urology)    Risks: Potential for delay in receiving treatment      Consent for Transfer:  I acknowledge that my medical condition has been evaluated and explained to me by the emergency department physician or other qualified medical person and/or my attending physician, who has recommended that I be transferred to the service of  Accepting Physician: Kj at [facility] Name, [address] : [address]  The above potential benefits of such transfer, the potential risks associated with such transfer, and the probable risks of not being transferred have been explained to me, and I fully understand them  The doctor has explained that, in my case, the benefits of transfer outweigh the risks  I agree to be transferred  I authorize the performance of emergency medical procedures and treatments upon me in both transit and upon arrival at the receiving facility  Additionally, I authorize the release of any and all medical records to the receiving facility and request they be transported with me, if possible  I understand that the safest mode of transportation during a medical emergency is an ambulance and that the Hospital advocates the use of this mode of transport  Risks of traveling to the receiving facility by car, including absence of medical control, life sustaining equipment, such as oxygen, and medical personnel has been explained to me and I fully understand them      ([address] BELOW)  [  ]  I consent to the stated transfer and to be transported by ambulance/helicopter  [  ]  I consent to the stated transfer, but refuse transportation by ambulance and accept full responsibility for my transportation by car  I understand the risks of non-ambulance transfers and I exonerate the Hospital and its staff from any deterioration in my condition that results from this refusal     X___________________________________________    DATE  20  TIME________  Signature of patient or legally responsible individual signing on patient behalf           RELATIONSHIP TO PATIENT_________________________          Provider Certification    NAME Al Bethea                                         1935                              MRN [id number]    A medical screening exam was performed on the above named patient  Based on the examination:    Condition Necessitating Transfer The primary encounter diagnosis was Hematuria  Diagnoses of Clot retention of urine and BPH (benign prostatic hyperplasia) were also pertinent to this visit      Patient Condition: The patient has been stabilized such that within reasonable medical probability, no material deterioration of the patient condition or the condition of the unborn child(leslye) is likely to result from the transfer    Reason for Transfer: Level of Care needed not available at this facility    Transfer Requirements: [address]   · Space available and qualified personnel available for treatment as acknowledged by [facility] [employer] [facility]  · Agreed to accept transfer and to provide appropriate medical treatment as acknowledged by       [facility]  · Appropriate medical records of the examination and treatment of the patient are provided at the time of transfer   [address], [address] _______  · Transfer will be performed by qualified personnel from [facility]  and appropriate transfer equipment as required, including the use of necessary and appropriate life support measures  Provider Certification: I have examined the patient and explained the following risks and benefits of being transferred/refusing transfer to the patient/family:  General risk, such as traffic hazards, adverse weather conditions, rough terrain or turbulence, possible failure of equipment (including vehicle or aircraft), or consequences of actions of persons outside the control of the transport personnel      Based on these reasonable risks and benefits to the patient and/or the unborn child(leslye), and based upon the information available at the time of the patients examination, I certify that the medical benefits reasonably to be expected from the provision of appropriate medical treatments at another medical facility outweigh the increasing risks, if any, to the individuals medical condition, and in the case of labor to the unborn child, from effecting the transfer      X____________________________________________ DATE 09/07/20        TIME_______      ORIGINAL - SEND TO MEDICAL RECORDS   COPY - SEND WITH PATIENT DURING TRANSFER

## 2020-09-07 NOTE — ED NOTES
Attempted to call report to [phone] given by [facility]  No answer  Called [facility] main line [phone] and was transferred to 4th floor [place], placed on hold and no one reanswered  [facility] stopped for EMS transfer           Rina Thao RN  09/07/20 0273

## 2020-09-07 NOTE — ED PROVIDER NOTES
History  Chief Complaint   Patient presents with    Urinary Catheter Problem     pt states his catheter is clogged  pt just saw urology and he needs surgery  82-year-old male with history of BPH, urinary tension presents for evaluation of blocked catheter and hematuria that started this evening  Per the son who is acting as an  patient has been dealing with intermittent hematuria for some time and was told that he has an enlarged prostate that will require surgery     Patient is under care of Urology and is supposed to prostate surgery however recently had issues with his Estrella catheter clotting off  He was seen 6 days ago and that time the catheter was replaced and flushed and he was told follow-up with urology  Tonight he has noted that there was no longer any urine draining and noticed some abdominal fullness  No nausea no vomiting, no fevers no chills  No flank pain          Prior to Admission Medications   Prescriptions Last Dose Informant Patient Reported? Taking?    Cholecalciferol (VITAMIN D3) 5000 units CAPS  Child Yes No   Sig: Take 1 capsule by mouth   Cyanocobalamin (VITAMIN B12 PO)  Child Yes No   Sig: Take 5,000 mcg by mouth   albuterol (PROVENTIL HFA,VENTOLIN HFA) 90 mcg/act inhaler  Child No No   Sig: Inhale 2 puffs every 4 (four) hours as needed for wheezing   aspirin 325 mg tablet  Child Yes No   Sig: Take 325 mg by mouth daily   carvedilol (COREG) 25 mg tablet  Child No No   Sig: Take 0 5 tablets (12 5 mg total) by mouth 2 (two) times a day with meals   clopidogrel (PLAVIX) 75 mg tablet  Child Yes No   Sig: Take 75 mg by mouth daily   docusate sodium (COLACE) 100 mg capsule  Child No No   Sig: Take 1 capsule (100 mg total) by mouth every 12 (twelve) hours   famotidine (PEPCID) 40 MG tablet  Child Yes No   Sig: Take 40 mg by mouth daily   finasteride (PROSCAR) 5 mg tablet  Child No No   Sig: Take 1 tablet (5 mg total) by mouth daily   furosemide (LASIX) 20 mg tablet  Child Yes No   Sig: Take 20 mg by mouth 2 (two) times a day   gabapentin (NEURONTIN) 300 mg capsule  Child Yes No   Sig: Take 300 mg by mouth 2 (two) times a day   insulin glargine (LANTUS) 100 units/mL subcutaneous injection  Child Yes No   Sig: Inject 20 Units under the skin daily at bedtime   irbesartan (AVAPRO) 150 mg tablet  Child Yes No   Sig: Take 150 mg by mouth daily at bedtime   isosorbide mononitrate (IMDUR) 30 mg 24 hr tablet  Child Yes No   Sig: Take 30 mg by mouth daily   meloxicam (MOBIC) 15 mg tablet  Child Yes No   Sig: Take 15 mg by mouth daily   metFORMIN (GLUCOPHAGE) 1000 MG tablet  Child Yes No   Sig: Take 1,000 mg by mouth 2 (two) times a day with meals   nitroglycerin (NITROSTAT) 0 4 mg SL tablet  Child Yes No   Sig: Place 0 4 mg under the tongue every 5 (five) minutes as needed for chest pain   simvastatin (ZOCOR) 20 mg tablet  Child Yes No   Sig: Take 20 mg by mouth daily at bedtime   sitaGLIPtin (JANUVIA) 100 mg tablet  Child Yes No   Sig: Take 100 mg by mouth daily   tamsulosin (FLOMAX) 0 4 mg  Child No No   Sig: Take 2 capsules (0 8 mg total) by mouth daily with dinner      Facility-Administered Medications: None       Past Medical History:   Diagnosis Date    Cardiac disease     CHF (congestive heart failure) (HCC)     Diabetes mellitus (HCC)     GERD (gastroesophageal reflux disease)     Hyperlipidemia     Hypertension     MI (myocardial infarction) ([facility] [address] )        Past Surgical History:   Procedure Laterality Date    EYE SURGERY         Family History   Problem Relation Age of Onset    Diabetes Mother     Hypertension Mother     Cancer Father     Hypertension Father     Cancer Sister     Hypertension Sister      I have reviewed and agree with the history as documented      E-Cigarette/Vaping    E-Cigarette Use Never User      E-Cigarette/Vaping Substances    Nicotine No     THC No     CBD No     Flavoring No     Other No     Unknown No      Social History     Tobacco Use    Smoking status: Never Smoker    Smokeless tobacco: Never Used   Substance Use Topics    Alcohol use: No     Alcohol/week: 0 0 standard drinks     Frequency: Never     Binge frequency: Never    Drug use: No       Review of Systems   Constitutional: Negative for appetite change, chills and fever  HENT: Negative for rhinorrhea and sore throat  Eyes: Negative for photophobia and visual disturbance  Respiratory: Negative for cough and shortness of breath  Cardiovascular: Negative for chest pain and palpitations  Gastrointestinal: Negative for abdominal pain and diarrhea  Genitourinary: Negative for dysuria, frequency and urgency  Skin: Negative for rash  Neurological: Negative for dizziness and weakness  All other systems reviewed and are negative  Physical Exam  Physical Exam  Vitals signs and nursing note reviewed  Constitutional:       Appearance: He is well-developed  HENT:      Head: Normocephalic and atraumatic  Right Ear: External ear normal       Left Ear: External ear normal    Eyes:      Conjunctiva/sclera: Conjunctivae normal       Pupils: Pupils are equal, round, and reactive to light  Neck:      Musculoskeletal: Normal range of motion and neck supple  Vascular: No JVD  Trachea: No tracheal deviation  Cardiovascular:      Rate and Rhythm: Normal rate and regular rhythm  Heart sounds: Normal heart sounds  No murmur  No friction rub  No gallop  Pulmonary:      Effort: Pulmonary effort is normal  No respiratory distress  Breath sounds: No stridor  No wheezing or rales  Abdominal:      General: There is no distension  Palpations: Abdomen is soft  There is no mass  Tenderness: There is no abdominal tenderness  There is no guarding or rebound  Genitourinary:     Comments: Estrella catheter in place without any actively draining urine  Musculoskeletal: Normal range of motion  Skin:     General: Skin is warm and dry        Coloration: Skin is not pale  Findings: No erythema or rash  Neurological:      Mental Status: He is alert and oriented to person, place, and time  Cranial Nerves: No cranial nerve deficit           Vital Signs  ED Triage Vitals [09/07/20 0111]   Temperature Pulse Respirations Blood Pressure SpO2   (!) 97 1 °F (36 2 °C) 90 18 152/75 98 %      Temp Source Heart Rate Source Patient Position - Orthostatic VS BP Location FiO2 (%)   Temporal Monitor Lying Right arm --      Pain Score       8           Vitals:    09/07/20 0111   BP: 152/75   Pulse: 90   Patient Position - Orthostatic VS: Lying         Visual Acuity      ED Medications  Medications - No data to display    Diagnostic Studies  Results Reviewed     Procedure Component Value Units Date/Time    Urine Microscopic [[id number]]  (Abnormal) Collected:  09/07/20 0202    Lab Status:  Final result Specimen:  Urine, Indwelling Estrella Catheter Updated:  09/07/20 0225     RBC, UA Innumerable /hpf      WBC, UA 4-10 /hpf      Epithelial Cells Occasional /hpf      Bacteria, UA Occasional /hpf     UA w Reflex to Microscopic w Reflex to Culture [[id number]]  (Abnormal) Collected:  09/07/20 0202    Lab Status:  Final result Specimen:  Urine, Indwelling Estrella Catheter Updated:  09/07/20 0221     Color, UA Red     Clarity, UA Cloudy     Specific Gravity, UA 1 015     pH, UA 6 5     Leukocytes, UA Trace     Nitrite, UA Negative     Protein,  (2+) mg/dl      Glucose, UA Negative mg/dl      Ketones, UA Negative mg/dl      Urobilinogen, UA 0 2 E U /dl      Bilirubin, UA Negative     Blood, UA Large                 No orders to display              Procedures  Procedures         ED Course  ED Course as of Sep 07 0411   Mon Sep 07, 2020   0306 Couday catheter inserted by nurse, multiple large blood clots came out with previous catheter, pink tinged urine now draining, patient feels better      0326 Patient with no complaints currently, catheter draining bloody urine, clearing, will irrigate bladder and observe patient for clearing  6454 Urinary catheter flushed with 100 mL of saline, now draining clear yellow urine, no further bleeding      0411 Patient has no complaints currently, blood pressure within normal limits, no tachycardia, no further bleeding from the urinary catheter, they will have patient follow-up with urology for further care                                                MDM  Number of Diagnoses or Management Options  Hematuria:   Problem with Estrella catheter, initial encounter [facility]):   Diagnosis management comments: 82 old male with blocked, will check bladder scan, will replace catheter, will check urinalysis        Disposition  Final diagnoses:   Hematuria   Problem with Estrella catheter, initial encounter [facility])     Time reflects when diagnosis was documented in both MDM as applicable and the Disposition within this note     Time User Action Codes Description Comment    9/7/2020  4:09 AM Raymona Koyanagi Add [R31 9] Hematuria     9/7/2020  4:09 AM [place], [address]  9XXA] Problem with Estrella catheter, initial encounter [facility])       ED Disposition     ED Disposition Condition Date/Time Comment    Discharge Stable Mon Sep 7, 2020  4:09 AM Merly Hall discharge to home/self care  Follow-up Information     Follow up With Specialties Details Why Contact Info Additional Tj Russell [address] Emergency Department Emergency Medicine  If symptoms worsen [address] [address] [phone]  ED, [address] [place] [address] [address]          Patient's Medications   Discharge Prescriptions    No medications on file     No discharge procedures on file      PDMP Review       Value Time User    PDMP Reviewed  Yes 8/19/2020 10:17 AM Deamrcus Montgomery MD          ED Provider  Electronically Signed by           Quirino Trejo MD  09/07/20 6764

## 2020-09-07 NOTE — ASSESSMENT & PLAN NOTE
Symptomatic   With acute urinary retention   - Awaiting urology evaluation  - For possible cystoscopy and TURP  - Continue Flomax and Finasteride

## 2020-09-08 ENCOUNTER — ANESTHESIA EVENT (INPATIENT)
Dept: PERIOP | Facility: HOSPITAL | Age: 85
DRG: 653 | End: 2020-09-08
Payer: MEDICARE

## 2020-09-08 ENCOUNTER — APPOINTMENT (INPATIENT)
Dept: NON INVASIVE DIAGNOSTICS | Facility: HOSPITAL | Age: 85
DRG: 653 | End: 2020-09-08
Payer: MEDICARE

## 2020-09-08 ENCOUNTER — APPOINTMENT (INPATIENT)
Dept: CT IMAGING | Facility: HOSPITAL | Age: 85
DRG: 653 | End: 2020-09-08
Payer: MEDICARE

## 2020-09-08 LAB
ALBUMIN SERPL BCP-MCNC: 2.9 G/DL (ref 3.5–5)
ALP SERPL-CCNC: 53 U/L (ref 46–116)
ALT SERPL W P-5'-P-CCNC: 19 U/L (ref 12–78)
ANION GAP SERPL CALCULATED.3IONS-SCNC: 6 MMOL/L (ref 4–13)
AST SERPL W P-5'-P-CCNC: 11 U/L (ref 5–45)
ATRIAL RATE: 82 BPM
BASOPHILS # BLD AUTO: 0.05 THOUSANDS/ΜL (ref 0–0.1)
BASOPHILS NFR BLD AUTO: 1 % (ref 0–1)
BILIRUB SERPL-MCNC: 0.33 MG/DL (ref 0.2–1)
BUN SERPL-MCNC: 15 MG/DL (ref 5–25)
CALCIUM SERPL-MCNC: 8.6 MG/DL (ref 8.3–10.1)
CHLORIDE SERPL-SCNC: 103 MMOL/L (ref 100–108)
CO2 SERPL-SCNC: 27 MMOL/L (ref 21–32)
CREAT SERPL-MCNC: 0.91 MG/DL (ref 0.6–1.3)
EOSINOPHIL # BLD AUTO: 0.58 THOUSAND/ΜL (ref 0–0.61)
EOSINOPHIL NFR BLD AUTO: 7 % (ref 0–6)
ERYTHROCYTE [DISTWIDTH] IN BLOOD BY AUTOMATED COUNT: 14.2 % (ref 11.6–15.1)
GFR SERPL CREATININE-BSD FRML MDRD: 78 ML/MIN/1.73SQ M
GLUCOSE SERPL-MCNC: 145 MG/DL (ref 65–140)
GLUCOSE SERPL-MCNC: 175 MG/DL (ref 65–140)
GLUCOSE SERPL-MCNC: 220 MG/DL (ref 65–140)
GLUCOSE SERPL-MCNC: 260 MG/DL (ref 65–140)
GLUCOSE SERPL-MCNC: 261 MG/DL (ref 65–140)
HCT VFR BLD AUTO: 34 % (ref 36.5–49.3)
HGB BLD-MCNC: 10.9 G/DL (ref 12–17)
IMM GRANULOCYTES # BLD AUTO: 0.05 THOUSAND/UL (ref 0–0.2)
IMM GRANULOCYTES NFR BLD AUTO: 1 % (ref 0–2)
LYMPHOCYTES # BLD AUTO: 1.77 THOUSANDS/ΜL (ref 0.6–4.47)
LYMPHOCYTES NFR BLD AUTO: 20 % (ref 14–44)
MAGNESIUM SERPL-MCNC: 1.9 MG/DL (ref 1.6–2.6)
MCH RBC QN AUTO: 29.5 PG (ref 26.8–34.3)
MCHC RBC AUTO-ENTMCNC: 32.1 G/DL (ref 31.4–37.4)
MCV RBC AUTO: 92 FL (ref 82–98)
MONOCYTES # BLD AUTO: 0.95 THOUSAND/ΜL (ref 0.17–1.22)
MONOCYTES NFR BLD AUTO: 11 % (ref 4–12)
NEUTROPHILS # BLD AUTO: 5.31 THOUSANDS/ΜL (ref 1.85–7.62)
NEUTS SEG NFR BLD AUTO: 60 % (ref 43–75)
NRBC BLD AUTO-RTO: 0 /100 WBCS
P AXIS: 57 DEGREES
PHOSPHATE SERPL-MCNC: 3.3 MG/DL (ref 2.3–4.1)
PLATELET # BLD AUTO: 250 THOUSANDS/UL (ref 149–390)
PMV BLD AUTO: 9.9 FL (ref 8.9–12.7)
POTASSIUM SERPL-SCNC: 4.2 MMOL/L (ref 3.5–5.3)
PR INTERVAL: 166 MS
PROT SERPL-MCNC: 7.2 G/DL (ref 6.4–8.2)
QRS AXIS: 4 DEGREES
QRSD INTERVAL: 98 MS
QT INTERVAL: 414 MS
QTC INTERVAL: 483 MS
RBC # BLD AUTO: 3.69 MILLION/UL (ref 3.88–5.62)
SODIUM SERPL-SCNC: 136 MMOL/L (ref 136–145)
T WAVE AXIS: 27 DEGREES
VENTRICULAR RATE: 82 BPM
WBC # BLD AUTO: 8.71 THOUSAND/UL (ref 4.31–10.16)

## 2020-09-08 PROCEDURE — 99232 SBSQ HOSP IP/OBS MODERATE 35: CPT | Performed by: STUDENT IN AN ORGANIZED HEALTH CARE EDUCATION/TRAINING PROGRAM

## 2020-09-08 PROCEDURE — 82948 REAGENT STRIP/BLOOD GLUCOSE: CPT

## 2020-09-08 PROCEDURE — 74178 CT ABD&PLV WO CNTR FLWD CNTR: CPT

## 2020-09-08 PROCEDURE — 83735 ASSAY OF MAGNESIUM: CPT | Performed by: STUDENT IN AN ORGANIZED HEALTH CARE EDUCATION/TRAINING PROGRAM

## 2020-09-08 PROCEDURE — 99223 1ST HOSP IP/OBS HIGH 75: CPT | Performed by: PHYSICIAN ASSISTANT

## 2020-09-08 PROCEDURE — 99222 1ST HOSP IP/OBS MODERATE 55: CPT | Performed by: INTERNAL MEDICINE

## 2020-09-08 PROCEDURE — 93010 ELECTROCARDIOGRAM REPORT: CPT

## 2020-09-08 PROCEDURE — 93005 ELECTROCARDIOGRAM TRACING: CPT

## 2020-09-08 PROCEDURE — 80053 COMPREHEN METABOLIC PANEL: CPT | Performed by: STUDENT IN AN ORGANIZED HEALTH CARE EDUCATION/TRAINING PROGRAM

## 2020-09-08 PROCEDURE — 97167 OT EVAL HIGH COMPLEX 60 MIN: CPT

## 2020-09-08 PROCEDURE — 97163 PT EVAL HIGH COMPLEX 45 MIN: CPT

## 2020-09-08 PROCEDURE — 84100 ASSAY OF PHOSPHORUS: CPT | Performed by: STUDENT IN AN ORGANIZED HEALTH CARE EDUCATION/TRAINING PROGRAM

## 2020-09-08 PROCEDURE — 85025 COMPLETE CBC W/AUTO DIFF WBC: CPT | Performed by: STUDENT IN AN ORGANIZED HEALTH CARE EDUCATION/TRAINING PROGRAM

## 2020-09-08 PROCEDURE — G1004 CDSM NDSC: HCPCS

## 2020-09-08 PROCEDURE — C8929 TTE W OR WO FOL WCON,DOPPLER: HCPCS

## 2020-09-08 PROCEDURE — 97530 THERAPEUTIC ACTIVITIES: CPT

## 2020-09-08 RX ADMIN — FINASTERIDE 5 MG: 5 TABLET, FILM COATED ORAL at 12:04

## 2020-09-08 RX ADMIN — FUROSEMIDE 20 MG: 20 TABLET ORAL at 17:25

## 2020-09-08 RX ADMIN — INSULIN GLARGINE 20 UNITS: 100 INJECTION, SOLUTION SUBCUTANEOUS at 21:31

## 2020-09-08 RX ADMIN — FAMOTIDINE 20 MG: 20 TABLET ORAL at 12:04

## 2020-09-08 RX ADMIN — CARVEDILOL 12.5 MG: 12.5 TABLET, FILM COATED ORAL at 17:25

## 2020-09-08 RX ADMIN — PRAVASTATIN SODIUM 40 MG: 40 TABLET ORAL at 17:28

## 2020-09-08 RX ADMIN — OXYCODONE HYDROCHLORIDE AND ACETAMINOPHEN 2 TABLET: 5; 325 TABLET ORAL at 12:20

## 2020-09-08 RX ADMIN — TAMSULOSIN HYDROCHLORIDE 0.8 MG: 0.4 CAPSULE ORAL at 17:25

## 2020-09-08 RX ADMIN — INSULIN LISPRO 3 UNITS: 100 INJECTION, SOLUTION INTRAVENOUS; SUBCUTANEOUS at 17:28

## 2020-09-08 RX ADMIN — PERFLUTREN 0.6 ML/MIN: 6.52 INJECTION, SUSPENSION INTRAVENOUS at 16:05

## 2020-09-08 RX ADMIN — CARVEDILOL 12.5 MG: 12.5 TABLET, FILM COATED ORAL at 12:39

## 2020-09-08 RX ADMIN — OXYBUTYNIN CHLORIDE 5 MG: 5 TABLET, EXTENDED RELEASE ORAL at 12:04

## 2020-09-08 RX ADMIN — ISOSORBIDE MONONITRATE 30 MG: 30 TABLET, EXTENDED RELEASE ORAL at 12:39

## 2020-09-08 RX ADMIN — ATROPA BELLADONNA AND OPIUM 1 SUPPOSITORY: 16.2; 3 SUPPOSITORY RECTAL at 12:29

## 2020-09-08 RX ADMIN — ATROPA BELLADONNA AND OPIUM 1 SUPPOSITORY: 16.2; 3 SUPPOSITORY RECTAL at 21:31

## 2020-09-08 RX ADMIN — DOCUSATE SODIUM 100 MG: 100 CAPSULE, LIQUID FILLED ORAL at 12:05

## 2020-09-08 RX ADMIN — FUROSEMIDE 20 MG: 20 TABLET ORAL at 12:39

## 2020-09-08 RX ADMIN — DOCUSATE SODIUM 100 MG: 100 CAPSULE, LIQUID FILLED ORAL at 19:31

## 2020-09-08 RX ADMIN — LOSARTAN POTASSIUM 50 MG: 50 TABLET, FILM COATED ORAL at 12:05

## 2020-09-08 RX ADMIN — GABAPENTIN 300 MG: 300 CAPSULE ORAL at 12:05

## 2020-09-08 RX ADMIN — IOHEXOL 100 ML: 350 INJECTION, SOLUTION INTRAVENOUS at 11:16

## 2020-09-08 RX ADMIN — GABAPENTIN 300 MG: 300 CAPSULE ORAL at 17:28

## 2020-09-08 RX ADMIN — OXYCODONE HYDROCHLORIDE AND ACETAMINOPHEN 2 TABLET: 5; 325 TABLET ORAL at 04:32

## 2020-09-08 RX ADMIN — INSULIN LISPRO 2 UNITS: 100 INJECTION, SOLUTION INTRAVENOUS; SUBCUTANEOUS at 12:28

## 2020-09-08 NOTE — PHYSICAL THERAPY NOTE
Physical Therapy Evaluation & Treatment    Patient's Name: Kae Jacob    Admitting Diagnosis  Hematuria [R31 9]    Problem List  Patient Active Problem List   Diagnosis    Benign prostatic hyperplasia with lower urinary tract symptoms    Abdominal pain of multiple sites    CAD (coronary artery disease)    CHF (congestive heart failure) (HCC)    Enlarged prostate    GERD (gastroesophageal reflux disease)    Essential hypertension    Hyperlipidemia    Increased frequency of urination    [facility] infarction ([facility]    Poorly controlled [facility]    Pain    Sepsis ([facility] )    Urinary tract infection associated with indwelling urethral catheter ([facility] )    Gross hematuria       Past Medical History  Past Medical History:   Diagnosis Date    Cardiac disease     [facility]     Diabetes mellitus (HCC)     GERD (gastroesophageal reflux disease)     Hyperlipidemia     Hypertension     MI (myocardial infarction) ([facility] )        Past Surgical History  Past Surgical History:   Procedure Laterality Date    EYE SURGERY          09/08/20 0924   PT Last Visit   PT Visit Date 09/08/20   Note Type   Note type Eval/Treat   Pain Assessment   Pain Assessment Tool FLACC   Pain Rating: FLACC (Rest) - Face 0   Pain Rating: FLACC (Rest) - Legs 0   Pain Rating: FLACC (Rest) - Activity 0   Pain Rating: FLACC (Rest) - Cry 0   Pain Rating: FLACC (Rest) - Consolability 0   Score: FLACC (Rest) 0   Pain Rating: FLACC (Activity) - Face 0   Pain Rating: FLACC (Activity) - Legs 0   Pain Rating: FLACC (Activity) - Activity 0   Pain Rating: FLACC (Activity) - Cry 0   Pain Rating: FLACC (Activity) - Consolability 0   Score: FLACC (Activity) 0   Home Living   Type of Home House   Home Layout Two level;Performs ADLs on one level; Able to live on main level with bedroom/bathroom;Stairs to enter with rails  (4 CRUZ, only negotiates FF to 2nd floor occasionally)   Bathroom Shower/Tub Tub/shower unit   Bathroom Toilet Standard   Bathroom Accessibility Accessible   Home Equipment   (PTA, pt  did not have an AD )   Prior Function   Level of Clinch Independent with ADLs and functional mobility; Needs assistance with IADLs   Lives With Spouse; Son   Vinnie Help From Family  (son, daughter)   ADL Assistance Independent  (however recent increased need for assistance)   IADLs Needs assistance  (tranportation,cooking, cleaning)   Falls in the last 6 months 0   Vocational Retired   Restrictions/Precautions   Wells Carrie Bearing Precautions Per Order No   Other Precautions Bed Alarm;Multiple lines; Fall Risk  (CBI)   General   Family/Caregiver Present Yes  (son present and assisted w/communication prn)   Cognition   Overall Cognitive Status WFL   Arousal/Participation Alert   Orientation Level Oriented to person;Oriented to place   Memory Unable to assess  (2/2 language barrier)   Following Commands Follows one step commands without difficulty   Comments Pt  agreeable to PT assessment, pleasant  RUE Assessment   RUE Assessment WFL   LUE Assessment   LUE Assessment WFL   RLE Assessment   RLE Assessment X  (4-/5 gross musculature)   LLE Assessment   LLE Assessment X  (4-/5 gross musculature)   Coordination   Movements are Fluid and Coordinated 1   Bed Mobility   Supine to Sit   (CGA)   Additional items Assist x 1;HOB elevated; Bedrails; Increased time required;Verbal cues   Sit to Supine 4  Minimal assistance   Additional items Assist x 1;Bedrails; Increased time required;Verbal cues;LE management   Transfers   Sit to Stand   (CGA)   Additional items Assist x 1;Bedrails; Increased time required;Verbal cues   Stand to Sit   (CGA)   Additional items Assist x 1;Bedrails; Increased time required;Verbal cues   Stand pivot   (CGA)   Additional items Assist x 1; Increased time required;Verbal cues   Ambulation/Elevation   Gait pattern Improper Weight shift; Forward Flexion;Decreased foot clearance; Short stride   Gait Assistance   (CGA<->min A)   Additional items Assist x 1;Verbal cues; Tactile cues   Assistive Device Rolling walker   Distance 20 feet  (sidesteps at EOB->around bed )   Stair Management Assistance Not tested   Balance   Static Sitting Fair +   Dynamic Sitting Fair   Static Standing Fair   Dynamic Standing Fair -   Ambulatory Fair -   Endurance Deficit   Endurance Deficit Yes   Activity Tolerance   Activity Tolerance Patient limited by fatigue   Nurse Made Aware Yes, Jasmin Haynes RN was informed of assessment outcome & encouraged mobility  Assessment   Prognosis Good   Problem List Decreased strength;Decreased endurance; Impaired balance;Decreased mobility; Decreased safety awareness   Assessment Pt is 82 y o  male seen for PT evaluation s/p admit to [facility] on 9/7/2020 w/ Gross hematuria  PT consulted to assess pt's functional mobility and d/c needs  Order placed for PT eval and tx, w/ up and OOB as tolerated order  Comorbidities affecting pt's physical performance at time of assessment include: gross hematuria, CHF,HTN,GERD,poorly controlled DM,hyperlipidemia  PTA, pt was independent w/ all functional mobility w/ o AD usage  Personal factors affecting pt at time of IE include: ambulating w/ assistive device, communication issues, inability to navigate community distances, inability to navigate level surfaces w/o external assistance, unable to perform dynamic tasks in community, preferred language not English (language barrier), limited insight into impairments and inability to perform ADLs  Please find objective findings from PT assessment regarding body systems outlined above with impairments and limitations including weakness, impaired balance, decreased endurance, impaired coordination, gait deviations, decreased activity tolerance, decreased functional mobility tolerance, decreased safety awareness, fall risk and decreased skin integrity   The following objective measures performed on IE also reveal limitations: Barthel Index: 30/100  Pt's clinical presentation is currently unstable/unpredictable seen in pt's presentation of abnormal lab values,progression of symptoms prior to hospitalization, continued gross hematuria w/winslow catheterization   Pt to benefit from continued PT tx to address deficits as defined above and maximize level of functional independent mobility and consistency  From PT/mobility standpoint, recommendation at time of d/c would be Home PT with family support and rolling walker pending progress in order to facilitate return to PLOF  Goals   Patient Goals pt  did not provide re:language barrier, son hoping to see his dad get back to normal   LTG Expiration Date 09/18/20   Long Term Goal #1 1 )Patient will complete bed mobility with supervision of 1 for decrease need for caregiver assistance, decrease burden of care  2 ) Patient will complete transfers with supervision of 1 to decrease risk of falls, facilitate upright standing posture  3 ) BLE strength to greater than/equal to 4/5 gross musculature to increase ability to safely transfer, control descent to chair  4 ) Patient will exhibit increase dynamic standing balance to Fair+, for 2-3 minutes without LOB and supervision of 1 to improve activity tolerance  5 ) Patient will exhibit increase dynamic ambulatory balance to Fair+ for  feet w/RW supervision of 1 to improve ability to mobilize to toilet, chair and decrease risk for additional medical complications  6 ) Patient will exhibit good self monitoring and ability to follow 2 step commands to increase complexity of tasks and resume ADL's without LOB  PT Treatment Day 1   Plan   Treatment/Interventions Functional transfer training;LE strengthening/ROM; Therapeutic exercise; Endurance training;Patient/family training;Equipment eval/education; Bed mobility;Gait training;Spoke to nursing;OT   PT Frequency Other (Comment)  (3-5x/wk)   Recommendation   PT Discharge Recommendation Home with skilled therapy; Return to previous environment with social support   Equipment Recommended Walker   PT - OK to Discharge No   Additional Comments Upon conclusion, pt  was resting in bed w/all needs within reach & bed alarm engaged  Barthel Index   Feeding 5   Bathing 0   Grooming Score 0   Dressing Score 5   Bladder Score 0   Bowels Score 5   Toilet Use Score 5   Transfers (Bed/Chair) Score 10   Mobility (Level Surface) Score 0   Stairs Score 0   Barthel Index Score 30     Additional skilled interventions: Therapeutic Activity x 10 minutes, [id number]    S: No reported pain prior or during session, patient agreeable to mobilize OOB as tolerated  O: therapeutic activity x 10 minutes including mobilization education: supine<->sit, static/dynamic sitting balance w/ postural facilitation, sit<->stand transfers, static/dynamic standing balance w/ postural facilitation, gait activities,RW education & usage, and continued safety training for increased awareness  A: Patient exhibited weakness, impaired balance, gait dysfunction, decreased endurance, activity intolerance, and decline from PLOF to benefit from continued interventions  P: Continue PT tx with progression of functional tasks as patient can safely tolerate, 3-5x/week      Isabel Dockery, PT

## 2020-09-08 NOTE — CONSULTS
Consult - Urology   [facility] Luis Smith 1935, 82 y o  male MRN: [id number]    Unit/Bed#: E4 -01 Encounter: [id number]    * Gross hematuria  Assessment & Plan  Gross hematuria in the presence of 325 mg of aspirin and Plavix for unknown cardiac history in the past     Hematuria under control at this time continuous bladder irrigation  Will make NPO after midnight for plans for OR for cystoscopy, clot evacuation, fulguration tomorrow  Needs preoperative cardiac clearance as well as anesthesia discussion given this vague history of some sort of cardiac condition  Discussed with Internal Medicine who will order echocardiogram, cardiology consult, anesthesia consult  Discussed personally with anesthesia today with having had that about his cardiac history relative to OR tomorrow  From a urology standpoint, continue CBI with hand irrigation p r n  For clots or obstruction  Continue bladder antispasmodics  OR tomorrow, as above  Bedside rounds performed with Tamia Mora RN  Discussed with Dr Renetta Ma, Anesthesia, Dr Jose Flowers  Subjective/Objective     Subjective:   CC: "I just can't take this bleeding any more "  HPI:  82-year-old male who recently immigrated to [facility] from [address] [facility]   Patient refused and requested the use of his son to translate  His son provides history and translates for on exam   Patient had a history of lower urinary tract symptoms and was then in retention requiring placement of Estrella catheter  There was 6 weeks ago and in that time has had regular ongoing bleeding  Never has required surgical multiple hospitalizations for hematuria  He reports a history of 325 mg of aspirin combined with Plavix for and unknown heart attack 2 years ago outside in and states it does not sound like cardiac catheterization was performed and he says he takes these medications because his heart is enlarged and 8 beats slowly    He has not yet had time to establish care with Cardiology or primary care outpatient and it states  Overnight he did have some significant bleeding with clots intermittent clot retention  This morning, catheter is running clear pink, hand irrigated with some bloody return but no significant clots on moderate drip CBI  He did have 1 episode of clot retention later in the morning the time which he was getting his CT scan  CT scan agree with the symptoms with persistent bladder secondary to clotted off catheter  Nurses were able to clear this bedside without assistance from this provider  Patient is now taking medications for bladder spasm and with CBI running well, has significant improvement of his pain and bladder spasm  He verbalizes frustration not he continues to bleed  He is reassured that we are planning surgery tomorrow  He reports his primary care physician outside the [facility] recommended against general anesthesia at all cost secondary to risks  It sounds as though this for cardiac risk  He denies any  history of surgery in the past, manipulation, surgeon on his bladder or his prostate  ROS:  Review of Systems   Constitutional: Negative for activity change and appetite change  HENT: Negative for congestion and ear pain  Eyes: Negative for pain  Respiratory: Negative for cough and shortness of breath  Cardiovascular: Negative for chest pain and palpitations  Gastrointestinal: Negative for abdominal distention, abdominal pain, blood in stool, constipation, diarrhea and nausea  Genitourinary: Positive for hematuria  Negative for difficulty urinating, dysuria and flank pain  Musculoskeletal: Negative for arthralgias and myalgias  Skin: Negative for rash  Allergic/Immunologic: Negative for immunocompromised state  Neurological: Negative for dizziness and headaches  Hematological: Negative for adenopathy  Does not bruise/bleed easily  Psychiatric/Behavioral: Negative for agitation   The patient is not nervous/anxious  Objective:  Vitals: Blood pressure 134/62, pulse 91, temperature 98 1 °F (36 7 °C), temperature source Tympanic, resp  rate 20, height 5' 7" (1 702 m), weight 97 1 kg (214 lb 1 1 oz), SpO2 96 %  ,Body mass index is 33 53 kg/m²  Intake/Output Summary (Last 24 hours) at 9/8/2020 1343  Last data filed at 9/8/2020 0447  Gross per 24 hour   Intake    Output 2255 ml   Net -2255 ml       Invasive Devices     Peripheral Intravenous Line            Peripheral IV 09/07/20 Antecubital 1 day          Drain            Continuous Bladder Irrigation Three-way 1 day                Physical Exam  Vitals signs and nursing note reviewed  Constitutional:       General: He is not in acute distress  Appearance: He is well-developed  He is not ill-appearing or diaphoretic  Comments: 68-year-old male, resting comfortably in bed  Appears younger than stated age  No acute distress  HENT:      Head: Normocephalic and atraumatic  Eyes:      Conjunctiva/sclera: Conjunctivae normal    Neck:      Musculoskeletal: Normal range of motion and neck supple  Trachea: No tracheal deviation  Cardiovascular:      Rate and Rhythm: Normal rate and regular rhythm  Heart sounds: Normal heart sounds  No murmur  Pulmonary:      Effort: Pulmonary effort is normal  No respiratory distress  Breath sounds: Normal breath sounds  No wheezing  Abdominal:      General: Bowel sounds are normal  There is no distension  Palpations: Abdomen is soft  There is no mass  Tenderness: There is no abdominal tenderness  Comments: Abdomen soft and benign without significant tenderness rigidity rebound or guarding  Normoactive bowel sounds x4  Genitourinary:     Comments: Urethral Estrella draining clear pink urine on moderate drip CBI  Hand irrigated for some blood, no significant clots  Hand irrigation with 500 cc, CBI restarted    Flowing again well clearing light yellow/pink on moderate rate CBI at 1:30 p m     Musculoskeletal: Normal range of motion  Skin:     General: Skin is warm and dry  Coloration: Skin is not pale  Findings: No erythema or rash  Neurological:      Mental Status: He is alert and oriented to person, place, and time  Psychiatric:         Behavior: Behavior normal  Behavior is cooperative  Thought Content:  Thought content normal          Judgment: Judgment normal          History:    Past Medical History:   Diagnosis Date    Cardiac disease     CHF (congestive heart failure) ([place] [address] )     Diabetes mellitus (HCC)     GERD (gastroesophageal reflux disease)     Hyperlipidemia     Hypertension     MI (myocardial infarction) ([place] [address] )      Past Surgical History:   Procedure Laterality Date    EYE SURGERY       Family History   Problem Relation Age of Onset    Diabetes Mother     Hypertension Mother     Cancer Father     Hypertension Father     Cancer Sister     Hypertension Sister      Social History     Socioeconomic History    Marital status: /Civil Union     Spouse name: None    Number of children: None    Years of education: None    Highest education level: None   Occupational History    None   Social Needs    Financial resource strain: None    Food insecurity     Worry: None     Inability: None    Transportation needs     Medical: None     Non-medical: None   Tobacco Use    Smoking status: Never Smoker    Smokeless tobacco: Never Used   Substance and Sexual Activity    Alcohol use: No     Alcohol/week: 0 0 standard drinks     Frequency: Never     Binge frequency: Never    Drug use: No    Sexual activity: Not Currently   Lifestyle    Physical activity     Days per week: None     Minutes per session: None    Stress: None   Relationships    Social connections     Talks on phone: None     Gets together: None     Attends Shinto service: None     Active member of club or organization: None     Attends meetings of clubs or organizations: None     Relationship status: None    Intimate partner violence     Fear of current or ex partner: None     Emotionally abused: None     Physically abused: None     Forced sexual activity: None   Other Topics Concern    None   Social History Narrative    None       Imaging:  CT scan reviewed personally and with Radiology  Distended bladder with clot retention versus bladder tumor, favor clot retained in the bladder  Bladder was distended at the time of CT scan but clot was irrigated and bladder is decompressed to follow per nursing and patient and son in the room  Await final read  Imaging reviewed - both report and images personally reviewed  Lab Results:  I have personally reviewed pertinent labs    Results from last 7 days   Lab Units 09/08/20  0447 09/07/20  1416   WBC Thousand/uL 8 71 10 97*   HEMOGLOBIN g/dL 10 9* 11 3*   PLATELETS Thousands/uL 250 257     Results from last 7 days   Lab Units 09/08/20  0447 09/07/20  1416   SODIUM mmol/L 136 135*   POTASSIUM mmol/L 4 2 4 3   CHLORIDE mmol/L 103 102   CO2 mmol/L 27 26   BUN mg/dL 15 16   CREATININE mg/dL 0 91 1 00   EGFR ml/min/1 73sq m 78 69   CALCIUM mg/dL 8 6 8 6   AST U/L 11  --    ALT U/L 19  --    ALK PHOS U/L 53  --                Rk Bueno PA-C  Date: 9/8/2020 Time: 1:43 PM

## 2020-09-08 NOTE — PROGRESS NOTES
Progress Note - Merly Simon 1935, 82 y o  male MRN: [id number]    Unit/Bed#: E4 -01 Encounter: [id number]    Primary Care Provider: No primary care provider on file  Date and time admitted to hospital: 9/7/2020  4:48 PM        * Gross hematuria  Assessment & Plan  82year old [address] speaking male admitted due to recurrent gross hematuria  - Monitor hemoglobin levels  - For possible urology intervention yvette  - CBI for the time being  - Aspirin and Plavix held for the time being  Spoke to son and patient (No history of stent)  Recent Labs     09/07/20  1416 09/08/20  0447   HGB 11 3* 10 9*   MCV 91 92   RDW 14 3 14 2         Benign prostatic hyperplasia with lower urinary tract symptoms  Assessment & Plan  Symptomatic  With acute urinary retention   - For possible cystoscopy and TURP  - Continue Flomax and Finasteride    CHF (congestive heart failure) ([facility])  Assessment & Plan  Wt Readings from Last 3 Encounters:   09/07/20 97 1 kg (214 lb 1 1 oz)   09/07/20 98 kg (216 lb)   09/04/20 99 3 kg (219 lb)     Euvolemic  Not in exacerbation  Although complaining of dyspnea on exertion   - Echo requested        Poorly controlled type 2 diabetes mellitus [facility])  Assessment & Plan  Lab Results   Component Value Date    HGBA1C 7 7 (H) 08/17/2020       Recent Labs     09/07/20  1721 09/07/20  2114 09/08/20  0722   POCGLU 320* 229* 145*       Blood Sugar Average: Last 72 hrs:  (P) 187   IASS, Glargine 20U HS   Hold oral hypoglycemics (Glipizide, Metformin, and Januvia)  Fingerstick monitoring    Essential hypertension  Assessment & Plan  Controlled  - Continue monitoring BP  - Continue carvedilol, ARB, and lasix    Hyperlipidemia  Assessment & Plan  Continue statin    GERD (gastroesophageal reflux disease)  Assessment & Plan  Continue Famotidine      VTE Pharmacologic Prophylaxis:   Pharmacologic: Pharmacologic VTE Prophylaxis contraindicated due to Gross hematuria  Mechanical VTE Prophylaxis in Place: Yes    Patient Centered Rounds: I have performed bedside rounds with nursing staff today  Discussions with Specialists or Other Care Team Provider: Nursing, urology    Education and Discussions with Family / Patient: Pateint    Time Spent for Care: 30 minutes  More than 50% of total time spent on counseling and coordination of care as described above  Current Length of Stay: 1 day(s)    Current Patient Status: Inpatient   Certification Statement: The patient will continue to require additional inpatient hospital stay due to Urology evaluation,echo, cbc monitoring    Discharge Plan: active    Code Status: Level 1 - Full Code      Subjective:   Patient seen and examined at bedside  Son was present  No acute issues overnight  Son again reinforced that patient might not be able to tolerate full anesthesia  I informed urology about this who then spoke to anesthesia  They requested cardiology evaluation for this  Objective:     Vitals:   Temp (24hrs), Av 3 °F (36 8 °C), Min:97 8 °F (36 6 °C), Max:98 9 °F (37 2 °C)    Temp:  [97 8 °F (36 6 °C)-98 9 °F (37 2 °C)] 98 1 °F (36 7 °C)  HR:  [] 93  Resp:  [16-20] 20  BP: (102-145)/(55-66) 112/60  SpO2:  [94 %-98 %] 96 %  Body mass index is 33 53 kg/m²  Input and Output Summary (last 24 hours): Intake/Output Summary (Last 24 hours) at 2020 1024  Last data filed at 2020 0447  Gross per 24 hour   Intake    Output 2255 ml   Net -2255 ml       Physical Exam:     Physical Exam  Vitals signs reviewed  HENT:      Head: Normocephalic and atraumatic  Nose: Nose normal       Mouth/Throat:      Mouth: Mucous membranes are moist    Eyes:      General: No scleral icterus  Pupils: Pupils are equal, round, and reactive to light  Cardiovascular:      Rate and Rhythm: Normal rate  Heart sounds: No murmur  No gallop  Pulmonary:      Effort: Pulmonary effort is normal  No respiratory distress  Breath sounds:  No wheezing or rales    Abdominal:      General: There is no distension  Palpations: Abdomen is soft  Tenderness: There is no abdominal tenderness  There is no guarding  Genitourinary:     Comments: Estrella catheter in place, dry blood surrounding urethral meatus, urine still blood tinged  Musculoskeletal:      Right lower leg: No edema  Left lower leg: No edema  Skin:     General: Skin is warm  Neurological:      Mental Status: He is alert  Psychiatric:         Mood and Affect: Mood normal          Behavior: Behavior normal        Additional Data:     Labs:    Results from last 7 days   Lab Units 09/08/20  0447   WBC Thousand/uL 8 71   HEMOGLOBIN g/dL 10 9*   HEMATOCRIT % 34 0*   PLATELETS Thousands/uL 250   NEUTROS PCT % 60   LYMPHS PCT % 20   MONOS PCT % 11   EOS PCT % 7*     Results from last 7 days   Lab Units 09/08/20  0447   SODIUM mmol/L 136   POTASSIUM mmol/L 4 2   CHLORIDE mmol/L 103   CO2 mmol/L 27   BUN mg/dL 15   CREATININE mg/dL 0 91   ANION GAP mmol/L 6   CALCIUM mg/dL 8 6   ALBUMIN g/dL 2 9*   TOTAL BILIRUBIN mg/dL 0 33   ALK PHOS U/L 53   ALT U/L 19   AST U/L 11   GLUCOSE RANDOM mg/dL 175*     Results from last 7 days   Lab Units 09/07/20  1416   INR  0 98     Results from last 7 days   Lab Units 09/08/20  0722 09/07/20  2114 09/07/20  1721   POC GLUCOSE mg/dl 145* 229* 320*                   * I Have Reviewed All Lab Data Listed Above  * Additional Pertinent Lab Tests Reviewed:  [facility] Reviewed    Imaging:    Imaging Reports Reviewed Today Include: No new imaging    Recent Cultures (last 7 days):           Last 24 Hours Medication List:   Current Facility-Administered Medications   Medication Dose Route Frequency Provider Last Rate    acetaminophen  650 mg Oral Q6H PRN Tommie Zavala MD      Or    oxyCODONE-acetaminophen  1 tablet Oral Q6H PRN Tommie Zavala MD      Or    oxyCODONE-acetaminophen  2 tablet Oral Q6H PRN Tommie Zaavla MD      albuterol  2 puff Inhalation Q4H PRN Katia Pantoja MD      belladonna-opium  30 mg Rectal Q8H PRN Ambar Delgado MD      carvedilol  12 5 mg Oral BID With Meals Katia Pantoja MD      docusate sodium  100 mg Oral Q12H Katia Pantoja MD      famotidine  20 mg Oral Daily Katia Pantoja MD      finasteride  5 mg Oral Daily Katia Pantoja MD      furosemide  20 mg Oral BID Katia Pantoja MD      gabapentin  300 mg Oral BID Katia Pantoja MD      insulin glargine  20 Units Subcutaneous HS Katia Pantoja MD      insulin lispro  1-6 Units Subcutaneous TID [facility] [place] Katia Pantoja MD      isosorbide mononitrate  30 mg Oral Daily Katia Pantoja MD      losartan  50 mg Oral Daily Katia Pantoja MD      nitroglycerin  0 4 mg Sublingual Q5 Min PRN Katia Pantoja MD      oxybutynin  5 mg Oral Daily Ambar Delgado MD      pravastatin  40 mg Oral Daily With Michelet Luveano MD      tamsulosin  0 8 mg Oral Daily With Michelet Luevano MD          Today, Patient Was Seen By: Mary Beth Frederick MD    ** Please Note: Dictation voice to text software may have been used in the creation of this document   **

## 2020-09-08 NOTE — ASSESSMENT & PLAN NOTE
Wt Readings from Last 3 Encounters:   09/07/20 97 1 kg (214 lb 1 1 oz)   09/07/20 98 kg (216 lb)   09/04/20 99 3 kg (219 lb)     Euvolemic  Not in exacerbation  Although complaining of dyspnea on exertion  Patient and son states that he could not tolerate full anesthesia due to history of cardiac disease  Been on aspirin and plavix for more than 15 years  Urology / anesthesia requesting cardiology evaluation for planned general anesthesia yvette    - Echo requested

## 2020-09-08 NOTE — UTILIZATION REVIEW
Initial Clinical Review    Admission: Date/Time/Statement:   Admission Orders (From admission, onward)     Ordered        09/07/20 1928  Inpatient Admission  Once                   Orders Placed This Encounter   Procedures    Inpatient Admission     Standing Status:   Standing     Number of Occurrences:   1     Order Specific Question:   Admitting Physician     Answer:   Lizette Harper [[id number]]     Order Specific Question:   Level of Care     Answer:   Med Surg [[id number]]     Order Specific Question:   Estimated length of stay     Answer:   More than 2 Midnights     Order Specific Question:   Certification     Answer:   I certify that inpatient services are medically necessary for this patient for a duration of greater than two midnights  See H&P and MD Progress Notes for additional information about the patient's course of treatment  Comments:   recurrent gross hematuria requiring urology evaluation, hemoglobin monitoring, continuous cbi, possible cystoscopy / TURP     On 9/7 @  0055,  83 yo male w/h/o BPH requiring chronic indwelling catheter  presented to [address] [place] [address] ER for "blocked winslow catheter" and hematuria that started prior evening  Pt currently under outpatient care of urology for enlarged prostate w/future surgery planned  Current winslow catheter was placed 6 days prior, last few days pt has had to flush at home for clotting  Tonight pt noted that there was no urine draining w/increasing abd fullness; No flank pain  IN ER,  couday catheter inserted by nurse w/multiple large clots returning,  Urine cleared w/flushing, dc to home    9/7  @  1117,    Pt returns to ER w/no urine in leg bag and copious bloody urine leaking around catheter   (pt on plavix and asa)   Urology was consulted and decision made to initiated CBI and transfer pt to [facility]  For urology/surgical services available       Will admit inpatient status ([facility])   to Internal medicine service at [facility] level of care,  For management of  Acute urinary retention w/gross hematuria 2/2 BPH  Will continue CBI, consult urology,  HOLD asa and plavix; Provide pain control and make NPO     9/8  UROLOGY   Hematuria controlled w/CBI  Will make NPO for tomorrow's planned  Cysto, clot evacuation, fulguration  Needs cardiac clearance (h/o chf)  Order echo, cardiac  And anesthesia consults  ED Triage Vitals   Temperature Pulse Respirations Blood Pressure SpO2   09/07/20 1640 09/07/20 1640 09/07/20 1640 09/07/20 1640 09/07/20 1640   97 8 °F (36 6 °C) (!) 106 17 136/65 98 %      Temp Source Heart Rate Source Patient Position - Orthostatic VS BP Location FiO2 (%)   09/07/20 1640 09/08/20 1202 09/07/20 1640 09/07/20 1640 --   Temporal Monitor Sitting Right arm       Pain Score       09/07/20 1751       No Pain          Wt Readings from Last 1 Encounters:   09/07/20 97 1 kg (214 lb 1 1 oz)     Additional Vital Signs:     09/08/20 1238      91      134/62       09/08/20 1202      95      121/57       09/08/20 0700   98 1 °F (36 7 °C)   93   20   112/60   96 %    09/08/20 0009   98 5 °F (36 9 °C)   91   18   102/55   96 %        Pertinent Labs/Diagnostic Test Results:   9/8  EKG  Nsr, prolonged QT   9/8  CTAP -  1   Distended urinary bladder despite the presence of a Estrella catheter; correlate with function  2   Hemorrhage dependently in the urinary bladder   Underlying small neoplasm is difficult to definitively exclude  3   Bilateral renal cysts and other subcentimeter hypodensities that are too small to characterize, statistically also cysts       4   Prostatomegaly   Correlate with PSA level                No clinically significant abnormality identified in the visualized lower chest            Results from last 7 days   Lab Units 09/08/20  0447 09/07/20  1416   WBC Thousand/uL 8 71 10 97*   HEMOGLOBIN g/dL 10 9* 11 3*   HEMATOCRIT % 34 0* 33 7*   PLATELETS Thousands/uL 250 257   NEUTROS ABS Thousands/µL 5 31 8 20* Results from last 7 days   Lab Units 09/08/20  0447 09/07/20  1416   SODIUM mmol/L 136 135*   POTASSIUM mmol/L 4 2 4 3   CHLORIDE mmol/L 103 102   CO2 mmol/L 27 26   ANION GAP mmol/L 6 7   BUN mg/dL 15 16   CREATININE mg/dL 0 91 1 00   EGFR ml/min/1 73sq m 78 69   CALCIUM mg/dL 8 6 8 6   MAGNESIUM mg/dL 1 9  --    PHOSPHORUS mg/dL 3 3  --      Results from last 7 days   Lab Units 09/08/20  0447   AST U/L 11   ALT U/L 19   ALK PHOS U/L 53   TOTAL PROTEIN g/dL 7 2   ALBUMIN g/dL 2 9*   TOTAL BILIRUBIN mg/dL 0 33     Results from last 7 days   Lab Units 09/08/20  1148 09/08/20  0722 09/07/20  2114 09/07/20  1721   POC GLUCOSE mg/dl 220* 145* 229* 320*     Results from last 7 days   Lab Units 09/08/20  0447 09/07/20  1416   GLUCOSE RANDOM mg/dL 175* 222*     Results from last 7 days   Lab Units 09/07/20  1416   PROTIME seconds 13 0   INR  0 98   PTT seconds 29     Results from last 7 days   Lab Units 09/07/20  0202   CLARITY UA  Cloudy   COLOR UA  Red   SPEC GRAV UA  1 015   PH UA  6 5   GLUCOSE UA mg/dl Negative   KETONES UA mg/dl Negative   BLOOD UA  Large*   PROTEIN UA mg/dl 100 (2+)*   NITRITE UA  Negative   BILIRUBIN UA  Negative   UROBILINOGEN UA E U /dl 0 2   LEUKOCYTES UA  Trace*   WBC UA /hpf 4-10*   RBC UA /hpf Innumerable*   BACTERIA UA /hpf Occasional   EPITHELIAL CELLS WET PREP /hpf Occasional       Past Medical History:   Diagnosis Date    Cardiac disease     CHF (congestive heart failure) (HCC)     Diabetes mellitus (HCC)     GERD (gastroesophageal reflux disease)     Hyperlipidemia     Hypertension     MI (myocardial infarction) ([facility] )      Present on Admission:   Benign prostatic hyperplasia with lower urinary tract symptoms   CHF (congestive heart failure) (HCC)   GERD (gastroesophageal reflux disease)   Essential hypertension   Hyperlipidemia   Poorly controlled type 2 diabetes mellitus (HCC)      Admitting Diagnosis: Hematuria [R31 9]  Age/Sex: 82 y o  male     Admission Orders:  Consult urology;   Continuous bladder irrigation;   Telemetry;  SCDs; Daily wgt;  I/O q shift;  ECHO;   PT/OT eval and treat ;   accucks qid w/SSI; Lo carb - Lo na diet    Scheduled Medications:  carvedilol, 12 5 mg, Oral, BID With Meals  docusate sodium, 100 mg, Oral, Q12H  famotidine, 20 mg, Oral, Daily  finasteride, 5 mg, Oral, Daily  furosemide, 20 mg, Oral, BID  gabapentin, 300 mg, Oral, BID  insulin glargine, 20 Units, Subcutaneous, HS  insulin lispro, 1-6 Units, Subcutaneous, TID AC  isosorbide mononitrate, 30 mg, Oral, Daily  losartan, 50 mg, Oral, Daily  oxybutynin, 5 mg, Oral, Daily  pravastatin, 40 mg, Oral, Daily With Dinner  tamsulosin, 0 8 mg, Oral, Daily With Dinner      Continuous IV Infusions:     PRN Meds:  acetaminophen, 650 mg, Oral, Q6H PRN    Or  oxyCODONE-acetaminophen, 1 tablet, Oral, Q6H PRN    Or  oxyCODONE-acetaminophen, 2 tablet, Oral, Q6H PRN   GIVEN x2   albuterol, 2 puff, Inhalation, Q4H PRN  belladonna-opium, 30 mg, Rectal, Q8H PRN   Given x2  nitroglycerin, 0 4 mg, Sublingual, Q5 Min PRN      Network Utilization Review Department  [email or web address]  ATTENTION: Please call with any questions or concerns to [phone] and carefully listen to the prompts so that you are directed to the right person  All voicemails are confidential   Estelle Adair all requests for admission clinical reviews, approved or denied determinations and any other requests to dedicated fax number below belonging to the campus where the patient is receiving treatment   List of dedicated fax numbers for the Facilities:  FACILITY NAME UR FAX NUMBER   ADMISSION DENIALS (Administrative/Medical Necessity) [phone]   PARENT CHILD HEALTH (Maternity/NICU/Pediatrics) [phone]   José Manuel Gale [phone]   Julio Cesarrebel Aguayo [phone]   [address] [facility] [phone]   [facility] [phone]   [address] [facility], [facility]  [phone]   [address] [place] [address] [address] [facility] [phone]

## 2020-09-08 NOTE — PLAN OF CARE
Problem: PHYSICAL THERAPY ADULT  Goal: Performs mobility at highest level of function for planned discharge setting  See evaluation for individualized goals  Description: Treatment/Interventions: Functional transfer training, LE strengthening/ROM, Therapeutic exercise, Endurance training, Patient/family training, Equipment eval/education, Bed mobility, Gait training, Spoke to nursing, OT  Equipment Recommended: Shruti Bhandari       See flowsheet documentation for full assessment, interventions and recommendations  Note: Prognosis: Good  Problem List: Decreased strength, Decreased endurance, Impaired balance, Decreased mobility, Decreased safety awareness  Assessment: Pt is 82 y o  male seen for PT evaluation s/p admit to [address] on 9/7/2020 w/ Gross hematuria  PT consulted to assess pt's functional mobility and d/c needs  Order placed for PT eval and tx, w/ up and OOB as tolerated order  Comorbidities affecting pt's physical performance at time of assessment include: gross hematuria, CHF,HTN,GERD,poorly controlled DM,hyperlipidemia  PTA, pt was independent w/ all functional mobility w/ o AD usage  Personal factors affecting pt at time of IE include: ambulating w/ assistive device, communication issues, inability to navigate community distances, inability to navigate level surfaces w/o external assistance, unable to perform dynamic tasks in community, preferred language not English (language barrier), limited insight into impairments and inability to perform ADLs  Please find objective findings from PT assessment regarding body systems outlined above with impairments and limitations including weakness, impaired balance, decreased endurance, impaired coordination, gait deviations, decreased activity tolerance, decreased functional mobility tolerance, decreased safety awareness, fall risk and decreased skin integrity   The following objective measures performed on IE also reveal limitations: Barthel Index: 30/100  Pt's clinical presentation is currently unstable/unpredictable seen in pt's presentation of abnormal lab values,progression of symptoms prior to hospitalization, continued gross hematuria w/winslow catheterization   Pt to benefit from continued PT tx to address deficits as defined above and maximize level of functional independent mobility and consistency  From PT/mobility standpoint, recommendation at time of d/c would be Home PT with family support and rolling walker pending progress in order to facilitate return to PLOF  PT Discharge Recommendation: Home with skilled therapy, Return to previous environment with social support     PT - OK to Discharge: No    See flowsheet documentation for full assessment

## 2020-09-08 NOTE — PLAN OF CARE
Problem: PAIN - ADULT  Goal: Verbalizes/displays adequate comfort level or baseline comfort level  Description: Interventions:  - Encourage patient to monitor pain and request assistance  - Assess pain using appropriate pain scale  - Administer analgesics based on type and severity of pain and evaluate response  - Implement non-pharmacological measures as appropriate and evaluate response  - Consider cultural and social influences on pain and pain management  - Notify physician/advanced practitioner if interventions unsuccessful or patient reports new pain  Outcome: Progressing     Problem: INFECTION - ADULT  Goal: Absence or prevention of progression during hospitalization  Description: INTERVENTIONS:  - Assess and monitor for signs and symptoms of infection  - Monitor lab/diagnostic results  - Monitor all insertion sites, i e  indwelling lines, tubes, and drains  - Monitor endotracheal if appropriate and nasal secretions for changes in amount and color  - [place] appropriate cooling/warming therapies per order  - Administer medications as ordered  - Instruct and encourage patient and family to use good hand hygiene technique  - Identify and instruct in appropriate isolation precautions for identified infection/condition  Outcome: Progressing  Goal: Absence of fever/infection during neutropenic period  Description: INTERVENTIONS:  - Monitor WBC    Outcome: Progressing     Problem: SAFETY ADULT  Goal: Patient will remain free of falls  Description: INTERVENTIONS:  - Assess patient frequently for physical needs  -  Identify cognitive and physical deficits and behaviors that affect risk of falls    -  [place] fall precautions as indicated by assessment   - Educate patient/family on patient safety including physical limitations  - Instruct patient to call for assistance with activity based on assessment  - Modify environment to reduce risk of injury  - Consider OT/PT consult to assist with strengthening/mobility  Outcome: Progressing  Goal: Maintain or return to baseline ADL function  Description: INTERVENTIONS:  -  Assess patient's ability to carry out ADLs; assess patient's baseline for ADL function and identify physical deficits which impact ability to perform ADLs (bathing, care of mouth/teeth, toileting, grooming, dressing, etc )  - Assess/evaluate cause of self-care deficits   - Assess range of motion  - Assess patient's mobility; develop plan if impaired  - Assess patient's need for assistive devices and provide as appropriate  - Encourage maximum independence but intervene and supervise when necessary  - Involve family in performance of ADLs  - Assess for home care needs following discharge   - Consider OT consult to assist with ADL evaluation and planning for discharge  - Provide patient education as appropriate  Outcome: Progressing  Goal: Maintain or return mobility status to optimal level  Description: INTERVENTIONS:  - Assess patient's baseline mobility status (ambulation, transfers, stairs, etc )    - Identify cognitive and physical deficits and behaviors that affect mobility  - Identify mobility aids required to assist with transfers and/or ambulation (gait belt, sit-to-stand, lift, walker, cane, etc )  - [place] fall precautions as indicated by assessment  - Record patient progress and toleration of activity level on Mobility SBAR; progress patient to next Phase/Stage  - Instruct patient to call for assistance with activity based on assessment  - Consider rehabilitation consult to assist with strengthening/weightbearing, etc   Outcome: Progressing     Problem: DISCHARGE PLANNING  Goal: Discharge to home or other facility with appropriate resources  Description: INTERVENTIONS:  - Identify barriers to discharge w/patient and caregiver  - Arrange for needed discharge resources and transportation as appropriate  - Identify discharge learning needs (meds, wound care, etc )  - Arrange for interpretive services to assist at discharge as needed  - Refer to Case Management Department for coordinating discharge planning if the patient needs post-hospital services based on physician/advanced practitioner order or complex needs related to functional status, cognitive ability, or social support system  Outcome: Progressing     Problem: Knowledge Deficit  Goal: Patient/family/caregiver demonstrates understanding of disease process, treatment plan, medications, and discharge instructions  Description: Complete learning assessment and assess knowledge base    Interventions:  - Provide teaching at level of understanding  - Provide teaching via preferred learning methods  Outcome: Progressing

## 2020-09-08 NOTE — ASSESSMENT & PLAN NOTE
Symptomatic   With acute urinary retention   - For possible cystoscopy and TURP  - Continue Flomax and Finasteride

## 2020-09-08 NOTE — ASSESSMENT & PLAN NOTE
Lab Results   Component Value Date    HGBA1C 7 7 (H) 08/17/2020       Recent Labs     09/07/20  1721 09/07/20  2114 09/08/20  0722   POCGLU 320* 229* 145*       Blood Sugar Average: Last 72 hrs:  (P) 187   IASS, Glargine 20U HS   Hold oral hypoglycemics (Glipizide, Metformin, and Januvia)  Fingerstick monitoring

## 2020-09-08 NOTE — ASSESSMENT & PLAN NOTE
2 Day Post-Op cystoscopy evacuation of clots, turp, spt insertion  1 Day Post-Op exploratory laparotomy, closure of cystotomy    H&H stable  Hematuria improved postop  Currently yellow-peach urine    Maintain Estrella catheter with hand irrigation q4h prn for hematuria

## 2020-09-08 NOTE — PLAN OF CARE
Problem: OCCUPATIONAL THERAPY ADULT  Goal: Performs self-care activities at highest level of function for planned discharge setting  See evaluation for individualized goals  Description: Treatment Interventions: ADL retraining, Functional transfer training, UE strengthening/ROM, Endurance training, Patient/family training, Equipment evaluation/education, Compensatory technique education, Energy conservation, Activityengagement  Equipment Recommended: Other (comment)(RW)       See flowsheet documentation for full assessment, interventions and recommendations  Outcome: Progressing  Note: Limitation: Decreased ADL status, Decreased Safe judgement during ADL, Decreased endurance, Decreased self-care trans, Decreased high-level ADLs, Decreased UE strength  Prognosis: Good  Assessment: Patient is a 82 y o  male admitted to [place] [address] on 9/7/2020 due to Gross hematuria  Comorbidities affecting pt's physical performance at time of assessment include benign prostatic hyperplasia with lower urinary tract symptoms, CHFm GERD, HTN, HLD, DM II  Patient has active OT orders and activity orders for Up and OOB as tolerated , per RN okay to get OOB at this time with continuous bladder irrigation running  PTA pt and wife recently moved from [facility] 6 weeks ago and has since been living with his son, pt currently living in [facility], requiring no (A) with ADLs, however son reports increased struggle to complete, requires (A) with IADLs, no use of AD at baseline, (-)drives, (-)falls  Personal factors affecting pt at time of IE include:steps to enter environment, difficulty performing ADLS, difficulty performing IADLS  and decreased initiation and engagement   At the time of evaluation patient currently requires (S) for UB ADLs, mod A for LB ADLs and CGA for functional mobility   The following deficits affected patient's occupational performance weakness, decreased functional strength, decreased functional balance, decreased activity tolerance, decreased safety awareness, impaired interpersonal skills and decreased coping skills  Patient would benefit from skilled OT services while in the hospital to address above deficits  Occupational performance areas to be addressed include ADL retraining, functional transfer training, endurance training, patient/family training, equipment evaluation/education, activity engagement and activity tolerance in order to maximize patient's level of function  From OT standpoint recommend Home OT upon D/C  OT continue to follow pt 3-5x/week to address the following goals        OT Discharge Recommendation: Home with skilled therapy  OT - OK to Discharge: (when medically cleared)

## 2020-09-08 NOTE — PLAN OF CARE
Problem: PAIN - ADULT  Goal: Verbalizes/displays adequate comfort level or baseline comfort level  Description: Interventions:  - Encourage patient to monitor pain and request assistance  - Assess pain using appropriate pain scale  - Administer analgesics based on type and severity of pain and evaluate response  - Implement non-pharmacological measures as appropriate and evaluate response  - Consider cultural and social influences on pain and pain management  - Notify physician/advanced practitioner if interventions unsuccessful or patient reports new pain  Outcome: Progressing     Problem: INFECTION - ADULT  Goal: Absence or prevention of progression during hospitalization  Description: INTERVENTIONS:  - Assess and monitor for signs and symptoms of infection  - Monitor lab/diagnostic results  - Monitor all insertion sites, i e  indwelling lines, tubes, and drains  - Monitor endotracheal if appropriate and nasal secretions for changes in amount and color  - [place] appropriate cooling/warming therapies per order  - Administer medications as ordered  - Instruct and encourage patient and family to use good hand hygiene technique  - Identify and instruct in appropriate isolation precautions for identified infection/condition  Outcome: Progressing  Goal: Absence of fever/infection during neutropenic period  Description: INTERVENTIONS:  - Monitor WBC    Outcome: Progressing     Problem: SAFETY ADULT  Goal: Patient will remain free of falls  Description: INTERVENTIONS:  - Assess patient frequently for physical needs  -  Identify cognitive and physical deficits and behaviors that affect risk of falls    -  [place] fall precautions as indicated by assessment   - Educate patient/family on patient safety including physical limitations  - Instruct patient to call for assistance with activity based on assessment  - Modify environment to reduce risk of injury  - Consider OT/PT consult to assist with strengthening/mobility  Outcome: Progressing  Goal: Maintain or return to baseline ADL function  Description: INTERVENTIONS:  -  Assess patient's ability to carry out ADLs; assess patient's baseline for ADL function and identify physical deficits which impact ability to perform ADLs (bathing, care of mouth/teeth, toileting, grooming, dressing, etc )  - Assess/evaluate cause of self-care deficits   - Assess range of motion  - Assess patient's mobility; develop plan if impaired  - Assess patient's need for assistive devices and provide as appropriate  - Encourage maximum independence but intervene and supervise when necessary  - Involve family in performance of ADLs  - Assess for home care needs following discharge   - Consider OT consult to assist with ADL evaluation and planning for discharge  - Provide patient education as appropriate  Outcome: Progressing  Goal: Maintain or return mobility status to optimal level  Description: INTERVENTIONS:  - Assess patient's baseline mobility status (ambulation, transfers, stairs, etc )    - Identify cognitive and physical deficits and behaviors that affect mobility  - Identify mobility aids required to assist with transfers and/or ambulation (gait belt, sit-to-stand, lift, walker, cane, etc )  - [place] fall precautions as indicated by assessment  - Record patient progress and toleration of activity level on Mobility SBAR; progress patient to next Phase/Stage  - Instruct patient to call for assistance with activity based on assessment  - Consider rehabilitation consult to assist with strengthening/weightbearing, etc   Outcome: Progressing     Problem: DISCHARGE PLANNING  Goal: Discharge to home or other facility with appropriate resources  Description: INTERVENTIONS:  - Identify barriers to discharge w/patient and caregiver  - Arrange for needed discharge resources and transportation as appropriate  - Identify discharge learning needs (meds, wound care, etc )  - Arrange for interpretive services to assist at discharge as needed  - Refer to Case Management Department for coordinating discharge planning if the patient needs post-hospital services based on physician/advanced practitioner order or complex needs related to functional status, cognitive ability, or social support system  Outcome: Progressing     Problem: Knowledge Deficit  Goal: Patient/family/caregiver demonstrates understanding of disease process, treatment plan, medications, and discharge instructions  Description: Complete learning assessment and assess knowledge base    Interventions:  - Provide teaching at level of understanding  - Provide teaching via preferred learning methods  Outcome: Progressing

## 2020-09-08 NOTE — OCCUPATIONAL THERAPY NOTE
Occupational Therapy Evaluation     Patient Name: Davis BARAJAS Date: 9/8/2020  Problem List  Principal Problem:    Gross hematuria  Active Problems:    Benign prostatic hyperplasia with lower urinary tract symptoms    CHF (congestive heart failure) ([facility])    GERD (gastroesophageal reflux disease)    Essential hypertension    Hyperlipidemia    Poorly controlled type 2 diabetes mellitus ([facility] )    Past Medical History  Past Medical History:   Diagnosis Date    Cardiac disease     CHF (congestive heart failure) ([place] [address] )     Diabetes mellitus ([place] [address] )     GERD (gastroesophageal reflux disease)     Hyperlipidemia     Hypertension     MI (myocardial infarction) ([place] [address] )      Past Surgical History  Past Surgical History:   Procedure Laterality Date    EYE SURGERY               09/08/20 0930   OT Last Visit   OT Visit Date 09/08/20   Note Type   Note type Eval/Treat   Restrictions/Precautions   Weight Bearing Precautions Per Order No   Other Precautions Bed Alarm; Fall Risk;Pain;Multiple lines  (continuous bladder irrigation)   Pain Assessment   Pain Assessment Tool FLACC   Pain Rating: FLACC (Rest) - Face 0   Pain Rating: FLACC (Rest) - Legs 0   Pain Rating: FLACC (Rest) - Activity 0   Pain Rating: FLACC (Rest) - Cry 0   Pain Rating: FLACC (Rest) - Consolability 0   Score: FLACC (Rest) 0   Pain Rating: FLACC (Activity) - Face 0   Pain Rating: FLACC (Activity) - Legs 0   Pain Rating: FLACC (Activity) - Activity 0   Pain Rating: FLACC (Activity) - Cry 0   Pain Rating: FLACC (Activity) - Consolability 0   Score: FLACC (Activity) 0   Home Living   Type of Home House   Home Layout Two level; Able to live on main level with bedroom/bathroom; Performs ADLs on one level;Stairs to enter with rails  (3 CRUZ; FFSU)   Bathroom Shower/Tub Tub/shower unit   Bathroom Toilet Standard   Bathroom Accessibility Accessible   Home Equipment   (no AD)   Additional Comments no use of AD at baseline, at time of eval   Prior Function Level of [place] Independent with ADLs and functional mobility; Needs assistance with IADLs   Lives With Spouse; Son   [place] 70 Help From Family  (son)   ADL Assistance Independent  (son noting increased need for assistance recently)   IADLs Needs assistance  (A for transport, cooking, cleaning)   Falls in the last 6 months 0   Vocational Retired   Comments son present to translate and provide PLOF and home set-up   Lifestyle   Autonomy PTA pt and wife recently moved from [facility] 6 weeks ago and has since been living with his son, pt currently living in son's [address], requiring no (A) with ADLs, however son reports increased struggle, requires (A) with IADLs, no use of AD at baseline, (-)drives, (-)falls  Reciprocal Relationships supportive wife, children   Service to Others retired   Intrinsic Gratification watching tv, prior to medical decline enjoyed gardening   Subjective   Subjective "I'm a good boy"    ADL   Eating Assistance 7  Independent   Grooming Assistance 7  [address] 5  Supervision/Setup   LB [address] 3  Moderate Assistance   [address] 5  Supervision/Setup   LB Dressing Assistance 3  Moderate Assistance   LB Dressing Deficit Increased time to complete;Supervision/safety; Don/doff L sock; Don/doff R sock   Toileting Assistance  5  Supervision/Setup   Bed Mobility   Supine to Sit   (CGA)   Additional items Assist x 1; Increased time required;Verbal cues; Bedrails   Sit to Supine 4  Minimal assistance   Additional items Assist x 1; Increased time required;Verbal cues;LE management   Transfers   Sit to Stand   (CGA)   Additional items Assist x 1; Increased time required;Verbal cues   Stand to Sit   (CGA)   Additional items Assist x 1; Increased time required;Verbal cues   Stand pivot   (CGA)   Additional items Assist x 1; Increased time required;Verbal cues   Additional Comments use of RW   Functional Mobility   Functional Mobility 4  Minimal assistance Additional Comments Ax1, short distance around bed   Additional items Rolling walker   Balance   Static Sitting Fair +   Dynamic Sitting Fair   Static Standing Fair   Dynamic Standing [address],4, & 5 -   Activity Tolerance   Activity Tolerance Patient limited by fatigue   Medical Staff Made Aware PT Kenroy Covert   Nurse Made Aware Per RN freda Silveira to see and encourage OOB   RUE Assessment   RUE Assessment WFL   LUE Assessment   LUE Assessment WFL   Hand Function   Gross Motor Coordination Functional   Fine Motor Coordination Functional   Sensation   Light Touch No apparent deficits   Sharp/Dull No apparent deficits   Cognition   Overall Cognitive Status WFL   Arousal/Participation Alert; Cooperative   Attention Within functional limits   Orientation Level Oriented to person;Oriented to place  (unable to assess time and situation)   Memory Unable to assess  (2* language barrier)   Following Commands Follows one step commands without difficulty   Assessment   Limitation Decreased ADL status; Decreased Safe judgement during ADL;Decreased endurance;Decreased self-care trans;Decreased high-level ADLs; Decreased UE strength   Prognosis Good   Assessment Patient is a 82 y o  male admitted to [place] [address] on 9/7/2020 due to Gross hematuria  Comorbidities affecting pt's physical performance at time of assessment include benign prostatic hyperplasia with lower urinary tract symptoms, CHFm GERD, HTN, HLD, DM II  Patient has active OT orders and activity orders for Up and OOB as tolerated , per RN freda to get OOB at this time with continuous bladder irrigation running  PTA pt and wife recently moved from [facility] 6 weeks ago and has since been living with his son, pt currently living in [facility], requiring no (A) with ADLs, however son reports increased struggle to complete, requires (A) with IADLs, no use of AD at baseline, (-)drives, (-)falls  Personal factors affecting pt at time of IE include:steps to enter environment, difficulty performing ADLS, difficulty performing IADLS  and decreased initiation and engagement   At the time of evaluation patient currently requires (S) for UB ADLs, mod A for LB ADLs and CGA for functional mobility  The following deficits affected patient's occupational performance weakness, decreased functional strength, decreased functional balance, decreased activity tolerance, decreased safety awareness, impaired interpersonal skills and decreased coping skills  Patient would benefit from skilled OT services while in the hospital to address above deficits  Occupational performance areas to be addressed include ADL retraining, functional transfer training, endurance training, patient/family training, equipment evaluation/education, activity engagement and activity tolerance in order to maximize patient's level of function  From OT standpoint recommend Home OT upon D/C  OT continue to follow pt 3-5x/week to address the following goals  Goals   Patient Goals to go home   LTG Time Frame 10-14   Long Term Goal see goals listed below   Plan   Treatment Interventions ADL retraining;Functional transfer training;UE strengthening/ROM; Endurance training;Patient/family training;Equipment evaluation/education; Compensatory technique education; Energy conservation; Activityengagement   Goal Expiration Date 09/22/20   OT Treatment Day 0   OT Frequency 3-5x/wk   Recommendation   OT Discharge Recommendation Home with skilled therapy   Equipment Recommended Other (comment)  (RW)   OT - OK to Discharge   (when medically cleared)   Barthel Index   Feeding 5   Bathing 0   Grooming Score 5   Dressing Score 5   Bladder Score 0   Bowels Score 5   Toilet Use Score 5   Transfers (Bed/Chair) Score 10   Mobility (Level Surface) Score 0   Stairs Score 0   Barthel Index Score 35        Goals    1) Patient will complete UB ADLs w/ mod I using AE and AD as needed    2) Patient will complete LB ADLs w/ mod I using AE and AD as needed    3) Patient will complete toileting w/ mod I w/ G hygiene/thoroughness    4) Patient will complete bed mobility with Mod I without use of bed rails    5) Patient will tolerate therapeutic activities for greater than 15 min, in order to increase tolerance for functional activities  6) Patient will perform functional transfers with Mod I to/from all surfaces using DME as needed    7) Patient will complete functional mobility during ADL/IADL/leisure tasks with Mod I     8) Patient will demonstrate 100% carryover of energy conservation techniques t/o functional I/ADL/leisure tasks w/o cues s/p skilled education to increase endurance during functional tasks        Pt will benefit from continued OT services in order to maximize independence with ADL performance, functional mobility with RW, and improve overall endurance/strength required to complete functional tasks in preparation for discharge      At the end of the session, all needs met and pt supine in bed, HOB elevated, call bell within reach and bed returned to elevated position per RN request with CBI, son present in room and bed alarm on      David [facility], OTR/L

## 2020-09-08 NOTE — ASSESSMENT & PLAN NOTE
82year old [place] speaking male admitted due to recurrent gross hematuria  - Monitor hemoglobin levels  - For possible urology intervention yvette  - CBI for the time being  - Aspirin and Plavix held for the time being  Spoke to son and patient (No history of stent)      Recent Labs     09/07/20  1416 09/08/20  0447   HGB 11 3* 10 9*   MCV 91 92   RDW 14 3 14 2

## 2020-09-08 NOTE — CONSULTS
Consult - Cardiology   Merly Pretty 82 y o  male MRN: [id number]  Unit/Bed#: E4 -01 Encounter: [id number]        Reason For Consult: Pre-operative risk stratification                  ASSESSMENT:  1  Preoperative risk stratification for urologic procedure   2  Reported history of CHF and CAD without much detail  3  Type 2 diabetes, A1C is 7 7  4  Hypertension  5  BPH    PLAN/ DISCUSSION:     Right now we know very little about his heart  Son at bedside reports that he does see a cardiologist in [place] and has an "enlarged heart and slow heart beat", but no details are available  He also says he had an MI 5-6 years ago however it sounds like the patient refused cardiac cath and opted for medical management  · Agree with updating echocardiogram   · Recommend perioperative telemetry- I ordered this today  · Continue oral Lasix, euvolemic on exam  · By history it sounds like he has stable angina with chest pain and shortness of breath occurring after walking short distances or lifting something heavy, this has been the case for quite some time (years) and is no worse lately   · Right now he is at least moderate risk for anesthesia if not high risk because we know so little about his cardiac function, echo as above   · Continue Imdur, Coreg, Losartan, Pravastatin     History Of Present Illness: This is an 27-year-old gentleman who receives all of his medical care in [facility] where he resides full-time  His son lives here in [place] and he is currently visiting  In [place] he does see both a primary care physician as well as a cardiologist   Details of why sees a cardiologist are unclear and as outlined above  Through translation the son tells me that this patient has had no recent testing on his heart in recent years    He believes he was hospitalized 5-6 years ago for a "small heart attack"at which time doctors wanted to put a stent in however the patient refused opting for medical therapy  At this point patient reports that for at least several years when he exerts himself (within something heavy, walking short distances) he does get substernal chest pain as well as shortness of breath  This has been the case for quite some time and is not any worse in recent days or weeks  He is presently hospitalized for symptoms unrelated to his heart  He has been having issues with urinary retention, UTIs, BPH, and Setrella catheter getting obstructed  He had previously had follow-up with urology arrange however yesterday September 7, 2020 he came to the emergency department as there was red blood in his Estrella catheter bag  He was subsequently transferred to [facility] for urologic evaluation as well as potential surgical intervention  As such we are asked to see him in consultation for preoperative risk stratification from a cardiac standpoint  Past Medical History:        Past Medical History:   Diagnosis Date    Cardiac disease     CHF (congestive heart failure) ([place] [address] )     Diabetes mellitus ([place] [address] )     GERD (gastroesophageal reflux disease)     Hyperlipidemia     Hypertension     MI (myocardial infarction) ([place] [address] )       Past Surgical History:   Procedure Laterality Date    EYE SURGERY          Allergy:        Allergies   Allergen Reactions    Demerol [Meperidine]        Medications:       Prior to Admission medications    Medication Sig Start Date End Date Taking?  Authorizing Provider   aspirin 325 mg tablet Take 325 mg by mouth daily   Yes Historical Provider, MD   carvedilol (COREG) 25 mg tablet Take 0 5 tablets (12 5 mg total) by mouth 2 (two) times a day with meals 8/19/20  Yes Joi Man MD   Cholecalciferol (VITAMIN D3) 5000 units CAPS Take 1 capsule by mouth   Yes Historical Provider, MD   clopidogrel (PLAVIX) 75 mg tablet Take 75 mg by mouth daily   Yes Historical Provider, MD   Cyanocobalamin (VITAMIN B12 PO) Take 5,000 mcg by mouth   Yes Historical Provider, MD   docusate sodium (COLACE) 100 mg capsule Take 1 capsule (100 mg total) by mouth every 12 (twelve) hours 9/1/20  Yes Manpreet Quintana MD   famotidine (PEPCID) 40 MG tablet Take 40 mg by mouth daily   Yes Historical Provider, MD   finasteride (PROSCAR) 5 mg tablet Take 1 tablet (5 mg total) by mouth daily 7/24/20  Yes Horace Mills MD   furosemide (LASIX) 20 mg tablet Take 20 mg by mouth 2 (two) times a day   Yes Historical Provider, MD   gabapentin (NEURONTIN) 300 mg capsule Take 300 mg by mouth 2 (two) times a day   Yes Historical Provider, MD   insulin glargine (LANTUS) 100 units/mL subcutaneous injection Inject 20 Units under the skin daily at bedtime   Yes Historical Provider, MD   irbesartan (AVAPRO) 150 mg tablet Take 150 mg by mouth daily at bedtime   Yes Historical Provider, MD   isosorbide mononitrate (IMDUR) 30 mg 24 hr tablet Take 30 mg by mouth daily   Yes Historical Provider, MD   meloxicam (MOBIC) 15 mg tablet Take 15 mg by mouth daily   Yes Historical Provider, MD   metFORMIN (GLUCOPHAGE) 1000 MG tablet Take 1,000 mg by mouth 2 (two) times a day with meals   Yes Historical Provider, MD   simvastatin (ZOCOR) 20 mg tablet Take 20 mg by mouth daily at bedtime   Yes Historical Provider, MD   tamsulosin (FLOMAX) 0 4 mg Take 2 capsules (0 8 mg total) by mouth daily with dinner 7/24/20 10/22/20 Yes Horace Mills MD   albuterol (PROVENTIL HFA,VENTOLIN HFA) 90 mcg/act inhaler Inhale 2 puffs every 4 (four) hours as needed for wheezing 12/11/17   JAYCE Juárez   nitroglycerin (NITROSTAT) 0 4 mg SL tablet Place 0 4 mg under the tongue every 5 (five) minutes as needed for chest pain    Historical Provider, MD   sitaGLIPtin (JANUVIA) 100 mg tablet Take 100 mg by mouth daily    Historical Provider, MD       Family History:     Family History   Problem Relation Age of Onset    Diabetes Mother     Hypertension Mother     Cancer Father     Hypertension Father     Cancer Sister    [facility] Hypertension Sister         Social History:       Social History     Socioeconomic History    Marital status: /Civil Union     Spouse name: None    Number of children: None    Years of education: None    Highest education level: None   Occupational History    None   Social Needs    Financial resource strain: None    Food insecurity     Worry: None     Inability: None    Transportation needs     Medical: None     Non-medical: None   Tobacco Use    Smoking status: Never Smoker    Smokeless tobacco: Never Used   Substance and Sexual Activity    Alcohol use: No     Alcohol/week: 0 0 standard drinks     Frequency: Never     Binge frequency: Never    Drug use: No    Sexual activity: Not Currently   Lifestyle    Physical activity     Days per week: None     Minutes per session: None    Stress: None   Relationships    Social connections     Talks on phone: None     Gets together: None     Attends Sikh service: None     Active member of club or organization: None     Attends meetings of clubs or organizations: None     Relationship status: None    Intimate partner violence     Fear of current or ex partner: None     Emotionally abused: None     Physically abused: None     Forced sexual activity: None   Other Topics Concern    None   Social History Narrative    None       ROS:  Symptoms for HPI  Remainder review of systems is negative    Exam:  General:  alert, oriented and in no distress, cooperative  Head: Normocephalic, atraumatic  Eyes:  EOMI  Pupils - equal, round, reactive to accomodation  No icterus  Normal Conjunctiva     Oropharynx: moist and normal-appearing mucosa  Neck: supple, symmetrical, trachea midline and no JVD  Heart:  RRR, No: murmer, rub or gallop, S1 & S2 normal   Respiratory effort / Chest Inspection: unlabored  Lungs:  normal air entry, lungs clear to auscultation and no rales, rhonchi or wheezing   Abdomen: flat, normal findings: bowel sounds normal and soft, non-tender  Lower Limbs:  no pitting edema  Pulses[de-identified]  RLE - DP: present 2+                 LLE - DP: present 2+  Musculoskeletal: ROM grossly normal    DATA:      ECG:    Normal sinus rhythm  Prolonged QTC, 483                   Telemetry:  Pending          Echocardiogram:  Pending               Weights: Wt Readings from Last 3 Encounters:   09/07/20 97 1 kg (214 lb 1 1 oz)   09/07/20 98 kg (216 lb)   09/04/20 99 3 kg (219 lb)   , Body mass index is 33 53 kg/m²           Lab Studies:             Results from last 7 days   Lab Units 09/08/20  0447 09/07/20  1416   WBC Thousand/uL 8 71 10 97*   HEMOGLOBIN g/dL 10 9* 11 3*   HEMATOCRIT % 34 0* 33 7*   PLATELETS Thousands/uL 250 257   ,   Results from last 7 days   Lab Units 09/08/20  0447 09/07/20  1416   POTASSIUM mmol/L 4 2 4 3   CHLORIDE mmol/L 103 102   CO2 mmol/L 27 26   BUN mg/dL 15 16   CREATININE mg/dL 0 91 1 00   CALCIUM mg/dL 8 6 8 6   ALK PHOS U/L 53  --    ALT U/L 19  --    AST U/L 11  --

## 2020-09-09 ENCOUNTER — ANESTHESIA (INPATIENT)
Dept: PERIOP | Facility: HOSPITAL | Age: 85
DRG: 653 | End: 2020-09-09
Payer: MEDICARE

## 2020-09-09 ENCOUNTER — APPOINTMENT (INPATIENT)
Dept: CT IMAGING | Facility: HOSPITAL | Age: 85
DRG: 653 | End: 2020-09-09
Payer: MEDICARE

## 2020-09-09 PROBLEM — I50.42 CHRONIC COMBINED SYSTOLIC AND DIASTOLIC CONGESTIVE HEART FAILURE (HCC): Status: ACTIVE | Noted: 2017-12-18

## 2020-09-09 PROBLEM — D64.9 ANEMIA: Status: ACTIVE | Noted: 2020-09-09

## 2020-09-09 PROBLEM — D62 ACUTE BLOOD LOSS ANEMIA: Status: ACTIVE | Noted: 2020-09-09

## 2020-09-09 LAB
ABO GROUP BLD: NORMAL
ABO GROUP BLD: NORMAL
ANION GAP SERPL CALCULATED.3IONS-SCNC: 6 MMOL/L (ref 4–13)
ANION GAP SERPL CALCULATED.3IONS-SCNC: 9 MMOL/L (ref 4–13)
ATRIAL RATE: 82 BPM
ATRIAL RATE: 85 BPM
BASOPHILS # BLD AUTO: 0.04 THOUSANDS/ΜL (ref 0–0.1)
BASOPHILS NFR BLD AUTO: 0 % (ref 0–1)
BLD GP AB SCN SERPL QL: NEGATIVE
BUN SERPL-MCNC: 19 MG/DL (ref 5–25)
BUN SERPL-MCNC: 24 MG/DL (ref 5–25)
CALCIUM SERPL-MCNC: 8.5 MG/DL (ref 8.3–10.1)
CALCIUM SERPL-MCNC: 9 MG/DL (ref 8.3–10.1)
CHLORIDE SERPL-SCNC: 101 MMOL/L (ref 100–108)
CHLORIDE SERPL-SCNC: 99 MMOL/L (ref 100–108)
CO2 SERPL-SCNC: 25 MMOL/L (ref 21–32)
CO2 SERPL-SCNC: 25 MMOL/L (ref 21–32)
CREAT SERPL-MCNC: 1.15 MG/DL (ref 0.6–1.3)
CREAT SERPL-MCNC: 1.31 MG/DL (ref 0.6–1.3)
EOSINOPHIL # BLD AUTO: 0.6 THOUSAND/ΜL (ref 0–0.61)
EOSINOPHIL NFR BLD AUTO: 6 % (ref 0–6)
ERYTHROCYTE [DISTWIDTH] IN BLOOD BY AUTOMATED COUNT: 14.2 % (ref 11.6–15.1)
GFR SERPL CREATININE-BSD FRML MDRD: 50 ML/MIN/1.73SQ M
GFR SERPL CREATININE-BSD FRML MDRD: 59 ML/MIN/1.73SQ M
GLUCOSE SERPL-MCNC: 139 MG/DL (ref 65–140)
GLUCOSE SERPL-MCNC: 167 MG/DL (ref 65–140)
GLUCOSE SERPL-MCNC: 179 MG/DL (ref 65–140)
GLUCOSE SERPL-MCNC: 202 MG/DL (ref 65–140)
GLUCOSE SERPL-MCNC: 239 MG/DL (ref 65–140)
GLUCOSE SERPL-MCNC: 292 MG/DL (ref 65–140)
GLUCOSE SERPL-MCNC: 318 MG/DL (ref 65–140)
HCT VFR BLD AUTO: 33.2 % (ref 36.5–49.3)
HGB BLD-MCNC: 10.6 G/DL (ref 12–17)
IMM GRANULOCYTES # BLD AUTO: 0.07 THOUSAND/UL (ref 0–0.2)
IMM GRANULOCYTES NFR BLD AUTO: 1 % (ref 0–2)
LYMPHOCYTES # BLD AUTO: 1.97 THOUSANDS/ΜL (ref 0.6–4.47)
LYMPHOCYTES NFR BLD AUTO: 20 % (ref 14–44)
MAGNESIUM SERPL-MCNC: 2.1 MG/DL (ref 1.6–2.6)
MCH RBC QN AUTO: 29.8 PG (ref 26.8–34.3)
MCHC RBC AUTO-ENTMCNC: 31.9 G/DL (ref 31.4–37.4)
MCV RBC AUTO: 93 FL (ref 82–98)
MONOCYTES # BLD AUTO: 1 THOUSAND/ΜL (ref 0.17–1.22)
MONOCYTES NFR BLD AUTO: 10 % (ref 4–12)
NEUTROPHILS # BLD AUTO: 6.06 THOUSANDS/ΜL (ref 1.85–7.62)
NEUTS SEG NFR BLD AUTO: 63 % (ref 43–75)
NRBC BLD AUTO-RTO: 0 /100 WBCS
P AXIS: 10 DEGREES
P AXIS: 44 DEGREES
PLATELET # BLD AUTO: 262 THOUSANDS/UL (ref 149–390)
PMV BLD AUTO: 10.6 FL (ref 8.9–12.7)
POTASSIUM SERPL-SCNC: 4.2 MMOL/L (ref 3.5–5.3)
POTASSIUM SERPL-SCNC: 4.5 MMOL/L (ref 3.5–5.3)
PR INTERVAL: 146 MS
PR INTERVAL: 160 MS
QRS AXIS: 3 DEGREES
QRS AXIS: 5 DEGREES
QRSD INTERVAL: 92 MS
QRSD INTERVAL: 94 MS
QT INTERVAL: 404 MS
QT INTERVAL: 420 MS
QTC INTERVAL: 480 MS
QTC INTERVAL: 490 MS
RBC # BLD AUTO: 3.56 MILLION/UL (ref 3.88–5.62)
RH BLD: POSITIVE
RH BLD: POSITIVE
SODIUM SERPL-SCNC: 130 MMOL/L (ref 136–145)
SODIUM SERPL-SCNC: 135 MMOL/L (ref 136–145)
SPECIMEN EXPIRATION DATE: NORMAL
T WAVE AXIS: 22 DEGREES
T WAVE AXIS: 26 DEGREES
VENTRICULAR RATE: 82 BPM
VENTRICULAR RATE: 85 BPM
WBC # BLD AUTO: 9.74 THOUSAND/UL (ref 4.31–10.16)

## 2020-09-09 PROCEDURE — 82948 REAGENT STRIP/BLOOD GLUCOSE: CPT

## 2020-09-09 PROCEDURE — G1004 CDSM NDSC: HCPCS

## 2020-09-09 PROCEDURE — 99232 SBSQ HOSP IP/OBS MODERATE 35: CPT | Performed by: PHYSICIAN ASSISTANT

## 2020-09-09 PROCEDURE — 80048 BASIC METABOLIC PNL TOTAL CA: CPT | Performed by: STUDENT IN AN ORGANIZED HEALTH CARE EDUCATION/TRAINING PROGRAM

## 2020-09-09 PROCEDURE — 70450 CT HEAD/BRAIN W/O DYE: CPT

## 2020-09-09 PROCEDURE — 99232 SBSQ HOSP IP/OBS MODERATE 35: CPT | Performed by: STUDENT IN AN ORGANIZED HEALTH CARE EDUCATION/TRAINING PROGRAM

## 2020-09-09 PROCEDURE — 85025 COMPLETE CBC W/AUTO DIFF WBC: CPT | Performed by: STUDENT IN AN ORGANIZED HEALTH CARE EDUCATION/TRAINING PROGRAM

## 2020-09-09 PROCEDURE — 93010 ELECTROCARDIOGRAM REPORT: CPT | Performed by: INTERNAL MEDICINE

## 2020-09-09 PROCEDURE — 86901 BLOOD TYPING SEROLOGIC RH(D): CPT | Performed by: UROLOGY

## 2020-09-09 PROCEDURE — 93005 ELECTROCARDIOGRAM TRACING: CPT

## 2020-09-09 PROCEDURE — 99232 SBSQ HOSP IP/OBS MODERATE 35: CPT | Performed by: INTERNAL MEDICINE

## 2020-09-09 PROCEDURE — 86850 RBC ANTIBODY SCREEN: CPT | Performed by: UROLOGY

## 2020-09-09 PROCEDURE — 93306 TTE W/DOPPLER COMPLETE: CPT | Performed by: INTERNAL MEDICINE

## 2020-09-09 PROCEDURE — 86900 BLOOD TYPING SEROLOGIC ABO: CPT | Performed by: UROLOGY

## 2020-09-09 PROCEDURE — 83735 ASSAY OF MAGNESIUM: CPT | Performed by: STUDENT IN AN ORGANIZED HEALTH CARE EDUCATION/TRAINING PROGRAM

## 2020-09-09 RX ORDER — SODIUM CHLORIDE 9 MG/ML
50 INJECTION, SOLUTION INTRAVENOUS CONTINUOUS
Status: DISCONTINUED | OUTPATIENT
Start: 2020-09-09 | End: 2020-09-10

## 2020-09-09 RX ADMIN — FAMOTIDINE 20 MG: 20 TABLET ORAL at 08:25

## 2020-09-09 RX ADMIN — FINASTERIDE 5 MG: 5 TABLET, FILM COATED ORAL at 08:25

## 2020-09-09 RX ADMIN — SODIUM CHLORIDE 50 ML/HR: 0.9 INJECTION, SOLUTION INTRAVENOUS at 19:27

## 2020-09-09 RX ADMIN — GABAPENTIN 300 MG: 300 CAPSULE ORAL at 08:25

## 2020-09-09 RX ADMIN — GABAPENTIN 300 MG: 300 CAPSULE ORAL at 16:36

## 2020-09-09 RX ADMIN — INSULIN LISPRO 4 UNITS: 100 INJECTION, SOLUTION INTRAVENOUS; SUBCUTANEOUS at 14:44

## 2020-09-09 RX ADMIN — OXYBUTYNIN CHLORIDE 5 MG: 5 TABLET, EXTENDED RELEASE ORAL at 08:30

## 2020-09-09 RX ADMIN — DOCUSATE SODIUM 100 MG: 100 CAPSULE, LIQUID FILLED ORAL at 08:24

## 2020-09-09 RX ADMIN — FUROSEMIDE 20 MG: 20 TABLET ORAL at 08:30

## 2020-09-09 RX ADMIN — ATROPA BELLADONNA AND OPIUM 1 SUPPOSITORY: 16.2; 3 SUPPOSITORY RECTAL at 08:23

## 2020-09-09 RX ADMIN — ISOSORBIDE MONONITRATE 30 MG: 30 TABLET, EXTENDED RELEASE ORAL at 08:30

## 2020-09-09 RX ADMIN — TAMSULOSIN HYDROCHLORIDE 0.8 MG: 0.4 CAPSULE ORAL at 16:33

## 2020-09-09 RX ADMIN — INSULIN GLARGINE 20 UNITS: 100 INJECTION, SOLUTION SUBCUTANEOUS at 21:50

## 2020-09-09 RX ADMIN — PRAVASTATIN SODIUM 40 MG: 40 TABLET ORAL at 16:34

## 2020-09-09 RX ADMIN — CARVEDILOL 12.5 MG: 12.5 TABLET, FILM COATED ORAL at 08:31

## 2020-09-09 RX ADMIN — ATROPA BELLADONNA AND OPIUM 1 SUPPOSITORY: 16.2; 3 SUPPOSITORY RECTAL at 20:37

## 2020-09-09 RX ADMIN — LOSARTAN POTASSIUM 50 MG: 50 TABLET, FILM COATED ORAL at 08:31

## 2020-09-09 NOTE — RAPID RESPONSE
Progress Note - Rapid Response   Merly Melendez 82 y o  male MRN: [id number]    Time Called ( Time): 5651  Date Called: 09/09/2020  Level of Care: MS  Room#: 412  IBBTFSH Time ( Time): 1894  Event End Time ( Time): 7144  Primary reason for call: Acute change in mental status  Interventions:  Airway/Breathing:  No Intervention  Circulation: EKG  Other Treatments: N/A       Assessment:   1  Vasovagal episode  2  Exertional angina pectoris  3  Gross hematuria  4  Acute blood loss anemia    Plan:   · Suspect vasovagal response as he went minimally responsive while he was getting blood work drawn  He then had a return of mental status with no neurological deficits  · Blood glucose in 170's and blood pressure stable  · Will send for CT head  · Check hemoglobin secondary to hematuria         HPI/Chief Complaint (Background/Situation):   Mei Pendleton is a 82y o  year old male who presents with a past medical history significant for CAD, HTN, HLD, and CHF  He was admitted secondary to hematuria  One week prior to admission he was evaluated due to urinary catheter problems and has a chronic indwelling catheter secondary to BPH  He then was discharged home and had increased hematuria and pain  He was admitted to the medical floor and evaluated by urology for a cysto with TURP  He was evaluated by cardiology and planning for cardiac catheterization during admission  Today was having blood work drawn and had a syncopal episode  He then woke up and was alert and oriented       Historical Information   Past Medical History:   Diagnosis Date    Cardiac disease     [facility]) [facility] [address] [facility]     Diabetes mellitus ([facility] )     GERD (gastroesophageal reflux disease)     Hyperlipidemia     Hypertension     MI (myocardial infarction) ([facility] )      Past Surgical History:   Procedure Laterality Date    EYE SURGERY       Social History   Social History     Substance and Sexual Activity   Alcohol Use No    Alcohol/week: 0 0 standard drinks    Frequency: Never    Binge frequency: Never     Social History     Substance and Sexual Activity   Drug Use No     Social History     Tobacco Use   Smoking Status Never Smoker   Smokeless Tobacco Never Used     Family History:   Family History   Problem Relation Age of Onset    Diabetes Mother     Hypertension Mother     Cancer Father     Hypertension Father     Cancer Sister     Hypertension Sister        Meds/Allergies   Current Facility-Administered Medications   Medication Dose Route Frequency Provider Last Rate    acetaminophen  650 mg Oral Q6H PRN Zaida Jennings MD      Or    oxyCODONE-acetaminophen  1 tablet Oral Q6H PRN Zaida Jennings MD      Or    oxyCODONE-acetaminophen  2 tablet Oral Q6H PRN Zaida Jennings MD      albuterol  2 puff Inhalation Q4H PRN Zaida Jennings MD      belladonna-opium  30 mg Rectal Q8H PRN Celia Davies MD      carvedilol  12 5 mg Oral BID With Meals Zaida Jennings MD      docusate sodium  100 mg Oral Q12H Zaida Jennings MD      famotidine  20 mg Oral Daily Zaida Jennings MD      finasteride  5 mg Oral Daily Zaida Jennings MD      furosemide  20 mg Oral BID Zaida Jennings MD      gabapentin  300 mg Oral BID Zaida Jennings MD      insulin glargine  20 Units Subcutaneous HS Zaida Jennings MD      insulin lispro  1-6 Units Subcutaneous TID [facility] Zaida Jennings MD      isosorbide mononitrate  30 mg Oral Daily Zaida Jennings MD      losartan  50 mg Oral Daily Zaida Jennings MD      nitroglycerin  0 4 mg Sublingual Q5 Min PRN Zaida Jennings MD      oxybutynin  5 mg Oral Daily Celia Davies MD      pravastatin  40 mg Oral Daily With Alex Regan MD      tamsulosin  0 8 mg Oral Daily With Alex Regan MD              Allergies   Allergen Reactions    Demerol [Meperidine]        ROS: Negative except denies any pain or shortness of breath    Vitals: HR 74 BP in 110's and sat 95% Physical Exam:  Gen: no apparent distress  HEENT: PERRL, EOMI, normocephalic, atraumatic, o/p clear  Neck: supple, no jvd, no adenopathy  Chest: CTA  Cor: RRR, no murmurs, rubs, or gallops  Abd: soft, non-tender, non-distended, bowel sounds present  Ext: FAGAN, no edema  Neuro: Alert and oriented x 3, no neurological deficits, equal strength bilaterally, facial symmetry   Skin:Warm, dry, intact      Intake/Output Summary (Last 24 hours) at 9/9/2020 1334  Last data filed at 9/9/2020 1201  Gross per 24 hour   Intake 180 ml   Output 4600 ml   Net -4420 ml       Respiratory    Lab Data (Last 4 hours)    None         O2/Vent Data (Last 4 hours)    None              Invasive Devices     Peripheral Intravenous Line            Peripheral IV 09/07/20 Antecubital 2 days          Drain            Continuous Bladder Irrigation Three-way 2 days                DIAGNOSTIC DATA:    Lab: I have personally reviewed pertinent lab results     CBC:   Results from last 7 days   Lab Units 09/09/20  0600   WBC Thousand/uL 9 74   HEMOGLOBIN g/dL 10 6*   HEMATOCRIT % 33 2*   PLATELETS Thousands/uL 262     CMP:   Results from last 7 days   Lab Units 09/09/20  0600 09/08/20  0447 09/07/20  1416   POTASSIUM mmol/L 4 2 4 2 4 3   CHLORIDE mmol/L 101 103 102   CO2 mmol/L 25 27 26   BUN mg/dL 19 15 16   CREATININE mg/dL 1 15 0 91 1 00   CALCIUM mg/dL 9 0 8 6 8 6   ALK PHOS U/L  --  53  --    ALT U/L  --  19  --    AST U/L  --  11  --      PT/INR:   No results found for: PT, INR,   Magnesium: No components found for: MAG,   Phosphorous: No results found for: PHOS    Microbiology:  Lab Results   Component Value Date    BLOODCX Escherichia coli (A) 08/16/2020    BLOODCX Escherichia coli (A) 08/16/2020    BLOODCX Escherichia coli (A) 08/16/2020    URINECX 10,000-19,000 cfu/ml  08/16/2020    URINECX <10,000 cfu/ml Klebsiella pneumoniae (A) 08/16/2020    URINECX >100,000 cfu/ml Escherichia coli (A) 07/22/2020         OUTCOME:   Stayed in room   Family notified of transfer: no  Family member contacted:    Code Status: Level 1 - Full Code  Critical Care Time: Total Critical Care time spent 10 minutes excluding procedures, teaching and family updates

## 2020-09-09 NOTE — PROGRESS NOTES
Progress Note - Merly Hammond 1935, 82 y o  male MRN: [id number]    Unit/Bed#: E4 -01 Encounter: [id number]    Primary Care Provider: No primary care provider on file  Date and time admitted to hospital: 9/7/2020  4:48 PM        * Gross hematuria  Assessment & Plan  82year old [address] speaking male admitted due to recurrent gross hematuria  - Monitor hemoglobin levels  - For possible urology intervention today pending cardiology risk stratification   - Continue CBI  - Aspirin and Plavix held for the time being  Spoke to son and patient (No history of stent)  Recent Labs     09/07/20  1416 09/08/20  0447 09/09/20  0600   HGB 11 3* 10 9* 10 6*   MCV 91 92 93   RDW 14 3 14 2 14 2         Benign prostatic hyperplasia with lower urinary tract symptoms  Assessment & Plan  Symptomatic  With acute urinary retention   - For possible cystoscopy and TURP  - Continue Flomax and Finasteride    Acute blood loss anemia  Assessment & Plan  Secondary to gross hematuria  Continue monitoring for the time being  No indication for transfusion at this time  Recent Labs     09/07/20  1416 09/08/20  0447 09/09/20  0600   HGB 11 3* 10 9* 10 6*   MCV 91 92 93   RDW 14 3 14 2 14 2         Chronic combined systolic and diastolic congestive heart failure (HCC)  Assessment & Plan  Wt Readings from Last 3 Encounters:   09/09/20 96 kg (211 lb 10 3 oz)   09/07/20 98 kg (216 lb)   09/04/20 99 3 kg (219 lb)     Euvolemic  Not in exacerbation    - Aspirin Plavix held in the setting of gross bleeding  - On BB, ARB, ISMN        Poorly controlled type 2 diabetes mellitus [facility])  Assessment & Plan  Lab Results   Component Value Date    HGBA1C 7 7 (H) 08/17/2020       Recent Labs     09/08/20  1148 09/08/20  1619 09/08/20  2104 09/09/20  0728   POCGLU 220* 261* 260* 139       Blood Sugar Average: Last 72 hrs:  (P) 209   IASS, Glargine 20U HS   Hold oral hypoglycemics (Glipizide, Metformin, and Januvia)  Fingerstick monitoring    Essential hypertension  Assessment & Plan  Controlled  - Continue monitoring BP  - Continue carvedilol, ARB, and lasix    Hyperlipidemia  Assessment & Plan  Continue statin    GERD (gastroesophageal reflux disease)  Assessment & Plan  Continue Famotidine      VTE Pharmacologic Prophylaxis:   Pharmacologic: Pharmacologic VTE Prophylaxis contraindicated due to Acute bleed  Mechanical VTE Prophylaxis in Place: Yes    Patient Centered Rounds: I have performed bedside rounds with nursing staff today  Discussions with Specialists or Other Care Team Provider: Urology, cardiology    Education and Discussions with Family / Patient: Patient, son    Time Spent for Care: 30 minutes  More than 50% of total time spent on counseling and coordination of care as described above  Current Length of Stay: 2 day(s)    Current Patient Status: Inpatient   Certification Statement: The patient will continue to require additional inpatient hospital stay due to For cystoscopy and turp, cardiology preop clearance    Discharge Plan: active    Code Status: Level 1 - Full Code      Subjective:   Patient seen and examined at bedside  Still continues to drain bloody urine via CBI/winslow  Planned for possible intervention this afternoon once cardiology clears him  Objective:     Vitals:   Temp (24hrs), Av 7 °F (37 1 °C), Min:98 3 °F (36 8 °C), Max:98 9 °F (37 2 °C)    Temp:  [98 3 °F (36 8 °C)-98 9 °F (37 2 °C)] 98 9 °F (37 2 °C)  HR:  [83-96] 96  Resp:  [18] 18  BP: (102-138)/(55-65) 138/65  SpO2:  [94 %-96 %] 96 %  Body mass index is 33 15 kg/m²  Input and Output Summary (last 24 hours): Intake/Output Summary (Last 24 hours) at 2020 1107  Last data filed at 2020 0948  Gross per 24 hour   Intake 420 ml   Output 6300 ml   Net -5880 ml       Physical Exam:     Physical Exam  Vitals signs reviewed  HENT:      Head: Normocephalic        Nose: Nose normal       Mouth/Throat:      Mouth: Mucous membranes are moist    Eyes:      General: No scleral icterus  Extraocular Movements: Extraocular movements intact  Pupils: Pupils are equal, round, and reactive to light  Cardiovascular:      Rate and Rhythm: Normal rate and regular rhythm  Pulmonary:      Effort: Pulmonary effort is normal  No respiratory distress  Abdominal:      General: There is no distension  Palpations: Abdomen is soft  Tenderness: There is abdominal tenderness  There is no guarding  Genitourinary:     Comments: Estrella catheter draining bloody urine  Musculoskeletal: Normal range of motion  Skin:     General: Skin is warm  Neurological:      Mental Status: He is alert  Mental status is at baseline  Psychiatric:         Mood and Affect: Mood normal          Behavior: Behavior normal        Additional Data:     Labs:    Results from last 7 days   Lab Units 09/09/20  0600   WBC Thousand/uL 9 74   HEMOGLOBIN g/dL 10 6*   HEMATOCRIT % 33 2*   PLATELETS Thousands/uL 262   NEUTROS PCT % 63   LYMPHS PCT % 20   MONOS PCT % 10   EOS PCT % 6     Results from last 7 days   Lab Units 09/09/20  0600 09/08/20  0447   SODIUM mmol/L 135* 136   POTASSIUM mmol/L 4 2 4 2   CHLORIDE mmol/L 101 103   CO2 mmol/L 25 27   BUN mg/dL 19 15   CREATININE mg/dL 1 15 0 91   ANION GAP mmol/L 9 6   CALCIUM mg/dL 9 0 8 6   ALBUMIN g/dL  --  2 9*   TOTAL BILIRUBIN mg/dL  --  0 33   ALK PHOS U/L  --  53   ALT U/L  --  19   AST U/L  --  11   GLUCOSE RANDOM mg/dL 167* 175*     Results from last 7 days   Lab Units 09/07/20  1416   INR  0 98     Results from last 7 days   Lab Units 09/09/20  0728 09/08/20  2104 09/08/20  1619 09/08/20  1148 09/08/20  0722 09/07/20  2114 09/07/20  1721   POC GLUCOSE mg/dl 139 260* 261* 220* 145* 229* 320*                   * I Have Reviewed All Lab Data Listed Above  * Additional Pertinent Lab Tests Reviewed:  All Labs For Current Hospital Admission Reviewed    Imaging:    Imaging Reports Reviewed Today Include: ct a/p    Recent Cultures (last 7 days):           Last 24 Hours Medication List:   Current Facility-Administered Medications   Medication Dose Route Frequency Provider Last Rate    acetaminophen  650 mg Oral Q6H PRN Himanshu Mejía MD      Or    oxyCODONE-acetaminophen  1 tablet Oral Q6H PRN Himanshu Mejía MD      Or    oxyCODONE-acetaminophen  2 tablet Oral Q6H PRN Himanshu Mejía MD      albuterol  2 puff Inhalation Q4H PRN Himanshu Mejía MD      belladonna-opium  30 mg Rectal Q8H PRN Pippa Adair MD      carvedilol  12 5 mg Oral BID With Meals Himanshu Mejía MD      docusate sodium  100 mg Oral Q12H Himanshu Mejía MD      famotidine  20 mg Oral Daily Himanshu Mejía MD      finasteride  5 mg Oral Daily Himanshu Mejía MD      furosemide  20 mg Oral BID Himanshu Mejía MD      gabapentin  300 mg Oral BID Himanshu Mejía MD      insulin glargine  20 Units Subcutaneous HS Himanshu Mejía MD      insulin lispro  1-6 Units Subcutaneous TID [facility] Himanshu Mejía MD      isosorbide mononitrate  30 mg Oral Daily Himanshu Mejía MD      losartan  50 mg Oral Daily Himanshu Mejía MD      nitroglycerin  0 4 mg Sublingual Q5 Min PRN Himanshu Mejía MD      oxybutynin  5 mg Oral Daily Pippa Adair MD      pravastatin  40 mg Oral Daily With Luz Harkins MD      tamsulosin  0 8 mg Oral Daily With Luz Harkins MD          Today, Patient Was Seen By: Sandi Prince MD    ** Please Note: Dictation voice to text software may have been used in the creation of this document   **

## 2020-09-09 NOTE — PLAN OF CARE
Problem: PAIN - ADULT  Goal: Verbalizes/displays adequate comfort level or baseline comfort level  Description: Interventions:  - Encourage patient to monitor pain and request assistance  - Assess pain using appropriate pain scale  - Administer analgesics based on type and severity of pain and evaluate response  - Implement non-pharmacological measures as appropriate and evaluate response  - Consider cultural and social influences on pain and pain management  - Notify physician/advanced practitioner if interventions unsuccessful or patient reports new pain  Outcome: Progressing     Problem: INFECTION - ADULT  Goal: Absence or prevention of progression during hospitalization  Description: INTERVENTIONS:  - Assess and monitor for signs and symptoms of infection  - Monitor lab/diagnostic results  - Monitor all insertion sites, i e  indwelling lines, tubes, and drains  - Monitor endotracheal if appropriate and nasal secretions for changes in amount and color  - [place] appropriate cooling/warming therapies per order  - Administer medications as ordered  - Instruct and encourage patient and family to use good hand hygiene technique  - Identify and instruct in appropriate isolation precautions for identified infection/condition  Outcome: Progressing  Goal: Absence of fever/infection during neutropenic period  Description: INTERVENTIONS:  - Monitor WBC    Outcome: Progressing     Problem: SAFETY ADULT  Goal: Patient will remain free of falls  Description: INTERVENTIONS:  - Assess patient frequently for physical needs  -  Identify cognitive and physical deficits and behaviors that affect risk of falls    -  [place] fall precautions as indicated by assessment   - Educate patient/family on patient safety including physical limitations  - Instruct patient to call for assistance with activity based on assessment  - Modify environment to reduce risk of injury  - Consider OT/PT consult to assist with strengthening/mobility  Outcome: Progressing  Goal: Maintain or return to baseline ADL function  Description: INTERVENTIONS:  -  Assess patient's ability to carry out ADLs; assess patient's baseline for ADL function and identify physical deficits which impact ability to perform ADLs (bathing, care of mouth/teeth, toileting, grooming, dressing, etc )  - Assess/evaluate cause of self-care deficits   - Assess range of motion  - Assess patient's mobility; develop plan if impaired  - Assess patient's need for assistive devices and provide as appropriate  - Encourage maximum independence but intervene and supervise when necessary  - Involve family in performance of ADLs  - Assess for home care needs following discharge   - Consider OT consult to assist with ADL evaluation and planning for discharge  - Provide patient education as appropriate  Outcome: Progressing  Goal: Maintain or return mobility status to optimal level  Description: INTERVENTIONS:  - Assess patient's baseline mobility status (ambulation, transfers, stairs, etc )    - Identify cognitive and physical deficits and behaviors that affect mobility  - Identify mobility aids required to assist with transfers and/or ambulation (gait belt, sit-to-stand, lift, walker, cane, etc )  - [place] fall precautions as indicated by assessment  - Record patient progress and toleration of activity level on Mobility SBAR; progress patient to next Phase/Stage  - Instruct patient to call for assistance with activity based on assessment  - Consider rehabilitation consult to assist with strengthening/weightbearing, etc   Outcome: Progressing     Problem: DISCHARGE PLANNING  Goal: Discharge to home or other facility with appropriate resources  Description: INTERVENTIONS:  - Identify barriers to discharge w/patient and caregiver  - Arrange for needed discharge resources and transportation as appropriate  - Identify discharge learning needs (meds, wound care, etc )  - Arrange for interpretive services to assist at discharge as needed  - Refer to Case Management Department for coordinating discharge planning if the patient needs post-hospital services based on physician/advanced practitioner order or complex needs related to functional status, cognitive ability, or social support system  Outcome: Progressing     Problem: Knowledge Deficit  Goal: Patient/family/caregiver demonstrates understanding of disease process, treatment plan, medications, and discharge instructions  Description: Complete learning assessment and assess knowledge base    Interventions:  - Provide teaching at level of understanding  - Provide teaching via preferred learning methods  Outcome: Progressing

## 2020-09-09 NOTE — PLAN OF CARE
Problem: PAIN - ADULT  Goal: Verbalizes/displays adequate comfort level or baseline comfort level  Description: Interventions:  - Encourage patient to monitor pain and request assistance  - Assess pain using appropriate pain scale  - Administer analgesics based on type and severity of pain and evaluate response  - Implement non-pharmacological measures as appropriate and evaluate response  - Consider cultural and social influences on pain and pain management  - Notify physician/advanced practitioner if interventions unsuccessful or patient reports new pain  Outcome: Progressing     Problem: INFECTION - ADULT  Goal: Absence or prevention of progression during hospitalization  Description: INTERVENTIONS:  - Assess and monitor for signs and symptoms of infection  - Monitor lab/diagnostic results  - Monitor all insertion sites, i e  indwelling lines, tubes, and drains  - Monitor endotracheal if appropriate and nasal secretions for changes in amount and color  - [place] appropriate cooling/warming therapies per order  - Administer medications as ordered  - Instruct and encourage patient and family to use good hand hygiene technique  - Identify and instruct in appropriate isolation precautions for identified infection/condition  Outcome: Progressing  Goal: Absence of fever/infection during neutropenic period  Description: INTERVENTIONS:  - Monitor WBC    Outcome: Progressing     Problem: SAFETY ADULT  Goal: Patient will remain free of falls  Description: INTERVENTIONS:  - Assess patient frequently for physical needs  -  Identify cognitive and physical deficits and behaviors that affect risk of falls    -  [place] fall precautions as indicated by assessment   - Educate patient/family on patient safety including physical limitations  - Instruct patient to call for assistance with activity based on assessment  - Modify environment to reduce risk of injury  - Consider OT/PT consult to assist with strengthening/mobility  Outcome: Progressing  Goal: Maintain or return to baseline ADL function  Description: INTERVENTIONS:  -  Assess patient's ability to carry out ADLs; assess patient's baseline for ADL function and identify physical deficits which impact ability to perform ADLs (bathing, care of mouth/teeth, toileting, grooming, dressing, etc )  - Assess/evaluate cause of self-care deficits   - Assess range of motion  - Assess patient's mobility; develop plan if impaired  - Assess patient's need for assistive devices and provide as appropriate  - Encourage maximum independence but intervene and supervise when necessary  - Involve family in performance of ADLs  - Assess for home care needs following discharge   - Consider OT consult to assist with ADL evaluation and planning for discharge  - Provide patient education as appropriate  Outcome: Progressing  Goal: Maintain or return mobility status to optimal level  Description: INTERVENTIONS:  - Assess patient's baseline mobility status (ambulation, transfers, stairs, etc )    - Identify cognitive and physical deficits and behaviors that affect mobility  - Identify mobility aids required to assist with transfers and/or ambulation (gait belt, sit-to-stand, lift, walker, cane, etc )  - [place] fall precautions as indicated by assessment  - Record patient progress and toleration of activity level on Mobility SBAR; progress patient to next Phase/Stage  - Instruct patient to call for assistance with activity based on assessment  - Consider rehabilitation consult to assist with strengthening/weightbearing, etc   Outcome: Progressing     Problem: DISCHARGE PLANNING  Goal: Discharge to home or other facility with appropriate resources  Description: INTERVENTIONS:  - Identify barriers to discharge w/patient and caregiver  - Arrange for needed discharge resources and transportation as appropriate  - Identify discharge learning needs (meds, wound care, etc )  - Arrange for interpretive services to assist at discharge as needed  - Refer to Case Management Department for coordinating discharge planning if the patient needs post-hospital services based on physician/advanced practitioner order or complex needs related to functional status, cognitive ability, or social support system  Outcome: Progressing     Problem: Knowledge Deficit  Goal: Patient/family/caregiver demonstrates understanding of disease process, treatment plan, medications, and discharge instructions  Description: Complete learning assessment and assess knowledge base    Interventions:  - Provide teaching at level of understanding  - Provide teaching via preferred learning methods  Outcome: Progressing

## 2020-09-09 NOTE — OCCUPATIONAL THERAPY NOTE
Occupational Therapy  OT tx session cancelled  Pt had rapid response called  Pt currently in the care of nursing staff  Will continue to follow as appropriate   BINDU Lackey

## 2020-09-09 NOTE — ASSESSMENT & PLAN NOTE
82year old Citizen of [place] speaking male admitted due to recurrent gross hematuria  - Monitor hemoglobin levels  - For possible urology intervention today pending cardiology risk stratification   - Continue CBI  - Aspirin and Plavix held for the time being  Spoke to son and patient (No history of stent)      Recent Labs     09/07/20  1416 09/08/20  0447 09/09/20  0600   HGB 11 3* 10 9* 10 6*   MCV 91 92 93   RDW 14 3 14 2 14 2

## 2020-09-09 NOTE — ASSESSMENT & PLAN NOTE
Wt Readings from Last 3 Encounters:   09/09/20 96 kg (211 lb 10 3 oz)   09/07/20 98 kg (216 lb)   09/04/20 99 3 kg (219 lb)     Euvolemic   Not in exacerbation    - Aspirin Plavix held in the setting of gross bleeding  - On BB, ARB, ISMN

## 2020-09-09 NOTE — PROGRESS NOTES
Progress Note - Urology  [facility] Bryan Brown 1935, 82 y o  male MRN: [id number]    Unit/Bed#: E4 -01 Encounter: [id number]    * Gross hematuria  Assessment & Plan  Gross hematuria in the presence of 325 mg of aspirin and Plavix for unknown cardiac history in the past  Now antiplatelets are on hold for at least past 24 hrs  Hematuria currently being managed with continuous bladder irrigation via 3-way Estrella catheter  He is NPO in anticipation of OR today for cystoscopy, clot evacuation, fulguration, possible TURP  Continues to have intermittent clot retention requiring manual irrigation including now, though clears relatively easily  Urine at present is watermelon colored after irrigation  Needs preoperative cardiac clearance as well as anesthesia discussion given cardiac history  ECHO completed, pending  Discussed with cardiology, will update with risk stratification this morning  From a urology standpoint, continue CBI with hand irrigation p r n  For clots or obstruction  Continue bladder antispasmodics  OR cancelled given high cardiac risk  Is to undergo NM stress test tomorrow +/- cardiac cath Fri  Monitor H&H  Watch for worsening hematuria  If persistently high surgical risk, consider IR for angioembolization as alternative to cystoscopic approach for his hematuria  Bedside rounds performed with RN and patient's son (translating)  Discussed with Dr Ingrid Villegas, Idalmis Key PA-C cardiology    Subjective:   HPI Mr Bryan Brown feeling well this morning  Required irrigation several times overnight for spasms from clots with relief after that  I irrigated him again during this morning's interaction for a bit of clots again  He feels better now and empty and draining freely  He did have ECHO done yesterday, results pending  He understands OR may be postponed for cardiac risk  Review of Systems   Constitutional: Negative    Negative for activity change, appetite change, chills, fever and unexpected weight change  HENT: Negative  Respiratory: Positive for shortness of breath (BLACK)  Cardiovascular: Positive for chest pain (exertional angina)  Gastrointestinal: Negative for abdominal distention, abdominal pain, blood in stool, constipation, diarrhea, nausea and vomiting  Endocrine: Negative  Genitourinary: Positive for hematuria  Negative for decreased urine volume, difficulty urinating, dysuria, flank pain, frequency, penile pain and urgency  Musculoskeletal: Negative  Negative for back pain and gait problem  Skin: Negative  Allergic/Immunologic: Negative  Neurological: Negative  Hematological: Negative for adenopathy  Bruises/bleeds easily (asa/plavix on hold)  Objective:  Vitals: Blood pressure 138/65, pulse 96, temperature 98 9 °F (37 2 °C), temperature source Temporal, resp  rate 18, height 5' 7" (1 702 m), weight 96 kg (211 lb 10 3 oz), SpO2 96 %  ,Body mass index is 33 15 kg/m²  Intake/Output Summary (Last 24 hours) at 9/9/2020 1308  Last data filed at 9/9/2020 1201  Gross per 24 hour   Intake 180 ml   Output 4600 ml   Net -4420 ml     Invasive Devices     Peripheral Intravenous Line            Peripheral IV 09/07/20 Antecubital 1 day          Drain            Continuous Bladder Irrigation Three-way 2 days                Physical Exam  Vitals signs and nursing note reviewed  Constitutional:       Appearance: He is well-developed  HENT:      Head: Normocephalic and atraumatic  Cardiovascular:      Rate and Rhythm: Normal rate and regular rhythm  Heart sounds: Normal heart sounds  No murmur  Pulmonary:      Effort: Pulmonary effort is normal       Breath sounds: Normal breath sounds  Abdominal:      General: Bowel sounds are normal       Palpations: Abdomen is soft  There is no mass  Tenderness: There is no abdominal tenderness  There is no right CVA tenderness, left CVA tenderness or guarding  Hernia: No hernia is present  Comments: No suprapubic tenderness or fullness   Genitourinary:     Comments: Circumcised penis  Three way Estrella catheter per urethra slowly draining cherry colored efflux on fast/open CBI  Musculoskeletal: Normal range of motion  Skin:     General: Skin is warm and dry  Capillary Refill: Capillary refill takes less than 2 seconds  Coloration: Skin is not pale  Neurological:      Mental Status: He is alert and oriented to person, place, and time           History:    Past Medical History:   Diagnosis Date    Cardiac disease     CHF (congestive heart failure) ([place] [address] )     Diabetes mellitus ([place] [address] )     GERD (gastroesophageal reflux disease)     Hyperlipidemia     Hypertension     MI (myocardial infarction) ([place] [address] )      Past Surgical History:   Procedure Laterality Date    EYE SURGERY       Family History   Problem Relation Age of Onset    Diabetes Mother     Hypertension Mother     Cancer Father     Hypertension Father     Cancer Sister     Hypertension Sister      Social History     Socioeconomic History    Marital status: /Civil Union     Spouse name: None    Number of children: None    Years of education: None    Highest education level: None   Occupational History    None   Social Needs    Financial resource strain: None    Food insecurity     Worry: None     Inability: None    Transportation needs     Medical: None     Non-medical: None   Tobacco Use    Smoking status: Never Smoker    Smokeless tobacco: Never Used   Substance and Sexual Activity    Alcohol use: No     Alcohol/week: 0 0 standard drinks     Frequency: Never     Binge frequency: Never    Drug use: No    Sexual activity: Not Currently   Lifestyle    Physical activity     Days per week: None     Minutes per session: None    Stress: None   Relationships    Social connections     Talks on phone: None     Gets together: None     Attends Buddhism service: None     Active member of club or organization: None Attends meetings of clubs or organizations: None     Relationship status: None    Intimate partner violence     Fear of current or ex partner: None     Emotionally abused: None     Physically abused: None     Forced sexual activity: None   Other Topics Concern    None   Social History Narrative    None       Labs:  Recent Labs     09/07/20  1416 09/08/20 0447 09/09/20  0600   WBC 10 97* 8 71 9 74     Recent Labs     09/07/20  1416 09/08/20  0447 09/09/20  0600   HGB 11 3* 10 9* 10 6*       Recent Labs     09/07/20  1416 09/08/20  0447 09/09/20  0600   CREATININE 1 00 0 91 1 15         Nahun Campo PA-C  Date: 9/9/2020 Time: 1:08 PM

## 2020-09-09 NOTE — PHYSICAL THERAPY NOTE
Physical Therapy Cancellation Note- rapid response called on pt  Will follow for ability to work with pt    Tonia Boyle PT

## 2020-09-09 NOTE — ANESTHESIA PREPROCEDURE EVALUATION
Procedure:  CYSTOSCOPY EVACUATION OF CLOTS (N/A Bladder)    Relevant Problems   CARDIO  EF 30%    LVH, pt can walk 1 flgiht of steps without CP - but can get angina with long distance walking   he can do full ADLs per son    (+) CAD (coronary artery disease)   (+) CHF (congestive heart failure) (HCC)   (+) Essential hypertension   (+) Hyperlipidemia   (+) Myocardial infarction (HCC)      ENDO   (+) Poorly controlled type 2 diabetes mellitus (HCC)      GI/HEPATIC   (+) GERD (gastroesophageal reflux disease)      /RENAL   (+) Benign prostatic hyperplasia with lower urinary tract symptoms      HEMATOLOGY   (+) Anemia      PULMONARY (within normal limits)             Anesthesia Plan  ASA Score- 3     Anesthesia Type- general with ASA Monitors  Additional Monitors:   Airway Plan: ETT  Plan Factors-    Induction- intravenous  Postoperative Plan-     Informed Consent- Anesthetic plan and risks discussed with patient

## 2020-09-09 NOTE — PROGRESS NOTES
Progress Note - Cardiology   [facility] 82 y o  male MRN: [id number]  Unit/Bed#: E4 -01 Encounter: [id number]    Assessment:  1  Postoperative cardiovascular examination for urologic procedures  2  Exertional angina pectoris  3  Paroxysmal nocturnal dyspnea  4  Dyspnea on exertion  5  Severely decreased left ventricular systolic function with grade 1 left ventricular diastolic function and left ventricular cavity enlargement  6  Hypertension  7  Diabetes mellitus  8  Gross hematuria  9  BPH    Plan:  1  Cardiac catheterization on Friday to define patient's anatomy and make further plans as appropriate      Interval history:  Patient is hospitalized with significant hematuria  Urology plans to do a cystoscopy to identify the bleeding site followed by a trans urethral prostatectomy which will take 1 5-2 hours general anesthesia  However, if the risk is too great, they would consider a 20 minutes anesthesia to evaluate the bladder and wash out the clots followed by an IR procedure to attempt to embolize the area bleeding if necessary  Patient of high risk due to a history of exertional angina pectoris, prior myocardial infarction and severe left ventricular systolic dysfunction  A decision on approach to patient's urologic problems would best be made if patient's coronary anatomy was defined  Presently planned for a cardiac catheterization on Friday  Discussed with Dr Jenette Apgar        Vitals: /65 (BP Location: Left arm)   Pulse 96   Temp 98 9 °F (37 2 °C) (Temporal)   Resp 18   Ht 5' 7" (1 702 m)   Wt 96 kg (211 lb 10 3 oz)   SpO2 96%   BMI 33 15 kg/m²   Vitals:    20 1929 20 0556   Weight: 97 1 kg (214 lb 1 1 oz) 96 kg (211 lb 10 3 oz)     Orthostatic Blood Pressures      Most Recent Value   Blood Pressure  138/65 filed at 2020 0803   Patient Position - Orthostatic VS  Lying filed at 2020 0803            Intake/Output Summary (Last 24 hours) at 2020 1570 [address] 8 & 89 y [address] filed at 9/9/2020 0948  Gross per 24 hour   Intake 420 ml   Output 6300 ml   Net -5880 ml       Invasive Devices     Peripheral Intravenous Line            Peripheral IV 09/07/20 Antecubital 1 day          Drain            Continuous Bladder Irrigation Three-way 2 days                Review of Systems   Constitutional: Negative for activity change  Respiratory: Positive for shortness of breath  Negative for cough, chest tightness and wheezing  Cardiovascular: Positive for chest pain and leg swelling  Negative for palpitations  Musculoskeletal: Negative for gait problem  Skin: Negative for color change  Neurological: Negative for dizziness, tremors, syncope, weakness, light-headedness and headaches  Psychiatric/Behavioral: Negative for agitation and confusion  Physical Exam  Constitutional:       General: He is not in acute distress  Appearance: He is well-developed  Comments: Obese, BMI 33 2   HENT:      Head: Normocephalic and atraumatic  Neck:      Vascular: No JVD  Cardiovascular:      Rate and Rhythm: Normal rate and regular rhythm  Heart sounds: Normal heart sounds  No murmur  No friction rub  No gallop  Pulmonary:      Effort: Pulmonary effort is normal  No respiratory distress  Breath sounds: Normal breath sounds  No wheezing or rales  Chest:      Chest wall: No tenderness  Abdominal:      General: Bowel sounds are normal  There is no distension  Palpations: Abdomen is soft  Genitourinary:     Comments: Estrella catheter in place  Musculoskeletal:      Right lower leg: No edema  Left lower leg: No edema  Skin:     General: Skin is warm and dry  Neurological:      Mental Status: He is alert and oriented to person, place, and time     Psychiatric:         Behavior: Behavior normal            --------------------------------------------------------------------------------  ECHO:   Results for orders placed during the hospital encounter of 20   Echo complete with contrast if indicated    Narrative [address]  [address]  [place], [address]  [phone]    Transthoracic Echocardiogram  2D, M-mode, Doppler, and Color Doppler    Study date:  08-Sep-2020    Patient: Tian Shah  MR number: [id number]  Account number: [de-identified]  : 1935  Age: 82 years  Gender: Male  Status: Inpatient  Location: Bedside  Height: 67 in  Weight: 213 6 lb  BP: 102/ 55 mmHg    Indications: Dyspnea  Diagnoses: R06 00 - Dyspnea, unspecified    Sonographer:  Sangeetha Hamilton RDCS  Referring Physician:  Elan Rincon MD  Group:  [facility]  Interpreting Physician:  Robbie Kelsey DO    SUMMARY    SUMMARY:  1  This is a technically difficult but adequate study with the use of echo contrast  2  Left ventricle is mildly dilated with severely reduced systolic function  Left ventricular ejection fraction is estimated at 30%  3  Mild concentric left ventricular hypertrophy  4  Grade 1 diastolic dysfunction  5  Regional wall motion abnormalities consistent with coronary artery disease  6  Mild right ventricular dilatation with normal systolic function  7  Left atrium is mildly dilated  8  Aortic valve sclerotic with adequate separation  9  Fibrocalcific changes of the mitral valve with trace mitral regurgitation  10  Pulmonary artery systolic pressures cannot be estimated due to lack of tricuspid regurgitation jet  11  Aortic root is mildly dilated at 3 5 cm  12  There is no study for comparison    SUMMARY MEASUREMENTS  2D measurements:  Unspecified Anatomy:   RWT was 0 4   MM measurements:  Unspecified Anatomy:   TAPSE was 2 3 cm  PW measurements:  Unspecified Anatomy:   E' Sept was 0 1 m/s  E/E' Sept was 8 8   MV A Scott was 0 7 m/s  MV Dec [place] was 2 3 m/s2  MV DecT was 213 5 ms   MV E Scott was 0 5 m/s  MV E/A Ratio was 0 7   MV PHT was 61 9 ms  MVA By PHT was 3 6 cm2      HISTORY: PRIOR HISTORY: CHF  GERD  Hypertension  Hyperlipidemia  Diabetes  CAD  MI  Sepsis  PROCEDURE: The procedure was performed at the bedside  This was a routine study  The transthoracic approach was used  The study included complete 2D imaging, M-mode, complete spectral Doppler, and color Doppler  The heart rate was 87 bpm,  at the start of the study  Images were obtained from the parasternal, apical, subcostal, and suprasternal notch acoustic windows  Intravenous contrast ( 0 6 ml Definity in NSS) was administered to opacify the left ventricle  Echocardiographic views were limited due to restricted patient mobility, poor acoustic window availability, and decreased penetration  This was a technically difficult study  LEFT VENTRICLE: Left ventricle is mildly dilated with severely reduced systolic function  Left ventricular ejection fraction is estimated at 30%  Mild concentric left ventricular hypertrophy  Grade 1 diastolic dysfunction  There is severe  hypokinesis of the anterior and anteroseptal wall from base to mid  The remaining walls are mildly hypokinetic  RIGHT VENTRICLE: Right ventricle is mildly dilated with normal systolic function  LEFT ATRIUM: Left atrium is mildly dilated  ATRIAL SEPTUM: The interatrial septum appears to be grossly normal without evidence of shunting by color-flow Doppler  RIGHT ATRIUM: Right atrium is top normal in size    MITRAL VALVE: Mitral valve opens well  There are fibrocalcific changes of the mitral valve and chordae  No evidence of mitral stenosis  Trace mitral regurgitation  AORTIC VALVE: Aortic valve is sclerotic with adequate separation  No evidence aortic stenosis or aortic regurgitation  TRICUSPID VALVE: Tricuspid valve opens well  No evidence tricuspid regurgitation  Pulmonary artery systolic pressures cannot be estimated due to lack of tricuspid regurgitation jet  PULMONIC VALVE: Pulmonic valve is not well visualized   No evidence of pulmonic stenosis or pulmonic regurgitation  PERICARDIUM: There is no evidence of significant pericardial effusion  AORTA: The aortic root is mildly dilated at 3 5 cm  SYSTEMIC VEINS: IVC: The IVC is normal size with collapse  SYSTEM MEASUREMENT TABLES    2D  %FS: 24 5 %  Ao Diam: 3 5 cm  EDV(Teich): 198 9 ml  EF(Teich): 47 7 %  ESV(Teich): 104 1 ml  IVSd: 1 1 cm  LA Area: 21 4 cm2  LA Diam: 4 1 cm  LVIDd: 6 3 cm  LVIDs: 4 7 cm  LVPWd: 1 2 cm  RA Area: 18 4 cm2  RVIDd: 4 4 cm  RWT: 0 4  SV(Teich): 94 8 ml    MM  TAPSE: 2 3 cm    PW  E' Sept: 0 1 m/s  E/E' Sept: 8 8  MV A Scott: 0 7 m/s  MV Dec [place]: 2 3 m/s2  MV DecT: 213 5 ms  MV E Scott: 0 5 m/s  MV E/A Ratio: 0 7  MV PHT: 61 9 ms  MVA By PHT: 3 6 cm2    [address] [facility]redited [facility]    Prepared and electronically signed by    Cony Bass DO  Signed 09-Sep-2020 11:02:18         ======================================================    Lab Results   Component Value Date    WBC 9 74 09/09/2020    HGB 10 6 (L) 09/09/2020    HCT 33 2 (L) 09/09/2020    MCV 93 09/09/2020     09/09/2020      Lab Results   Component Value Date    SODIUM 135 (L) 09/09/2020    K 4 2 09/09/2020     09/09/2020    CO2 25 09/09/2020    BUN 19 09/09/2020    CREATININE 1 15 09/09/2020    GLUC 167 (H) 09/09/2020    CALCIUM 9 0 09/09/2020      Lab Results   Component Value Date    HGBA1C 7 7 (H) 08/17/2020      No results found for: CHOL  No results found for: HDL  No results found for: LDLCALC  No results found for: TRIG  No results found for: CHOLHDL   Lab Results   Component Value Date    INR 0 98 09/07/2020    INR 0 97 09/01/2020    INR 0 99 07/22/2020    PROTIME 13 0 09/07/2020    PROTIME 12 9 09/01/2020    PROTIME 12 8 07/22/2020        Imaging:   I have personally reviewed pertinent reports

## 2020-09-09 NOTE — ASSESSMENT & PLAN NOTE
Secondary to gross hematuria  Continue monitoring for the time being  No indication for transfusion at this time      Recent Labs     09/07/20  1416 09/08/20  0447 09/09/20  0600   HGB 11 3* 10 9* 10 6*   MCV 91 92 93   RDW 14 3 14 2 14 2

## 2020-09-09 NOTE — ASSESSMENT & PLAN NOTE
Lab Results   Component Value Date    HGBA1C 7 7 (H) 08/17/2020       Recent Labs     09/08/20  1148 09/08/20  1619 09/08/20  2104 09/09/20  0728   POCGLU 220* 261* 260* 139       Blood Sugar Average: Last 72 hrs:  (P) 209   IASS, Glargine 20U HS   Hold oral hypoglycemics (Glipizide, Metformin, and Januvia)  Fingerstick monitoring

## 2020-09-09 NOTE — QUICK NOTE
Patient seen briefly this morning  Estrella is draining light cherry, required numerous irrigations overnight, appears draining at present  Scheduled for cysto in OR this noon, I suspect he will need TURP  Apparently had cysto in the [place] office which showed large prostate

## 2020-09-10 LAB
ANION GAP SERPL CALCULATED.3IONS-SCNC: 5 MMOL/L (ref 4–13)
BASOPHILS # BLD AUTO: 0.04 THOUSANDS/ΜL (ref 0–0.1)
BASOPHILS NFR BLD AUTO: 1 % (ref 0–1)
BUN SERPL-MCNC: 21 MG/DL (ref 5–25)
CALCIUM SERPL-MCNC: 8.7 MG/DL (ref 8.3–10.1)
CHLORIDE SERPL-SCNC: 103 MMOL/L (ref 100–108)
CO2 SERPL-SCNC: 30 MMOL/L (ref 21–32)
CREAT SERPL-MCNC: 1.17 MG/DL (ref 0.6–1.3)
EOSINOPHIL # BLD AUTO: 0.6 THOUSAND/ΜL (ref 0–0.61)
EOSINOPHIL NFR BLD AUTO: 8 % (ref 0–6)
ERYTHROCYTE [DISTWIDTH] IN BLOOD BY AUTOMATED COUNT: 14.1 % (ref 11.6–15.1)
GFR SERPL CREATININE-BSD FRML MDRD: 57 ML/MIN/1.73SQ M
GLUCOSE SERPL-MCNC: 157 MG/DL (ref 65–140)
GLUCOSE SERPL-MCNC: 164 MG/DL (ref 65–140)
GLUCOSE SERPL-MCNC: 183 MG/DL (ref 65–140)
GLUCOSE SERPL-MCNC: 196 MG/DL (ref 65–140)
GLUCOSE SERPL-MCNC: 255 MG/DL (ref 65–140)
HCT VFR BLD AUTO: 29.4 % (ref 36.5–49.3)
HGB BLD-MCNC: 9.3 G/DL (ref 12–17)
IMM GRANULOCYTES # BLD AUTO: 0.05 THOUSAND/UL (ref 0–0.2)
IMM GRANULOCYTES NFR BLD AUTO: 1 % (ref 0–2)
LYMPHOCYTES # BLD AUTO: 1.21 THOUSANDS/ΜL (ref 0.6–4.47)
LYMPHOCYTES NFR BLD AUTO: 16 % (ref 14–44)
MAGNESIUM SERPL-MCNC: 1.9 MG/DL (ref 1.6–2.6)
MAGNESIUM SERPL-MCNC: 2.1 MG/DL (ref 1.6–2.6)
MCH RBC QN AUTO: 29.5 PG (ref 26.8–34.3)
MCHC RBC AUTO-ENTMCNC: 31.6 G/DL (ref 31.4–37.4)
MCV RBC AUTO: 93 FL (ref 82–98)
MONOCYTES # BLD AUTO: 0.8 THOUSAND/ΜL (ref 0.17–1.22)
MONOCYTES NFR BLD AUTO: 11 % (ref 4–12)
NEUTROPHILS # BLD AUTO: 4.95 THOUSANDS/ΜL (ref 1.85–7.62)
NEUTS SEG NFR BLD AUTO: 63 % (ref 43–75)
NRBC BLD AUTO-RTO: 0 /100 WBCS
PLATELET # BLD AUTO: 229 THOUSANDS/UL (ref 149–390)
PMV BLD AUTO: 10.1 FL (ref 8.9–12.7)
POTASSIUM SERPL-SCNC: 4.3 MMOL/L (ref 3.5–5.3)
RBC # BLD AUTO: 3.15 MILLION/UL (ref 3.88–5.62)
SODIUM SERPL-SCNC: 138 MMOL/L (ref 136–145)
WBC # BLD AUTO: 7.65 THOUSAND/UL (ref 4.31–10.16)

## 2020-09-10 PROCEDURE — 83735 ASSAY OF MAGNESIUM: CPT | Performed by: STUDENT IN AN ORGANIZED HEALTH CARE EDUCATION/TRAINING PROGRAM

## 2020-09-10 PROCEDURE — 99232 SBSQ HOSP IP/OBS MODERATE 35: CPT | Performed by: INTERNAL MEDICINE

## 2020-09-10 PROCEDURE — 82948 REAGENT STRIP/BLOOD GLUCOSE: CPT

## 2020-09-10 PROCEDURE — 80048 BASIC METABOLIC PNL TOTAL CA: CPT | Performed by: STUDENT IN AN ORGANIZED HEALTH CARE EDUCATION/TRAINING PROGRAM

## 2020-09-10 PROCEDURE — 99232 SBSQ HOSP IP/OBS MODERATE 35: CPT | Performed by: STUDENT IN AN ORGANIZED HEALTH CARE EDUCATION/TRAINING PROGRAM

## 2020-09-10 PROCEDURE — 83735 ASSAY OF MAGNESIUM: CPT | Performed by: PHYSICIAN ASSISTANT

## 2020-09-10 PROCEDURE — 99232 SBSQ HOSP IP/OBS MODERATE 35: CPT | Performed by: PHYSICIAN ASSISTANT

## 2020-09-10 PROCEDURE — 85025 COMPLETE CBC W/AUTO DIFF WBC: CPT | Performed by: STUDENT IN AN ORGANIZED HEALTH CARE EDUCATION/TRAINING PROGRAM

## 2020-09-10 RX ADMIN — OXYBUTYNIN CHLORIDE 5 MG: 5 TABLET, EXTENDED RELEASE ORAL at 17:13

## 2020-09-10 RX ADMIN — FINASTERIDE 5 MG: 5 TABLET, FILM COATED ORAL at 17:12

## 2020-09-10 RX ADMIN — LOSARTAN POTASSIUM 50 MG: 50 TABLET, FILM COATED ORAL at 17:14

## 2020-09-10 RX ADMIN — FUROSEMIDE 20 MG: 20 TABLET ORAL at 17:13

## 2020-09-10 RX ADMIN — ISOSORBIDE MONONITRATE 30 MG: 30 TABLET, EXTENDED RELEASE ORAL at 17:14

## 2020-09-10 RX ADMIN — TAMSULOSIN HYDROCHLORIDE 0.8 MG: 0.4 CAPSULE ORAL at 17:13

## 2020-09-10 RX ADMIN — CARVEDILOL 12.5 MG: 12.5 TABLET, FILM COATED ORAL at 17:14

## 2020-09-10 RX ADMIN — DOCUSATE SODIUM 100 MG: 100 CAPSULE, LIQUID FILLED ORAL at 17:17

## 2020-09-10 RX ADMIN — FAMOTIDINE 20 MG: 20 TABLET ORAL at 17:13

## 2020-09-10 RX ADMIN — GABAPENTIN 300 MG: 300 CAPSULE ORAL at 17:13

## 2020-09-10 RX ADMIN — INSULIN LISPRO 1 UNITS: 100 INJECTION, SOLUTION INTRAVENOUS; SUBCUTANEOUS at 17:14

## 2020-09-10 RX ADMIN — PRAVASTATIN SODIUM 40 MG: 40 TABLET ORAL at 17:13

## 2020-09-10 RX ADMIN — INSULIN GLARGINE 20 UNITS: 100 INJECTION, SOLUTION SUBCUTANEOUS at 23:18

## 2020-09-10 NOTE — PROGRESS NOTES
Progress Note - Urology  [facility] Lillard Landau 1935, 82 y o  male MRN: [id number]    Unit/Bed#: E4 -01 Encounter: [id number]    * Gross hematuria  Assessment & Plan  Gross hematuria in the presence of 325 mg of aspirin and Plavix for unknown cardiac history in the past  Now antiplatelets are on hold for at least past 24 hrs  Undergoing cardiac workup including cardiac catheterization on for tomorrow  Hematuria currently being managed with continuous bladder irrigation via 3-way Estrella catheter  Unfortunately, has not been cleared for the OR  Continues to require a non emergent clot evacuation at some point in the future as well as TURP versus fulguration versus IR embolization of his prostate to control bleeding  From a urology standpoint, continue CBI with hand irrigation p r n  For clots or obstruction  Continue bladder antispasmodics  Continue daily trending of H&H  Watch for worsening hematuria  If persistently high surgical risk, consider IR for angioembolization as alternative to cystoscopic approach for his hematuria  Earliest potential date for this non urgent on emergent urologic procedure of cystoscopy clot evacuation fulguration would be Monday 9/14/2020  Bedside rounds performed with Silvia Castorena RN  Discussed with Dr Bryan Martin, reviewed Dr Karine Swain from Cardiology plan for Cardiac Cath tomorrow, & discussed with Dr Ned Hutchison of [facility]  Subjective/Objective     Subjective:   CC: "I just want this whole thing to be over with "  HPI:  Reports he is feeling well, continues to have intermittent small clots was catheter causing obstruction  Bladder spasms are improving  ROS:  Review of Systems   Constitutional: Negative for activity change and appetite change  HENT: Negative for congestion and ear pain  Eyes: Negative for pain  Respiratory: Negative for cough and shortness of breath  Cardiovascular: Negative for chest pain and palpitations     Gastrointestinal: Negative for abdominal distention, abdominal pain, blood in stool, constipation, diarrhea and nausea  Genitourinary: Positive for hematuria  Negative for difficulty urinating, dysuria and flank pain  Musculoskeletal: Negative for arthralgias and myalgias  Skin: Negative for rash  Allergic/Immunologic: Negative for immunocompromised state  Neurological: Negative for dizziness and headaches  Hematological: Negative for adenopathy  Does not bruise/bleed easily  Psychiatric/Behavioral: Negative for agitation  The patient is not nervous/anxious  Objective:  Vitals: Blood pressure 147/64, pulse 87, temperature 97 7 °F (36 5 °C), temperature source Temporal, resp  rate 18, height 5' 7" (1 702 m), weight 95 9 kg (211 lb 6 7 oz), SpO2 95 %  ,Body mass index is 33 11 kg/m²  Intake/Output Summary (Last 24 hours) at 9/10/2020 1140  Last data filed at 9/10/2020 0650  Gross per 24 hour   Intake 480 ml   Output -4250 ml   Net 4730 ml     Invasive Devices     Peripheral Intravenous Line            Peripheral IV 09/07/20 Antecubital 2 days          Drain            Continuous Bladder Irrigation Three-way 2 days                Physical Exam  Vitals signs and nursing note reviewed  Constitutional:       General: He is not in acute distress  Appearance: He is well-developed  He is not ill-appearing or diaphoretic  Comments: 29-year-old male, resting comfortably in bed  No acute distress  HENT:      Head: Normocephalic and atraumatic  Eyes:      Conjunctiva/sclera: Conjunctivae normal    Neck:      Musculoskeletal: Normal range of motion and neck supple  Trachea: No tracheal deviation  Cardiovascular:      Rate and Rhythm: Normal rate and regular rhythm  Heart sounds: Normal heart sounds  No murmur  Pulmonary:      Effort: Pulmonary effort is normal  No respiratory distress  Breath sounds: Normal breath sounds  No wheezing     Abdominal:      General: Bowel sounds are normal  There is no distension  Palpations: Abdomen is soft  There is no mass  Tenderness: There is no abdominal tenderness  Genitourinary:     Comments: Urethral Estrella draining clear pink urine small clot irrigated by the bedside nurse  Flowing well on moderate drip CBI  Musculoskeletal: Normal range of motion  Skin:     General: Skin is warm and dry  Coloration: Skin is not pale  Findings: No erythema or rash  Neurological:      Mental Status: He is alert and oriented to person, place, and time  Psychiatric:         Behavior: Behavior normal  Behavior is cooperative  Thought Content:  Thought content normal          Judgment: Judgment normal          History:    Past Medical History:   Diagnosis Date    Cardiac disease     [facility]congest[facility] failure[facility] ([facility] )     Diabetes mellitus (HCC)     GERD (gastroesophageal reflux disease)     Hyperlipidemia     Hypertension     MI (myocardial infarction) ([place] [address] )      Past Surgical History:   Procedure Laterality Date    EYE SURGERY       Family History   Problem Relation Age of Onset    Diabetes Mother     Hypertension Mother     Cancer Father     Hypertension Father     Cancer Sister     Hypertension Sister      Social History     Socioeconomic History    Marital status: /Civil Union     Spouse name: None    Number of children: None    Years of education: None    Highest education level: None   Occupational History    None   Social Needs    Financial resource strain: None    Food insecurity     Worry: None     Inability: None    Transportation needs     Medical: None     Non-medical: None   Tobacco Use    Smoking status: Never Smoker    Smokeless tobacco: Never Used   Substance and Sexual Activity    Alcohol use: No     Alcohol/week: 0 0 standard drinks     Frequency: Never     Binge frequency: Never    Drug use: No    Sexual activity: Not Currently   Lifestyle    Physical activity     Days per week: None     Minutes per session: None    Stress: None   Relationships    Social connections     Talks on phone: None     Gets together: None     Attends L[employer] day service: None     Active member of club or organization: None     Attends meetings of clubs or organizations: None     Relationship status: None    Intimate partner violence     Fear of current or ex partner: None     Emotionally abused: None     Physically abused: None     Forced sexual activity: None   Other Topics Concern    None   Social History Narrative    None       Labs:  Results from last 7 days   Lab Units 09/10/20  0559 09/09/20  0600 09/08/20  0447   WBC Thousand/uL 7 65 9 74 8 71   HEMOGLOBIN g/dL 9 3* 10 6* 10 9*   PLATELETS Thousands/uL 229 262 250     Results from last 7 days   Lab Units 09/10/20  0559 09/09/20  1704 09/09/20  0600 09/08/20  0447   SODIUM mmol/L 138 130* 135* 136   POTASSIUM mmol/L 4 3 4 5 4 2 4 2   CHLORIDE mmol/L 103 99* 101 103   CO2 mmol/L 30 25 25 27   BUN mg/dL 21 24 19 15   CREATININE mg/dL 1 17 1 31* 1 15 0 91   EGFR ml/min/1 73sq m 57 50 59 78   CALCIUM mg/dL 8 7 8 5 9 0 8 6   AST U/L  --   --   --  11   ALT U/L  --   --   --  19   ALK PHOS U/L  --   --   --  53               Rk Bueno PA-C  Date: 9/10/2020 Time: 11:40 AM

## 2020-09-10 NOTE — PROGRESS NOTES
Progress Note - Merly Vásquez 1935, 82 y o  male MRN: [id number]    Unit/Bed#: E4 -01 Encounter: [id number]    Primary Care Provider: No primary care provider on file  Date and time admitted to hospital: 9/7/2020  4:48 PM        * Gross hematuria  Assessment & Plan  82year old [address] speaking male admitted due to recurrent gross hematuria  - Monitor hemoglobin levels  - Awaiting cardiology risk stratification, planned for cardiac catheterization this friday  - Continue CBI  - Will continue holding Aspirin and Plavix      Benign prostatic hyperplasia with lower urinary tract symptoms  Assessment & Plan  Symptomatic  With acute urinary retention   - For possible cystoscopy and TURP  - Continue Flomax and Finasteride    Acute blood loss anemia  Assessment & Plan  Secondary to gross hematuria  Continue monitoring for the time being  No indication for transfusion at this time  Recent Labs     09/07/20  1416 09/08/20  0447 09/09/20  0600 09/10/20  0559   HGB 11 3* 10 9* 10 6* 9 3*   MCV 91 92 93 93   RDW 14 3 14 2 14 2 14 1         Chronic combined systolic and diastolic congestive heart failure (HCC)  Assessment & Plan  Wt Readings from Last 3 Encounters:   09/10/20 95 9 kg (211 lb 6 7 oz)   09/07/20 98 kg (216 lb)   09/04/20 99 3 kg (219 lb)     Euvolemic  Not in exacerbation    - Aspirin Plavix held in the setting of gross bleeding  - On BB, ARB, ISMN        Poorly controlled type 2 diabetes mellitus [facility])  Assessment & Plan  Lab Results   Component Value Date    HGBA1C 7 7 (H) 08/17/2020       Recent Labs     09/09/20  1325 09/09/20  1444 09/09/20  2101 09/10/20  0746   POCGLU 202* 292* 318* 164*       Blood Sugar Average: Last 72 hrs:  (P) 219   IASS, Glargine 20U HS   Hold oral hypoglycemics (Glipizide, Metformin, and Januvia)  Fingerstick monitoring    Essential hypertension  Assessment & Plan  Controlled  - Continue monitoring BP  - Continue carvedilol, ARB, and lasix    Hyperlipidemia  Assessment & Plan  Continue statin    GERD (gastroesophageal reflux disease)  Assessment & Plan  Continue Famotidine      VTE Pharmacologic Prophylaxis:   Pharmacologic: Pharmacologic VTE Prophylaxis contraindicated due to Gross hematuria  Mechanical VTE Prophylaxis in Place: Yes    Patient Centered Rounds: I have performed bedside rounds with nursing staff today  Discussions with Specialists or Other Care Team Provider: Nursing, cardiology    Education and Discussions with Family / Patient: Patient, son    Time Spent for Care: 30 minutes  More than 50% of total time spent on counseling and coordination of care as described above  Current Length of Stay: 3 day(s)    Current Patient Status: Inpatient   Certification Statement: The patient will continue to require additional inpatient hospital stay due to Cardiac testing (possible cath), cardiology reevaluation, urology intervention afterwards    Discharge Plan: active    Code Status: Level 1 - Full Code      Subjective:   Patient seen and examined at bedside  Feels much better today compared to yesterday    Objective:     Vitals:   Temp (24hrs), Av °F (36 7 °C), Min:97 5 °F (36 4 °C), Max:99 1 °F (37 3 °C)    Temp:  [97 5 °F (36 4 °C)-99 1 °F (37 3 °C)] 97 7 °F (36 5 °C)  HR:  [85-92] 87  Resp:  [18] 18  BP: (100-147)/(53-64) 147/64  SpO2:  [95 %-96 %] 95 %  Body mass index is 33 11 kg/m²  Input and Output Summary (last 24 hours): Intake/Output Summary (Last 24 hours) at 9/10/2020 0929  Last data filed at 9/10/2020 0650  Gross per 24 hour   Intake 480 ml   Output -2000 ml   Net 2480 ml       Physical Exam:     Physical Exam  Vitals signs reviewed  HENT:      Head: Normocephalic  Nose: Nose normal       Mouth/Throat:      Mouth: Mucous membranes are moist    Eyes:      General: No scleral icterus  Pupils: Pupils are equal, round, and reactive to light  Cardiovascular:      Rate and Rhythm: Normal rate  Pulmonary:      Effort: Pulmonary effort is normal  No respiratory distress  Breath sounds: No wheezing  Abdominal:      General: There is no distension  Palpations: Abdomen is soft  Tenderness: There is abdominal tenderness  There is no guarding  Genitourinary:     Comments: Estrella catheter in place, still draining cherry pink urine  Skin:     General: Skin is warm  Neurological:      Mental Status: He is alert  Mental status is at baseline  Psychiatric:         Mood and Affect: Mood normal          Behavior: Behavior normal        Additional Data:     Labs:    Results from last 7 days   Lab Units 09/10/20  0559   WBC Thousand/uL 7 65   HEMOGLOBIN g/dL 9 3*   HEMATOCRIT % 29 4*   PLATELETS Thousands/uL 229   NEUTROS PCT % 63   LYMPHS PCT % 16   MONOS PCT % 11   EOS PCT % 8*     Results from last 7 days   Lab Units 09/10/20  0559  09/08/20  0447   SODIUM mmol/L 138   < > 136   POTASSIUM mmol/L 4 3   < > 4 2   CHLORIDE mmol/L 103   < > 103   CO2 mmol/L 30   < > 27   BUN mg/dL 21   < > 15   CREATININE mg/dL 1 17   < > 0 91   ANION GAP mmol/L 5   < > 6   CALCIUM mg/dL 8 7   < > 8 6   ALBUMIN g/dL  --   --  2 9*   TOTAL BILIRUBIN mg/dL  --   --  0 33   ALK PHOS U/L  --   --  53   ALT U/L  --   --  19   AST U/L  --   --  11   GLUCOSE RANDOM mg/dL 183*   < > 175*    < > = values in this interval not displayed  Results from last 7 days   Lab Units 09/07/20  1416   INR  0 98     Results from last 7 days   Lab Units 09/10/20  0746 09/09/20  2101 09/09/20  1444 09/09/20  1325 09/09/20  1102 09/09/20  0728 09/08/20  2104 09/08/20  1619 09/08/20  1148 09/08/20  0722 09/07/20  2114 09/07/20  1721   POC GLUCOSE mg/dl 164* 318* 292* 202* 179* 139 260* 261* 220* 145* 229* 320*                   * I Have Reviewed All Lab Data Listed Above  * Additional Pertinent Lab Tests Reviewed:  All Labs For Current Hospital Admission Reviewed    Imaging:    Imaging Reports Reviewed Today Include: CT head wo contrast    Recent Cultures (last 7 days):           Last 24 Hours Medication List:   Current Facility-Administered Medications   Medication Dose Route Frequency Provider Last Rate    acetaminophen  650 mg Oral Q6H PRN Himanshu Mejía MD      Or    oxyCODONE-acetaminophen  1 tablet Oral Q6H PRN Himanshu Mejía MD      Or    oxyCODONE-acetaminophen  2 tablet Oral Q6H PRN Himanshu Mejía MD      albuterol  2 puff Inhalation Q4H PRN Himanshu Mejía MD      belladonna-opium  30 mg Rectal Q8H PRN Pippa Adair MD      carvedilol  12 5 mg Oral BID With Meals Himanshu Mejía MD      docusate sodium  100 mg Oral Q12H Himanshu Mejía MD      famotidine  20 mg Oral Daily Himanshu Mejía MD      finasteride  5 mg Oral Daily Himanshu Mejía MD      furosemide  20 mg Oral BID Himanshu Mejía MD      gabapentin  300 mg Oral BID Himanshu Mejía MD      insulin glargine  20 Units Subcutaneous HS Himanshu Mejía MD      insulin lispro  1-6 Units Subcutaneous TID [facility] Himanshu Mejía MD      isosorbide mononitrate  30 mg Oral Daily Himanshu Mejía MD      losartan  50 mg Oral Daily Himanshu Mejía MD      nitroglycerin  0 4 mg Sublingual Q5 Min PRN Himanshu Mejía MD      oxybutynin  5 mg Oral Daily Pippa Adair MD      pravastatin  40 mg Oral Daily With Luz Harkins MD      tamsulosin  0 8 mg Oral Daily With Luz Harkins MD          Today, Patient Was Seen By: Sandi Prince MD    ** Please Note: Dictation voice to text software may have been used in the creation of this document   **

## 2020-09-10 NOTE — ASSESSMENT & PLAN NOTE
Lab Results   Component Value Date    HGBA1C 7 7 (H) 08/17/2020       Recent Labs     09/09/20  1325 09/09/20  1444 09/09/20  2101 09/10/20  0746   POCGLU 202* 292* 318* 164*       Blood Sugar Average: Last 72 hrs:  (P) 219   IASS, Glargine 20U HS   Hold oral hypoglycemics (Glipizide, Metformin, and Januvia)  Fingerstick monitoring

## 2020-09-10 NOTE — ASSESSMENT & PLAN NOTE
82year old [place] speaking male admitted due to recurrent gross hematuria    - Monitor hemoglobin levels  - Awaiting cardiology risk stratification, planned for cardiac catheterization this friday  - Continue CBI  - Will continue holding Aspirin and Plavix

## 2020-09-10 NOTE — PROGRESS NOTES
Progress Note - Cardiology   Merly Fernandez 82 y o  male MRN: [id number]  Unit/Bed#: E4 -01 Encounter: [id number]    Assessment:  1  Postoperative cardiovascular examination for urologic procedures  2  Exertional angina pectoris  3  Paroxysmal nocturnal dyspnea  4  Dyspnea on exertion  5  Severely decreased left ventricular systolic function with grade 1 left ventricular diastolic function and left ventricular cavity enlargement  6  Hypertension  7  Diabetes mellitus  8  Gross hematuria  9  BPH    Plan: For cardiac catheterization tomorrow to define patient's anatomy and make further plans asked appropriate for urologic surgery      Interval history:  No chest pain overnight  For cardiac catheterization tomorrow to further risk stratify patient for urologic surgery  Vitals: /64 (BP Location: Right arm)   Pulse 87   Temp 97 7 °F (36 5 °C) (Temporal)   Resp 18   Ht 5' 7" (1 702 m)   Wt 95 9 kg (211 lb 6 7 oz)   SpO2 95%   BMI 33 11 kg/m²   Vitals:    09/09/20 0556 09/10/20 0552   Weight: 96 kg (211 lb 10 3 oz) 95 9 kg (211 lb 6 7 oz)     Orthostatic Blood Pressures      Most Recent Value   Blood Pressure  147/64 filed at 09/10/2020 1257   Patient Position - Orthostatic VS  Lying filed at 09/10/2020 7626            Intake/Output Summary (Last 24 hours) at 9/10/2020 1101  Last data filed at 9/10/2020 0650  Gross per 24 hour   Intake 480 ml   Output -4250 ml   Net 4730 ml       Invasive Devices     Peripheral Intravenous Line            Peripheral IV 09/07/20 Antecubital 2 days          Drain            Continuous Bladder Irrigation Three-way 2 days                Review of Systems   Constitutional: Negative for activity change  Respiratory: Positive for shortness of breath  Negative for cough, chest tightness and wheezing  Cardiovascular: Positive for leg swelling  Negative for chest pain and palpitations  Musculoskeletal: Negative for gait problem     Skin: Negative for color change  Neurological: Negative for dizziness, tremors, syncope, weakness, light-headedness and headaches  Psychiatric/Behavioral: Negative for agitation and confusion  Physical Exam  Constitutional:       General: He is not in acute distress  Appearance: He is well-developed  Comments: Obese, BMI 33 1   HENT:      Head: Normocephalic and atraumatic  Neck:      Vascular: No JVD  Cardiovascular:      Rate and Rhythm: Normal rate and regular rhythm  Heart sounds: Normal heart sounds  No murmur  No friction rub  No gallop  Pulmonary:      Effort: Pulmonary effort is normal  No respiratory distress  Breath sounds: Normal breath sounds  No wheezing or rales  Chest:      Chest wall: No tenderness  Abdominal:      General: Bowel sounds are normal  There is no distension  Palpations: Abdomen is soft  Genitourinary:     Comments: Estrella catheter in place  Skin:     General: Skin is warm and dry  Neurological:      Mental Status: He is alert and oriented to person, place, and time  Psychiatric:         Behavior: Behavior normal            --------------------------------------------------------------------------------  ECHO:   Results for orders placed during the hospital encounter of 20   Echo complete with contrast if indicated    Tasha Marcus 50  Kyaw Reese 35  \[facility] [place] [facility]\"", [address] [facility]  [phone]    Transthoracic Echocardiogram  2D, M-mode, Doppler, and Color Doppler    Study date:  08-Sep-2020    Patient: Mehdi Patel  MR number: [id number]  Account number: [de-identified]  : 1935  Age: 82 years  Gender: Male  Status: Inpatient  Location: Bedside  Height: 67 in  Weight: 213 6 lb  BP: 102/ 55 mmHg    Indications: Dyspnea      Diagnoses: R06 00 - Dyspnea, unspecified    Sonographer:  Gwen Antoine RDCS  Referring Physician:  Jennye Babinski, MD  Group:  [facility]  Interpreting Physician:  Bennett Covington Yoon Cortés DO    SUMMARY    SUMMARY:  1  This is a technically difficult but adequate study with the use of echo contrast  2  Left ventricle is mildly dilated with severely reduced systolic function  Left ventricular ejection fraction is estimated at 30%  3  Mild concentric left ventricular hypertrophy  4  Grade 1 diastolic dysfunction  5  Regional wall motion abnormalities consistent with coronary artery disease  6  Mild right ventricular dilatation with normal systolic function  7  Left atrium is mildly dilated  8  Aortic valve sclerotic with adequate separation  9  Fibrocalcific changes of the mitral valve with trace mitral regurgitation  10  Pulmonary artery systolic pressures cannot be estimated due to lack of tricuspid regurgitation jet  11  Aortic root is mildly dilated at 3 5 cm  12  There is no study for comparison    SUMMARY MEASUREMENTS  2D measurements:  Unspecified Anatomy:   RWT was 0 4   MM measurements:  Unspecified Anatomy:   TAPSE was 2 3 cm  PW measurements:  Unspecified Anatomy:   E' Sept was 0 1 m/s  E/E' Sept was 8 8   MV A Scott was 0 7 m/s  MV Dec [place] was 2 3 m/s2  MV DecT was 213 5 ms   MV E Scott was 0 5 m/s  MV E/A Ratio was 0 7   MV PHT was 61 9 ms  MVA By PHT was 3 6 cm2  HISTORY: PRIOR HISTORY: CHF  GERD  Hypertension  Hyperlipidemia  Diabetes  CAD  MI  Sepsis  PROCEDURE: The procedure was performed at the bedside  This was a routine study  The transthoracic approach was used  The study included complete 2D imaging, M-mode, complete spectral Doppler, and color Doppler  The heart rate was 87 bpm,  at the start of the study  Images were obtained from the parasternal, apical, subcostal, and suprasternal notch acoustic windows  Intravenous contrast ( 0 6 ml Definity in NSS) was administered to opacify the left ventricle  Echocardiographic views were limited due to restricted patient mobility, poor acoustic window availability, and decreased penetration   This was a technically difficult study  LEFT VENTRICLE: Left ventricle is mildly dilated with severely reduced systolic function  Left ventricular ejection fraction is estimated at 30%  Mild concentric left ventricular hypertrophy  Grade 1 diastolic dysfunction  There is severe  hypokinesis of the anterior and anteroseptal wall from base to mid  The remaining walls are mildly hypokinetic  RIGHT VENTRICLE: Right ventricle is mildly dilated with normal systolic function  LEFT ATRIUM: Left atrium is mildly dilated  ATRIAL SEPTUM: The interatrial septum appears to be grossly normal without evidence of shunting by color-flow Doppler  RIGHT ATRIUM: Right atrium is top normal in size    MITRAL VALVE: Mitral valve opens well  There are fibrocalcific changes of the mitral valve and chordae  No evidence of mitral stenosis  Trace mitral regurgitation  AORTIC VALVE: Aortic valve is sclerotic with adequate separation  No evidence aortic stenosis or aortic regurgitation  TRICUSPID VALVE: Tricuspid valve opens well  No evidence tricuspid regurgitation  Pulmonary artery systolic pressures cannot be estimated due to lack of tricuspid regurgitation jet  PULMONIC VALVE: Pulmonic valve is not well visualized  No evidence of pulmonic stenosis or pulmonic regurgitation  PERICARDIUM: There is no evidence of significant pericardial effusion  AORTA: The aortic root is mildly dilated at 3 5 cm  SYSTEMIC VEINS: IVC: The IVC is normal size with collapse      SYSTEM MEASUREMENT TABLES    2D  %FS: 24 5 %  Ao Diam: 3 5 cm  EDV(Teich): 198 9 ml  EF(Teich): 47 7 %  ESV(Teich): 104 1 ml  IVSd: 1 1 cm  LA Area: 21 4 cm2  LA Diam: 4 1 cm  LVIDd: 6 3 cm  LVIDs: 4 7 cm  LVPWd: 1 2 cm  RA Area: 18 4 cm2  RVIDd: 4 4 cm  RWT: 0 4  SV(Teich): 94 8 ml    MM  TAPSE: 2 3 cm    PW  E' Sept: 0 1 m/s  E/E' Sept: 8 8  MV A Scott: 0 7 m/s  MV Dec [place]: 2 3 m/s2  MV DecT: 213 5 ms  MV E Scott: 0 5 m/s  MV E/A Ratio: 0 7  MV PHT: 61 9 ms  MVA By PHT: 3 6 cm2    [address] [facility]redited [facility]    Prepared and electronically signed by    Brian Martinez DO  Signed 09-Sep-2020 11:02:18         ======================================================    Lab Results   Component Value Date    WBC 7 65 09/10/2020    HGB 9 3 (L) 09/10/2020    HCT 29 4 (L) 09/10/2020    MCV 93 09/10/2020     09/10/2020      Lab Results   Component Value Date    SODIUM 138 09/10/2020    K 4 3 09/10/2020     09/10/2020    CO2 30 09/10/2020    BUN 21 09/10/2020    CREATININE 1 17 09/10/2020    GLUC 183 (H) 09/10/2020    CALCIUM 8 7 09/10/2020      Lab Results   Component Value Date    HGBA1C 7 7 (H) 08/17/2020      Lab Results   Component Value Date    INR 0 98 09/07/2020    INR 0 97 09/01/2020    INR 0 99 07/22/2020    PROTIME 13 0 09/07/2020    PROTIME 12 9 09/01/2020    PROTIME 12 8 07/22/2020        Imaging:   I have personally reviewed pertinent reports

## 2020-09-10 NOTE — SOCIAL WORK
LOS 2 days, no bundle, and unplanned readmission is green with a score of 16  Pt is a 30 day readmission now back with gross hematuria  Pt has Marcial  Son was given phone number for [employer] to find a PCP and address/phone number of [address] [facility] office to change insurance since pt plans to live in [place] on the last hospital admission  Review prior assessment  Pt and his wife live with him in [place] and stays on 1st floor, few steps to enter  Son reports pt/spouse moved in with him a month ago from [facility]  Pt preferred language is Romanian  Pt has managed Medicare from [facility] and they are working on transitioning to another insurance  Pt will need PCP and wrote [employer] on discharge instruction sheets  Pt will use [facility] in [facility] for his prescriptions  Patient has indwelling [place] for a month because of cysto  Son will transport home  PT wrote for home PT vs return to previous environment with social supports  Pt will need RW  Because pt still has Marcial will not be able to get home PT and RW  TC to son to ask where in the process they are with changing insurance  Son stated he started the process, but still have no switched insurance  Explained to son PT is recommending RW and this would not be covered on pt's current insurance  Son stated he understood  Recommend son get RW at [facility] or [facility]  Son stated he understood

## 2020-09-10 NOTE — UTILIZATION REVIEW
Notification of Inpatient Admission/Inpatient Authorization Request   This is a Notification of Inpatient Admission for [address]  Be advised that this patient was admitted to our facility under Inpatient Status  Contact Mary Kay Bui at [phone] for additional admission information  [facility]  DEDICATED [phone]  Patient Name:   Bree Hoskins   YOB: 1935       [address]   [address]:   [place]  Tax ID: [id number]  NPI: [id number] Attending Provider/NPI:  Phone:  Address: Ulises Dan [[id number]]  [phone]  Same as the facility   Place of Service Code: 24 Place of Service Name:  [address] [place] [address]   Start Date: 9/7/20 1648 Discharge Date & Time: No discharge date for patient encounter  Type of Admission: Inpatient Status Discharge Disposition   (if discharged): [address] [place] [address]   Patient Diagnoses: Hematuria [R31 9]     Orders: Admission Orders (From admission, onward)     Ordered        09/07/20 1928  Inpatient Admission  Once                    Assigned Utilization Review Contact: Lise Suazo  Utilization   Network Utilization Review Department  Phone: [phone]; Fax [phone]  Email: [email or web address]   ATTENTION PAYERS: Please call the assigned Utilization  directly with any questions or concerns ALL voicemails in the department are confidential  Send all requests for admission clinical reviews, approved or denied determinations and any other requests to dedicated fax number belonging to the campus where the patient is receiving treatment

## 2020-09-10 NOTE — ASSESSMENT & PLAN NOTE
Wt Readings from Last 3 Encounters:   09/10/20 95 9 kg (211 lb 6 7 oz)   09/07/20 98 kg (216 lb)   09/04/20 99 3 kg (219 lb)     Euvolemic   Not in exacerbation    - Aspirin Plavix held in the setting of gross bleeding  - On BB, ARB, ISMN

## 2020-09-10 NOTE — ASSESSMENT & PLAN NOTE
Secondary to gross hematuria  Continue monitoring for the time being  No indication for transfusion at this time      Recent Labs     09/07/20  1416 09/08/20  0447 09/09/20  0600 09/10/20  0559   HGB 11 3* 10 9* 10 6* 9 3*   MCV 91 92 93 93   RDW 14 3 14 2 14 2 14 1

## 2020-09-10 NOTE — UTILIZATION REVIEW
Initial Clinical Review    Admission: Date/Time/Statement:   Admission Orders (From admission, onward)     Ordered        09/07/20 1928  Inpatient Admission  Once                   Orders Placed This Encounter   Procedures    Inpatient Admission     Standing Status:   Standing     Number of Occurrences:   1     Order Specific Question:   Admitting Physician     Answer:   Sabiha Sotelo [[id number]]     Order Specific Question:   Level of Care     Answer:   Med Surg [[id number]]     Order Specific Question:   Estimated length of stay     Answer:   More than 2 Midnights     Order Specific Question:   Certification     Answer:   I certify that inpatient services are medically necessary for this patient for a duration of greater than two midnights  See H&P and MD Progress Notes for additional information about the patient's course of treatment  Comments:   recurrent gross hematuria requiring urology evaluation, hemoglobin monitoring, continuous cbi, possible cystoscopy / TURP     On 9/7 @  0055,  83 yo male w/h/o BPH requiring chronic indwelling catheter  presented to [address] [place] [address] ER for "blocked winslow catheter" and hematuria that started prior evening  Pt currently under outpatient care of urology for enlarged prostate w/future surgery planned  Current winslow catheter was placed 6 days prior, last few days pt has had to flush at home for clotting  Tonight pt noted that there was no urine draining w/increasing abd fullness; No flank pain  IN ER,  couday catheter inserted by nurse w/multiple large clots returning,  Urine cleared w/flushing, dc to home    9/7  @  1117,    Pt returns to ER w/no urine in leg bag and copious bloody urine leaking around catheter   (pt on plavix and asa)   Urology was consulted and decision made to initiated CBI and transfer pt to [facility]  For urology/surgical services available       Will admit inpatient status ([facility])   to Internal medicine service at [facility] level of care,  For management of  Acute urinary retention w/gross hematuria 2/2 BPH  Will continue CBI, consult urology,  HOLD asa and plavix; Provide pain control and make NPO     9/8  UROLOGY   Hematuria controlled w/CBI  Will make NPO for tomorrow's planned  Cysto, clot evacuation, fulguration  Needs cardiac clearance (h/o chf)  Order echo, cardiac  And anesthesia consults  ED Triage Vitals   Temperature Pulse Respirations Blood Pressure SpO2   09/07/20 1640 09/07/20 1640 09/07/20 1640 09/07/20 1640 09/07/20 1640   97 8 °F (36 6 °C) (!) 106 17 136/65 98 %      Temp Source Heart Rate Source Patient Position - Orthostatic VS BP Location FiO2 (%)   09/07/20 1640 09/08/20 1202 09/07/20 1640 09/07/20 1640 --   Temporal Monitor Sitting Right arm       Pain Score       09/07/20 1751       No Pain          Wt Readings from Last 1 Encounters:   09/10/20 95 9 kg (211 lb 6 7 oz)     Additional Vital Signs:     09/08/20 1238      91      134/62       09/08/20 1202      95      121/57       09/08/20 0700   98 1 °F (36 7 °C)   93   20   112/60   96 %    09/08/20 0009   98 5 °F (36 9 °C)   91   18   102/55   96 %        Pertinent Labs/Diagnostic Test Results:   9/8  EKG  Nsr, prolonged QT   9/8  CTAP -  1   Distended urinary bladder despite the presence of a Estrella catheter; correlate with function  2   Hemorrhage dependently in the urinary bladder   Underlying small neoplasm is difficult to definitively exclude  3   Bilateral renal cysts and other subcentimeter hypodensities that are too small to characterize, statistically also cysts       4   Prostatomegaly   Correlate with PSA level                No clinically significant abnormality identified in the visualized lower chest            Results from last 7 days   Lab Units 09/10/20  0559 09/09/20  0600 09/08/20  0447 09/07/20  1416   WBC Thousand/uL 7 65 9 74 8 71 10 97*   HEMOGLOBIN g/dL 9 3* 10 6* 10 9* 11 3*   HEMATOCRIT % 29 4* 33 2* 34 0* 33 7* PLATELETS Thousands/uL 229 262 250 257   NEUTROS ABS Thousands/µL 4 95 6 06 5 31 8 20*         Results from last 7 days   Lab Units 09/10/20  0559 09/10/20  0015 09/09/20  1704 09/09/20  0600 09/08/20  0447 09/07/20  1416   SODIUM mmol/L 138  --  130* 135* 136 135*   POTASSIUM mmol/L 4 3  --  4 5 4 2 4 2 4 3   CHLORIDE mmol/L 103  --  99* 101 103 102   CO2 mmol/L 30  --  25 25 27 26   ANION GAP mmol/L 5  --  6 9 6 7   BUN mg/dL 21  --  24 19 15 16   CREATININE mg/dL 1 17  --  1 31* 1 15 0 91 1 00   EGFR ml/min/1 73sq m 57  --  50 59 78 69   CALCIUM mg/dL 8 7  --  8 5 9 0 8 6 8 6   MAGNESIUM mg/dL 1 9 2 1  --  2 1 1 9  --    PHOSPHORUS mg/dL  --   --   --   --  3 3  --      Results from last 7 days   Lab Units 09/08/20  0447   AST U/L 11   ALT U/L 19   ALK PHOS U/L 53   TOTAL PROTEIN g/dL 7 2   ALBUMIN g/dL 2 9*   TOTAL BILIRUBIN mg/dL 0 33     Results from last 7 days   Lab Units 09/10/20  1058 09/10/20  0746 09/09/20  2101 09/09/20  1444 09/09/20  1325 09/09/20  1102 09/09/20  0728 09/08/20  2104 09/08/20  1619 09/08/20  1148 09/08/20  0722 09/07/20  2114   POC GLUCOSE mg/dl 157* 164* 318* 292* 202* 179* 139 260* 261* 220* 145* 229*     Results from last 7 days   Lab Units 09/10/20  0559 09/09/20  1704 09/09/20  0600 09/08/20  0447 09/07/20  1416   GLUCOSE RANDOM mg/dL 183* 239* 167* 175* 222*     Results from last 7 days   Lab Units 09/07/20  1416   PROTIME seconds 13 0   INR  0 98   PTT seconds 29     Results from last 7 days   Lab Units 09/07/20  0202   CLARITY UA  Cloudy   COLOR UA  Red   SPEC GRAV UA  1 015   PH UA  6 5   GLUCOSE UA mg/dl Negative   KETONES UA mg/dl Negative   BLOOD UA  Large*   PROTEIN UA mg/dl 100 (2+)*   NITRITE UA  Negative   BILIRUBIN UA  Negative   UROBILINOGEN UA E U /dl 0 2   LEUKOCYTES UA  Trace*   WBC UA /hpf 4-10*   RBC UA /hpf Innumerable*   BACTERIA UA /hpf Occasional   EPITHELIAL CELLS WET PREP /hpf Occasional       Past Medical History:   Diagnosis Date    Cardiac disease  CHF (congestive heart failure) (HCC)     Diabetes mellitus ([facility] )     GERD (gastroesophageal reflux disease)     Hyperlipidemia     Hypertension     MI (myocardial infarction) ([facility] )      Present on Admission:   Benign prostatic hyperplasia with lower urinary tract symptoms   Chronic combined systolic and diastolic congestive heart failure (HCC)   GERD (gastroesophageal reflux disease)   Essential hypertension   Hyperlipidemia   Poorly controlled type 2 diabetes mellitus ([facility])      Admitting Diagnosis: Hematuria [R31 9]  Age/Sex: 82 y o  male     Admission Orders:  Consult urology;   Continuous bladder irrigation;   Telemetry;  SCDs; Daily wgt;  I/O q shift;  ECHO;   PT/OT eval and treat ;   accucks qid w/SSI; Lo carb - Lo na diet    Scheduled Medications:  carvedilol, 12 5 mg, Oral, BID With Meals  docusate sodium, 100 mg, Oral, Q12H  famotidine, 20 mg, Oral, Daily  finasteride, 5 mg, Oral, Daily  furosemide, 20 mg, Oral, BID  gabapentin, 300 mg, Oral, BID  insulin glargine, 20 Units, Subcutaneous, HS  insulin lispro, 1-6 Units, Subcutaneous, TID AC  isosorbide mononitrate, 30 mg, Oral, Daily  losartan, 50 mg, Oral, Daily  oxybutynin, 5 mg, Oral, Daily  pravastatin, 40 mg, Oral, Daily With Dinner  tamsulosin, 0 8 mg, Oral, Daily With Dinner      Continuous IV Infusions:     PRN Meds:  acetaminophen, 650 mg, Oral, Q6H PRN    Or  oxyCODONE-acetaminophen, 1 tablet, Oral, Q6H PRN    Or  oxyCODONE-acetaminophen, 2 tablet, Oral, Q6H PRN   GIVEN x2   albuterol, 2 puff, Inhalation, Q4H PRN  belladonna-opium, 30 mg, Rectal, Q8H PRN   Given x2  nitroglycerin, 0 4 mg, Sublingual, Q5 Min PRN      Network Utilization Review Department  [email or web address]  ATTENTION: Please call with any questions or concerns to [phone] and carefully listen to the prompts so that you are directed to the right person   All voicemails are confidential   Doc Latham all requests for admission clinical reviews, approved or denied determinations and any other requests to dedicated fax number below belonging to the campus where the patient is receiving treatment   List of dedicated fax numbers for the Facilities:  [address] [place] [address] DENIALS (Administrative/Medical Necessity) [phone]   [address]  (Maternity/NICU/Pediatrics) [phone]   Reyna Castro [phone]   [facility] [phone]   Sudha Oviedo [phone]   [address] [phone]   [address] [place] [address] [phone]   [place] [phone]   [address] [facility] [address]  [address] [address] [facility] [phone]

## 2020-09-11 ENCOUNTER — APPOINTMENT (OUTPATIENT)
Dept: NON INVASIVE DIAGNOSTICS | Facility: HOSPITAL | Age: 85
DRG: 653 | End: 2020-09-11
Attending: INTERNAL MEDICINE
Payer: MEDICARE

## 2020-09-11 LAB
GLUCOSE SERPL-MCNC: 152 MG/DL (ref 65–140)
GLUCOSE SERPL-MCNC: 182 MG/DL (ref 65–140)
GLUCOSE SERPL-MCNC: 203 MG/DL (ref 65–140)
GLUCOSE SERPL-MCNC: 257 MG/DL (ref 65–140)

## 2020-09-11 PROCEDURE — 4A023N7 MEASUREMENT OF CARDIAC SAMPLING AND PRESSURE, LEFT HEART, PERCUTANEOUS APPROACH: ICD-10-PCS | Performed by: INTERNAL MEDICINE

## 2020-09-11 PROCEDURE — 99232 SBSQ HOSP IP/OBS MODERATE 35: CPT | Performed by: STUDENT IN AN ORGANIZED HEALTH CARE EDUCATION/TRAINING PROGRAM

## 2020-09-11 PROCEDURE — 99152 MOD SED SAME PHYS/QHP 5/>YRS: CPT | Performed by: INTERNAL MEDICINE

## 2020-09-11 PROCEDURE — 93458 L HRT ARTERY/VENTRICLE ANGIO: CPT | Performed by: INTERNAL MEDICINE

## 2020-09-11 PROCEDURE — C1894 INTRO/SHEATH, NON-LASER: HCPCS | Performed by: INTERNAL MEDICINE

## 2020-09-11 PROCEDURE — 82948 REAGENT STRIP/BLOOD GLUCOSE: CPT

## 2020-09-11 PROCEDURE — B2111ZZ FLUOROSCOPY OF MULTIPLE CORONARY ARTERIES USING LOW OSMOLAR CONTRAST: ICD-10-PCS | Performed by: INTERNAL MEDICINE

## 2020-09-11 PROCEDURE — C1760 CLOSURE DEV, VASC: HCPCS | Performed by: INTERNAL MEDICINE

## 2020-09-11 RX ORDER — FENTANYL CITRATE 50 UG/ML
INJECTION, SOLUTION INTRAMUSCULAR; INTRAVENOUS CODE/TRAUMA/SEDATION MEDICATION
Status: COMPLETED | OUTPATIENT
Start: 2020-09-11 | End: 2020-09-11

## 2020-09-11 RX ORDER — LIDOCAINE HYDROCHLORIDE 10 MG/ML
INJECTION, SOLUTION EPIDURAL; INFILTRATION; INTRACAUDAL; PERINEURAL CODE/TRAUMA/SEDATION MEDICATION
Status: COMPLETED | OUTPATIENT
Start: 2020-09-11 | End: 2020-09-11

## 2020-09-11 RX ORDER — SODIUM CHLORIDE 9 MG/ML
75 INJECTION, SOLUTION INTRAVENOUS CONTINUOUS
Status: DISCONTINUED | OUTPATIENT
Start: 2020-09-11 | End: 2020-09-14

## 2020-09-11 RX ORDER — MIDAZOLAM HYDROCHLORIDE 2 MG/2ML
INJECTION, SOLUTION INTRAMUSCULAR; INTRAVENOUS CODE/TRAUMA/SEDATION MEDICATION
Status: COMPLETED | OUTPATIENT
Start: 2020-09-11 | End: 2020-09-11

## 2020-09-11 RX ADMIN — FAMOTIDINE 20 MG: 20 TABLET ORAL at 17:19

## 2020-09-11 RX ADMIN — CARVEDILOL 12.5 MG: 12.5 TABLET, FILM COATED ORAL at 17:20

## 2020-09-11 RX ADMIN — TAMSULOSIN HYDROCHLORIDE 0.8 MG: 0.4 CAPSULE ORAL at 17:19

## 2020-09-11 RX ADMIN — FENTANYL CITRATE 50 MCG: 50 INJECTION, SOLUTION INTRAMUSCULAR; INTRAVENOUS at 14:07

## 2020-09-11 RX ADMIN — FUROSEMIDE 20 MG: 20 TABLET ORAL at 17:20

## 2020-09-11 RX ADMIN — INSULIN GLARGINE 20 UNITS: 100 INJECTION, SOLUTION SUBCUTANEOUS at 21:39

## 2020-09-11 RX ADMIN — SODIUM CHLORIDE 75 ML/HR: 0.9 INJECTION, SOLUTION INTRAVENOUS at 15:00

## 2020-09-11 RX ADMIN — IOHEXOL 50 ML: 350 INJECTION, SOLUTION INTRAVENOUS at 14:23

## 2020-09-11 RX ADMIN — MIDAZOLAM 2 MG: 1 INJECTION INTRAMUSCULAR; INTRAVENOUS at 14:07

## 2020-09-11 RX ADMIN — LIDOCAINE HYDROCHLORIDE 5 ML: 10 INJECTION, SOLUTION EPIDURAL; INFILTRATION; INTRACAUDAL; PERINEURAL at 14:09

## 2020-09-11 RX ADMIN — FINASTERIDE 5 MG: 5 TABLET, FILM COATED ORAL at 17:20

## 2020-09-11 RX ADMIN — LOSARTAN POTASSIUM 50 MG: 50 TABLET, FILM COATED ORAL at 17:20

## 2020-09-11 RX ADMIN — ISOSORBIDE MONONITRATE 30 MG: 30 TABLET, EXTENDED RELEASE ORAL at 17:19

## 2020-09-11 RX ADMIN — DOCUSATE SODIUM 100 MG: 100 CAPSULE, LIQUID FILLED ORAL at 17:20

## 2020-09-11 RX ADMIN — INSULIN LISPRO 1 UNITS: 100 INJECTION, SOLUTION INTRAVENOUS; SUBCUTANEOUS at 17:20

## 2020-09-11 RX ADMIN — GABAPENTIN 300 MG: 300 CAPSULE ORAL at 17:20

## 2020-09-11 RX ADMIN — PRAVASTATIN SODIUM 40 MG: 40 TABLET ORAL at 17:19

## 2020-09-11 RX ADMIN — OXYBUTYNIN CHLORIDE 5 MG: 5 TABLET, EXTENDED RELEASE ORAL at 17:19

## 2020-09-11 NOTE — ASSESSMENT & PLAN NOTE
Wt Readings from Last 3 Encounters:   09/11/20 94 8 kg (208 lb 15 9 oz)   09/07/20 98 kg (216 lb)   09/04/20 99 3 kg (219 lb)     Euvolemic   Not in exacerbation    - Aspirin Plavix held in the setting of gross bleeding  - On BB, ARB, ISMN

## 2020-09-11 NOTE — PLAN OF CARE
Problem: PAIN - ADULT  Goal: Verbalizes/displays adequate comfort level or baseline comfort level  Description: Interventions:  - Encourage patient to monitor pain and request assistance  - Assess pain using appropriate pain scale  - Administer analgesics based on type and severity of pain and evaluate response  - Implement non-pharmacological measures as appropriate and evaluate response  - Consider cultural and social influences on pain and pain management  - Notify physician/advanced practitioner if interventions unsuccessful or patient reports new pain  Outcome: Progressing     Problem: INFECTION - ADULT  Goal: Absence or prevention of progression during hospitalization  Description: INTERVENTIONS:  - Assess and monitor for signs and symptoms of infection  - Monitor lab/diagnostic results  - Monitor all insertion sites, i e  indwelling lines, tubes, and drains  - Monitor endotracheal if appropriate and nasal secretions for changes in amount and color  - [place] appropriate cooling/warming therapies per order  - Administer medications as ordered  - Instruct and encourage patient and family to use good hand hygiene technique  - Identify and instruct in appropriate isolation precautions for identified infection/condition  Outcome: Progressing  Goal: Absence of fever/infection during neutropenic period  Description: INTERVENTIONS:  - Monitor WBC    Outcome: Progressing     Problem: SAFETY ADULT  Goal: Patient will remain free of falls  Description: INTERVENTIONS:  - Assess patient frequently for physical needs  -  Identify cognitive and physical deficits and behaviors that affect risk of falls    -  [place] fall precautions as indicated by assessment   - Educate patient/family on patient safety including physical limitations  - Instruct patient to call for assistance with activity based on assessment  - Modify environment to reduce risk of injury  - Consider OT/PT consult to assist with strengthening/mobility  Outcome: Progressing  Goal: Maintain or return to baseline ADL function  Description: INTERVENTIONS:  -  Assess patient's ability to carry out ADLs; assess patient's baseline for ADL function and identify physical deficits which impact ability to perform ADLs (bathing, care of mouth/teeth, toileting, grooming, dressing, etc )  - Assess/evaluate cause of self-care deficits   - Assess range of motion  - Assess patient's mobility; develop plan if impaired  - Assess patient's need for assistive devices and provide as appropriate  - Encourage maximum independence but intervene and supervise when necessary  - Involve family in performance of ADLs  - Assess for home care needs following discharge   - Consider OT consult to assist with ADL evaluation and planning for discharge  - Provide patient education as appropriate  Outcome: Progressing  Goal: Maintain or return mobility status to optimal level  Description: INTERVENTIONS:  - Assess patient's baseline mobility status (ambulation, transfers, stairs, etc )    - Identify cognitive and physical deficits and behaviors that affect mobility  - Identify mobility aids required to assist with transfers and/or ambulation (gait belt, sit-to-stand, lift, walker, cane, etc )  - [place] fall precautions as indicated by assessment  - Record patient progress and toleration of activity level on Mobility SBAR; progress patient to next Phase/Stage  - Instruct patient to call for assistance with activity based on assessment  - Consider rehabilitation consult to assist with strengthening/weightbearing, etc   Outcome: Progressing     Problem: DISCHARGE PLANNING  Goal: Discharge to home or other facility with appropriate resources  Description: INTERVENTIONS:  - Identify barriers to discharge w/patient and caregiver  - Arrange for needed discharge resources and transportation as appropriate  - Identify discharge learning needs (meds, wound care, etc )  - Arrange for interpretive services to assist at discharge as needed  - Refer to Case Management Department for coordinating discharge planning if the patient needs post-hospital services based on physician/advanced practitioner order or complex needs related to functional status, cognitive ability, or social support system  Outcome: Progressing     Problem: Knowledge Deficit  Goal: Patient/family/caregiver demonstrates understanding of disease process, treatment plan, medications, and discharge instructions  Description: Complete learning assessment and assess knowledge base  Interventions:  - Provide teaching at level of understanding  - Provide teaching via preferred learning methods  Outcome: Progressing     Problem: Potential for Falls  Goal: Patient will remain free of falls  Description: INTERVENTIONS:  - Assess patient frequently for physical needs  -  Identify cognitive and physical deficits and behaviors that affect risk of falls    -  [place] fall precautions as indicated by assessment   - Educate patient/family on patient safety including physical limitations  - Instruct patient to call for assistance with activity based on assessment  - Modify environment to reduce risk of injury  - Consider OT/PT consult to assist with strengthening/mobility  Outcome: Progressing     Problem: GENITOURINARY - ADULT  Goal: Maintains or returns to baseline urinary function  Description: INTERVENTIONS:  - Assess urinary function  - Encourage oral fluids to ensure adequate hydration if ordered  - Administer IV fluids as ordered to ensure adequate hydration  - Administer ordered medications as needed  - Offer frequent toileting  - Follow urinary retention protocol if ordered  Outcome: Progressing  Goal: Absence of urinary retention  Description: INTERVENTIONS:  - Assess patients ability to void and empty bladder  - Monitor I/O  - Bladder scan as needed  - Discuss with physician/AP medications to alleviate retention as needed  - Discuss catheterization for long term situations as appropriate  Outcome: Progressing  Goal: Urinary catheter remains patent  Description: INTERVENTIONS:  - Assess patency of urinary catheter  - If patient has a chronic winslow, consider changing catheter if non-functioning  - Follow guidelines for intermittent irrigation of non-functioning urinary catheter  Outcome: Progressing

## 2020-09-11 NOTE — ASSESSMENT & PLAN NOTE
82year old [place] speaking male admitted due to recurrent gross hematuria  Cardiac catheterization not showing any evidence of disease    - Awaiting cardiology risk stratification, once cleared urology will proceed with intervention  - Monitor hemoglobin levels  - Continue CBI  - Will continue holding Aspirin and Plavix

## 2020-09-11 NOTE — ASSESSMENT & PLAN NOTE
Lab Results   Component Value Date    HGBA1C 7 7 (H) 08/17/2020       Recent Labs     09/10/20  1553 09/10/20  2128 09/11/20  0725 09/11/20  1125   POCGLU 255* 196* 203* 182*       Blood Sugar Average: Last 72 hrs:  (P) 046 [id number]   IASS, Glargine 20U HS   Hold oral hypoglycemics (Glipizide, Metformin, and Januvia)  Fingerstick monitoring

## 2020-09-11 NOTE — PROGRESS NOTES
Progress Note - Merly Jimenez 1935, 82 y o  male MRN: [id number]    Unit/Bed#: E4 -01 Encounter: [id number]    Primary Care Provider: No primary care provider on file  Date and time admitted to hospital: 9/7/2020  4:48 PM        * Gross hematuria  Assessment & Plan  82year old [address] speaking male admitted due to recurrent gross hematuria  Cardiac catheterization not showing any evidence of disease  - Awaiting cardiology risk stratification, once cleared urology will proceed with intervention  - Monitor hemoglobin levels  - Continue CBI  - Will continue holding Aspirin and Plavix      Benign prostatic hyperplasia with lower urinary tract symptoms  Assessment & Plan  Symptomatic  With acute urinary retention   - For possible cystoscopy and TURP  - Continue Flomax and Finasteride    Acute blood loss anemia  Assessment & Plan  Secondary to gross hematuria  Continue monitoring for the time being  No indication for transfusion at this time  Recent Labs     09/09/20  0600 09/10/20  0559   HGB 10 6* 9 3*   MCV 93 93   RDW 14 2 14 1         Chronic combined systolic and diastolic congestive heart failure (HCC)  Assessment & Plan  Wt Readings from Last 3 Encounters:   09/11/20 94 8 kg (208 lb 15 9 oz)   09/07/20 98 kg (216 lb)   09/04/20 99 3 kg (219 lb)     Euvolemic  Not in exacerbation    - Aspirin Plavix held in the setting of gross bleeding  - On BB, ARB, ISMN        Poorly controlled type 2 diabetes mellitus [facility])  Assessment & Plan  Lab Results   Component Value Date    HGBA1C 7 7 (H) 08/17/2020       Recent Labs     09/10/20  1553 09/10/20  2128 09/11/20  0725 09/11/20  1125   POCGLU 255* 196* 203* 182*       Blood Sugar Average: Last 72 hrs:  (P) 385 [id number]   IASS, Glargine 20U HS   Hold oral hypoglycemics (Glipizide, Metformin, and Januvia)  Fingerstick monitoring    Essential hypertension  Assessment & Plan  Controlled  - Continue monitoring BP  - Continue carvedilol, ARB, and lasix    Hyperlipidemia  Assessment & Plan  Continue statin    GERD (gastroesophageal reflux disease)  Assessment & Plan  Continue Famotidine      VTE Pharmacologic Prophylaxis:   Pharmacologic: Pharmacologic VTE Prophylaxis contraindicated due to gross hematuria  Mechanical VTE Prophylaxis in Place: Yes    Patient Centered Rounds: I have performed bedside rounds with nursing staff today  Discussions with Specialists or Other Care Team Provider: Cardiology    Education and Discussions with Family / Patient: Patient    Time Spent for Care: 30 minutes  More than 50% of total time spent on counseling and coordination of care as described above  Current Length of Stay: 4 day(s)    Current Patient Status: Inpatient   Certification Statement: The patient will continue to require additional inpatient hospital stay due to urology intervention, hemoglobin monitoring, cbi    Discharge Plan: active    Code Status: Level 1 - Full Code      Subjective:   Patient seen and examined at bedside  Still with bloody urine  No chest pain or shortness of breath  Comfortable  He understands he needs to stay longer for his urology procedure  Objective:     Vitals:   Temp (24hrs), Av 8 °F (36 6 °C), Min:97 °F (36 1 °C), Max:98 4 °F (36 9 °C)    Temp:  [97 °F (36 1 °C)-98 4 °F (36 9 °C)] 97 °F (36 1 °C)  HR:  [79-97] 97  Resp:  [16-18] 18  BP: ()/(50-72) 140/71  SpO2:  [94 %-97 %] 97 %  Body mass index is 32 73 kg/m²  Input and Output Summary (last 24 hours): Intake/Output Summary (Last 24 hours) at 2020 1617  Last data filed at 2020 1515  Gross per 24 hour   Intake    Output 4725 ml   Net -4725 ml       Physical Exam:     Physical Exam  Vitals signs reviewed  HENT:      Head: Normocephalic  Nose: Nose normal       Mouth/Throat:      Mouth: Mucous membranes are moist    Eyes:      General: No scleral icterus  Extraocular Movements: Extraocular movements intact        Pupils: Pupils are equal, round, and reactive to light  Cardiovascular:      Rate and Rhythm: Normal rate and regular rhythm  Heart sounds: No murmur  No gallop  Pulmonary:      Effort: Pulmonary effort is normal  No respiratory distress  Breath sounds: No wheezing or rales  Abdominal:      General: There is no distension  Palpations: Abdomen is soft  Tenderness: There is no abdominal tenderness  Genitourinary:     Comments: Estrella catheter in place, still with cherry red urine  Skin:     General: Skin is warm  Neurological:      Mental Status: He is alert  Mental status is at baseline  Psychiatric:         Mood and Affect: Mood normal          Behavior: Behavior normal          Additional Data:     Labs:    Results from last 7 days   Lab Units 09/10/20  0559   WBC Thousand/uL 7 65   HEMOGLOBIN g/dL 9 3*   HEMATOCRIT % 29 4*   PLATELETS Thousands/uL 229   NEUTROS PCT % 63   LYMPHS PCT % 16   MONOS PCT % 11   EOS PCT % 8*     Results from last 7 days   Lab Units 09/10/20  0559  09/08/20  0447   SODIUM mmol/L 138   < > 136   POTASSIUM mmol/L 4 3   < > 4 2   CHLORIDE mmol/L 103   < > 103   CO2 mmol/L 30   < > 27   BUN mg/dL 21   < > 15   CREATININE mg/dL 1 17   < > 0 91   ANION GAP mmol/L 5   < > 6   CALCIUM mg/dL 8 7   < > 8 6   ALBUMIN g/dL  --   --  2 9*   TOTAL BILIRUBIN mg/dL  --   --  0 33   ALK PHOS U/L  --   --  53   ALT U/L  --   --  19   AST U/L  --   --  11   GLUCOSE RANDOM mg/dL 183*   < > 175*    < > = values in this interval not displayed  Results from last 7 days   Lab Units 09/07/20  1416   INR  0 98     Results from last 7 days   Lab Units 09/11/20  1125 09/11/20  0725 09/10/20  2128 09/10/20  1553 09/10/20  1058 09/10/20  0746 09/09/20  2101 09/09/20  1444 09/09/20  1325 09/09/20  1102 09/09/20  0728 09/08/20  2104   POC GLUCOSE mg/dl 182* 203* 196* 255* 157* 164* 318* 292* 202* 179* 139 260*                   * I Have Reviewed All Lab Data Listed Above    * Additional Pertinent Lab Tests Reviewed: [facility] Admission Reviewed    Imaging:    Imaging Reports Reviewed Today Include: Cardiac cath    Recent Cultures (last 7 days):           Last 24 Hours Medication List:   Current Facility-Administered Medications   Medication Dose Route Frequency Provider Last Rate    acetaminophen  650 mg Oral Q6H PRN Master Peralta MD      Or    oxyCODONE-acetaminophen  1 tablet Oral Q6H PRN Master Peralta MD      Or    oxyCODONE-acetaminophen  2 tablet Oral Q6H PRN Master Peralta MD      albuterol  2 puff Inhalation Q4H PRN Master Peralta, MD      belladonna-opium  30 mg Rectal Q8H PRN Naren Muhammad MD      carvedilol  12 5 mg Oral BID With Meals Master Peralta MD      docusate sodium  100 mg Oral Q12H Master Peralta MD      famotidine  20 mg Oral Daily Master Peralta, MD      finasteride  5 mg Oral Daily Master Peralta, MD      furosemide  20 mg Oral BID Master Peralta, MD      gabapentin  300 mg Oral BID Master Peralta MD      insulin glargine  20 Units Subcutaneous HS Master Peralta MD      insulin lispro  1-6 Units Subcutaneous TID [facility] Master Peralta MD      isosorbide mononitrate  30 mg Oral Daily Master Peralta, MD      losartan  50 mg Oral Daily Master Peralta MD      nitroglycerin  0 4 mg Sublingual Q5 Min PRN Master Peralta MD      oxybutynin  5 mg Oral Daily Naren Muhammad MD      pravastatin  40 mg Oral Daily With Dinner Master Peralta MD      sodium chloride  75 mL/hr Intravenous Continuous Sam Rodrigez MD      tamsulosin  0 8 mg Oral Daily With Frank Villtaoro MD          Today, Patient Was Seen By: Tommie Duvall MD    ** Please Note: Dictation voice to text software may have been used in the creation of this document   **

## 2020-09-11 NOTE — OCCUPATIONAL THERAPY NOTE
Occupational Therapy         Patient Name: Al Bethea  [id number] Date: 9/11/2020      Attempted to see pt per POC however pt declining at this time  Pt has been NPO for cardiac cath lab procedure and is not feeling well  Cx at this time  Continue OT treatment as able/appropriate      Quique Monterroso, OT

## 2020-09-11 NOTE — PLAN OF CARE
Problem: PAIN - ADULT  Goal: Verbalizes/displays adequate comfort level or baseline comfort level  Description: Interventions:  - Encourage patient to monitor pain and request assistance  - Assess pain using appropriate pain scale  - Administer analgesics based on type and severity of pain and evaluate response  - Implement non-pharmacological measures as appropriate and evaluate response  - Consider cultural and social influences on pain and pain management  - Notify physician/advanced practitioner if interventions unsuccessful or patient reports new pain  Outcome: Progressing     Problem: INFECTION - ADULT  Goal: Absence or prevention of progression during hospitalization  Description: INTERVENTIONS:  - Assess and monitor for signs and symptoms of infection  - Monitor lab/diagnostic results  - Monitor all insertion sites, i e  indwelling lines, tubes, and drains  - Monitor endotracheal if appropriate and nasal secretions for changes in amount and color  - [place] appropriate cooling/warming therapies per order  - Administer medications as ordered  - Instruct and encourage patient and family to use good hand hygiene technique  - Identify and instruct in appropriate isolation precautions for identified infection/condition  Outcome: Progressing  Goal: Absence of fever/infection during neutropenic period  Description: INTERVENTIONS:  - Monitor WBC    Outcome: Progressing     Problem: SAFETY ADULT  Goal: Patient will remain free of falls  Description: INTERVENTIONS:  - Assess patient frequently for physical needs  -  Identify cognitive and physical deficits and behaviors that affect risk of falls    -  [place] fall precautions as indicated by assessment   - Educate patient/family on patient safety including physical limitations  - Instruct patient to call for assistance with activity based on assessment  - Modify environment to reduce risk of injury  - Consider OT/PT consult to assist with strengthening/mobility  Outcome: Progressing  Goal: Maintain or return to baseline ADL function  Description: INTERVENTIONS:  -  Assess patient's ability to carry out ADLs; assess patient's baseline for ADL function and identify physical deficits which impact ability to perform ADLs (bathing, care of mouth/teeth, toileting, grooming, dressing, etc )  - Assess/evaluate cause of self-care deficits   - Assess range of motion  - Assess patient's mobility; develop plan if impaired  - Assess patient's need for assistive devices and provide as appropriate  - Encourage maximum independence but intervene and supervise when necessary  - Involve family in performance of ADLs  - Assess for home care needs following discharge   - Consider OT consult to assist with ADL evaluation and planning for discharge  - Provide patient education as appropriate  Outcome: Progressing  Goal: Maintain or return mobility status to optimal level  Description: INTERVENTIONS:  - Assess patient's baseline mobility status (ambulation, transfers, stairs, etc )    - Identify cognitive and physical deficits and behaviors that affect mobility  - Identify mobility aids required to assist with transfers and/or ambulation (gait belt, sit-to-stand, lift, walker, cane, etc )  - [place] fall precautions as indicated by assessment  - Record patient progress and toleration of activity level on Mobility SBAR; progress patient to next Phase/Stage  - Instruct patient to call for assistance with activity based on assessment  - Consider rehabilitation consult to assist with strengthening/weightbearing, etc   Outcome: Progressing     Problem: DISCHARGE PLANNING  Goal: Discharge to home or other facility with appropriate resources  Description: INTERVENTIONS:  - Identify barriers to discharge w/patient and caregiver  - Arrange for needed discharge resources and transportation as appropriate  - Identify discharge learning needs (meds, wound care, etc )  - Arrange for interpretive services to assist at discharge as needed  - Refer to Case Management Department for coordinating discharge planning if the patient needs post-hospital services based on physician/advanced practitioner order or complex needs related to functional status, cognitive ability, or social support system  Outcome: Progressing     Problem: Knowledge Deficit  Goal: Patient/family/caregiver demonstrates understanding of disease process, treatment plan, medications, and discharge instructions  Description: Complete learning assessment and assess knowledge base  Interventions:  - Provide teaching at level of understanding  - Provide teaching via preferred learning methods  Outcome: Progressing     Problem: Potential for Falls  Goal: Patient will remain free of falls  Description: INTERVENTIONS:  - Assess patient frequently for physical needs  -  Identify cognitive and physical deficits and behaviors that affect risk of falls    -  [place] fall precautions as indicated by assessment   - Educate patient/family on patient safety including physical limitations  - Instruct patient to call for assistance with activity based on assessment  - Modify environment to reduce risk of injury  - Consider OT/PT consult to assist with strengthening/mobility  Outcome: Progressing

## 2020-09-11 NOTE — PHYSICAL THERAPY NOTE
Physical Therapy Cancellation Note    Attempted to see pt per PT POC however pt declining at this time  Per RN and son translating, pt has been NPO for cardiac cath lab procedure, is not feeling well at this time  Will cx and continue to follow during hospital stay      Alex Mandujano, PT

## 2020-09-11 NOTE — PLAN OF CARE
Problem: PAIN - ADULT  Goal: Verbalizes/displays adequate comfort level or baseline comfort level  Description: Interventions:  - Encourage patient to monitor pain and request assistance  - Assess pain using appropriate pain scale  - Administer analgesics based on type and severity of pain and evaluate response  - Implement non-pharmacological measures as appropriate and evaluate response  - Consider cultural and social influences on pain and pain management  - Notify physician/advanced practitioner if interventions unsuccessful or patient reports new pain  Outcome: Progressing     Problem: INFECTION - ADULT  Goal: Absence or prevention of progression during hospitalization  Description: INTERVENTIONS:  - Assess and monitor for signs and symptoms of infection  - Monitor lab/diagnostic results  - Monitor all insertion sites, i e  indwelling lines, tubes, and drains  - Monitor endotracheal if appropriate and nasal secretions for changes in amount and color  - [place] appropriate cooling/warming therapies per order  - Administer medications as ordered  - Instruct and encourage patient and family to use good hand hygiene technique  - Identify and instruct in appropriate isolation precautions for identified infection/condition  Outcome: Progressing  Goal: Absence of fever/infection during neutropenic period  Description: INTERVENTIONS:  - Monitor WBC    Outcome: Progressing     Problem: SAFETY ADULT  Goal: Patient will remain free of falls  Description: INTERVENTIONS:  - Assess patient frequently for physical needs  -  Identify cognitive and physical deficits and behaviors that affect risk of falls    -  [place] fall precautions as indicated by assessment   - Educate patient/family on patient safety including physical limitations  - Instruct patient to call for assistance with activity based on assessment  - Modify environment to reduce risk of injury  - Consider OT/PT consult to assist with strengthening/mobility  Outcome: Progressing  Goal: Maintain or return to baseline ADL function  Description: INTERVENTIONS:  -  Assess patient's ability to carry out ADLs; assess patient's baseline for ADL function and identify physical deficits which impact ability to perform ADLs (bathing, care of mouth/teeth, toileting, grooming, dressing, etc )  - Assess/evaluate cause of self-care deficits   - Assess range of motion  - Assess patient's mobility; develop plan if impaired  - Assess patient's need for assistive devices and provide as appropriate  - Encourage maximum independence but intervene and supervise when necessary  - Involve family in performance of ADLs  - Assess for home care needs following discharge   - Consider OT consult to assist with ADL evaluation and planning for discharge  - Provide patient education as appropriate  Outcome: Progressing  Goal: Maintain or return mobility status to optimal level  Description: INTERVENTIONS:  - Assess patient's baseline mobility status (ambulation, transfers, stairs, etc )    - Identify cognitive and physical deficits and behaviors that affect mobility  - Identify mobility aids required to assist with transfers and/or ambulation (gait belt, sit-to-stand, lift, walker, cane, etc )  - [place] fall precautions as indicated by assessment  - Record patient progress and toleration of activity level on Mobility SBAR; progress patient to next Phase/Stage  - Instruct patient to call for assistance with activity based on assessment  - Consider rehabilitation consult to assist with strengthening/weightbearing, etc   Outcome: Progressing     Problem: DISCHARGE PLANNING  Goal: Discharge to home or other facility with appropriate resources  Description: INTERVENTIONS:  - Identify barriers to discharge w/patient and caregiver  - Arrange for needed discharge resources and transportation as appropriate  - Identify discharge learning needs (meds, wound care, etc )  - Arrange for interpretive services to assist at discharge as needed  - Refer to Case Management Department for coordinating discharge planning if the patient needs post-hospital services based on physician/advanced practitioner order or complex needs related to functional status, cognitive ability, or social support system  Outcome: Progressing     Problem: Knowledge Deficit  Goal: Patient/family/caregiver demonstrates understanding of disease process, treatment plan, medications, and discharge instructions  Description: Complete learning assessment and assess knowledge base  Interventions:  - Provide teaching at level of understanding  - Provide teaching via preferred learning methods  Outcome: Progressing     Problem: Potential for Falls  Goal: Patient will remain free of falls  Description: INTERVENTIONS:  - Assess patient frequently for physical needs  -  Identify cognitive and physical deficits and behaviors that affect risk of falls    -  [place] fall precautions as indicated by assessment   - Educate patient/family on patient safety including physical limitations  - Instruct patient to call for assistance with activity based on assessment  - Modify environment to reduce risk of injury  - Consider OT/PT consult to assist with strengthening/mobility  Outcome: Progressing     Problem: GENITOURINARY - ADULT  Goal: Maintains or returns to baseline urinary function  Description: INTERVENTIONS:  - Assess urinary function  - Encourage oral fluids to ensure adequate hydration if ordered  - Administer IV fluids as ordered to ensure adequate hydration  - Administer ordered medications as needed  - Offer frequent toileting  - Follow urinary retention protocol if ordered  Outcome: Progressing  Goal: Absence of urinary retention  Description: INTERVENTIONS:  - Assess patients ability to void and empty bladder  - Monitor I/O  - Bladder scan as needed  - Discuss with physician/AP medications to alleviate retention as needed  - Discuss catheterization for long term situations as appropriate  Outcome: Progressing  Goal: Urinary catheter remains patent  Description: INTERVENTIONS:  - Assess patency of urinary catheter  - If patient has a chronic [place], consider changing catheter if non-functioning  - Follow guidelines for intermittent irrigation of non-functioning urinary catheter  Outcome: Progressing

## 2020-09-11 NOTE — ASSESSMENT & PLAN NOTE
Secondary to gross hematuria  Continue monitoring for the time being  No indication for transfusion at this time      Recent Labs     09/09/20  0600 09/10/20  0559   HGB 10 6* 9 3*   MCV 93 93   RDW 14 2 14 1

## 2020-09-12 LAB
ANION GAP SERPL CALCULATED.3IONS-SCNC: 8 MMOL/L (ref 4–13)
BASOPHILS # BLD AUTO: 0.04 THOUSANDS/ΜL (ref 0–0.1)
BASOPHILS NFR BLD AUTO: 1 % (ref 0–1)
BUN SERPL-MCNC: 16 MG/DL (ref 5–25)
CALCIUM SERPL-MCNC: 8.1 MG/DL (ref 8.3–10.1)
CHLORIDE SERPL-SCNC: 104 MMOL/L (ref 100–108)
CO2 SERPL-SCNC: 26 MMOL/L (ref 21–32)
CREAT SERPL-MCNC: 0.94 MG/DL (ref 0.6–1.3)
EOSINOPHIL # BLD AUTO: 0.69 THOUSAND/ΜL (ref 0–0.61)
EOSINOPHIL NFR BLD AUTO: 9 % (ref 0–6)
ERYTHROCYTE [DISTWIDTH] IN BLOOD BY AUTOMATED COUNT: 14.1 % (ref 11.6–15.1)
GFR SERPL CREATININE-BSD FRML MDRD: 75 ML/MIN/1.73SQ M
GLUCOSE SERPL-MCNC: 162 MG/DL (ref 65–140)
GLUCOSE SERPL-MCNC: 166 MG/DL (ref 65–140)
GLUCOSE SERPL-MCNC: 277 MG/DL (ref 65–140)
GLUCOSE SERPL-MCNC: 282 MG/DL (ref 65–140)
GLUCOSE SERPL-MCNC: 337 MG/DL (ref 65–140)
HCT VFR BLD AUTO: 23.9 % (ref 36.5–49.3)
HGB BLD-MCNC: 7.7 G/DL (ref 12–17)
IMM GRANULOCYTES # BLD AUTO: 0.07 THOUSAND/UL (ref 0–0.2)
IMM GRANULOCYTES NFR BLD AUTO: 1 % (ref 0–2)
LYMPHOCYTES # BLD AUTO: 1.76 THOUSANDS/ΜL (ref 0.6–4.47)
LYMPHOCYTES NFR BLD AUTO: 23 % (ref 14–44)
MAGNESIUM SERPL-MCNC: 2.2 MG/DL (ref 1.6–2.6)
MCH RBC QN AUTO: 30.4 PG (ref 26.8–34.3)
MCHC RBC AUTO-ENTMCNC: 32.2 G/DL (ref 31.4–37.4)
MCV RBC AUTO: 95 FL (ref 82–98)
MONOCYTES # BLD AUTO: 0.72 THOUSAND/ΜL (ref 0.17–1.22)
MONOCYTES NFR BLD AUTO: 10 % (ref 4–12)
NEUTROPHILS # BLD AUTO: 4.25 THOUSANDS/ΜL (ref 1.85–7.62)
NEUTS SEG NFR BLD AUTO: 56 % (ref 43–75)
NRBC BLD AUTO-RTO: 0 /100 WBCS
PLATELET # BLD AUTO: 227 THOUSANDS/UL (ref 149–390)
PMV BLD AUTO: 10.6 FL (ref 8.9–12.7)
POTASSIUM SERPL-SCNC: 3.9 MMOL/L (ref 3.5–5.3)
RBC # BLD AUTO: 2.53 MILLION/UL (ref 3.88–5.62)
SODIUM SERPL-SCNC: 138 MMOL/L (ref 136–145)
WBC # BLD AUTO: 7.53 THOUSAND/UL (ref 4.31–10.16)

## 2020-09-12 PROCEDURE — 99232 SBSQ HOSP IP/OBS MODERATE 35: CPT | Performed by: STUDENT IN AN ORGANIZED HEALTH CARE EDUCATION/TRAINING PROGRAM

## 2020-09-12 PROCEDURE — 99232 SBSQ HOSP IP/OBS MODERATE 35: CPT | Performed by: INTERNAL MEDICINE

## 2020-09-12 PROCEDURE — 83735 ASSAY OF MAGNESIUM: CPT | Performed by: STUDENT IN AN ORGANIZED HEALTH CARE EDUCATION/TRAINING PROGRAM

## 2020-09-12 PROCEDURE — 82948 REAGENT STRIP/BLOOD GLUCOSE: CPT

## 2020-09-12 PROCEDURE — 80048 BASIC METABOLIC PNL TOTAL CA: CPT | Performed by: INTERNAL MEDICINE

## 2020-09-12 PROCEDURE — 85025 COMPLETE CBC W/AUTO DIFF WBC: CPT | Performed by: STUDENT IN AN ORGANIZED HEALTH CARE EDUCATION/TRAINING PROGRAM

## 2020-09-12 PROCEDURE — 99231 SBSQ HOSP IP/OBS SF/LOW 25: CPT | Performed by: UROLOGY

## 2020-09-12 RX ADMIN — DOCUSATE SODIUM 100 MG: 100 CAPSULE, LIQUID FILLED ORAL at 21:36

## 2020-09-12 RX ADMIN — PRAVASTATIN SODIUM 40 MG: 40 TABLET ORAL at 16:56

## 2020-09-12 RX ADMIN — DOCUSATE SODIUM 100 MG: 100 CAPSULE, LIQUID FILLED ORAL at 08:39

## 2020-09-12 RX ADMIN — GABAPENTIN 300 MG: 300 CAPSULE ORAL at 08:40

## 2020-09-12 RX ADMIN — GABAPENTIN 300 MG: 300 CAPSULE ORAL at 17:01

## 2020-09-12 RX ADMIN — CARVEDILOL 12.5 MG: 12.5 TABLET, FILM COATED ORAL at 08:39

## 2020-09-12 RX ADMIN — TAMSULOSIN HYDROCHLORIDE 0.8 MG: 0.4 CAPSULE ORAL at 16:56

## 2020-09-12 RX ADMIN — FINASTERIDE 5 MG: 5 TABLET, FILM COATED ORAL at 08:40

## 2020-09-12 RX ADMIN — FUROSEMIDE 20 MG: 20 TABLET ORAL at 08:40

## 2020-09-12 RX ADMIN — OXYCODONE HYDROCHLORIDE AND ACETAMINOPHEN 2 TABLET: 5; 325 TABLET ORAL at 18:11

## 2020-09-12 RX ADMIN — SODIUM CHLORIDE 75 ML/HR: 0.9 INJECTION, SOLUTION INTRAVENOUS at 15:00

## 2020-09-12 RX ADMIN — ISOSORBIDE MONONITRATE 30 MG: 30 TABLET, EXTENDED RELEASE ORAL at 08:40

## 2020-09-12 RX ADMIN — INSULIN LISPRO 1 UNITS: 100 INJECTION, SOLUTION INTRAVENOUS; SUBCUTANEOUS at 08:39

## 2020-09-12 RX ADMIN — FAMOTIDINE 20 MG: 20 TABLET ORAL at 08:40

## 2020-09-12 RX ADMIN — OXYBUTYNIN CHLORIDE 5 MG: 5 TABLET, EXTENDED RELEASE ORAL at 08:39

## 2020-09-12 RX ADMIN — INSULIN GLARGINE 20 UNITS: 100 INJECTION, SOLUTION SUBCUTANEOUS at 21:36

## 2020-09-12 RX ADMIN — INSULIN LISPRO 4 UNITS: 100 INJECTION, SOLUTION INTRAVENOUS; SUBCUTANEOUS at 16:56

## 2020-09-12 RX ADMIN — INSULIN LISPRO 5 UNITS: 100 INJECTION, SOLUTION INTRAVENOUS; SUBCUTANEOUS at 11:49

## 2020-09-12 RX ADMIN — LOSARTAN POTASSIUM 50 MG: 50 TABLET, FILM COATED ORAL at 08:40

## 2020-09-12 NOTE — ASSESSMENT & PLAN NOTE
82year old Malay speaking male admitted due to recurrent gross hematuria   Cardiac catheterization not showing any evidence of disease   - Cardiology recommending that urology may proceed to TURP and cystoscopy  - Continue CBI  - Will continue holding Aspirin and Plavix

## 2020-09-12 NOTE — PROGRESS NOTES
Progress Note - Cardiology   Hortensio Lillard Landau 82 y o  male MRN: [id number]  Unit/Bed#: E4 -01 Encounter: [id number]    Assessment:  1  Trivial coronary artery disease  2  Severely decreased left ventricular systolic function with grade 1 left ventricular diastolic dysfunction and left ventricular cavity enlargement  3  Dyspnea on exertion and rare episodes of PND  4  Hypertension   5  Diabetes mellitus  6  Gross hematuria  7  BPH     Plan:  1  Recommend proceeding with cystoscopy and TURP  2  Consider patient to be a moderate cardiac risk due to decreased LV systolic function  Would avoid fluid overload, if possible, to reduce risk of CHF  3  Would like to follow patient in the office following discharge  Proper adjustment of his medications with afterload reduction and beta-blockade may resulted in significant improvement in LV function  Interval history:  Patient underwent a cardiac catheterization yesterday which demonstrated trivial coronary artery disease with no obstructive lesions  He has a dilated cardiomyopathy of uncertain etiology  It may be secondary to longstanding hypertension  Doubt history of small heart attacks in the past   Viral or other etiology cannot be ruled out  With proper medical management, his LV systolic function may improve  If LV function does not improve, he will eventually need an ICD        Vitals: /72 (BP Location: Left arm)   Pulse 88   Temp 97 7 °F (36 5 °C) (Temporal)   Resp 18   Ht 5' 7" (1 702 m)   Wt 95 kg (209 lb 7 oz)   SpO2 96%   BMI 32 80 kg/m²   Vitals:    09/11/20 0600 09/12/20 0600   Weight: 94 8 kg (208 lb 15 9 oz) 95 kg (209 lb 7 oz)     Orthostatic Blood Pressures      Most Recent Value   Blood Pressure  120/72 filed at 09/12/2020 1146   Patient Position - Orthostatic VS  Lying filed at 09/12/2020 1146            Intake/Output Summary (Last 24 hours) at 9/12/2020 1158  Last data filed at 9/12/2020 1155  Gross per 24 hour   Intake    Output 5300 ml   Net -5300 ml       Invasive Devices     Peripheral Intravenous Line            Peripheral IV 09/12/20 Left Antecubital less than 1 day          Drain            Continuous Bladder Irrigation Three-way 5 days                Review of Systems   Constitutional: Negative for activity change  Respiratory: Positive for shortness of breath  Negative for cough, chest tightness and wheezing  Cardiovascular: Negative for chest pain, palpitations and leg swelling  Musculoskeletal: Negative for gait problem  Skin: Negative for color change  Neurological: Negative for dizziness, tremors, syncope, weakness, light-headedness and headaches  Psychiatric/Behavioral: Negative for agitation and confusion  Physical Exam  Constitutional:       General: He is not in acute distress  Appearance: He is well-developed  HENT:      Head: Normocephalic and atraumatic  Neck:      Vascular: No JVD  Cardiovascular:      Rate and Rhythm: Normal rate and regular rhythm  Heart sounds: Normal heart sounds  No murmur  No friction rub  No gallop  Pulmonary:      Effort: Pulmonary effort is normal  No respiratory distress  Breath sounds: Normal breath sounds  No wheezing or rales  Chest:      Chest wall: No tenderness  Abdominal:      General: Bowel sounds are normal  There is no distension  Palpations: Abdomen is soft  Genitourinary:     Comments: Estrella in place  Skin:     General: Skin is warm and dry  Neurological:      Mental Status: He is alert and oriented to person, place, and time  Psychiatric:         Behavior: Behavior normal              --------------------------------------------------------------------------------  CATH:    Results for orders placed during the hospital encounter of 09/07/20   Cardiac catheterization    Narrative [address]  [address]    [place], [address]  [phone]    ([place])    Invasive Cardiovascular Lab Complete Report    Patient: Samaria Graham  MR number: [id number]  Account number: [de-identified]  Study date: 2020  Gender: Male  : 1935  Height: 66 9 in  Weight: 213 4 lb  BSA: 2 08 mï¾²    Allergies: MEPERIDINE    Diagnostic Cardiologist:  Kathryn Donovan MD    SUMMARY    CORONARY CIRCULATION:  Left main: Normal   LAD: The vessel was normal sized  Angiography showed minor luminal irregularities  Circumflex: The vessel was normal sized  Angiography showed minor luminal irregularities  The two OM branches were also free of significant disease  RCA: The vessel was normal sized and dominant, giving rise to the PDA and a posterolateral branch  There was minimal plaque  There were no significant lesions  HEMODYNAMICS:  Hemodynamic assessment demonstrated normal LVEDP (6 mmHg)  Summary:  No significant atherosclerosis was evident  Normal LVEDP  INDICATIONS:  --  Congestive heart failure with cardiomyopathy  PROCEDURES PERFORMED    --  Left heart catheterization without ventriculogram   --  Left coronary angiography  --  Right coronary angiography  --  Mod Sedation Same Physician Initial 15min  --  Coronary Catheterization (w/ LHC)  PROCEDURE: The risks and alternatives of the procedures and conscious sedation were explained to the patient and informed consent was obtained  The patient was brought to the cath lab and placed on the table  The planned puncture sites  were prepped and draped in the usual sterile fashion  --  Right femoral artery access  The puncture site was infiltrated with local anesthetic  The vessel was accessed using the modified Seldinger technique, a wire was advanced into the vessel, and a sheath was advanced over the wire into the  vessel  --  Left heart catheterization without ventriculogram  A catheter was advanced over a guidewire into the ascending aorta   After recording ascending aortic pressure, the catheter was advanced across the aortic valve and left ventricular  pressure was recorded  The catheter was pulled back across the aortic valve and into the ascending aorta and pullback pressures were obtained  --  Left coronary artery angiography  A catheter was advanced over a guidewire into the aorta and positioned in the left coronary artery ostium under fluoroscopic guidance  Angiography was performed  --  Right coronary artery angiography  A catheter was advanced over a guidewire into the aorta and positioned in the right coronary artery ostium under fluoroscopic guidance  Angiography was performed  --  Mod Sedation Same Physician Initial 15min  --  Coronary Catheterization (w/ [facility])  PROCEDURE COMPLETION: The patient tolerated the procedure well and was discharged from the cath lab  TIMING: Test started at 13:47  Test concluded at 14:21  HEMOSTASIS: The sheath was removed over a wire and the Angioseal delivery sheath  was inserted into the femoral artery  Hemostasis was obtained using a closure device ( Angioseal) deployed through the delivery sheath  MEDICATIONS GIVEN: Fentanyl (1OOmcg/2 ml), 50 mcg, IV, at 14:02  Versed (2mg/2ml), 2 mg, IV, at 14:02   1% Lidocaine, 5 ml, subcutaneously, at 14:08  CONTRAST GIVEN: 50 ml Omnipaque (350mg I /ml)  RADIATION EXPOSURE: Fluoroscopy time: 1 7 min  HEMODYNAMICS: Hemodynamic assessment demonstrated normal LVEDP (6 mmHg)  CORONARY VESSELS:   --  Left main: Normal   --  LAD: The vessel was normal sized  Angiography showed minor luminal irregularities  --  Circumflex: The vessel was normal sized  Angiography showed minor luminal irregularities  The two OM branches were also free of significant disease  --  RCA: The vessel was normal sized and dominant, giving rise to the PDA and a posterolateral branch  There was minimal plaque  There were no significant lesions  NOTE: Rght femoral access  Hemostasis was achieved with an Angioseal device  There were no comlications      Prepared and signed by    Jazmin Stark MD  Signed 2020 14:32:56    CC: Dr Kristen Talbot    Study diagram    Hemodynamic tables    Pressures:  Baseline  Pressures:  - HR: 85  Pressures:  - Rhythm:  Pressures:  -- Aortic Pressure (S/D/M): 121/52/69  Pressures:  -- Left Ventricle (s/edp): 120/6/--    Outputs:  Baseline  Outputs:  -- CALCULATIONS: [age]  Outputs:  -- CALCULATIONS: Body Surface Area: 2 08  Outputs:  -- CALCULATIONS: Height in cm: 170 00  Outputs:  -- CALCULATIONS: Sex: Male  Outputs:  -- CALCULATIONS: Weight in k 00       --------------------------------------------------------------------------------  ECHO:   Results for orders placed during the hospital encounter of 20   Echo complete with contrast if indicated    Tasha Marcus [address]  [facility]  [place], [address]  [phone]    Transthoracic Echocardiogram  2D, M-mode, Doppler, and Color Doppler    Study date:  08-Sep-2020    Patient: Stephanie Crow  MR number: [id number]  Account number: [de-identified]  : 1935  Age: 82 years  Gender: Male  Status: Inpatient  Location: Bedside  Height: 67 in  Weight: 213 6 lb  BP: 102/ 55 mmHg    Indications: Dyspnea  Diagnoses: R06 00 - Dyspnea, unspecified    Sonographer:  Lise Peralta RDCS  Referring Physician:  Alexx Stack MD  Group:  [facility]  Interpreting Physician:  Antonieta Cruz DO    SUMMARY    SUMMARY:  1  This is a technically difficult but adequate study with the use of echo contrast  2  Left ventricle is mildly dilated with severely reduced systolic function  Left ventricular ejection fraction is estimated at 30%  3  Mild concentric left ventricular hypertrophy  4  Grade 1 diastolic dysfunction  5  Regional wall motion abnormalities consistent with coronary artery disease  6  Mild right ventricular dilatation with normal systolic function  7  Left atrium is mildly dilated  8   Aortic valve sclerotic with adequate separation  9  Fibrocalcific changes of the mitral valve with trace mitral regurgitation  10  Pulmonary artery systolic pressures cannot be estimated due to lack of tricuspid regurgitation jet  11  Aortic root is mildly dilated at 3 5 cm  12  There is no study for comparison    SUMMARY MEASUREMENTS  2D measurements:  Unspecified Anatomy:   RWT was 0 4   MM measurements:  Unspecified Anatomy:   TAPSE was 2 3 cm  PW measurements:  Unspecified Anatomy:   E' Sept was 0 1 m/s  E/E' Sept was 8 8   MV A Scott was 0 7 m/s  MV Dec [place] was 2 3 m/s2  MV DecT was 213 5 ms   MV E Scott was 0 5 m/s  MV E/A Ratio was 0 7   MV PHT was 61 9 ms  MVA By PHT was 3 6 cm2  HISTORY: PRIOR HISTORY: CHF  GERD  Hypertension  Hyperlipidemia  Diabetes  CAD  MI  Sepsis  PROCEDURE: The procedure was performed at the bedside  This was a routine study  The transthoracic approach was used  The study included complete 2D imaging, M-mode, complete spectral Doppler, and color Doppler  The heart rate was 87 bpm,  at the start of the study  Images were obtained from the parasternal, apical, subcostal, and suprasternal notch acoustic windows  Intravenous contrast ( 0 6 ml Definity in NSS) was administered to opacify the left ventricle  Echocardiographic views were limited due to restricted patient mobility, poor acoustic window availability, and decreased penetration  This was a technically difficult study  LEFT VENTRICLE: Left ventricle is mildly dilated with severely reduced systolic function  Left ventricular ejection fraction is estimated at 30%  Mild concentric left ventricular hypertrophy  Grade 1 diastolic dysfunction  There is severe  hypokinesis of the anterior and anteroseptal wall from base to mid  The remaining walls are mildly hypokinetic  RIGHT VENTRICLE: Right ventricle is mildly dilated with normal systolic function  LEFT ATRIUM: Left atrium is mildly dilated      ATRIAL SEPTUM: The interatrial septum appears to be grossly normal without evidence of shunting by color-flow Doppler  RIGHT ATRIUM: Right atrium is top normal in size    MITRAL VALVE: Mitral valve opens well  There are fibrocalcific changes of the mitral valve and chordae  No evidence of mitral stenosis  Trace mitral regurgitation  AORTIC VALVE: Aortic valve is sclerotic with adequate separation  No evidence aortic stenosis or aortic regurgitation  TRICUSPID VALVE: Tricuspid valve opens well  No evidence tricuspid regurgitation  Pulmonary artery systolic pressures cannot be estimated due to lack of tricuspid regurgitation jet  PULMONIC VALVE: Pulmonic valve is not well visualized  No evidence of pulmonic stenosis or pulmonic regurgitation  PERICARDIUM: There is no evidence of significant pericardial effusion  AORTA: The aortic root is mildly dilated at 3 5 cm  SYSTEMIC VEINS: IVC: The IVC is normal size with collapse      SYSTEM MEASUREMENT TABLES    2D  %FS: 24 5 %  Ao Diam: 3 5 cm  EDV(Teich): 198 9 ml  EF(Teich): 47 7 %  ESV(Teich): 104 1 ml  IVSd: 1 1 cm  LA Area: 21 4 cm2  LA Diam: 4 1 cm  LVIDd: 6 3 cm  LVIDs: 4 7 cm  LVPWd: 1 2 cm  RA Area: 18 4 cm2  RVIDd: 4 4 cm  RWT: 0 4  SV(Teich): 94 8 ml    MM  TAPSE: 2 3 cm    PW  E' Sept: 0 1 m/s  E/E' Sept: 8 8  MV A Scott: 0 7 m/s  MV Dec [place]: 2 3 m/s2  MV DecT: 213 5 ms  MV E Scott: 0 5 m/s  MV E/A Ratio: 0 7  MV PHT: 61 9 ms  MVA By PHT: 3 6 cm2    [address] Accredited Echocardiography [facility]    Prepared and electronically signed by    Michael Waller DO  Signed 09-Sep-2020 11:02:18         ======================================================    Lab Results   Component Value Date    WBC 7 53 09/12/2020    HGB 7 7 (L) 09/12/2020    HCT 23 9 (L) 09/12/2020    MCV 95 09/12/2020     09/12/2020      Lab Results   Component Value Date    SODIUM 138 09/12/2020    K 3 9 09/12/2020     09/12/2020    CO2 26 09/12/2020    BUN 16 09/12/2020    CREATININE 0 94 09/12/2020    GLUC 166 (H) 09/12/2020    CALCIUM 8 1 (L) 09/12/2020      Lab Results   Component Value Date    HGBA1C 7 7 (H) 08/17/2020     Lab Results   Component Value Date    INR 0 98 09/07/2020    INR 0 97 09/01/2020    INR 0 99 07/22/2020    PROTIME 13 0 09/07/2020    PROTIME 12 9 09/01/2020    PROTIME 12 8 07/22/2020        Imaging:   I have personally reviewed pertinent reports

## 2020-09-12 NOTE — PROGRESS NOTES
[facility] 1935[facility] 82 [facility]  [facility] MRN: [id number]    Unit/Bed#: E4 -01 Encounter: [id number]    Primary Care Provider: No primary care provider on file  Date and time admitted to hospital: 9/7/2020  4:48 PM        * Gross hematuria  Assessment & Plan  82year old [address] speaking male admitted due to recurrent gross hematuria  Cardiac catheterization not showing any evidence of disease   - Cardiology recommending that urology may proceed to TURP and cystoscopy  - Continue CBI  - Will continue holding Aspirin and Plavix      Benign prostatic hyperplasia with lower urinary tract symptoms  Assessment & Plan  Symptomatic  With acute urinary retention   - For possible cystoscopy and TURP  - Continue Flomax and Finasteride    Acute blood loss anemia  Assessment & Plan  Secondary to gross hematuria  Continue monitoring for the time being  No indication for transfusion at this time  Will order type & Screen in case he would require transfusion yvette  Recent Labs     09/10/20  0559 09/12/20  0631   HGB 9 3* 7 7*   MCV 93 95   RDW 14 1 14 1         Chronic combined systolic and diastolic congestive heart failure (HCC)  Assessment & Plan  Wt Readings from Last 3 Encounters:   09/12/20 95 kg (209 lb 7 oz)   09/07/20 98 kg (216 lb)   09/04/20 99 3 kg (219 lb)     Euvolemic  Not in exacerbation    - Aspirin Plavix held in the setting of gross bleeding  - On BB, ARB, ISMN        Poorly controlled type 2 diabetes mellitus [facility])  Assessment & Plan  Lab Results   Component Value Date    HGBA1C 7 7 (H) 08/17/2020       Recent Labs     09/11/20  1508 09/11/20  2052 09/12/20  0810 09/12/20  1145   POCGLU 152* 257* 162* 337*       Blood Sugar Average: Last 72 hrs:  (P) 213   IASS, Glargine 20U HS   Hold oral hypoglycemics (Glipizide, Metformin, and Januvia)  Fingerstick monitoring    Essential hypertension  Assessment & Plan  Controlled  - Continue monitoring BP  - Continue carvedilol, ARB, and lasix    Hyperlipidemia  Assessment & Plan  Continue statin    VTE Pharmacologic Prophylaxis:   Pharmacologic: Pharmacologic VTE Prophylaxis contraindicated due to gross hematuria  Mechanical VTE Prophylaxis in Place: Yes    Patient Centered Rounds: I have performed bedside rounds with nursing staff today  Discussions with Specialists or Other Care Team Provider: Nursing    Education and Discussions with Family / Patient: patient, son    Time Spent for Care: 30 minutes  More than 50% of total time spent on counseling and coordination of care as described above  Current Length of Stay: 5 day(s)    Current Patient Status: Inpatient   Certification Statement: The patient will continue to require additional inpatient hospital stay due to TURP and cystoscopy, hemoglobin monitoring, gross hematuria management    Discharge Plan: active    Code Status: Level 1 - Full Code      Subjective:   Patient seen and examined at bedside  Comfortable  No acute issues at this time  Objective:     Vitals:   Temp (24hrs), Av 8 °F (36 6 °C), Min:97 5 °F (36 4 °C), Max:98 1 °F (36 7 °C)    Temp:  [97 5 °F (36 4 °C)-98 1 °F (36 7 °C)] 98 °F (36 7 °C)  HR:  [81-98] 87  Resp:  [18] 18  BP: ()/(55-73) 101/58  SpO2:  [95 %-98 %] 98 %  Body mass index is 32 8 kg/m²  Input and Output Summary (last 24 hours): Intake/Output Summary (Last 24 hours) at 2020 1444  Last data filed at 2020 1350  Gross per 24 hour   Intake    Output 5150 ml   Net -5150 ml       Physical Exam:     Physical Exam  Vitals signs reviewed  HENT:      Head: Normocephalic  Nose: Nose normal       Mouth/Throat:      Mouth: Mucous membranes are moist    Eyes:      General: No scleral icterus  Extraocular Movements: Extraocular movements intact  Pupils: Pupils are equal, round, and reactive to light  Cardiovascular:      Rate and Rhythm: Normal rate     Pulmonary:      Effort: Pulmonary effort is normal  No respiratory distress  Breath sounds: No wheezing  Abdominal:      General: There is no distension  Palpations: Abdomen is soft  Tenderness: There is abdominal tenderness  There is no guarding  Skin:     General: Skin is warm  Neurological:      Mental Status: He is alert  Mental status is at baseline  Psychiatric:         Mood and Affect: Mood normal          Behavior: Behavior normal        Additional Data:     Labs:    Results from last 7 days   Lab Units 09/12/20  0631   WBC Thousand/uL 7 53   HEMOGLOBIN g/dL 7 7*   HEMATOCRIT % 23 9*   PLATELETS Thousands/uL 227   NEUTROS PCT % 56   LYMPHS PCT % 23   MONOS PCT % 10   EOS PCT % 9*     Results from last 7 days   Lab Units 09/12/20  0631  09/08/20  0447   SODIUM mmol/L 138   < > 136   POTASSIUM mmol/L 3 9   < > 4 2   CHLORIDE mmol/L 104   < > 103   CO2 mmol/L 26   < > 27   BUN mg/dL 16   < > 15   CREATININE mg/dL 0 94   < > 0 91   ANION GAP mmol/L 8   < > 6   CALCIUM mg/dL 8 1*   < > 8 6   ALBUMIN g/dL  --   --  2 9*   TOTAL BILIRUBIN mg/dL  --   --  0 33   ALK PHOS U/L  --   --  53   ALT U/L  --   --  19   AST U/L  --   --  11   GLUCOSE RANDOM mg/dL 166*   < > 175*    < > = values in this interval not displayed  Results from last 7 days   Lab Units 09/07/20  1416   INR  0 98     Results from last 7 days   Lab Units 09/12/20  1145 09/12/20  0810 09/11/20  2052 09/11/20  1508 09/11/20  1125 09/11/20  0725 09/10/20  2128 09/10/20  1553 09/10/20  1058 09/10/20  0746 09/09/20  2101 09/09/20  1444   POC GLUCOSE mg/dl 337* 162* 257* 152* 182* 203* 196* 255* 157* 164* 318* 292*                   * I Have Reviewed All Lab Data Listed Above  * Additional Pertinent Lab Tests Reviewed:  Rosalina [address] Admission Reviewed    Imaging:    Imaging Reports Reviewed Today Include: No new imaging    Recent Cultures (last 7 days):           Last 24 Hours Medication List:   Current Facility-Administered Medications   Medication Dose Route Frequency Provider Last Rate    acetaminophen  650 mg Oral Q6H PRN Katia Pantoja MD      Or    oxyCODONE-acetaminophen  1 tablet Oral Q6H PRN Katia Pantoja MD      Or    oxyCODONE-acetaminophen  2 tablet Oral Q6H PRN Katia Pantoja MD      albuterol  2 puff Inhalation Q4H PRN Katia Pantoja MD      belladonna-opium  30 mg Rectal Q8H PRN Ambar Delgado MD      carvedilol  12 5 mg Oral BID With Meals Katia Pantoja MD      docusate sodium  100 mg Oral Q12H Katia Pantoja MD      famotidine  20 mg Oral Daily Katia Pantoja MD      finasteride  5 mg Oral Daily Katia Pantoja MD      furosemide  20 mg Oral BID Katia Pantoja MD      gabapentin  300 mg Oral BID Katia Pantoja MD      insulin glargine  20 Units Subcutaneous HS Katia Pantoja MD      insulin lispro  1-6 Units Subcutaneous TID [facility] Katia Pantoja MD      isosorbide mononitrate  30 mg Oral Daily Katia Pantoja MD      losartan  50 mg Oral Daily Katia Pantoja MD      nitroglycerin  0 4 mg Sublingual Q5 Min PRN Katia Pantoja MD      oxybutynin  5 mg Oral Daily Ambar Delgado MD      pravastatin  40 mg Oral Daily With Michelet Luevano MD      sodium chloride  75 mL/hr Intravenous Continuous Tony Chen MD 75 mL/hr (09/11/20 1500)    tamsulosin  0 8 mg Oral Daily With Mcihelet Luevano MD          Today, Patient Was Seen By: Mary Beth Frederick MD    ** Please Note: Dictation voice to text software may have been used in the creation of this document   **

## 2020-09-12 NOTE — ASSESSMENT & PLAN NOTE
Wt Readings from Last 3 Encounters:   09/12/20 95 kg (209 lb 7 oz)   09/07/20 98 kg (216 lb)   09/04/20 99 3 kg (219 lb)     Euvolemic   Not in exacerbation    - Aspirin Plavix held in the setting of gross bleeding  - On BB, ARB, ISMN

## 2020-09-12 NOTE — PROGRESS NOTES
History of gross hematuria on Plavix and aspirin  Cardiology evaluation performed  Minimal coronary artery disease noted  He has had urinary retention  He has had an indwelling Estrella catheter for least 1 month  A recent urine culture grew E coli  He was seen in the office by Dr Cristhian Vaca in late July 2020  He was prescribed tamsulosin 0 8 mg daily along with finasteride 5 mg daily  Currently his CBI is clear  CBI was clamped during my discussion with the patient and his son for at least 5 min or more and remained clear  I recommend discontinuing the CBI  I would maintain his Estrella catheter to gravity drainage and continue to irrigate the catheter Q 6 hr with 60 cc of normal saline or more frequently p r n  Hematuria/clot  If the urine remains clear I would consider a voiding trial   If he fails a voiding trial outpatient TURP can be considered

## 2020-09-12 NOTE — ASSESSMENT & PLAN NOTE
Secondary to gross hematuria  Continue monitoring for the time being  No indication for transfusion at this time  Will order type & Screen in case he would require transfusion yvette      Recent Labs     09/10/20  0559 09/12/20  0631   HGB 9 3* 7 7*   MCV 93 95   RDW 14 1 14 1

## 2020-09-12 NOTE — ASSESSMENT & PLAN NOTE
Lab Results   Component Value Date    HGBA1C 7 7 (H) 08/17/2020       Recent Labs     09/11/20  1508 09/11/20 2052 09/12/20  0810 09/12/20  1145   POCGLU 152* 257* 162* 337*       Blood Sugar Average: Last 72 hrs:  (P) 213   IASS, Glargine 20U HS   Hold oral hypoglycemics (Glipizide, Metformin, and Januvia)  Fingerstick monitoring

## 2020-09-13 ENCOUNTER — ANESTHESIA EVENT (INPATIENT)
Dept: PERIOP | Facility: HOSPITAL | Age: 85
DRG: 653 | End: 2020-09-13
Payer: MEDICARE

## 2020-09-13 LAB
ERYTHROCYTE [DISTWIDTH] IN BLOOD BY AUTOMATED COUNT: 14.5 % (ref 11.6–15.1)
GLUCOSE SERPL-MCNC: 137 MG/DL (ref 65–140)
GLUCOSE SERPL-MCNC: 269 MG/DL (ref 65–140)
GLUCOSE SERPL-MCNC: 292 MG/DL (ref 65–140)
GLUCOSE SERPL-MCNC: 316 MG/DL (ref 65–140)
HCT VFR BLD AUTO: 24.4 % (ref 36.5–49.3)
HGB BLD-MCNC: 7.7 G/DL (ref 12–17)
MCH RBC QN AUTO: 30.1 PG (ref 26.8–34.3)
MCHC RBC AUTO-ENTMCNC: 31.6 G/DL (ref 31.4–37.4)
MCV RBC AUTO: 95 FL (ref 82–98)
PLATELET # BLD AUTO: 244 THOUSANDS/UL (ref 149–390)
PMV BLD AUTO: 10.5 FL (ref 8.9–12.7)
RBC # BLD AUTO: 2.56 MILLION/UL (ref 3.88–5.62)
WBC # BLD AUTO: 9.39 THOUSAND/UL (ref 4.31–10.16)

## 2020-09-13 PROCEDURE — 82948 REAGENT STRIP/BLOOD GLUCOSE: CPT

## 2020-09-13 PROCEDURE — 99232 SBSQ HOSP IP/OBS MODERATE 35: CPT | Performed by: STUDENT IN AN ORGANIZED HEALTH CARE EDUCATION/TRAINING PROGRAM

## 2020-09-13 PROCEDURE — 85027 COMPLETE CBC AUTOMATED: CPT | Performed by: UROLOGY

## 2020-09-13 PROCEDURE — 99231 SBSQ HOSP IP/OBS SF/LOW 25: CPT | Performed by: UROLOGY

## 2020-09-13 RX ADMIN — INSULIN LISPRO 3 UNITS: 100 INJECTION, SOLUTION INTRAVENOUS; SUBCUTANEOUS at 17:14

## 2020-09-13 RX ADMIN — INSULIN GLARGINE 20 UNITS: 100 INJECTION, SOLUTION SUBCUTANEOUS at 22:13

## 2020-09-13 RX ADMIN — ATROPA BELLADONNA AND OPIUM 1 SUPPOSITORY: 16.2; 3 SUPPOSITORY RECTAL at 22:14

## 2020-09-13 RX ADMIN — TAMSULOSIN HYDROCHLORIDE 0.8 MG: 0.4 CAPSULE ORAL at 17:13

## 2020-09-13 RX ADMIN — ISOSORBIDE MONONITRATE 30 MG: 30 TABLET, EXTENDED RELEASE ORAL at 09:02

## 2020-09-13 RX ADMIN — FAMOTIDINE 20 MG: 20 TABLET ORAL at 09:02

## 2020-09-13 RX ADMIN — GABAPENTIN 300 MG: 300 CAPSULE ORAL at 17:13

## 2020-09-13 RX ADMIN — GABAPENTIN 300 MG: 300 CAPSULE ORAL at 09:02

## 2020-09-13 RX ADMIN — DOCUSATE SODIUM 100 MG: 100 CAPSULE, LIQUID FILLED ORAL at 22:14

## 2020-09-13 RX ADMIN — SODIUM CHLORIDE 75 ML/HR: 0.9 INJECTION, SOLUTION INTRAVENOUS at 17:14

## 2020-09-13 RX ADMIN — FUROSEMIDE 20 MG: 20 TABLET ORAL at 09:02

## 2020-09-13 RX ADMIN — FINASTERIDE 5 MG: 5 TABLET, FILM COATED ORAL at 09:02

## 2020-09-13 RX ADMIN — OXYBUTYNIN CHLORIDE 5 MG: 5 TABLET, EXTENDED RELEASE ORAL at 09:02

## 2020-09-13 RX ADMIN — LOSARTAN POTASSIUM 50 MG: 50 TABLET, FILM COATED ORAL at 09:02

## 2020-09-13 RX ADMIN — SODIUM CHLORIDE 75 ML/HR: 0.9 INJECTION, SOLUTION INTRAVENOUS at 03:55

## 2020-09-13 RX ADMIN — CARVEDILOL 12.5 MG: 12.5 TABLET, FILM COATED ORAL at 09:02

## 2020-09-13 RX ADMIN — CARVEDILOL 12.5 MG: 12.5 TABLET, FILM COATED ORAL at 17:14

## 2020-09-13 RX ADMIN — INSULIN LISPRO 5 UNITS: 100 INJECTION, SOLUTION INTRAVENOUS; SUBCUTANEOUS at 12:07

## 2020-09-13 RX ADMIN — PRAVASTATIN SODIUM 40 MG: 40 TABLET ORAL at 17:13

## 2020-09-13 RX ADMIN — DOCUSATE SODIUM 100 MG: 100 CAPSULE, LIQUID FILLED ORAL at 09:02

## 2020-09-13 RX ADMIN — FUROSEMIDE 20 MG: 20 TABLET ORAL at 17:14

## 2020-09-13 NOTE — ASSESSMENT & PLAN NOTE
Secondary to gross hematuria  Continue monitoring for the time being  Currently asymptomatic  No indication for transfusion at this time unless it continues to drop further      Recent Labs     09/12/20  0631 09/13/20  1144   HGB 7 7* 7 7*   MCV 95 95   RDW 14 1 14 5

## 2020-09-13 NOTE — ASSESSMENT & PLAN NOTE
Lab Results   Component Value Date    HGBA1C 7 7 (H) 08/17/2020       Recent Labs     09/12/20 2013 09/13/20  0748 09/13/20  1138 09/13/20  1627   POCGLU 277* 137 316* 269*       Blood Sugar Average: Last 72 hrs:  (P) 150 [id number]   IASS, Glargine 20U HS   Hold oral hypoglycemics (Glipizide, Metformin, and Januvia)  Fingerstick monitoring

## 2020-09-13 NOTE — PROGRESS NOTES
Progress Note - Merly Brownlee 1935, 82 y o  male MRN: [id number]    Unit/Bed#: E4 -01 Encounter: [id number]    Primary Care Provider: No primary care provider on file  Date and time admitted to hospital: 9/7/2020  4:48 PM        * Gross hematuria  Assessment & Plan  82year old [address] speaking male admitted due to recurrent gross hematuria  Cardiac catheterization not showing any evidence of disease  Appreciate cardiology preoperative risk stratification   - For urologic intervention yvette afternoon   - Continue CBI  - Will continue holding Aspirin and Plavix      Benign prostatic hyperplasia with lower urinary tract symptoms  Assessment & Plan  Symptomatic  With acute urinary retention   - For possible cystoscopy and TURP  - Continue Flomax and Finasteride    Acute blood loss anemia  Assessment & Plan  Secondary to gross hematuria  Continue monitoring for the time being  Currently asymptomatic  No indication for transfusion at this time unless it continues to drop further  Recent Labs     09/12/20  0631 09/13/20  1144   HGB 7 7* 7 7*   MCV 95 95   RDW 14 1 14 5         Chronic combined systolic and diastolic congestive heart failure (HCC)  Assessment & Plan  Wt Readings from Last 3 Encounters:   09/13/20 96 5 kg (212 lb 11 9 oz)   09/07/20 98 kg (216 lb)   09/04/20 99 3 kg (219 lb)     Euvolemic  Not in exacerbation    - Aspirin Plavix held in the setting of gross bleeding  - On BB, ARB, ISMN        Poorly controlled type 2 diabetes mellitus [facility])  Assessment & Plan  Lab Results   Component Value Date    HGBA1C 7 7 (H) 08/17/2020       Recent Labs     09/12/20  2013 09/13/20  0748 09/13/20  1138 09/13/20  1627   POCGLU 277* 137 316* 269*       Blood Sugar Average: Last 72 hrs:  (P) 771 [id number]   IASS, Glargine 20U HS   Hold oral hypoglycemics (Glipizide, Metformin, and Januvia)  Fingerstick monitoring    Essential hypertension  Assessment & Plan  Controlled  - Continue monitoring BP  - Continue carvedilol, ARB, and lasix    Hyperlipidemia  Assessment & Plan  Continue statin    GERD (gastroesophageal reflux disease)  Assessment & Plan  Continue Famotidine      VTE Pharmacologic Prophylaxis:   Pharmacologic: Pharmacologic VTE Prophylaxis contraindicated due to gross hematuria  Mechanical VTE Prophylaxis in Place: Yes    Patient Centered Rounds: I have performed bedside rounds with nursing staff today  Discussions with Specialists or Other Care Team Provider: urology    Education and Discussions with Family / Patient: patient, son    Time Spent for Care: 30 minutes  More than 50% of total time spent on counseling and coordination of care as described above  Current Length of Stay: 6 day(s)    Current Patient Status: Inpatient   Certification Statement: The patient will continue to require additional inpatient hospital stay due to FOr urologic intervention yvette, hemoglobin monitoring    Discharge Plan: active    Code Status: Level 1 - Full Code      Subjective:   Patient seen and examined at bedside  Comfortable  Urine starting to clear up  Urology planning intervention yvette afternoon  Patient made aware  Objective:     Vitals:   Temp (24hrs), Av 1 °F (36 7 °C), Min:97 5 °F (36 4 °C), Max:98 5 °F (36 9 °C)    Temp:  [97 5 °F (36 4 °C)-98 5 °F (36 9 °C)] 97 5 °F (36 4 °C)  HR:  [81-98] 85  Resp:  [18] 18  BP: ()/(50-58) 121/58  SpO2:  [94 %-97 %] 97 %  Body mass index is 33 32 kg/m²  Input and Output Summary (last 24 hours): Intake/Output Summary (Last 24 hours) at 2020 1729  Last data filed at 2020 1725  Gross per 24 hour   Intake 1000 ml   Output 1300 ml   Net -300 ml       Physical Exam:     Physical Exam  Vitals signs reviewed  HENT:      Head: Normocephalic  Nose: Nose normal       Mouth/Throat:      Mouth: Mucous membranes are moist    Eyes:      Extraocular Movements: Extraocular movements intact        Pupils: Pupils are equal, round, and reactive to light  Cardiovascular:      Rate and Rhythm: Normal rate  Pulmonary:      Effort: Pulmonary effort is normal  No respiratory distress  Breath sounds: No wheezing or rales  Abdominal:      General: There is no distension  Palpations: Abdomen is soft  Tenderness: There is abdominal tenderness  There is no guarding  Genitourinary:     Comments: Estrella catheter in place, some blood clots noted but color of urine improving  Skin:     General: Skin is warm  Neurological:      Mental Status: He is alert  Mental status is at baseline  Psychiatric:         Mood and Affect: Mood normal          Behavior: Behavior normal        Additional Data:     Labs:    Results from last 7 days   Lab Units 09/13/20  1144 09/12/20  0631   WBC Thousand/uL 9 39 7 53   HEMOGLOBIN g/dL 7 7* 7 7*   HEMATOCRIT % 24 4* 23 9*   PLATELETS Thousands/uL 244 227   NEUTROS PCT %  --  56   LYMPHS PCT %  --  23   MONOS PCT %  --  10   EOS PCT %  --  9*     Results from last 7 days   Lab Units 09/12/20  0631  09/08/20  0447   SODIUM mmol/L 138   < > 136   POTASSIUM mmol/L 3 9   < > 4 2   CHLORIDE mmol/L 104   < > 103   CO2 mmol/L 26   < > 27   BUN mg/dL 16   < > 15   CREATININE mg/dL 0 94   < > 0 91   ANION GAP mmol/L 8   < > 6   CALCIUM mg/dL 8 1*   < > 8 6   ALBUMIN g/dL  --   --  2 9*   TOTAL BILIRUBIN mg/dL  --   --  0 33   ALK PHOS U/L  --   --  53   ALT U/L  --   --  19   AST U/L  --   --  11   GLUCOSE RANDOM mg/dL 166*   < > 175*    < > = values in this interval not displayed  Results from last 7 days   Lab Units 09/07/20  1416   INR  0 98     Results from last 7 days   Lab Units 09/13/20  1627 09/13/20  1138 09/13/20  0748 09/12/20  2013 09/12/20  1604 09/12/20  1145 09/12/20  0810 09/11/20  2052 09/11/20  1508 09/11/20  1125 09/11/20  0725 09/10/20  2128   POC GLUCOSE mg/dl 269* 316* 137 277* 282* 337* 162* 257* 152* 182* 203* 196*                   * I Have Reviewed All Lab Data Listed Above    * Additional Pertinent Lab Tests Reviewed: [facility] Admission Reviewed    Imaging:    Imaging Reports Reviewed Today Include: No new imaging    Recent Cultures (last 7 days):           Last 24 Hours Medication List:   Current Facility-Administered Medications   Medication Dose Route Frequency Provider Last Rate    acetaminophen  650 mg Oral Q6H PRN Master Peralta MD      Or    oxyCODONE-acetaminophen  1 tablet Oral Q6H PRN Master Peralta MD      Or    oxyCODONE-acetaminophen  2 tablet Oral Q6H PRN Master Peralta MD      albuterol  2 puff Inhalation Q4H PRN Master Peralta MD      belladonna-opium  30 mg Rectal Q8H PRN Naren Muhammad MD      carvedilol  12 5 mg Oral BID With Meals Master Peralta MD      docusate sodium  100 mg Oral Q12H Master Peralta MD      famotidine  20 mg Oral Daily Master Peralta MD      finasteride  5 mg Oral Daily Master Peralta MD      furosemide  20 mg Oral BID Master Peralta MD      gabapentin  300 mg Oral BID Master Peralta MD      insulin glargine  20 Units Subcutaneous HS Master Peralta MD      insulin lispro  1-6 Units Subcutaneous TID [facility] [place] Master Peralta MD      isosorbide mononitrate  30 mg Oral Daily Master Peralta MD      losartan  50 mg Oral Daily Master Peralta MD      nitroglycerin  0 4 mg Sublingual Q5 Min PRN Master Peralta MD      oxybutynin  5 mg Oral Daily Naren Muhammad MD      pravastatin  40 mg Oral Daily With Frank Villatoro MD      sodium chloride  75 mL/hr Intravenous Continuous Sam Rodrigez MD 75 mL/hr (09/13/20 1714)    tamsulosin  0 8 mg Oral Daily With Frank Villatoro MD          Today, Patient Was Seen By: Tommie Duvall MD    ** Please Note: Dictation voice to text software may have been used in the creation of this document   **

## 2020-09-13 NOTE — ANESTHESIA PREPROCEDURE EVALUATION
Procedure:  CYSTOSCOPY EVACUATION OF CLOTS (N/A Bladder)    Relevant Problems   CARDIO  EF 30%    LVH, pt can walk 1 flgiht of steps without CP - but can get angina with long distance walking   he can do full ADLs per son    (+) CAD (coronary artery disease)   (+) Essential hypertension   (+) Hyperlipidemia   (+) Myocardial infarction ([facility] [address] )      ENDO   (+) Poorly controlled type 2 diabetes mellitus (HCC)      GI/HEPATIC   (+) GERD (gastroesophageal reflux disease)      /RENAL   (+) Benign prostatic hyperplasia with lower urinary tract symptoms      HEMATOLOGY   (+) Acute blood loss anemia      PULMONARY (within normal limits)        Physical Exam    Airway    Mallampati score: I    Neck ROM: full     Dental       Cardiovascular  Rhythm: regular, Rate: normal,     Pulmonary  Pulmonary exam normal     Other Findings        Anesthesia Plan  ASA Score- 3     Anesthesia Type- general with ASA Monitors  Additional Monitors:   Airway Plan: ETT  Plan Factors-    Induction- intravenous  Postoperative Plan-     Informed Consent- Anesthetic plan and risks discussed with patient

## 2020-09-13 NOTE — PROGRESS NOTES
CBI clamped at the beginning of the shift  At 2130 patient started complaining of pressure  At 2000 emptied 500 cc of punch color  No urine in the bag at 2130  Bella scanned for 629 ml  Flushed the winslow and almost immediately returned 700 ml of bloody urine  Urology made aware and ordered CBI restarted  By morning the color of urine had changed to like pink lemonade color

## 2020-09-13 NOTE — ASSESSMENT & PLAN NOTE
Wt Readings from Last 3 Encounters:   09/13/20 96 5 kg (212 lb 11 9 oz)   09/07/20 98 kg (216 lb)   09/04/20 99 3 kg (219 lb)     Euvolemic   Not in exacerbation    - Aspirin Plavix held in the setting of gross bleeding  - On BB, ARB, ISMN

## 2020-09-13 NOTE — PROGRESS NOTES
Admitted with BPH and persistent hematuria  CBI was clamped yesterday and hematuria recurred  CBI was resumed and urine is clear at at this time  Hematocrit from September 12, 2020 noted to be 23 9  I had a lengthy discussion with the patient and his son-in-law at the bedside today regarding his BPH, urinary retention, and continuous hematuria  I am recommending cystoscopy with transurethral resection of his prostate in the operating room on the afternoon of September 14, 2020  He understands that surgery will occur probably no sooner than 5:00 p m  Tomorrow  He will be NPO after breakfast   I will repeat a H&H today  If he remains low we may need to consider transfusion prior to surgery  For now maintain CBI  Continue to irrigate the Estrella catheter Q 6 hours with 60 cc of normal saline solution or more frequently as needed for clots

## 2020-09-13 NOTE — ASSESSMENT & PLAN NOTE
82year old [place] speaking male admitted due to recurrent gross hematuria  Cardiac catheterization not showing any evidence of disease   Appreciate cardiology preoperative risk stratification   - For urologic intervention yvette afternoon   - Continue CBI  - Will continue holding Aspirin and Plavix

## 2020-09-14 ENCOUNTER — ANESTHESIA (INPATIENT)
Dept: PERIOP | Facility: HOSPITAL | Age: 85
DRG: 653 | End: 2020-09-14
Payer: MEDICARE

## 2020-09-14 VITALS — HEART RATE: 96 BPM

## 2020-09-14 LAB
ABO GROUP BLD: NORMAL
ANION GAP SERPL CALCULATED.3IONS-SCNC: 3 MMOL/L (ref 4–13)
ANION GAP SERPL CALCULATED.3IONS-SCNC: 8 MMOL/L (ref 4–13)
BASE EXCESS BLDA CALC-SCNC: 1 MMOL/L (ref -2–3)
BASE EXCESS BLDA CALC-SCNC: 1 MMOL/L (ref -2–3)
BASOPHILS # BLD AUTO: 0.04 THOUSANDS/ΜL (ref 0–0.1)
BASOPHILS NFR BLD AUTO: 1 % (ref 0–1)
BLD GP AB SCN SERPL QL: NEGATIVE
BUN SERPL-MCNC: 10 MG/DL (ref 5–25)
BUN SERPL-MCNC: 9 MG/DL (ref 5–25)
CA-I BLD-SCNC: 1.16 MMOL/L (ref 1.12–1.32)
CA-I BLD-SCNC: 1.23 MMOL/L (ref 1.12–1.32)
CALCIUM SERPL-MCNC: 7.6 MG/DL (ref 8.3–10.1)
CALCIUM SERPL-MCNC: 8.3 MG/DL (ref 8.3–10.1)
CHLORIDE SERPL-SCNC: 106 MMOL/L (ref 100–108)
CHLORIDE SERPL-SCNC: 106 MMOL/L (ref 100–108)
CO2 SERPL-SCNC: 24 MMOL/L (ref 21–32)
CO2 SERPL-SCNC: 28 MMOL/L (ref 21–32)
CREAT SERPL-MCNC: 0.82 MG/DL (ref 0.6–1.3)
CREAT SERPL-MCNC: 0.89 MG/DL (ref 0.6–1.3)
EOSINOPHIL # BLD AUTO: 0.62 THOUSAND/ΜL (ref 0–0.61)
EOSINOPHIL NFR BLD AUTO: 7 % (ref 0–6)
ERYTHROCYTE [DISTWIDTH] IN BLOOD BY AUTOMATED COUNT: 14 % (ref 11.6–15.1)
ERYTHROCYTE [DISTWIDTH] IN BLOOD BY AUTOMATED COUNT: 14.6 % (ref 11.6–15.1)
GFR SERPL CREATININE-BSD FRML MDRD: 79 ML/MIN/1.73SQ M
GFR SERPL CREATININE-BSD FRML MDRD: 82 ML/MIN/1.73SQ M
GLUCOSE SERPL-MCNC: 140 MG/DL (ref 65–140)
GLUCOSE SERPL-MCNC: 142 MG/DL (ref 65–140)
GLUCOSE SERPL-MCNC: 176 MG/DL (ref 65–140)
GLUCOSE SERPL-MCNC: 177 MG/DL (ref 65–140)
GLUCOSE SERPL-MCNC: 191 MG/DL (ref 65–140)
GLUCOSE SERPL-MCNC: 207 MG/DL (ref 65–140)
GLUCOSE SERPL-MCNC: 215 MG/DL (ref 65–140)
GLUCOSE SERPL-MCNC: 273 MG/DL (ref 65–140)
HCO3 BLDA-SCNC: 25.8 MMOL/L (ref 24–30)
HCO3 BLDA-SCNC: 27.3 MMOL/L (ref 24–30)
HCT VFR BLD AUTO: 22.1 % (ref 36.5–49.3)
HCT VFR BLD AUTO: 29 % (ref 36.5–49.3)
HCT VFR BLD CALC: 15 % (ref 36.5–49.3)
HCT VFR BLD CALC: 26 % (ref 36.5–49.3)
HGB BLD-MCNC: 7.1 G/DL (ref 12–17)
HGB BLD-MCNC: 9.4 G/DL (ref 12–17)
HGB BLDA-MCNC: 5.1 G/DL (ref 12–17)
HGB BLDA-MCNC: 8.8 G/DL (ref 12–17)
IMM GRANULOCYTES # BLD AUTO: 0.08 THOUSAND/UL (ref 0–0.2)
IMM GRANULOCYTES NFR BLD AUTO: 1 % (ref 0–2)
LYMPHOCYTES # BLD AUTO: 1.51 THOUSANDS/ΜL (ref 0.6–4.47)
LYMPHOCYTES NFR BLD AUTO: 18 % (ref 14–44)
MCH RBC QN AUTO: 30.3 PG (ref 26.8–34.3)
MCH RBC QN AUTO: 31.1 PG (ref 26.8–34.3)
MCHC RBC AUTO-ENTMCNC: 32.1 G/DL (ref 31.4–37.4)
MCHC RBC AUTO-ENTMCNC: 32.4 G/DL (ref 31.4–37.4)
MCV RBC AUTO: 94 FL (ref 82–98)
MCV RBC AUTO: 96 FL (ref 82–98)
MONOCYTES # BLD AUTO: 0.71 THOUSAND/ΜL (ref 0.17–1.22)
MONOCYTES NFR BLD AUTO: 9 % (ref 4–12)
NEUTROPHILS # BLD AUTO: 5.42 THOUSANDS/ΜL (ref 1.85–7.62)
NEUTS SEG NFR BLD AUTO: 64 % (ref 43–75)
NRBC BLD AUTO-RTO: 0 /100 WBCS
PCO2 BLD: 27 MMOL/L (ref 21–32)
PCO2 BLD: 29 MMOL/L (ref 21–32)
PCO2 BLD: 39 MM HG (ref 42–50)
PCO2 BLD: 55.1 MM HG (ref 42–50)
PH BLD: 7.3 [PH] (ref 7.3–7.4)
PH BLD: 7.43 [PH] (ref 7.3–7.4)
PLATELET # BLD AUTO: 182 THOUSANDS/UL (ref 149–390)
PLATELET # BLD AUTO: 202 THOUSANDS/UL (ref 149–390)
PMV BLD AUTO: 10.1 FL (ref 8.9–12.7)
PMV BLD AUTO: 9.7 FL (ref 8.9–12.7)
PO2 BLD: 57 MM HG (ref 35–45)
PO2 BLD: 66 MM HG (ref 35–45)
POTASSIUM BLD-SCNC: 4.4 MMOL/L (ref 3.5–5.3)
POTASSIUM BLD-SCNC: 4.5 MMOL/L (ref 3.5–5.3)
POTASSIUM SERPL-SCNC: 3.7 MMOL/L (ref 3.5–5.3)
POTASSIUM SERPL-SCNC: 4.2 MMOL/L (ref 3.5–5.3)
RBC # BLD AUTO: 2.34 MILLION/UL (ref 3.88–5.62)
RBC # BLD AUTO: 3.02 MILLION/UL (ref 3.88–5.62)
RH BLD: POSITIVE
SAO2 % BLD FROM PO2: 90 % (ref 60–85)
SAO2 % BLD FROM PO2: 90 % (ref 60–85)
SODIUM BLD-SCNC: 141 MMOL/L (ref 136–145)
SODIUM BLD-SCNC: 141 MMOL/L (ref 136–145)
SODIUM SERPL-SCNC: 137 MMOL/L (ref 136–145)
SODIUM SERPL-SCNC: 138 MMOL/L (ref 136–145)
SPECIMEN EXPIRATION DATE: NORMAL
SPECIMEN SOURCE: ABNORMAL
SPECIMEN SOURCE: ABNORMAL
WBC # BLD AUTO: 12.47 THOUSAND/UL (ref 4.31–10.16)
WBC # BLD AUTO: 8.38 THOUSAND/UL (ref 4.31–10.16)

## 2020-09-14 PROCEDURE — 97530 THERAPEUTIC ACTIVITIES: CPT

## 2020-09-14 PROCEDURE — 0VB08ZZ EXCISION OF PROSTATE, VIA NATURAL OR ARTIFICIAL OPENING ENDOSCOPIC: ICD-10-PCS | Performed by: UROLOGY

## 2020-09-14 PROCEDURE — 80048 BASIC METABOLIC PNL TOTAL CA: CPT | Performed by: STUDENT IN AN ORGANIZED HEALTH CARE EDUCATION/TRAINING PROGRAM

## 2020-09-14 PROCEDURE — 97535 SELF CARE MNGMENT TRAINING: CPT

## 2020-09-14 PROCEDURE — C2627 CATH, SUPRAPUBIC/CYSTOSCOPIC: HCPCS | Performed by: UROLOGY

## 2020-09-14 PROCEDURE — 87086 URINE CULTURE/COLONY COUNT: CPT | Performed by: UROLOGY

## 2020-09-14 PROCEDURE — 0T9B80Z DRAINAGE OF BLADDER WITH DRAINAGE DEVICE, VIA NATURAL OR ARTIFICIAL OPENING ENDOSCOPIC: ICD-10-PCS | Performed by: UROLOGY

## 2020-09-14 PROCEDURE — 82803 BLOOD GASES ANY COMBINATION: CPT

## 2020-09-14 PROCEDURE — 86900 BLOOD TYPING SEROLOGIC ABO: CPT | Performed by: ANESTHESIOLOGY

## 2020-09-14 PROCEDURE — 88305 TISSUE EXAM BY PATHOLOGIST: CPT | Performed by: PATHOLOGY

## 2020-09-14 PROCEDURE — 84295 ASSAY OF SERUM SODIUM: CPT

## 2020-09-14 PROCEDURE — 84132 ASSAY OF SERUM POTASSIUM: CPT

## 2020-09-14 PROCEDURE — 82947 ASSAY GLUCOSE BLOOD QUANT: CPT

## 2020-09-14 PROCEDURE — 97116 GAIT TRAINING THERAPY: CPT

## 2020-09-14 PROCEDURE — 82948 REAGENT STRIP/BLOOD GLUCOSE: CPT

## 2020-09-14 PROCEDURE — 30233N1 TRANSFUSION OF NONAUTOLOGOUS RED BLOOD CELLS INTO PERIPHERAL VEIN, PERCUTANEOUS APPROACH: ICD-10-PCS | Performed by: PAIN MEDICINE

## 2020-09-14 PROCEDURE — 87077 CULTURE AEROBIC IDENTIFY: CPT | Performed by: UROLOGY

## 2020-09-14 PROCEDURE — 87186 SC STD MICRODIL/AGAR DIL: CPT | Performed by: UROLOGY

## 2020-09-14 PROCEDURE — 85027 COMPLETE CBC AUTOMATED: CPT | Performed by: UROLOGY

## 2020-09-14 PROCEDURE — 52601 PROSTATECTOMY (TURP): CPT | Performed by: UROLOGY

## 2020-09-14 PROCEDURE — 85014 HEMATOCRIT: CPT

## 2020-09-14 PROCEDURE — 86923 COMPATIBILITY TEST ELECTRIC: CPT

## 2020-09-14 PROCEDURE — 99231 SBSQ HOSP IP/OBS SF/LOW 25: CPT | Performed by: UROLOGY

## 2020-09-14 PROCEDURE — 51102 DRAIN BL W/CATH INSERTION: CPT | Performed by: UROLOGY

## 2020-09-14 PROCEDURE — 86850 RBC ANTIBODY SCREEN: CPT | Performed by: ANESTHESIOLOGY

## 2020-09-14 PROCEDURE — 80048 BASIC METABOLIC PNL TOTAL CA: CPT | Performed by: UROLOGY

## 2020-09-14 PROCEDURE — 85025 COMPLETE CBC W/AUTO DIFF WBC: CPT | Performed by: STUDENT IN AN ORGANIZED HEALTH CARE EDUCATION/TRAINING PROGRAM

## 2020-09-14 PROCEDURE — 82330 ASSAY OF CALCIUM: CPT

## 2020-09-14 PROCEDURE — P9016 RBC LEUKOCYTES REDUCED: HCPCS

## 2020-09-14 PROCEDURE — 99024 POSTOP FOLLOW-UP VISIT: CPT | Performed by: UROLOGY

## 2020-09-14 PROCEDURE — 86901 BLOOD TYPING SEROLOGIC RH(D): CPT | Performed by: ANESTHESIOLOGY

## 2020-09-14 PROCEDURE — 99232 SBSQ HOSP IP/OBS MODERATE 35: CPT | Performed by: INTERNAL MEDICINE

## 2020-09-14 RX ORDER — BUPIVACAINE HYDROCHLORIDE AND EPINEPHRINE 2.5; 5 MG/ML; UG/ML
INJECTION, SOLUTION EPIDURAL; INFILTRATION; INTRACAUDAL; PERINEURAL AS NEEDED
Status: DISCONTINUED | OUTPATIENT
Start: 2020-09-14 | End: 2020-09-14 | Stop reason: HOSPADM

## 2020-09-14 RX ORDER — SODIUM CHLORIDE 9 MG/ML
100 INJECTION, SOLUTION INTRAVENOUS CONTINUOUS
Status: DISCONTINUED | OUTPATIENT
Start: 2020-09-14 | End: 2020-09-17

## 2020-09-14 RX ORDER — LIDOCAINE HYDROCHLORIDE 10 MG/ML
INJECTION, SOLUTION EPIDURAL; INFILTRATION; INTRACAUDAL; PERINEURAL AS NEEDED
Status: DISCONTINUED | OUTPATIENT
Start: 2020-09-14 | End: 2020-09-14

## 2020-09-14 RX ORDER — PROPOFOL 10 MG/ML
INJECTION, EMULSION INTRAVENOUS AS NEEDED
Status: DISCONTINUED | OUTPATIENT
Start: 2020-09-14 | End: 2020-09-14

## 2020-09-14 RX ORDER — GLYCINE 1.5 G/100ML
SOLUTION IRRIGATION AS NEEDED
Status: DISCONTINUED | OUTPATIENT
Start: 2020-09-14 | End: 2020-09-14 | Stop reason: HOSPADM

## 2020-09-14 RX ORDER — SODIUM CHLORIDE 9 MG/ML
INJECTION, SOLUTION INTRAVENOUS AS NEEDED
Status: DISCONTINUED | OUTPATIENT
Start: 2020-09-14 | End: 2020-09-14 | Stop reason: HOSPADM

## 2020-09-14 RX ORDER — CEFAZOLIN SODIUM 2 G/50ML
SOLUTION INTRAVENOUS AS NEEDED
Status: DISCONTINUED | OUTPATIENT
Start: 2020-09-14 | End: 2020-09-14

## 2020-09-14 RX ORDER — MAGNESIUM HYDROXIDE 1200 MG/15ML
LIQUID ORAL AS NEEDED
Status: DISCONTINUED | OUTPATIENT
Start: 2020-09-14 | End: 2020-09-14 | Stop reason: HOSPADM

## 2020-09-14 RX ORDER — FENTANYL CITRATE 50 UG/ML
INJECTION, SOLUTION INTRAMUSCULAR; INTRAVENOUS AS NEEDED
Status: DISCONTINUED | OUTPATIENT
Start: 2020-09-14 | End: 2020-09-14

## 2020-09-14 RX ORDER — EPHEDRINE SULFATE 50 MG/ML
INJECTION INTRAVENOUS AS NEEDED
Status: DISCONTINUED | OUTPATIENT
Start: 2020-09-14 | End: 2020-09-14

## 2020-09-14 RX ORDER — ALBUMIN, HUMAN INJ 5% 5 %
SOLUTION INTRAVENOUS CONTINUOUS PRN
Status: DISCONTINUED | OUTPATIENT
Start: 2020-09-14 | End: 2020-09-14

## 2020-09-14 RX ORDER — SODIUM CHLORIDE 9 MG/ML
INJECTION, SOLUTION INTRAVENOUS CONTINUOUS PRN
Status: DISCONTINUED | OUTPATIENT
Start: 2020-09-14 | End: 2020-09-14

## 2020-09-14 RX ADMIN — PHENYLEPHRINE HYDROCHLORIDE 100 MCG: 10 INJECTION INTRAVENOUS at 18:48

## 2020-09-14 RX ADMIN — EPHEDRINE SULFATE 20 MG: 50 INJECTION, SOLUTION INTRAVENOUS at 18:45

## 2020-09-14 RX ADMIN — ISOSORBIDE MONONITRATE 30 MG: 30 TABLET, EXTENDED RELEASE ORAL at 08:23

## 2020-09-14 RX ADMIN — PROPOFOL 200 MG: 10 INJECTION, EMULSION INTRAVENOUS at 17:08

## 2020-09-14 RX ADMIN — CARVEDILOL 12.5 MG: 12.5 TABLET, FILM COATED ORAL at 08:24

## 2020-09-14 RX ADMIN — PHENYLEPHRINE HYDROCHLORIDE 100 MCG: 10 INJECTION INTRAVENOUS at 18:21

## 2020-09-14 RX ADMIN — LIDOCAINE HYDROCHLORIDE 80 MG: 10 INJECTION, SOLUTION EPIDURAL; INFILTRATION; INTRACAUDAL; PERINEURAL at 17:08

## 2020-09-14 RX ADMIN — ALBUMIN (HUMAN): 12.5 INJECTION, SOLUTION INTRAVENOUS at 17:16

## 2020-09-14 RX ADMIN — FAMOTIDINE 20 MG: 20 TABLET ORAL at 08:23

## 2020-09-14 RX ADMIN — LOSARTAN POTASSIUM 50 MG: 50 TABLET, FILM COATED ORAL at 08:24

## 2020-09-14 RX ADMIN — INSULIN LISPRO 2 UNITS: 100 INJECTION, SOLUTION INTRAVENOUS; SUBCUTANEOUS at 08:22

## 2020-09-14 RX ADMIN — FENTANYL CITRATE 50 MCG: 50 INJECTION, SOLUTION INTRAMUSCULAR; INTRAVENOUS at 17:30

## 2020-09-14 RX ADMIN — CEFAZOLIN SODIUM 2000 MG: 2 SOLUTION INTRAVENOUS at 17:04

## 2020-09-14 RX ADMIN — GABAPENTIN 300 MG: 300 CAPSULE ORAL at 08:23

## 2020-09-14 RX ADMIN — PHENYLEPHRINE HYDROCHLORIDE 100 MCG: 10 INJECTION INTRAVENOUS at 18:43

## 2020-09-14 RX ADMIN — FENTANYL CITRATE 25 MCG: 50 INJECTION, SOLUTION INTRAMUSCULAR; INTRAVENOUS at 17:48

## 2020-09-14 RX ADMIN — SODIUM CHLORIDE 125 ML/HR: 0.9 INJECTION, SOLUTION INTRAVENOUS at 16:30

## 2020-09-14 RX ADMIN — DOCUSATE SODIUM 100 MG: 100 CAPSULE, LIQUID FILLED ORAL at 08:24

## 2020-09-14 RX ADMIN — SODIUM CHLORIDE: 0.9 INJECTION, SOLUTION INTRAVENOUS at 17:15

## 2020-09-14 RX ADMIN — OXYBUTYNIN CHLORIDE 5 MG: 5 TABLET, EXTENDED RELEASE ORAL at 08:24

## 2020-09-14 RX ADMIN — SODIUM CHLORIDE: 0.9 INJECTION, SOLUTION INTRAVENOUS at 17:03

## 2020-09-14 RX ADMIN — INSULIN LISPRO 4 UNITS: 100 INJECTION, SOLUTION INTRAVENOUS; SUBCUTANEOUS at 12:39

## 2020-09-14 RX ADMIN — PHENYLEPHRINE HYDROCHLORIDE 100 MCG: 10 INJECTION INTRAVENOUS at 18:46

## 2020-09-14 RX ADMIN — FENTANYL CITRATE 100 MCG: 50 INJECTION, SOLUTION INTRAMUSCULAR; INTRAVENOUS at 18:39

## 2020-09-14 RX ADMIN — FUROSEMIDE 20 MG: 20 TABLET ORAL at 08:24

## 2020-09-14 RX ADMIN — FENTANYL CITRATE 25 MCG: 50 INJECTION, SOLUTION INTRAMUSCULAR; INTRAVENOUS at 18:03

## 2020-09-14 RX ADMIN — FINASTERIDE 5 MG: 5 TABLET, FILM COATED ORAL at 08:23

## 2020-09-14 NOTE — PLAN OF CARE
Problem: PHYSICAL THERAPY ADULT  Goal: Performs mobility at highest level of function for planned discharge setting  See evaluation for individualized goals  Description: Treatment/Interventions: Functional transfer training, LE strengthening/ROM, Therapeutic exercise, Endurance training, Patient/family training, Equipment eval/education, Bed mobility, Gait training, Spoke to nursing, OT  Equipment Recommended: Loren Bhandari       See flowsheet documentation for full assessment, interventions and recommendations  Outcome: Progressing  Note: Prognosis: Good  Problem List: Decreased strength, Decreased endurance, Impaired balance, Decreased mobility, Decreased safety awareness, Pain  Assessment: Pt seen for PT treatment session this date with interventions consisting of gait training w/ emphasis on improving pt's ability to ambulate level surfaces x 50 feet x 1 with CGA provided by therapist with RW  Pt agreeable to PT treatment session upon arrival, pt found supine in bed w/ HOB elevated, in no apparent distress  In comparison to previous session, pt with improvements in amb tolerance  Post session: pt returned BTB and all needs in reach Continue to recommend Home PT with family support at time of d/c in order to maximize pt's functional independence and safety w/ mobility  Pt continues to be functioning below baseline level, and remains limited 2* factors listed above and including decreased strength, endurance & safe functional mobility  PT will continue to see pt while here in order to address the deficits listed above and provide interventions consistent w/ POC in effort to achieve STGs  PT Discharge Recommendation: Home with skilled therapy, Return to previous environment with social support     PT - OK to Discharge: Yes(when med cleared)    See flowsheet documentation for full assessment

## 2020-09-14 NOTE — QUICK NOTE
Mr Ruperto Triplett feeling well today  Did not tolerate CBI being clamped yesterday  Currently running moderate rate is draining clear yellow  Has a 1-2/10 bladder and penile discomfort but has not had recurrence of severe pain or clot retention overnight  His belly is soft  He had breakfast at 7, is now NPO for OR later today as planned  Has had no fever, dizziness, chest pain, shortness of breath, cough, edema or other derangement in vital signs or metabolics in the interim

## 2020-09-14 NOTE — PHYSICAL THERAPY NOTE
09/14/20 1059   PT Last Visit   PT Visit Date 09/14/20   Pain Assessment   Pain Assessment Tool 0-10   Pain Score 3   Pain Location/Orientation Orientation: Lower; Location: Abdomen   Patient's Stated Pain Goal No pain   Restrictions/Precautions   Weight Bearing Precautions Per Order No   Other Precautions Bed Alarm;Multiple lines; Fall Risk;Pain   General   Chart Reviewed Yes   Family/Caregiver Present No   Cognition   Overall Cognitive Status WFL   Arousal/Participation Alert; Cooperative   Attention Within functional limits   Orientation Level Oriented X4   Memory Unable to assess  (language barrier)   Following Commands Follows one step commands without difficulty   Comments pt agreed to PT treatment-ambulation   Subjective   Subjective Pt c/o abdominal pain 3/10   Bed Mobility   Supine to Sit 5  Supervision   Additional items Assist x 1;Bedrails; Increased time required;Verbal cues   Sit to Supine 5  Supervision   Additional items Assist x 1; Increased time required;Verbal cues   Transfers   Sit to Stand 5  Supervision   Additional items Assist x 1;Bedrails; Increased time required;Verbal cues   Stand to Sit 5  Supervision   Additional items Assist x 1;Bedrails; Increased time required;Verbal cues   Ambulation/Elevation   Gait pattern Improper Weight shift; Forward Flexion;Decreased foot clearance; Short stride   Gait Assistance   (CG)   Additional items Assist x 1;Verbal cues; Tactile cues   Assistive Device Rolling walker   Distance 50 feet x 1   Stair Management Assistance Not tested   Balance   Static Sitting Fair +   Dynamic Sitting Fair   Static Standing Fair   Dynamic Standing Fair -   Ambulatory Fair -   Endurance Deficit   Endurance Deficit Yes   Activity Tolerance   Activity Tolerance Patient limited by fatigue   Nurse Made Aware HALIMA Keller aware   Assessment   Prognosis Good   Problem List Decreased strength;Decreased endurance; Impaired balance;Decreased mobility; Decreased safety awareness;Pain Assessment Pt seen for PT treatment session this date with interventions consisting of gait training w/ emphasis on improving pt's ability to ambulate level surfaces x 50 feet x 1 with CGA provided by therapist with RW  Pt agreeable to PT treatment session upon arrival, pt found supine in bed w/ HOB elevated, in no apparent distress  In comparison to previous session, pt with improvements in amb tolerance  Post session: pt returned BTB and all needs in reach Continue to recommend Home PT with family support at time of d/c in order to maximize pt's functional independence and safety w/ mobility  Pt continues to be functioning below baseline level, and remains limited 2* factors listed above and including decreased strength, endurance & safe functional mobility  PT will continue to see pt while here in order to address the deficits listed above and provide interventions consistent w/ POC in effort to achieve STGs  Goals   Patient Goals to rest   PT Treatment Day 2   Plan   Treatment/Interventions Functional transfer training;LE strengthening/ROM; Therapeutic exercise; Endurance training;Patient/family training;Equipment eval/education; Bed mobility;Gait training;Spoke to nursing   Progress Progressing toward goals   PT Frequency   (3-5 x week)   Recommendation   PT Discharge Recommendation Home with skilled therapy; Return to previous environment with social support   Equipment Recommended Stephanie Hood   PT - OK to Discharge Yes  (when med cleared)   Denise Sharif, PTA

## 2020-09-14 NOTE — PLAN OF CARE
Problem: PAIN - ADULT  Goal: Verbalizes/displays adequate comfort level or baseline comfort level  Description: Interventions:  - Encourage patient to monitor pain and request assistance  - Assess pain using appropriate pain scale  - Administer analgesics based on type and severity of pain and evaluate response  - Implement non-pharmacological measures as appropriate and evaluate response  - Consider cultural and social influences on pain and pain management  - Notify physician/advanced practitioner if interventions unsuccessful or patient reports new pain  Outcome: Progressing     Problem: INFECTION - ADULT  Goal: Absence or prevention of progression during hospitalization  Description: INTERVENTIONS:  - Assess and monitor for signs and symptoms of infection  - Monitor lab/diagnostic results  - Monitor all insertion sites, i e  indwelling lines, tubes, and drains  - Monitor endotracheal if appropriate and nasal secretions for changes in amount and color  - [place] appropriate cooling/warming therapies per order  - Administer medications as ordered  - Instruct and encourage patient and family to use good hand hygiene technique  - Identify and instruct in appropriate isolation precautions for identified infection/condition  Outcome: Progressing     Problem: SAFETY ADULT  Goal: Patient will remain free of falls  Description: INTERVENTIONS:  - Assess patient frequently for physical needs  -  Identify cognitive and physical deficits and behaviors that affect risk of falls    -  [place] fall precautions as indicated by assessment   - Educate patient/family on patient safety including physical limitations  - Instruct patient to call for assistance with activity based on assessment  - Modify environment to reduce risk of injury  - Consider OT/PT consult to assist with strengthening/mobility  Outcome: Progressing  Goal: Maintain or return to baseline ADL function  Description: INTERVENTIONS:  -  Assess patient's ability to carry out ADLs; assess patient's baseline for ADL function and identify physical deficits which impact ability to perform ADLs (bathing, care of mouth/teeth, toileting, grooming, dressing, etc )  - Assess/evaluate cause of self-care deficits   - Assess range of motion  - Assess patient's mobility; develop plan if impaired  - Assess patient's need for assistive devices and provide as appropriate  - Encourage maximum independence but intervene and supervise when necessary  - Involve family in performance of ADLs  - Assess for home care needs following discharge   - Consider OT consult to assist with ADL evaluation and planning for discharge  - Provide patient education as appropriate  Outcome: Progressing  Goal: Maintain or return mobility status to optimal level  Description: INTERVENTIONS:  - Assess patient's baseline mobility status (ambulation, transfers, stairs, etc )    - Identify cognitive and physical deficits and behaviors that affect mobility  - Identify mobility aids required to assist with transfers and/or ambulation (gait belt, sit-to-stand, lift, walker, cane, etc )  - [place] fall precautions as indicated by assessment  - Record patient progress and toleration of activity level on Mobility SBAR; progress patient to next Phase/Stage  - Instruct patient to call for assistance with activity based on assessment  - Consider rehabilitation consult to assist with strengthening/weightbearing, etc   Outcome: Progressing     Problem: DISCHARGE PLANNING  Goal: Discharge to home or other facility with appropriate resources  Description: INTERVENTIONS:  - Identify barriers to discharge w/patient and caregiver  - Arrange for needed discharge resources and transportation as appropriate  - Identify discharge learning needs (meds, wound care, etc )  - Arrange for interpretive services to assist at discharge as needed  - Refer to Case Management Department for coordinating discharge planning if the patient needs post-hospital services based on physician/advanced practitioner order or complex needs related to functional status, cognitive ability, or social support system  Outcome: Progressing     Problem: Knowledge Deficit  Goal: Patient/family/caregiver demonstrates understanding of disease process, treatment plan, medications, and discharge instructions  Description: Complete learning assessment and assess knowledge base  Interventions:  - Provide teaching at level of understanding  - Provide teaching via preferred learning methods  Outcome: Progressing     Problem: Potential for Falls  Goal: Patient will remain free of falls  Description: INTERVENTIONS:  - Assess patient frequently for physical needs  -  Identify cognitive and physical deficits and behaviors that affect risk of falls    -  [place] fall precautions as indicated by assessment   - Educate patient/family on patient safety including physical limitations  - Instruct patient to call for assistance with activity based on assessment  - Modify environment to reduce risk of injury  - Consider OT/PT consult to assist with strengthening/mobility  Outcome: Progressing     Problem: GENITOURINARY - ADULT  Goal: Maintains or returns to baseline urinary function  Description: INTERVENTIONS:  - Assess urinary function  - Encourage oral fluids to ensure adequate hydration if ordered  - Administer IV fluids as ordered to ensure adequate hydration  - Administer ordered medications as needed  - Offer frequent toileting  - Follow urinary retention protocol if ordered  Outcome: Progressing  Goal: Absence of urinary retention  Description: INTERVENTIONS:  - Assess patients ability to void and empty bladder  - Monitor I/O  - Bladder scan as needed  - Discuss with physician/AP medications to alleviate retention as needed  - Discuss catheterization for long term situations as appropriate  Outcome: Progressing  Goal: Urinary catheter remains patent  Description: INTERVENTIONS:  - Assess patency of urinary catheter  - If patient has a chronic winslow, consider changing catheter if non-functioning  - Follow guidelines for intermittent irrigation of non-functioning urinary catheter  Outcome: Progressing     Problem: Prexisting or High Potential for Compromised Skin Integrity  Goal: Skin integrity is maintained or improved  Description: INTERVENTIONS:  - Identify patients at risk for skin breakdown  - Assess and monitor skin integrity  - Assess and monitor nutrition and hydration status  - Monitor labs   - Assess for incontinence   - Turn and reposition patient  - Assist with mobility/ambulation  - Relieve pressure over bony prominences  - Avoid friction and shearing  - Provide appropriate hygiene as needed including keeping skin clean and dry  - Evaluate need for skin moisturizer/barrier cream  - Collaborate with interdisciplinary team   - Patient/family teaching  - Consider wound care consult   Outcome: Progressing

## 2020-09-14 NOTE — UTILIZATION REVIEW
Continued Stay Review    Date: 9/12/20                   Current Patient Class: IP [facility]    HPI:85 y o  male initially admitted on 9/7/20  To IP with  Acute Urinary retention with gross hematuria s/s BPH   9/8  Hematuria controlled w/CBI  Plan for OR pending cardiac clearance  9/9 Per Cardio - Patient of high risk due to a history of exertional angina pectoris, prior myocardial infarction and severe left ventricular systolic dysfunction  A decision on approach to patient's urologic problems would best be made if patient's coronary anatomy was defined  Presently planned for a cardiac catheterization  Rapid Response 9/9  Suspect vasovagal response as he went minimally responsive while he was getting blood work drawn  He then had a return of mental status with no neurological deficits  CT Head:No acute intracranial abnormality  Assessment/Plan:   9/10: Hematuria currently being managed with continuous bladder irrigation via 3-way Estrella catheter  continue holding Aspirin and Plavix  Continue daily trending of H&H  Watch for worsening hematuria  If persistently high surgical risk, consider IR for angioembolization as alternative to cystoscopic approach for his hematuria  9/11: Cardiac Cath: Left main: Normal   LAD: The vessel was normal sized  Angiography showed minor luminal irregularities  Circumflex: The vessel was normal sized  Angiography showed minor luminal irregularities  The two OM branches were also free of significant disease  RCA: The vessel was normal sized and dominant, giving rise to the PDA and a posterolateral branch  There was minimal plaque  There were no significant lesions  No significant atherosclerosis was evident  Normal LVEDP     9/12 Per Cardio: Consider patient to be a moderate cardiac risk due to decreased LV systolic function  Would avoid fluid overload, if possible, to reduce risk of CHF  Per Urology: currently his CBI is clear   recommend discontinuing the CBI, maintain  Estrella catheter to gravity drainage and continue to irrigate the catheter Q 6 hr with 60 cc of normal saline or more frequently p r n  Hematuria/clot  If the urine remains clear I would consider a voiding trial   If he fails a voiding trial outpatient TURP can be considered    9/13 CBI restarted due to recurrence of hematuria and urinary retention  Plan for Cysto with TURP 9/14  Repeat  H&H today  If he remains low we may need to consider transfusion prior to surgery    For now maintain CBI      Pertinent Labs/Diagnostic Results:   Results from last 7 days   Lab Units 09/14/20  0601 09/13/20  1144 09/12/20  0631 09/10/20  0559 09/09/20  0600   WBC Thousand/uL 8 38 9 39 7 53 7 65 9 74   HEMOGLOBIN g/dL 7 1* 7 7* 7 7* 9 3* 10 6*   HEMATOCRIT % 22 1* 24 4* 23 9* 29 4* 33 2*   PLATELETS Thousands/uL 202 244 227 229 262   NEUTROS ABS Thousands/µL 5 42  --  4 25 4 95 6 06     Results from last 7 days   Lab Units 09/14/20  0601 09/12/20  0631 09/10/20  0559 09/10/20  0015 09/09/20  1704 09/09/20  0600 09/08/20  0447   SODIUM mmol/L 138 138 138  --  130* 135* 136   POTASSIUM mmol/L 3 7 3 9 4 3  --  4 5 4 2 4 2   CHLORIDE mmol/L 106 104 103  --  99* 101 103   CO2 mmol/L 24 26 30  --  25 25 27   ANION GAP mmol/L 8 8 5  --  6 9 6   BUN mg/dL 10 16 21  --  24 19 15   CREATININE mg/dL 0 82 0 94 1 17  --  1 31* 1 15 0 91   EGFR ml/min/1 73sq m 82 75 57  --  50 59 78   CALCIUM mg/dL 8 3 8 1* 8 7  --  8 5 9 0 8 6   MAGNESIUM mg/dL  --  2 2 1 9 2 1  --  2 1 1 9   PHOSPHORUS mg/dL  --   --   --   --   --   --  3 3     Results from last 7 days   Lab Units 09/08/20  0447   AST U/L 11   ALT U/L 19   ALK PHOS U/L 53   TOTAL PROTEIN g/dL 7 2   ALBUMIN g/dL 2 9*   TOTAL BILIRUBIN mg/dL 0 33     Results from last 7 days   Lab Units 09/14/20  1109 09/14/20  0801 09/13/20  2100 09/13/20  1627 09/13/20  1138 09/13/20  0748 09/12/20  2013 09/12/20  1604 09/12/20  1145 09/12/20  0810 09/11/20  2052 09/11/20  1508   POC GLUCOSE mg/dl 273* 191* 292* 269* 316* 137 277* 282* 337* 162* 257* 152*     Results from last 7 days   Lab Units 09/14/20  0601 09/12/20  0631 09/10/20  0559 09/09/20  1704 09/09/20  0600 09/08/20  0447   GLUCOSE RANDOM mg/dL 177* 166* 183* 239* 167* 175*     Vital Signs:   09/13/20 2329   98 5 °F (36 9 °C)   76   18   105/51   73   97 %     Lying    09/13/20 1925   98 6 °F (37 °C)   77   18   113/53   77   95 %     Lying    09/13/20 1500   97 5 °F (36 4 °C)   85   18   121/58      97 %   None (Room air)  Sitting    09/13/20 0700   98 2 °F (36 8 °C)   98   18   115/57      96 %   None (Room air)  Sitting    09/12/20 2324   98 5 °F (36 9 °C)   81   18   93/50   68   95 %     Lying    09/12/20 2005   98 °F (36 7 °C)   97   18   96/51   68   94 %     Lying    09/12/20 1436   98 °F (36 7 °C)   87   18   101/58      98 %   None (Room air)  Lying    09/12/20 1146      88   18   120/72           Lying    09/12/20 0700   97 7 °F (36 5 °C)   85   18   116/55      96 %   None (Room air)  Sitting    09/12/20 0315   97 5 °F (36 4 °C)   81   18   104/55      96 %   None (Room air)  Lying        Medications:   Scheduled Medications:  carvedilol, 12 5 mg, Oral, BID With Meals  docusate sodium, 100 mg, Oral, Q12H  famotidine, 20 mg, Oral, Daily  finasteride, 5 mg, Oral, Daily  furosemide, 20 mg, Oral, BID  gabapentin, 300 mg, Oral, BID  insulin glargine, 20 Units, Subcutaneous, HS  insulin lispro, 1-6 Units, Subcutaneous, TID AC  isosorbide mononitrate, 30 mg, Oral, Daily  losartan, 50 mg, Oral, Daily  oxybutynin, 5 mg, Oral, Daily  pravastatin, 40 mg, Oral, Daily With Dinner  tamsulosin, 0 8 mg, Oral, Daily With Dinner    Continuous IV Infusions:   sodium chloride 0 9 % infusion   @  75 / hr    PRN Meds:  acetaminophen, 650 mg, Oral, Q6H PRN    Or  oxyCODONE-acetaminophen, 1 tablet, Oral, Q6H PRN    Or  oxyCODONE-acetaminophen, 2 tablet, Oral, Q6H PRN x 1 9/12  albuterol, 2 puff, Inhalation, Q4H PRN  belladonna-opium, 30 mg, Rectal, Q8H PRN  nitroglycerin, 0 4 mg, Sublingual, Q5 Min PRN    Discharge Plan: TBD    Network Utilization Review Department  [email or web address]  ATTENTION: Please call with any questions or concerns to [phone] and carefully listen to the prompts so that you are directed to the right person  All voicemails are confidential   Fabiola Anson all requests for admission clinical reviews, approved or denied determinations and any other requests to dedicated fax number below belonging to the campus where the patient is receiving treatment   List of dedicated fax numbers for the Facilities:  [address] DENIALS (Administrative/Medical Necessity) [phone]   [address] (Maternity/NICU/Pediatrics) [phone]   Pat Skelton [phone]   Christen Orantes [phone]   [facility] [phone]   Pedro Guerrero [phone]   [address] [place] [address] [phone]   [facility]  [phone]   [address] [facility]  [address] [facility] [address] [facility] [phone]

## 2020-09-14 NOTE — OP NOTE
OPERATIVE REPORT  PATIENT NAME: Bree Hoskins    :  1935  MRN: [id number]  Pt Location: [facility] OR ROOM 06    SURGERY DATE: 2020    Surgeon(s) and Role:     Lesly Giron MD - Primary    Preop Diagnosis:  Gross hematuria [R31 0]    Post-Op Diagnosis Codes:     * Gross hematuria [R31 0]    Procedure(s) (LRB):  CYSTOSCOPY EVACUATION OF CLOTS; TURP; SUPRAPUBIC CATHETER INSERTION (N/A)    Specimen(s):  Urine culture, prostate chips    Estimated Blood Loss:   Minimal    Drains:  Continuous Bladder Irrigation Three-way (Active)   Site Assessment Clean;Skin intact 20 0749   Securement Method Securing device (Describe) 20 0749   Rate Slow 20 0749   Irrigant Normal saline 20 0749   CBI Irrigation Intake (mL) 3000 mL 20 1033   CBI Estrella Output (mL) 1100 mL 20 1558   CBI Net Output (mL) -1600 mL 20 1033   Estrella Output Appearance Other (comment) 20 0450   Number of days: 7       Anesthesia Type:   General    Operative Indications:  Gross hematuria [R31 0]      Operative Findings:  Very large prostate with friable bleeding surfaces both on the median lobe as well as on both large lateral coapted lobes  Eighteen Latvian suprapubic tube inserted  TURP performed  Complications:   None    Procedure and Technique:  Merly is a 82-year-old male admitted with intractable hematuria despite CBI  Hematocrit was continuing to drop down to as low as 22 today  The delay to the operating room was awaiting cardiac catheterization which was recently performed showing no significant coronary artery disease  The prostate was quite large, however, I was concerned about performing an open suprapubic prostatectomy based on his age, size and comorbidities  Risk and benefits of TURP along with suprapubic tube insertion were discussed and reviewed  Informed consent was obtained  The patient is brought to the operative room on 2020   After the smooth induction of general LMA anesthesia, the patient was placed in the dorsal lithotomy position  Intravenous anabiotic's were administered  Venodyne boots were applied  A timeout was performed with all members of the operative team confirming the patient's identity and procedure to be performed  A 22 Ukrainian rigid cystoscope with 30° lens was inserted  The bladder was thoroughly inspected  There was lateral lobe hyperplasia of the prostate  A large median lobe with intravesical protrusion of the prostate was noted  Most surfaces of the prostate appeared to be oozing or bleeding  The bladder was entered  A urine culture was obtained  The bladder was thick-walled and trabeculated  There was no evidence of mucosal abnormalities or lesions  Ureteral orifices were visualized along the trigonal ridge  No significant clot was identified within the bladder  The scope was angled up towards the anterior wall of the bladder  0 5% Marcaine with epinephrine was utilized to anesthetize a region approximately 2 fingerbreadths above the pubic symphysis in the midline  A transverse skin incision was then made under 1 cm with a 15  Blade  A finer needle was placed under direct vision into the bladder  Urine was identified coming from the needle after removal of the inner trocar  A wire was inserted under vision  A fascial cutting needle was passed x2 at 90 degree angle to itself  I then passed the blue dilating 20 Ukrainian sheath with outer peel-away sheath  The blue dilating sheath was removed and an 18 Ukrainian suprapubic tube was then placed under direct vision  20 cc were placed into the balloon  The catheter was placed on gentle traction and secured with a silk drain stitch  The cystoscope was exchanged for the continuous-flow resectoscope  Both ureteral orifices were marked just distal to the opening  A standard TURP was performed   I first took down the large median lobe followed by floor tissue from the bladder neck proximally to the verumontanum distally  At no point during the procedure did any resection occur distal to the vera montanum  Next I took down both lateral lobes of the prostate from the bladder neck proximally to the verumontanum distally  A large amount of anterior tissue then fell into the urethra and this was taken down as well  Based on the size of the prostate there was no way to perform a complete TURP  I was able to open the channel and resect all of the bleeding and friable mucosa  Additional resection would likely have been performed  I-STAT x2 was obtained throughout the case with total resection time of approximately 1 hour and 20 minutes  On both i-STAT measurements, sodium level was 141  His hematocrit trended downward throughout the case and he received a total of 3 units of packed red blood cells with the 1st unit administered at the start of the case  When I thought I could safely perform is much resection as possible today, I utilized a roller ball electrocautery  Hemostasis was obtained  All chips were removed with the Ellik  CBI was initiated in through the 18 [place] suprapubic tube in out through a 24 [place] 3 way hematuria catheter  The 3rd port on the 20/4 [place] catheter was capped  50 cc were placed into the balloon  The catheter was placed on traction and taped to the patient's leg  CBI was running clear to light pink upon departure from the operating room  Overall the patient tolerated the procedure well and there were no complications  The patient was extubated in the operating room transferred to the PACU in stable condition at the conclusion of the case      Patient Disposition:  PACU stable and extubated    SIGNATURE: Tonia Canseco MD  DATE: September 14, 2020  TIME: 5:09 PM

## 2020-09-14 NOTE — PLAN OF CARE
Problem: PAIN - ADULT  Goal: Verbalizes/displays adequate comfort level or baseline comfort level  Description: Interventions:  - Encourage patient to monitor pain and request assistance  - Assess pain using appropriate pain scale  - Administer analgesics based on type and severity of pain and evaluate response  - Implement non-pharmacological measures as appropriate and evaluate response  - Consider cultural and social influences on pain and pain management  - Notify physician/advanced practitioner if interventions unsuccessful or patient reports new pain  Outcome: Progressing     Problem: INFECTION - ADULT  Goal: Absence or prevention of progression during hospitalization  Description: INTERVENTIONS:  - Assess and monitor for signs and symptoms of infection  - Monitor lab/diagnostic results  - Monitor all insertion sites, i e  indwelling lines, tubes, and drains  - Monitor endotracheal if appropriate and nasal secretions for changes in amount and color  - [place] appropriate cooling/warming therapies per order  - Administer medications as ordered  - Instruct and encourage patient and family to use good hand hygiene technique  - Identify and instruct in appropriate isolation precautions for identified infection/condition  Outcome: Progressing  Goal: Absence of fever/infection during neutropenic period  Description: INTERVENTIONS:  - Monitor WBC    Outcome: Progressing     Problem: SAFETY ADULT  Goal: Patient will remain free of falls  Description: INTERVENTIONS:  - Assess patient frequently for physical needs  -  Identify cognitive and physical deficits and behaviors that affect risk of falls    -  [place] fall precautions as indicated by assessment   - Educate patient/family on patient safety including physical limitations  - Instruct patient to call for assistance with activity based on assessment  - Modify environment to reduce risk of injury  - Consider OT/PT consult to assist with strengthening/mobility  Outcome: Progressing  Goal: Maintain or return to baseline ADL function  Description: INTERVENTIONS:  -  Assess patient's ability to carry out ADLs; assess patient's baseline for ADL function and identify physical deficits which impact ability to perform ADLs (bathing, care of mouth/teeth, toileting, grooming, dressing, etc )  - Assess/evaluate cause of self-care deficits   - Assess range of motion  - Assess patient's mobility; develop plan if impaired  - Assess patient's need for assistive devices and provide as appropriate  - Encourage maximum independence but intervene and supervise when necessary  - Involve family in performance of ADLs  - Assess for home care needs following discharge   - Consider OT consult to assist with ADL evaluation and planning for discharge  - Provide patient education as appropriate  Outcome: Progressing  Goal: Maintain or return mobility status to optimal level  Description: INTERVENTIONS:  - Assess patient's baseline mobility status (ambulation, transfers, stairs, etc )    - Identify cognitive and physical deficits and behaviors that affect mobility  - Identify mobility aids required to assist with transfers and/or ambulation (gait belt, sit-to-stand, lift, walker, cane, etc )  - [place] fall precautions as indicated by assessment  - Record patient progress and toleration of activity level on Mobility SBAR; progress patient to next Phase/Stage  - Instruct patient to call for assistance with activity based on assessment  - Consider rehabilitation consult to assist with strengthening/weightbearing, etc   Outcome: Progressing     Problem: DISCHARGE PLANNING  Goal: Discharge to home or other facility with appropriate resources  Description: INTERVENTIONS:  - Identify barriers to discharge w/patient and caregiver  - Arrange for needed discharge resources and transportation as appropriate  - Identify discharge learning needs (meds, wound care, etc )  - Arrange for interpretive services to assist at discharge as needed  - Refer to Case Management Department for coordinating discharge planning if the patient needs post-hospital services based on physician/advanced practitioner order or complex needs related to functional status, cognitive ability, or social support system  Outcome: Progressing     Problem: Knowledge Deficit  Goal: Patient/family/caregiver demonstrates understanding of disease process, treatment plan, medications, and discharge instructions  Description: Complete learning assessment and assess knowledge base  Interventions:  - Provide teaching at level of understanding  - Provide teaching via preferred learning methods  Outcome: Progressing     Problem: Potential for Falls  Goal: Patient will remain free of falls  Description: INTERVENTIONS:  - Assess patient frequently for physical needs  -  Identify cognitive and physical deficits and behaviors that affect risk of falls    -  [place] fall precautions as indicated by assessment   - Educate patient/family on patient safety including physical limitations  - Instruct patient to call for assistance with activity based on assessment  - Modify environment to reduce risk of injury  - Consider OT/PT consult to assist with strengthening/mobility  Outcome: Progressing     Problem: GENITOURINARY - ADULT  Goal: Maintains or returns to baseline urinary function  Description: INTERVENTIONS:  - Assess urinary function  - Encourage oral fluids to ensure adequate hydration if ordered  - Administer IV fluids as ordered to ensure adequate hydration  - Administer ordered medications as needed  - Offer frequent toileting  - Follow urinary retention protocol if ordered  Outcome: Progressing  Goal: Absence of urinary retention  Description: INTERVENTIONS:  - Assess patients ability to void and empty bladder  - Monitor I/O  - Bladder scan as needed  - Discuss with physician/AP medications to alleviate retention as needed  - Discuss catheterization for long term situations as appropriate  Outcome: Progressing  Goal: Urinary catheter remains patent  Description: INTERVENTIONS:  - Assess patency of urinary catheter  - If patient has a chronic winslow, consider changing catheter if non-functioning  - Follow guidelines for intermittent irrigation of non-functioning urinary catheter  Outcome: Progressing

## 2020-09-14 NOTE — OCCUPATIONAL THERAPY NOTE
OccupationalTherapy Progress Note     Patient Name: Nguyen Ellsworth  UFLIJ'Y Date: 9/14/2020  Problem List  Principal Problem:    Gross hematuria  Active Problems:    Benign prostatic hyperplasia with lower urinary tract symptoms    Chronic combined systolic and diastolic congestive heart failure (HCC)    GERD (gastroesophageal reflux disease)    Essential hypertension    Hyperlipidemia    Poorly controlled type 2 diabetes mellitus ([facility] [address] )    Acute blood loss anemia              09/14/20 1346   Restrictions/Precautions   Weight Bearing Precautions Per Order No   Other Precautions Chair Alarm; Bed Alarm;Multiple lines; Fall Risk;Pain   Pain Assessment   Pain Assessment Tool FLACC   Pain Score No Pain   Pain Rating: FLACC (Rest) - Face 0   Pain Rating: FLACC (Rest) - Legs 0   Pain Rating: FLACC (Rest) - Activity 0   Pain Rating: FLACC (Rest) - Cry 0   Pain Rating: FLACC (Rest) - Consolability 0   Score: FLACC (Rest) 0   Pain Rating: FLACC (Activity) - Face 0   Pain Rating: FLACC (Activity) - Legs 0   Pain Rating: FLACC (Activity) - Activity 0   Pain Rating: FLACC (Activity) - Cry 0   Pain Rating: FLACC (Activity) - Consolability 0   Score: FLACC (Activity) 0   ADL   Where Assessed Edge of bed   UB Bathing Assistance 5  Supervision/Setup   UB Bathing Deficit Setup; Increased time to complete   UB Dressing Assistance 5  Supervision/Setup   UB Dressing Deficit Setup; Increased time to complete   UB Dressing Comments donning new gown   Bed Mobility   Supine to Sit 5  Supervision   Additional items Increased time required; Impulsive   Additional Comments OOB and in chair at end of session   Transfers   Sit to Stand 5  Supervision   Additional items Increased time required;Verbal cues   Stand to Sit 5  Supervision   Additional items Increased time required;Verbal cues   Additional Comments use of RW   Functional Mobility   Functional Mobility 5  Supervision   Additional Comments increased time required, functional household distance, min VC throughout for safety with RW   Additional items Rolling walker   Cognition   Overall Cognitive Status [facility]   Arousal/Participation Alert; Cooperative   Attention Within functional limits   Orientation Level Oriented X4   Memory Unable to assess  (language barrier)   Following Commands Follows one step commands without difficulty   Comments Pt is Sinhala speaking only, able to follow short commands and gestures    Assessment   Assessment Pt agreeable to participate in skilled OT treatment session to address ADL retraining, functional transfer training, endurance training, activity engagement and activity tolerance  Pt supine in bed upon arrival  Pt completing UB bathing and dressing with (S) and min VC  Pt educated on energy conservation techniques with UB ADL tasks, demonstrating G carryover  Pt requesting to complete functional mobility, completing functional household distance with RW, pt demonstrating increased endurance from previous session and improved activity tolerance  Pt demonstrating improvements with functional transfers and bed mobility  Despite improvements, pt continues to be performing below his baseline and would benefit from continued skilled OT treatment to address remaining deficits with LB ADLs, toileting, functional mobility, and overall safety awareness  Cont  To recommend home with skilled therapy on d/c     Plan   Goal Expiration Date 09/22/20   OT Treatment Day 1   OT Frequency 3-5x/wk   Recommendation   OT Discharge Recommendation Home with skilled therapy   Equipment Recommended Other (comment)  (RW)   OT - OK to Discharge   (when medically cleared)       Pt will benefit from continued OT services in order to maximize independence with ADL performance, functional mobility with RW, and improve overall endurance/strength required to complete functional tasks in preparation for discharge      At the end of the session, all needs met and pt seated in bedside chair, LEs elevated and call bell within reach    [facility], OTR/L

## 2020-09-14 NOTE — PROGRESS NOTES
Merly continues to have intermittent hematuria despite CBI  Cardiac workup showed normal coronary arteries  Hematocrit today noted to be 22 1  Discussed with patient and his son in Greek cystoscopy with clot evacuation, fulgeration, TURP, and insertion of suprapubic tube  Risks and benefits of the procedure including, but not limited to, incontinence, persistent retention, recurring hematuria, retrograded ejaculation and need for additional surgery or procedures discussed and reviewed  Informed consent obtained from the patient with his son present in [facility]

## 2020-09-14 NOTE — PROGRESS NOTES
Merly underwent TURP in the operating room today  He had a starting hematocrit of 22  There was significant bleeding from most mucosal surfaces of the prostate  He received a total of 3 units of packed red blood cells intraoperatively  He has continuous bladder irrigation running at this time in through his suprapubic tube in out through his 25 [employer] hematuria catheter  I would continue to run CBI overnight  I would irrigate both the suprapubic tube and Estrella catheter Q 4 hours with 60 cc of normal saline solution or more frequently as needed  Postoperative labs will be obtained in the postanesthesia care unit  He may require additional transfusion pending the results of his hemoglobin/hematocrit

## 2020-09-14 NOTE — PLAN OF CARE
Problem: OCCUPATIONAL THERAPY ADULT  Goal: Performs self-care activities at highest level of function for planned discharge setting  See evaluation for individualized goals  Description: Treatment Interventions: ADL retraining, Functional transfer training, UE strengthening/ROM, Endurance training, Patient/family training, Equipment evaluation/education, Compensatory technique education, Energy conservation, Activityengagement  Equipment Recommended: Other (comment)(RW)       See flowsheet documentation for full assessment, interventions and recommendations  Outcome: Progressing  Note: Limitation: Decreased ADL status, Decreased Safe judgement during ADL, Decreased endurance, Decreased self-care trans, Decreased high-level ADLs, Decreased UE strength  Prognosis: Good  Assessment: Pt agreeable to participate in skilled OT treatment session to address ADL retraining, functional transfer training, endurance training, activity engagement and activity tolerance  Pt supine in bed upon arrival  Pt completing UB bathing and dressing with (S) and min VC  Pt educated on energy conservation techniques with UB ADL tasks, demonstrating G carryover  Pt requesting to complete functional mobility, completing functional household distance with RW, pt demonstrating increased endurance from previous session and improved activity tolerance  Pt demonstrating improvements with functional transfers and bed mobility  Despite improvements, pt continues to be performing below his baseline and would benefit from continued skilled OT treatment to address remaining deficits with LB ADLs, toileting, functional mobility, and overall safety awareness  Cont   To recommend home with skilled therapy on d/c       OT Discharge Recommendation: Home with skilled therapy  OT - OK to Discharge: (when medically cleared)

## 2020-09-15 ENCOUNTER — ANESTHESIA EVENT (INPATIENT)
Dept: PERIOP | Facility: HOSPITAL | Age: 85
DRG: 653 | End: 2020-09-15
Payer: MEDICARE

## 2020-09-15 ENCOUNTER — ANESTHESIA (INPATIENT)
Dept: PERIOP | Facility: HOSPITAL | Age: 85
DRG: 653 | End: 2020-09-15
Payer: MEDICARE

## 2020-09-15 ENCOUNTER — APPOINTMENT (INPATIENT)
Dept: CT IMAGING | Facility: HOSPITAL | Age: 85
DRG: 653 | End: 2020-09-15
Payer: MEDICARE

## 2020-09-15 ENCOUNTER — APPOINTMENT (INPATIENT)
Dept: RADIOLOGY | Facility: HOSPITAL | Age: 85
DRG: 653 | End: 2020-09-15
Payer: MEDICARE

## 2020-09-15 VITALS — HEART RATE: 103 BPM

## 2020-09-15 PROBLEM — N99.72 BLADDER PERFORATION, INTRAOPERATIVE: Status: ACTIVE | Noted: 2020-09-15

## 2020-09-15 LAB
ABO GROUP BLD BPU: NORMAL
ALBUMIN SERPL BCP-MCNC: 2.9 G/DL (ref 3.5–5)
ALP SERPL-CCNC: 53 U/L (ref 46–116)
ALT SERPL W P-5'-P-CCNC: 16 U/L (ref 12–78)
ANION GAP SERPL CALCULATED.3IONS-SCNC: 10 MMOL/L (ref 4–13)
ARTERIAL PATENCY WRIST A: YES
AST SERPL W P-5'-P-CCNC: 12 U/L (ref 5–45)
BASE EXCESS BLDA CALC-SCNC: -7.5 MMOL/L
BASOPHILS # BLD AUTO: 0.04 THOUSANDS/ΜL (ref 0–0.1)
BASOPHILS # BLD MANUAL: 0 THOUSAND/UL (ref 0–0.1)
BASOPHILS NFR BLD AUTO: 0 % (ref 0–1)
BASOPHILS NFR MAR MANUAL: 0 % (ref 0–1)
BILIRUB SERPL-MCNC: 0.48 MG/DL (ref 0.2–1)
BPU ID: NORMAL
BUN SERPL-MCNC: 12 MG/DL (ref 5–25)
BUN SERPL-MCNC: 13 MG/DL (ref 5–25)
BUN SERPL-MCNC: 15 MG/DL (ref 5–25)
CA-I BLD-SCNC: 1.01 MMOL/L (ref 1.12–1.32)
CALCIUM SERPL-MCNC: 7.1 MG/DL (ref 8.3–10.1)
CALCIUM SERPL-MCNC: 7.2 MG/DL (ref 8.3–10.1)
CALCIUM SERPL-MCNC: 7.4 MG/DL (ref 8.3–10.1)
CHLORIDE SERPL-SCNC: 105 MMOL/L (ref 100–108)
CHLORIDE SERPL-SCNC: 105 MMOL/L (ref 100–108)
CHLORIDE SERPL-SCNC: 107 MMOL/L (ref 100–108)
CO2 SERPL-SCNC: 19 MMOL/L (ref 21–32)
CO2 SERPL-SCNC: 23 MMOL/L (ref 21–32)
CO2 SERPL-SCNC: 23 MMOL/L (ref 21–32)
CREAT SERPL-MCNC: 0.94 MG/DL (ref 0.6–1.3)
CREAT SERPL-MCNC: 0.96 MG/DL (ref 0.6–1.3)
CREAT SERPL-MCNC: 1.04 MG/DL (ref 0.6–1.3)
CROSSMATCH: NORMAL
EOSINOPHIL # BLD AUTO: 0.01 THOUSAND/ΜL (ref 0–0.61)
EOSINOPHIL # BLD MANUAL: 0 THOUSAND/UL (ref 0–0.4)
EOSINOPHIL NFR BLD AUTO: 0 % (ref 0–6)
EOSINOPHIL NFR BLD MANUAL: 0 % (ref 0–6)
ERYTHROCYTE [DISTWIDTH] IN BLOOD BY AUTOMATED COUNT: 14.6 % (ref 11.6–15.1)
ERYTHROCYTE [DISTWIDTH] IN BLOOD BY AUTOMATED COUNT: 14.9 % (ref 11.6–15.1)
ERYTHROCYTE [DISTWIDTH] IN BLOOD BY AUTOMATED COUNT: 14.9 % (ref 11.6–15.1)
GFR SERPL CREATININE-BSD FRML MDRD: 66 ML/MIN/1.73SQ M
GFR SERPL CREATININE-BSD FRML MDRD: 73 ML/MIN/1.73SQ M
GFR SERPL CREATININE-BSD FRML MDRD: 75 ML/MIN/1.73SQ M
GLUCOSE SERPL-MCNC: 202 MG/DL (ref 65–140)
GLUCOSE SERPL-MCNC: 206 MG/DL (ref 65–140)
GLUCOSE SERPL-MCNC: 207 MG/DL (ref 65–140)
GLUCOSE SERPL-MCNC: 214 MG/DL (ref 65–140)
GLUCOSE SERPL-MCNC: 215 MG/DL (ref 65–140)
GLUCOSE SERPL-MCNC: 241 MG/DL (ref 65–140)
GLUCOSE SERPL-MCNC: 296 MG/DL (ref 65–140)
GLUCOSE SERPL-MCNC: 303 MG/DL (ref 65–140)
HCO3 BLDA-SCNC: 17.3 MMOL/L (ref 22–28)
HCT VFR BLD AUTO: 28.4 % (ref 36.5–49.3)
HCT VFR BLD AUTO: 29.1 % (ref 36.5–49.3)
HCT VFR BLD AUTO: 29.3 % (ref 36.5–49.3)
HGB BLD-MCNC: 9.2 G/DL (ref 12–17)
HGB BLD-MCNC: 9.4 G/DL (ref 12–17)
HGB BLD-MCNC: 9.4 G/DL (ref 12–17)
IMM GRANULOCYTES # BLD AUTO: 0.1 THOUSAND/UL (ref 0–0.2)
IMM GRANULOCYTES NFR BLD AUTO: 1 % (ref 0–2)
LACTATE SERPL-SCNC: 1.9 MMOL/L (ref 0.5–2)
LYMPHOCYTES # BLD AUTO: 0.66 THOUSANDS/ΜL (ref 0.6–4.47)
LYMPHOCYTES # BLD AUTO: 1.28 THOUSAND/UL (ref 0.6–4.47)
LYMPHOCYTES # BLD AUTO: 6 % (ref 14–44)
LYMPHOCYTES NFR BLD AUTO: 5 % (ref 14–44)
MAGNESIUM SERPL-MCNC: 1.5 MG/DL (ref 1.6–2.6)
MCH RBC QN AUTO: 30.7 PG (ref 26.8–34.3)
MCH RBC QN AUTO: 31 PG (ref 26.8–34.3)
MCH RBC QN AUTO: 31.4 PG (ref 26.8–34.3)
MCHC RBC AUTO-ENTMCNC: 31.6 G/DL (ref 31.4–37.4)
MCHC RBC AUTO-ENTMCNC: 32.1 G/DL (ref 31.4–37.4)
MCHC RBC AUTO-ENTMCNC: 33.1 G/DL (ref 31.4–37.4)
MCV RBC AUTO: 95 FL (ref 82–98)
MCV RBC AUTO: 97 FL (ref 82–98)
MCV RBC AUTO: 97 FL (ref 82–98)
MONOCYTES # BLD AUTO: 0.43 THOUSAND/UL (ref 0–1.22)
MONOCYTES # BLD AUTO: 0.69 THOUSAND/ΜL (ref 0.17–1.22)
MONOCYTES NFR BLD AUTO: 5 % (ref 4–12)
MONOCYTES NFR BLD: 2 % (ref 4–12)
NASAL CANNULA: 2
NEUTROPHILS # BLD AUTO: 13.27 THOUSANDS/ΜL (ref 1.85–7.62)
NEUTROPHILS # BLD MANUAL: 19.7 THOUSAND/UL (ref 1.85–7.62)
NEUTS BAND NFR BLD MANUAL: 15 % (ref 0–8)
NEUTS SEG NFR BLD AUTO: 77 % (ref 43–75)
NEUTS SEG NFR BLD AUTO: 89 % (ref 43–75)
NRBC BLD AUTO-RTO: 0 /100 WBCS
NRBC BLD AUTO-RTO: 0 /100 WBCS
O2 CT BLDA-SCNC: 12.5 ML/DL (ref 16–23)
OXYHGB MFR BLDA: 91.8 % (ref 94–97)
PCO2 BLDA: 32.4 MM HG (ref 36–44)
PH BLDA: 7.34 [PH] (ref 7.35–7.45)
PHOSPHATE SERPL-MCNC: 2.3 MG/DL (ref 2.3–4.1)
PLATELET # BLD AUTO: 202 THOUSANDS/UL (ref 149–390)
PLATELET # BLD AUTO: 219 THOUSANDS/UL (ref 149–390)
PLATELET # BLD AUTO: 221 THOUSANDS/UL (ref 149–390)
PLATELET BLD QL SMEAR: ADEQUATE
PMV BLD AUTO: 10.1 FL (ref 8.9–12.7)
PMV BLD AUTO: 10.3 FL (ref 8.9–12.7)
PMV BLD AUTO: 10.4 FL (ref 8.9–12.7)
PO2 BLDA: 67.4 MM HG (ref 75–129)
POLYCHROMASIA BLD QL SMEAR: PRESENT
POTASSIUM SERPL-SCNC: 3.7 MMOL/L (ref 3.5–5.3)
POTASSIUM SERPL-SCNC: 3.8 MMOL/L (ref 3.5–5.3)
POTASSIUM SERPL-SCNC: 3.9 MMOL/L (ref 3.5–5.3)
PROT SERPL-MCNC: 6.8 G/DL (ref 6.4–8.2)
RBC # BLD AUTO: 2.99 MILLION/UL (ref 3.88–5.62)
RBC # BLD AUTO: 3 MILLION/UL (ref 3.88–5.62)
RBC # BLD AUTO: 3.03 MILLION/UL (ref 3.88–5.62)
RBC MORPH BLD: NORMAL
SODIUM SERPL-SCNC: 134 MMOL/L (ref 136–145)
SODIUM SERPL-SCNC: 138 MMOL/L (ref 136–145)
SODIUM SERPL-SCNC: 140 MMOL/L (ref 136–145)
SPECIMEN SOURCE: ABNORMAL
TOTAL CELLS COUNTED SPEC: 100
UNIT DISPENSE STATUS: NORMAL
UNIT PRODUCT CODE: NORMAL
UNIT RH: NORMAL
WBC # BLD AUTO: 14.77 THOUSAND/UL (ref 4.31–10.16)
WBC # BLD AUTO: 20.78 THOUSAND/UL (ref 4.31–10.16)
WBC # BLD AUTO: 21.41 THOUSAND/UL (ref 4.31–10.16)

## 2020-09-15 PROCEDURE — 82948 REAGENT STRIP/BLOOD GLUCOSE: CPT

## 2020-09-15 PROCEDURE — 5A1935Z RESPIRATORY VENTILATION, LESS THAN 24 CONSECUTIVE HOURS: ICD-10-PCS | Performed by: UROLOGY

## 2020-09-15 PROCEDURE — 99223 1ST HOSP IP/OBS HIGH 75: CPT | Performed by: SURGERY

## 2020-09-15 PROCEDURE — 83605 ASSAY OF LACTIC ACID: CPT | Performed by: NURSE PRACTITIONER

## 2020-09-15 PROCEDURE — 82330 ASSAY OF CALCIUM: CPT | Performed by: NURSE PRACTITIONER

## 2020-09-15 PROCEDURE — 85025 COMPLETE CBC W/AUTO DIFF WBC: CPT | Performed by: INTERNAL MEDICINE

## 2020-09-15 PROCEDURE — 0TQB0ZZ REPAIR BLADDER, OPEN APPROACH: ICD-10-PCS | Performed by: UROLOGY

## 2020-09-15 PROCEDURE — 85027 COMPLETE CBC AUTOMATED: CPT | Performed by: INTERNAL MEDICINE

## 2020-09-15 PROCEDURE — P9016 RBC LEUKOCYTES REDUCED: HCPCS

## 2020-09-15 PROCEDURE — 30233N1 TRANSFUSION OF NONAUTOLOGOUS RED BLOOD CELLS INTO PERIPHERAL VEIN, PERCUTANEOUS APPROACH: ICD-10-PCS | Performed by: UROLOGY

## 2020-09-15 PROCEDURE — 87040 BLOOD CULTURE FOR BACTERIA: CPT | Performed by: NURSE PRACTITIONER

## 2020-09-15 PROCEDURE — 99233 SBSQ HOSP IP/OBS HIGH 50: CPT | Performed by: NURSE PRACTITIONER

## 2020-09-15 PROCEDURE — 71045 X-RAY EXAM CHEST 1 VIEW: CPT

## 2020-09-15 PROCEDURE — 82805 BLOOD GASES W/O2 SATURATION: CPT | Performed by: NURSE PRACTITIONER

## 2020-09-15 PROCEDURE — 74178 CT ABD&PLV WO CNTR FLWD CNTR: CPT

## 2020-09-15 PROCEDURE — G1004 CDSM NDSC: HCPCS

## 2020-09-15 PROCEDURE — NC001 PR NO CHARGE: Performed by: UROLOGY

## 2020-09-15 PROCEDURE — 83735 ASSAY OF MAGNESIUM: CPT | Performed by: NURSE PRACTITIONER

## 2020-09-15 PROCEDURE — 99024 POSTOP FOLLOW-UP VISIT: CPT | Performed by: PHYSICIAN ASSISTANT

## 2020-09-15 PROCEDURE — 85027 COMPLETE CBC AUTOMATED: CPT | Performed by: NURSE PRACTITIONER

## 2020-09-15 PROCEDURE — 99232 SBSQ HOSP IP/OBS MODERATE 35: CPT | Performed by: INTERNAL MEDICINE

## 2020-09-15 PROCEDURE — 0W9G0ZZ DRAINAGE OF PERITONEAL CAVITY, OPEN APPROACH: ICD-10-PCS | Performed by: UROLOGY

## 2020-09-15 PROCEDURE — 80053 COMPREHEN METABOLIC PANEL: CPT | Performed by: NURSE PRACTITIONER

## 2020-09-15 PROCEDURE — 80048 BASIC METABOLIC PNL TOTAL CA: CPT | Performed by: INTERNAL MEDICINE

## 2020-09-15 PROCEDURE — 99233 SBSQ HOSP IP/OBS HIGH 50: CPT | Performed by: INTERNAL MEDICINE

## 2020-09-15 PROCEDURE — 0WQF0ZZ REPAIR ABDOMINAL WALL, OPEN APPROACH: ICD-10-PCS | Performed by: UROLOGY

## 2020-09-15 PROCEDURE — 84100 ASSAY OF PHOSPHORUS: CPT | Performed by: NURSE PRACTITIONER

## 2020-09-15 PROCEDURE — 51860 REPAIR OF BLADDER WOUND: CPT | Performed by: UROLOGY

## 2020-09-15 PROCEDURE — 85007 BL SMEAR W/DIFF WBC COUNT: CPT | Performed by: INTERNAL MEDICINE

## 2020-09-15 PROCEDURE — 72194 CT PELVIS W/O & W/DYE: CPT

## 2020-09-15 RX ORDER — SODIUM CHLORIDE 9 MG/ML
INJECTION, SOLUTION INTRAVENOUS CONTINUOUS PRN
Status: DISCONTINUED | OUTPATIENT
Start: 2020-09-15 | End: 2020-09-15

## 2020-09-15 RX ORDER — ONDANSETRON 2 MG/ML
INJECTION INTRAMUSCULAR; INTRAVENOUS AS NEEDED
Status: DISCONTINUED | OUTPATIENT
Start: 2020-09-15 | End: 2020-09-15

## 2020-09-15 RX ORDER — INSULIN GLARGINE 100 [IU]/ML
20 INJECTION, SOLUTION SUBCUTANEOUS
Status: DISCONTINUED | OUTPATIENT
Start: 2020-09-15 | End: 2020-09-19 | Stop reason: HOSPADM

## 2020-09-15 RX ORDER — MAGNESIUM HYDROXIDE 1200 MG/15ML
LIQUID ORAL AS NEEDED
Status: DISCONTINUED | OUTPATIENT
Start: 2020-09-15 | End: 2020-09-15 | Stop reason: HOSPADM

## 2020-09-15 RX ORDER — LIDOCAINE HYDROCHLORIDE 10 MG/ML
INJECTION, SOLUTION EPIDURAL; INFILTRATION; INTRACAUDAL; PERINEURAL AS NEEDED
Status: DISCONTINUED | OUTPATIENT
Start: 2020-09-15 | End: 2020-09-15

## 2020-09-15 RX ORDER — FUROSEMIDE 10 MG/ML
20 INJECTION INTRAMUSCULAR; INTRAVENOUS ONCE
Status: COMPLETED | OUTPATIENT
Start: 2020-09-15 | End: 2020-09-15

## 2020-09-15 RX ORDER — PROPOFOL 10 MG/ML
5-50 INJECTION, EMULSION INTRAVENOUS
Status: DISCONTINUED | OUTPATIENT
Start: 2020-09-16 | End: 2020-09-16

## 2020-09-15 RX ORDER — HEPARIN SODIUM 5000 [USP'U]/ML
5000 INJECTION, SOLUTION INTRAVENOUS; SUBCUTANEOUS EVERY 8 HOURS SCHEDULED
Status: DISCONTINUED | OUTPATIENT
Start: 2020-09-15 | End: 2020-09-15

## 2020-09-15 RX ORDER — FENTANYL CITRATE/PF 50 MCG/ML
50 SYRINGE (ML) INJECTION EVERY 2 HOUR PRN
Status: DISCONTINUED | OUTPATIENT
Start: 2020-09-15 | End: 2020-09-16

## 2020-09-15 RX ORDER — FUROSEMIDE 20 MG/1
20 TABLET ORAL 2 TIMES DAILY
Status: DISCONTINUED | OUTPATIENT
Start: 2020-09-15 | End: 2020-09-15

## 2020-09-15 RX ORDER — GABAPENTIN 300 MG/1
300 CAPSULE ORAL 2 TIMES DAILY
Status: DISCONTINUED | OUTPATIENT
Start: 2020-09-15 | End: 2020-09-19 | Stop reason: HOSPADM

## 2020-09-15 RX ORDER — ROCURONIUM BROMIDE 10 MG/ML
INJECTION, SOLUTION INTRAVENOUS AS NEEDED
Status: DISCONTINUED | OUTPATIENT
Start: 2020-09-15 | End: 2020-09-15

## 2020-09-15 RX ORDER — FENTANYL CITRATE 50 UG/ML
INJECTION, SOLUTION INTRAMUSCULAR; INTRAVENOUS AS NEEDED
Status: DISCONTINUED | OUTPATIENT
Start: 2020-09-15 | End: 2020-09-15

## 2020-09-15 RX ORDER — PROPOFOL 10 MG/ML
INJECTION, EMULSION INTRAVENOUS AS NEEDED
Status: DISCONTINUED | OUTPATIENT
Start: 2020-09-15 | End: 2020-09-15

## 2020-09-15 RX ORDER — SUCCINYLCHOLINE/SOD CL,ISO/PF 100 MG/5ML
SYRINGE (ML) INTRAVENOUS AS NEEDED
Status: DISCONTINUED | OUTPATIENT
Start: 2020-09-15 | End: 2020-09-15

## 2020-09-15 RX ORDER — CEFAZOLIN SODIUM 1 G/50ML
1000 SOLUTION INTRAVENOUS
Status: COMPLETED | OUTPATIENT
Start: 2020-09-15 | End: 2020-09-15

## 2020-09-15 RX ORDER — CARVEDILOL 12.5 MG/1
12.5 TABLET ORAL 2 TIMES DAILY WITH MEALS
Status: DISCONTINUED | OUTPATIENT
Start: 2020-09-15 | End: 2020-09-15

## 2020-09-15 RX ORDER — PRAVASTATIN SODIUM 40 MG
40 TABLET ORAL
Status: DISCONTINUED | OUTPATIENT
Start: 2020-09-15 | End: 2020-09-19 | Stop reason: HOSPADM

## 2020-09-15 RX ORDER — ALBUMIN (HUMAN) 12.5 G/50ML
50 SOLUTION INTRAVENOUS ONCE
Status: COMPLETED | OUTPATIENT
Start: 2020-09-15 | End: 2020-09-15

## 2020-09-15 RX ORDER — MIDAZOLAM HYDROCHLORIDE 2 MG/2ML
INJECTION, SOLUTION INTRAMUSCULAR; INTRAVENOUS AS NEEDED
Status: DISCONTINUED | OUTPATIENT
Start: 2020-09-15 | End: 2020-09-15

## 2020-09-15 RX ORDER — TAMSULOSIN HYDROCHLORIDE 0.4 MG/1
0.8 CAPSULE ORAL
Status: DISCONTINUED | OUTPATIENT
Start: 2020-09-15 | End: 2020-09-19 | Stop reason: HOSPADM

## 2020-09-15 RX ADMIN — ATROPA BELLADONNA AND OPIUM 1 SUPPOSITORY: 16.2; 3 SUPPOSITORY RECTAL at 03:39

## 2020-09-15 RX ADMIN — CEFAZOLIN SODIUM 2000 MG: 1 SOLUTION INTRAVENOUS at 22:02

## 2020-09-15 RX ADMIN — ROCURONIUM BROMIDE 10 MG: 10 INJECTION, SOLUTION INTRAVENOUS at 23:04

## 2020-09-15 RX ADMIN — SODIUM CHLORIDE: 0.9 INJECTION, SOLUTION INTRAVENOUS at 22:10

## 2020-09-15 RX ADMIN — CARVEDILOL 12.5 MG: 12.5 TABLET, FILM COATED ORAL at 17:36

## 2020-09-15 RX ADMIN — FUROSEMIDE 20 MG: 20 TABLET ORAL at 00:53

## 2020-09-15 RX ADMIN — SODIUM CHLORIDE 125 ML/HR: 0.9 INJECTION, SOLUTION INTRAVENOUS at 11:13

## 2020-09-15 RX ADMIN — ROCURONIUM BROMIDE 25 MG: 10 INJECTION, SOLUTION INTRAVENOUS at 22:16

## 2020-09-15 RX ADMIN — OXYCODONE HYDROCHLORIDE AND ACETAMINOPHEN 1 TABLET: 5; 325 TABLET ORAL at 09:05

## 2020-09-15 RX ADMIN — FUROSEMIDE 20 MG: 20 TABLET ORAL at 08:41

## 2020-09-15 RX ADMIN — FINASTERIDE 5 MG: 5 TABLET, FILM COATED ORAL at 08:40

## 2020-09-15 RX ADMIN — IOHEXOL 15 ML: 180 INJECTION INTRAVENOUS at 14:32

## 2020-09-15 RX ADMIN — SODIUM CHLORIDE 125 ML/HR: 0.9 INJECTION, SOLUTION INTRAVENOUS at 19:34

## 2020-09-15 RX ADMIN — ISOSORBIDE MONONITRATE 30 MG: 30 TABLET, EXTENDED RELEASE ORAL at 08:40

## 2020-09-15 RX ADMIN — INSULIN LISPRO 2 UNITS: 100 INJECTION, SOLUTION INTRAVENOUS; SUBCUTANEOUS at 17:35

## 2020-09-15 RX ADMIN — OXYCODONE HYDROCHLORIDE AND ACETAMINOPHEN 2 TABLET: 5; 325 TABLET ORAL at 03:10

## 2020-09-15 RX ADMIN — PHENYLEPHRINE HYDROCHLORIDE 100 MCG: 10 INJECTION INTRAVENOUS at 22:12

## 2020-09-15 RX ADMIN — METRONIDAZOLE 500 MG: 500 INJECTION, SOLUTION INTRAVENOUS at 22:02

## 2020-09-15 RX ADMIN — PROPOFOL 100 MG: 10 INJECTION, EMULSION INTRAVENOUS at 22:10

## 2020-09-15 RX ADMIN — FUROSEMIDE 20 MG: 10 INJECTION, SOLUTION INTRAMUSCULAR; INTRAVENOUS at 18:30

## 2020-09-15 RX ADMIN — ALBUMIN (HUMAN) 50 G: 0.25 INJECTION, SOLUTION INTRAVENOUS at 20:23

## 2020-09-15 RX ADMIN — OXYBUTYNIN CHLORIDE 5 MG: 5 TABLET, EXTENDED RELEASE ORAL at 08:40

## 2020-09-15 RX ADMIN — LIDOCAINE HYDROCHLORIDE 60 MG: 10 INJECTION, SOLUTION EPIDURAL; INFILTRATION; INTRACAUDAL; PERINEURAL at 22:10

## 2020-09-15 RX ADMIN — PROPOFOL 10 MCG/KG/MIN: 10 INJECTION, EMULSION INTRAVENOUS at 23:59

## 2020-09-15 RX ADMIN — MIDAZOLAM 1 MG: 1 INJECTION INTRAMUSCULAR; INTRAVENOUS at 23:37

## 2020-09-15 RX ADMIN — CARVEDILOL 12.5 MG: 12.5 TABLET, FILM COATED ORAL at 00:53

## 2020-09-15 RX ADMIN — PHENYLEPHRINE HYDROCHLORIDE 100 MCG: 10 INJECTION INTRAVENOUS at 22:24

## 2020-09-15 RX ADMIN — FUROSEMIDE 20 MG: 20 TABLET ORAL at 17:37

## 2020-09-15 RX ADMIN — TAMSULOSIN HYDROCHLORIDE 0.8 MG: 0.4 CAPSULE ORAL at 17:37

## 2020-09-15 RX ADMIN — CEFEPIME HYDROCHLORIDE 2000 MG: 2 INJECTION, POWDER, FOR SOLUTION INTRAVENOUS at 19:03

## 2020-09-15 RX ADMIN — INSULIN GLARGINE 20 UNITS: 100 INJECTION, SOLUTION SUBCUTANEOUS at 00:53

## 2020-09-15 RX ADMIN — IOHEXOL 100 ML: 350 INJECTION, SOLUTION INTRAVENOUS at 12:19

## 2020-09-15 RX ADMIN — PRAVASTATIN SODIUM 40 MG: 40 TABLET ORAL at 00:53

## 2020-09-15 RX ADMIN — FAMOTIDINE 20 MG: 20 TABLET ORAL at 08:41

## 2020-09-15 RX ADMIN — TAMSULOSIN HYDROCHLORIDE 0.8 MG: 0.4 CAPSULE ORAL at 00:53

## 2020-09-15 RX ADMIN — ROCURONIUM BROMIDE 10 MG: 10 INJECTION, SOLUTION INTRAVENOUS at 23:10

## 2020-09-15 RX ADMIN — GABAPENTIN 300 MG: 300 CAPSULE ORAL at 17:37

## 2020-09-15 RX ADMIN — INSULIN LISPRO 3 UNITS: 100 INJECTION, SOLUTION INTRAVENOUS; SUBCUTANEOUS at 08:41

## 2020-09-15 RX ADMIN — FENTANYL CITRATE 50 MCG: 50 INJECTION, SOLUTION INTRAMUSCULAR; INTRAVENOUS at 22:10

## 2020-09-15 RX ADMIN — GABAPENTIN 300 MG: 300 CAPSULE ORAL at 08:40

## 2020-09-15 RX ADMIN — DOCUSATE SODIUM 100 MG: 100 CAPSULE, LIQUID FILLED ORAL at 08:41

## 2020-09-15 RX ADMIN — GABAPENTIN 300 MG: 300 CAPSULE ORAL at 00:53

## 2020-09-15 RX ADMIN — ALBUTEROL SULFATE 2 PUFF: 90 AEROSOL, METERED RESPIRATORY (INHALATION) at 21:27

## 2020-09-15 RX ADMIN — Medication 100 MG: at 22:10

## 2020-09-15 RX ADMIN — PRAVASTATIN SODIUM 40 MG: 40 TABLET ORAL at 17:37

## 2020-09-15 RX ADMIN — MORPHINE SULFATE 1 MG: 2 INJECTION, SOLUTION INTRAMUSCULAR; INTRAVENOUS at 00:19

## 2020-09-15 RX ADMIN — SODIUM CHLORIDE, SODIUM LACTATE, POTASSIUM CHLORIDE, AND CALCIUM CHLORIDE 1000 ML: .6; .31; .03; .02 INJECTION, SOLUTION INTRAVENOUS at 20:33

## 2020-09-15 RX ADMIN — ONDANSETRON 4 MG: 2 INJECTION INTRAMUSCULAR; INTRAVENOUS at 22:10

## 2020-09-15 RX ADMIN — LOSARTAN POTASSIUM 50 MG: 50 TABLET, FILM COATED ORAL at 08:41

## 2020-09-15 RX ADMIN — PHENYLEPHRINE HYDROCHLORIDE 100 MCG: 10 INJECTION INTRAVENOUS at 22:21

## 2020-09-15 RX ADMIN — INSULIN GLARGINE 20 UNITS: 100 INJECTION, SOLUTION SUBCUTANEOUS at 21:00

## 2020-09-15 RX ADMIN — INSULIN LISPRO 4 UNITS: 100 INJECTION, SOLUTION INTRAVENOUS; SUBCUTANEOUS at 13:11

## 2020-09-15 RX ADMIN — CARVEDILOL 12.5 MG: 12.5 TABLET, FILM COATED ORAL at 08:40

## 2020-09-15 RX ADMIN — ROCURONIUM BROMIDE 5 MG: 10 INJECTION, SOLUTION INTRAVENOUS at 22:10

## 2020-09-15 NOTE — ASSESSMENT & PLAN NOTE
-patient currently has fever, tachycardia, and lethargy  -quite concerning for sepsis due to intra-abdominal infection/possible perforation  -patient discussed with the urology team at the bedside  -broad-spectrum antibiotics with cefepime started  -discussed with critical care team:  Will transfer patient to ICU  -being evaluated for possible surgical intervention

## 2020-09-15 NOTE — PROGRESS NOTES
Progress Note - Merly Webb Diss 1935, 82 y o  male MRN: [id number]    Unit/Bed#: ICU 13 Encounter: [id number]    Primary Care Provider: No primary care provider on file  Date and time admitted to hospital: 9/7/2020  4:48 PM        * Sepsis [facility])  Assessment & Plan  · S/p POD #1 cystoscopy evacuation of clots, turp with suprapubic catheter insertion   · Today began with tense abdomen complaining of feeling swollen underwent CT abdomen/pelvis and found to have the tip of the suprapubic catheter to be located at the serosal margin of the anterior lower urinary bladder  No suprapubic catheter balloon was present concerning for rupture  · Underwent cysto with small focal perforation in the anterior wall of the urinary bladder with simple fluid in retroperitoneum and peritoneal cavity  · Urology at bedside and transferred patient to critical care  · Now with temperature and tachycardia  Will treat for Sepsis with peritonitis     Bladder perforation, intraoperative  Assessment & Plan  · Found to have bladder perforation and rupture of suprapubic catheter   · Has extravasation of contrast in the preperitoneal cavity   · Urology following  · Follow abdominal exams  · Check repeat labs    Gross hematuria  Assessment & Plan  · Had CBI infusing since his admission on 9/7  · Discontinue for now secondary to perforation  · May need OR due to perforation with fluid in abdomen  · Hemoglobin remains stable   Received 3 units PRBC yesterday in the OR    Poorly controlled type 2 diabetes mellitus [facility])  Assessment & Plan  Lab Results   Component Value Date    HGBA1C 7 7 (H) 08/17/2020       Recent Labs     09/15/20  0801 09/15/20  1124 09/15/20  1540 09/15/20  1730   POCGLU 241* 296* 215* 214*       Blood Sugar Average: Last 72 hrs:  (P) 242 8125     · Blood glucose elevated   · Will increase SSI coverage, if blood glucose does not improve will start on insulin infusion     Acute blood loss anemia  Assessment & Plan  · Received 3 units PRBC intraoperatively    Essential hypertension  Assessment & Plan  · Hold on blood pressure medications for now  Suspect patient will become septic with shock related to peritonitis and perforated bladder    Chronic combined systolic and diastolic congestive heart failure (HCC)  Assessment & Plan  Wt Readings from Last 3 Encounters:   09/15/20 98 2 kg (216 lb 7 9 oz)   09/07/20 98 kg (216 lb)   09/04/20 99 3 kg (219 lb)     · Cardiology following underwent cardiac catheterization in 9/2020 and was cleared for OR with no CAD   · EF 30% with grade 1 diastolic dystfunction  · Continue on statin but hold on blood pressure medications for now           Benign prostatic hyperplasia with lower urinary tract symptoms  Assessment & Plan  · S/p turp    Hyperlipidemia  Assessment & Plan  · Continue on home Pravastatin    GERD (gastroesophageal reflux disease)  Assessment & Plan  · Resume on home Protonix    CAD (coronary artery disease)  Assessment & Plan  · September 2020 with EF 30%, mild LVH, grade 1 diastolic dysfunction, and mild RV dilatation   · Cardiac catheterization during hospitalization and cleared for OR procedure        ----------------------------------------------------------------------------------------  HPI/24hr events: 82year old male with a past medical history significant for CAD, hypertension, hyperlipidemia, GERD, diabetes mellitus, and CHF  He presented to the hospital with complaints of hematuria  He has a history of lower urinary tract symptoms with retention requiring winslow catheter placement  He presented to the hospital complaining of intermittent hematuria requiring visits to the hospital requiring winslow catheter replacement and irrigation  On the 3rd time he was admitted to the floor and evaluated by urology  He was placed on CBI and had anemia related to his hematuria and required 3 units PRBC during his admission    He had a history of CAD with EF 30% and cardiology was consulted for clearance prior to taken him to the OR for TURP  He underwent cardiac catheterization with no obstructive lesions  He was cleared to go to the OR  Underwent cystoscopy evacuation of clots, TURP, and suprapubic catheter insertion on   On 9/15 was found to have increased abdominal pain and distention  He had a CT abdomen/pelvis that revealed the tip of the suprapubic catheter that appears to be located at the serosal margin of the anterior lower bladder  There is also fluid in the space of retroperitoneum and peritoneal cavity  Due to this he underwent a cystogram that showed a small focal perforation in the anterior wall of the urinary bladder and extravasation of contrast into the preperitoneal space  He was transferred to the stepdown unit due to new tachycardia and fever with CT abdomen findings  Disposition: Admit to Stepdown Level 1  Code Status: Level 1 - Full Code  ---------------------------------------------------------------------------------------  SUBJECTIVE       Review of Systems   HENT: Negative  Respiratory: Positive for shortness of breath  Gastrointestinal: Positive for abdominal pain (severe abdominal pain)  Endocrine: Negative  Genitourinary: Estrella catheter     Musculoskeletal: Negative  Psychiatric/Behavioral: Negative  All other systems reviewed and are negative      Review of systems was reviewed and negative unless stated above in HPI/24-hour events   ---------------------------------------------------------------------------------------  OBJECTIVE    Vitals   Vitals:    09/15/20 0835 09/15/20 1100 09/15/20 1537 09/15/20 1712   BP: 113/59 114/58 138/64 149/76   BP Location: Right arm Left arm Left arm Left arm   Pulse: 99 92 57 (!) 120   Resp:  18 19    Temp:  97 9 °F (36 6 °C) (!) 97 3 °F (36 3 °C) (!) 100 9 °F (38 3 °C)   TempSrc:  Temporal Temporal Temporal   SpO2:  95% 93% 91%   Weight:       Height:         Temp (24hrs), Av 2 °F (36 8 °C), Min:97 3 °F (36 3 °C), Max:100 9 °F (38 3 °C)  Current: Temperature: (!) 100 9 °F (38 3 °C)          Respiratory:  SpO2: SpO2: 91 %  Nasal Cannula O2 Flow Rate (L/min): 2 L/min    Invasive/non-invasive ventilation settings   Respiratory    Lab Data (Last 4 hours)    None         O2/Vent Data (Last 4 hours)    None                Physical Exam  Vitals signs reviewed  HENT:      Head: Normocephalic and atraumatic  Nose: Nose normal  No congestion  Mouth/Throat:      Mouth: Mucous membranes are moist       Pharynx: Oropharynx is clear  Eyes:      General: No scleral icterus  Extraocular Movements: Extraocular movements intact  Conjunctiva/sclera: Conjunctivae normal       Pupils: Pupils are equal, round, and reactive to light  Neck:      Musculoskeletal: Normal range of motion and neck supple  No neck rigidity  Cardiovascular:      Rate and Rhythm: Tachycardia present  Pulses: Normal pulses  Heart sounds: Normal heart sounds  No murmur  Comments: Tachypnea    Pulmonary:      Breath sounds: Wheezing present  Abdominal:      Tenderness: There is abdominal tenderness  There is guarding  Musculoskeletal: Normal range of motion  General: No swelling  Lymphadenopathy:      Cervical: No cervical adenopathy  Skin:     Coloration: Skin is jaundiced  Neurological:      General: No focal deficit present  Mental Status: He is alert and oriented to person, place, and time  Mental status is at baseline  Cranial Nerves: No cranial nerve deficit           Laboratory and Diagnostics:  Results from last 7 days   Lab Units 09/15/20  1832 09/15/20  0446 09/14/20  2043 09/14/20  1905 09/14/20  1835 09/14/20  0601 09/13/20  1144 09/12/20  0631 09/10/20  0559 09/09/20  0600   WBC Thousand/uL 21 41* 14 77* 12 47*  --   --  8 38 9 39 7 53 7 65 9 74   HEMOGLOBIN g/dL 9 4* 9 4* 9 4*  --   --  7 1* 7 7* 7 7* 9 3* 10 6*   I STAT HEMOGLOBIN g/dl  --   --   --  5 1* 8 8*  -- --   --   --   --    HEMATOCRIT % 29 3* 28 4* 29 0*  --   --  22 1* 24 4* 23 9* 29 4* 33 2*   HEMATOCRIT, ISTAT %  --   --   --  15* 26*  --   --   --   --   --    PLATELETS Thousands/uL 221 202 182  --   --  202 244 227 229 262   NEUTROS PCT %  --  89*  --   --   --  64  --  56 63 63   MONOS PCT %  --  5  --   --   --  9  --  10 11 10     Results from last 7 days   Lab Units 09/15/20  0446 09/14/20  2043 09/14/20  1905 09/14/20  1835 09/14/20  0601 09/12/20  0631 09/10/20  0559 09/09/20  1704 09/09/20  0600   SODIUM mmol/L 138 137  --   --  138 138 138 130* 135*   POTASSIUM mmol/L 3 9 4 2  --   --  3 7 3 9 4 3 4 5 4 2   CHLORIDE mmol/L 105 106  --   --  106 104 103 99* 101   CO2 mmol/L 23 28  --   --  24 26 30 25 25   CO2, I-STAT mmol/L  --   --  29 27  --   --   --   --   --    ANION GAP mmol/L 10 3*  --   --  8 8 5 6 9   BUN mg/dL 15 9  --   --  10 16 21 24 19   CREATININE mg/dL 0 94 0 89  --   --  0 82 0 94 1 17 1 31* 1 15   CALCIUM mg/dL 7 4* 7 6*  --   --  8 3 8 1* 8 7 8 5 9 0   GLUCOSE RANDOM mg/dL 303* 215*  --   --  177* 166* 183* 239* 167*     Results from last 7 days   Lab Units 09/12/20  0631 09/10/20  0559 09/10/20  0015 09/09/20  0600   MAGNESIUM mg/dL 2 2 1 9 2 1 2 1                   ABG:    VBG:          Micro        EKG:    Imaging: I have personally reviewed pertinent films in PACS    Intake and Output  I/O       09/13 0701 - 09/14 0700 09/14 0701 - 09/15 0700 09/15 0701 - 09/16 0700    P  O        I V  (mL/kg) 1000 (10 4) 1850 (18 8)     Blood  900     IV Piggyback  250     Total Intake(mL/kg) 1000 (10 4) 3000 (30 5)     Urine (mL/kg/hr) 100 (0) 2150 (0 9) -900 (-0 8)    Total Output 100 2150 -900    Net +900 +850 +900                 Height and Weights   Height: 5' 7" (170 2 cm)  IBW: 66 1 kg  Body mass index is 33 91 kg/m²    Weight (last 2 days)     Date/Time   Weight    09/15/20 0600   98 2 (216 49)    09/14/20 0600   96 6 (212 96)    09/13/20 0600   96 5 (212 74)                Nutrition Diet Orders   (From admission, onward)             Start     Ordered    09/15/20 1838  Diet NPO  Diet effective now     Question Answer Comment   Diet Type NPO    RD to adjust diet per protocol?  Yes        09/15/20 1837                  Active Medications  Scheduled Meds:  Current Facility-Administered Medications   Medication Dose Route Frequency Provider Last Rate    acetaminophen  650 mg Oral Q6H PRN Buck Ryan MD      Or    oxyCODONE-acetaminophen  1 tablet Oral Q6H PRN Buck Ryan MD      Or    oxyCODONE-acetaminophen  2 tablet Oral Q6H PRN Buck Ryan MD      albuterol  2 puff Inhalation Q4H PRN Buck Ryan MD      belladonna-opium  30 mg Rectal Q8H PRN Buck Ryan MD      carvedilol  12 5 mg Oral BID With Meals Buck Ryan MD      cefepime  2,000 mg Intravenous Q12H Buck Ryan MD      docusate sodium  100 mg Oral Q12H Buck Ryan MD      enoxaparin  40 mg Subcutaneous Q24H [address] [facility], MD      famotidine  20 mg Oral Daily Buck Ryan MD      finasteride  5 mg Oral Daily Buck Ryan MD      furosemide  20 mg Oral BID Buck Ryan MD      gabapentin  300 mg Oral BID Buck Ryan MD      insulin glargine  20 Units Subcutaneous HS Buck Ryan MD      insulin lispro  1-6 Units Subcutaneous TID [facility] Buck Ryan MD      isosorbide mononitrate  30 mg Oral Daily Buck Ryan MD      losartan  50 mg Oral Daily Buck Ryan MD      morphine injection  1 mg Intravenous Q4H PRN Buck Ryan MD      nitroglycerin  0 4 mg Sublingual Q5 Min PRN Buck Ryan MD      oxybutynin  5 mg Oral Daily Buck Ryan MD      pravastatin  40 mg Oral Daily With Chuckie Putnam MD      sodium chloride  125 mL/hr Intravenous Continuous Buck Ryan  mL/hr (09/15/20 1113)    tamsulosin  0 8 mg Oral Daily With Chuckie Putnam MD       Continuous Infusions:  sodium chloride, 125 mL/hr, Last Rate: 125 mL/hr (09/15/20 1113)      PRN Meds:   acetaminophen, 650 mg, Q6H PRN    Or  oxyCODONE-acetaminophen, 1 tablet, Q6H PRN    Or  oxyCODONE-acetaminophen, 2 tablet, Q6H PRN  albuterol, 2 puff, Q4H PRN  belladonna-opium, 30 mg, Q8H PRN  morphine injection, 1 mg, Q4H PRN  nitroglycerin, 0 4 mg, Q5 Min PRN        Invasive Devices Review  Invasive Devices     Peripheral Intravenous Line            Peripheral IV 09/15/20 Distal;Left;Upper;Ventral (anterior) Arm less than 1 day          Drain            Suprapubic Catheter Non-latex 18 Fr  1 day    Continuous Bladder Irrigation less than 1 day                Rationale for remaining devices:    ---------------------------------------------------------------------------------------  Advance Directive and Living Will:      Power of :    POLST:    ---------------------------------------------------------------------------------------  Care Time Delivered:   No Critical Care time spent       [facility], CRNP      Portions of the record may have been created with voice recognition software  Occasional wrong word or "sound a like" substitutions may have occurred due to the inherent limitations of voice recognition software    Read the chart carefully and recognize, using context, where substitutions have occurred

## 2020-09-15 NOTE — ASSESSMENT & PLAN NOTE
· Hold on blood pressure medications for now   Suspect patient will become septic with shock related to peritonitis and perforated bladder

## 2020-09-15 NOTE — ASSESSMENT & PLAN NOTE
· S/p POD #1 cystoscopy evacuation of clots, turp with suprapubic catheter insertion   · Today began with tense abdomen complaining of feeling swollen underwent CT abdomen/pelvis and found to have the tip of the suprapubic catheter to be located at the serosal margin of the anterior lower urinary bladder  No suprapubic catheter balloon was present concerning for rupture  · Underwent cysto with small focal perforation in the anterior wall of the urinary bladder with simple fluid in retroperitoneum and peritoneal cavity  · Urology at bedside and transferred patient to critical care  · Now with temperature and tachycardia   Will treat for Sepsis with peritonitis

## 2020-09-15 NOTE — ASSESSMENT & PLAN NOTE
· September 2020 with EF 30%, mild LVH, grade 1 diastolic dysfunction, and mild RV dilatation   · Cardiac catheterization during hospitalization and cleared for OR procedure

## 2020-09-15 NOTE — PROGRESS NOTES
Postoperative day 1  Status post TURP  Approximately 50 L of continuous bladder irrigation was utilized intraoperatively and 49 5 L of fluid was removed and accounted for on the suction device  Patient with low-grade temperature this afternoon of 100 9  Also found to be tachycardic to 120  CT scan was performed today which revealed fluid in the retroperitoneal space  There is also question of intra-abdominal fluid although this may be within the retroperitoneum itself even tracking up to the level of the liver and spleen  There is also fluid in the base of the mesentery  The CT scan shows that the balloon on the suprapubic tube likely malfunction  The suprapubic tube remains in position externally and was placed under direct vision in the operating room yesterday at the time of his TURP  20 cc were placed into the balloon and the catheter was secured on gentle traction with a drain stitch  After the CT scan was performed, I then performed a CT cystogram   This shows clear extravasation of contrast into the preperitoneal space likely from the suprapubic tube tract  The suprapubic tube has not been manipulated and has simply been connected to gravity drainage to act as a drain in the preperitoneal space  The patient was transferred to step-down status based on his low-grade temperature and tachycardia  I have personally seen and examined the patient at the bedside upon arrival to the step-down unit  /76 (BP Location: Left arm)   Pulse (!) 120   Temp (!) 100 9 °F (38 3 °C) (Temporal)   Resp 19   Ht 5' 7" (1 702 m)   Wt 98 2 kg (216 lb 7 9 oz)   SpO2 91%   BMI 33 91 kg/m²       At this time he is awake and alert  He has moderate discomfort  He is anxious  On examination his abdomen is soft and distended  There is voluntary guarding without rebound  He does not have overwhelming signs of an acute abdomen at this time  The suprapubic tube is in place draining clear to light pink fluid    A 24 French 3 way hematuria catheter is in place  There is no CBI running at this time  The catheter is draining light punch color urine  Impression:  Status post TURP with insertion of suprapubic tube, retroperitoneal extravasation of contrast through malfunctioning suprapubic tube    Plan:  I agree with transfer to ICU/step-down for close monitoring  I do not feel that the patient needs emergent exploratory laparotomy in the operating room based on his physical examination and CT cystogram which shows extravasation into the preperitoneal space  I would continue to follow him with close serial abdominal examinations  Again, the fluid seen on the CT scan may actually be within the retroperitoneum and tracking around even to the level of the liver and the spleen  I recommend broad-spectrum antibiotics due to the extravasation of urine into the preperitoneal space and his low-grade temperature  Cefepime or ceftriaxone would be appropriate  I would maintain the suprapubic tube simply to gravity drainage to act as a drain in the preperitoneal space  I would maintain the large-bore Estrella catheter to gravity at this time and avoid continuous bladder irrigation  I would gently flush the Estrella catheter Q 6 hours with 60 cc of normal saline solution to ensure patency  I would not flush the suprapubic tube  I would closely monitor his cardiac and respiratory status at this time  If there is no improvement in his clinical status in the next 12-24 hours, I would consider paracentesis/abdominal drain in interventional radiology  This could be both diagnostic and therapeutic to assess for fluid in the peritoneal cavity  Will perform complete blood work to assess patient and to evaluate for possible jaundice  I discussed this plan with the critical care PA on-call  Chip Schrader

## 2020-09-15 NOTE — PROGRESS NOTES
Cardiology Progress Note   MD Supriya Spring MD Roe Quick, DO, Charlane Copper Mansoor, MD Darryll Mcardle, DO, Kathie Edouard DO, [facility]  ----------------------------------------------------------------  [address]  [address], [address] 82 y o  male MRN: [id number]  Unit/Bed#: E4 -01 Encounter: [id number]      ASSESSMENT:   Yamil Kim hematuria s/p TURP    CAD s/p cath w/ only luminal irregularities of all vessels, September 2020   LVEF 30%, mild LVH, grade 1 diastolic dysfunction, mild RV dilatation with normal function, mild LA dilatation, AV sclerosis, fibrocalcific changes of and the with trace MR, mild aortic root dilatation 3 5 cm, September 2020   Normal LVEDP   Dyspnea on exertion   Hypertension   Type 2 diabetes mellitus   BPH    PLAN:  Clinically, patient is euvolemic and LVEDP is normal  He has no symptoms concerning for angina  He does have some abdominal discomfort and distention; urology following  Continue statin, ARB, isosorbide and beta-blocker  Outpatient follow-up for additional CV testing as clinically indicated after discharge including repeat echocardiogram to reassess for possible need for ICD  We will see inpatient PRN        Signed: Anam Donaldson DO, [facility]      History of Present Illness:  Patient seen and examined  Denies chest pain, pressure, tightness or squeezing  Denies lightheadedness, dizziness or palpitations  Denies lower extremity swelling, orthopnea or paroxysmal nocturnal dyspnea  Admits to some abdominal distention  Review of Systems:  Review of Systems   Constitution: Negative for decreased appetite, fever, weight gain and weight loss  HENT: Negative for congestion and sore throat  Eyes: Negative for visual disturbance  Cardiovascular: Negative for chest pain, dyspnea on exertion, leg swelling, near-syncope and palpitations     Respiratory: Negative for cough and shortness of breath  Hematologic/Lymphatic: Negative for bleeding problem  Skin: Negative for rash  Musculoskeletal: Negative for myalgias and neck pain  Gastrointestinal: Positive for bloating and abdominal pain  Negative for nausea  Neurological: Negative for light-headedness and weakness  Psychiatric/Behavioral: Negative for depression  Allergies   Allergen Reactions    Demerol [Meperidine]        No current facility-administered medications on file prior to encounter        Current Outpatient Medications on File Prior to Encounter   Medication Sig    aspirin 325 mg tablet Take 325 mg by mouth daily    carvedilol (COREG) 25 mg tablet Take 0 5 tablets (12 5 mg total) by mouth 2 (two) times a day with meals    Cholecalciferol (VITAMIN D3) 5000 units CAPS Take 1 capsule by mouth    clopidogrel (PLAVIX) 75 mg tablet Take 75 mg by mouth daily    Cyanocobalamin (VITAMIN B12 PO) Take 5,000 mcg by mouth    docusate sodium (COLACE) 100 mg capsule Take 1 capsule (100 mg total) by mouth every 12 (twelve) hours    famotidine (PEPCID) 40 MG tablet Take 40 mg by mouth daily    finasteride (PROSCAR) 5 mg tablet Take 1 tablet (5 mg total) by mouth daily    furosemide (LASIX) 20 mg tablet Take 20 mg by mouth 2 (two) times a day    gabapentin (NEURONTIN) 300 mg capsule Take 300 mg by mouth 2 (two) times a day    insulin glargine (LANTUS) 100 units/mL subcutaneous injection Inject 20 Units under the skin daily at bedtime    irbesartan (AVAPRO) 150 mg tablet Take 150 mg by mouth daily at bedtime    isosorbide mononitrate (IMDUR) 30 mg 24 hr tablet Take 30 mg by mouth daily    meloxicam (MOBIC) 15 mg tablet Take 15 mg by mouth daily    metFORMIN (GLUCOPHAGE) 1000 MG tablet Take 1,000 mg by mouth 2 (two) times a day with meals    simvastatin (ZOCOR) 20 mg tablet Take 20 mg by mouth daily at bedtime    tamsulosin (FLOMAX) 0 4 mg Take 2 capsules (0 8 mg total) by mouth daily with dinner    albuterol (PROVENTIL HFA,VENTOLIN HFA) 90 mcg/act inhaler Inhale 2 puffs every 4 (four) hours as needed for wheezing    nitroglycerin (NITROSTAT) 0 4 mg SL tablet Place 0 4 mg under the tongue every 5 (five) minutes as needed for chest pain    sitaGLIPtin (JANUVIA) 100 mg tablet Take 100 mg by mouth daily        Current Facility-Administered Medications   Medication Dose Route Frequency Provider Last Rate    acetaminophen  650 mg Oral Q6H PRN RosaJAYCE Peñaloza      Or    oxyCODONE-acetaminophen  1 tablet Oral Q6H PRN Rosa JAYCE Schwarz      Or    oxyCODONE-acetaminophen  2 tablet Oral Q6H PRN RosaJAYCE Waters      albuterol  2 puff Inhalation Q4H PRN Mechelle Izaguirre MD      belladonna-opium  30 mg Rectal Q8H PRN RosaJAYCE Waters      carvedilol  12 5 mg Oral BID With Meals JAYCE Boucher      docusate sodium  100 mg Oral Q12H Mechelle Izaguirre MD      enoxaparin  40 mg Subcutaneous Q24H [address] Mechelle Izaguirre MD      famotidine  20 mg Oral Daily Mechelle Izaguirre MD      finasteride  5 mg Oral Daily Mechelle Izaguirre MD      furosemide  20 mg Oral BID JAYCE Boucher      gabapentin  300 mg Oral BID JAYCE Boucher      insulin glargine  20 Units Subcutaneous HS JAYCE Boucher      insulin lispro  1-6 Units Subcutaneous TID [facility] Mechelle Izaguirre MD      isosorbide mononitrate  30 mg Oral Daily Mechelle Izaguirre MD      lactated ringers  1,000 mL Intravenous Once PRN Mechelle Izaguirre MD      And    lactated ringers  1,000 mL Intravenous Once PRN Mechelle Izaguirre MD      losartan  50 mg Oral Daily Mechelle Izaguirre MD      morphine injection  1 mg Intravenous Q4H PRN Tyson Johnston MD      nitroglycerin  0 4 mg Sublingual Q5 Min PRN Mechelle Izaguirre MD      oxybutynin  5 mg Oral Daily Mechelle Izaguirre MD      pravastatin  40 mg Oral Daily With [employer]AYCE      sodium chloride  1,000 mL Intravenous Once PRN Mechelle Izaguirre MD      And    sodium chloride  1,000 mL Intravenous Once PRN Kimberly Shrestha MD      sodium chloride  125 mL/hr Intravenous Continuous Kimberly Shrestha  mL/hr (09/15/20 1113)    tamsulosin  0 8 mg Oral Daily With 202-206 [facility]         sodium chloride, 125 mL/hr, Last Rate: 125 mL/hr (09/15/20 1113)        Vitals:    09/15/20 0759 09/15/20 0835 09/15/20 1100 09/15/20 1537   BP: 93/52 113/59 114/58 138/64   BP Location: Left arm Right arm Left arm Left arm   Pulse: 92 99 92 57   Resp: 18  18 19   Temp: 98 °F (36 7 °C)  97 9 °F (36 6 °C) (!) 97 3 °F (36 3 °C)   TempSrc: Temporal  Temporal Temporal   SpO2: 92%  95% 93%   Weight:       Height:             Intake/Output Summary (Last 24 hours) at 9/15/2020 1538  Last data filed at 9/15/2020 1155  Gross per 24 hour   Intake 3000 ml   Output 1900 ml   Net 1100 ml       Weight change: 1 6 kg (3 lb 8 4 oz)    PHYSICAL EXAMINATION:  Gen: Awake, Alert, NAD  HEENT: AT/NC, Anicteric, mmm  Neck: Supple, No elevated JVP  Resp: CTA bilaterally no w/r/r  CV: RRR +S1, S2, No m/r/g  Abd: obese with abdominal distention + BS  Ext: warm, no LE edema bilaterally  Neuro: Follows commands, moves all extermities  Psych: Appropriate affect    Lab Results:  Results from last 7 days   Lab Units 09/15/20  0446   WBC Thousand/uL 14 77*   HEMOGLOBIN g/dL 9 4*   HEMATOCRIT % 28 4*   PLATELETS Thousands/uL 202     Results from last 7 days   Lab Units 09/15/20  0446  09/14/20  1905   POTASSIUM mmol/L 3 9   < >  --    CHLORIDE mmol/L 105   < >  --    CO2 mmol/L 23   < >  --    CO2, I-STAT mmol/L  --   --  29   BUN mg/dL 15   < >  --    CREATININE mg/dL 0 94   < >  --    CALCIUM mg/dL 7 4*   < >  --    GLUCOSE, ISTAT mg/dl  --   --  140    < > = values in this interval not displayed  No results found for: TROPONINT                This note was completed in part utilizing [employer] Fluency Direct Software    Grammatical errors, random word insertions, spelling mistakes, and incomplete sentences may be an occasional consequence of this system secondary to software limitations, ambient noise, and hardware issues  If you have any questions or concerns about the content, text, or information contained within the body of this dictation, please contact the provider for clarification

## 2020-09-15 NOTE — ASSESSMENT & PLAN NOTE
· Had CBI infusing since his admission on 9/7  · Discontinue for now secondary to perforation  · May need OR due to perforation with fluid in abdomen  · Hemoglobin remains stable   Received 3 units PRBC yesterday in the OR

## 2020-09-15 NOTE — ASSESSMENT & PLAN NOTE
Lab Results   Component Value Date    HGBA1C 7 7 (H) 08/17/2020       Recent Labs     09/13/20  2100 09/14/20  0801 09/14/20  1109 09/14/20  1554   POCGLU 292* 191* 273* 176*       Blood Sugar Average: Last 72 hrs:  (P) 167 [id number]   IASS,     -patient has a history of diabetes type 2, with long-term use of insulin, without complication  -continue Glargine 20U HS    -continue Accu-Cheks and sliding scale insulin  - Hold home oral hypoglycemics (Glipizide, Metformin, and Januvia)  Fingerstick monitoring

## 2020-09-15 NOTE — ASSESSMENT & PLAN NOTE
Wt Readings from Last 3 Encounters:   09/14/20 96 6 kg (212 lb 15 4 oz)   09/07/20 98 kg (216 lb)   09/04/20 99 3 kg (219 lb)     -patient has a history of chronic combined systolic and diastolic congestive heart failure  -most recent echocardiogram 9/8 with left ventricular ejection fraction 30% and grade 1 diastolic dysfunction  -will likely need Lasix with blood transfusions tonight  -continue Coreg, with blood pressure hold parameters, continue isosorbide, ARB  -diuresis needed

## 2020-09-15 NOTE — ASSESSMENT & PLAN NOTE
Lab Results   Component Value Date    HGBA1C 7 7 (H) 08/17/2020       Recent Labs     09/15/20  0801 09/15/20  1124 09/15/20  1540 09/15/20  1730   POCGLU 241* 296* 215* 214*       Blood Sugar Average: Last 72 hrs:  (P) 242 8961     · Blood glucose elevated   · Will increase SSI coverage, if blood glucose does not improve will start on insulin infusion

## 2020-09-15 NOTE — ASSESSMENT & PLAN NOTE
-patient has a history of coronary artery disease  -he underwent preoperative cardiac testing with a cardiac catheterization that did not have any significant coronary disease  -continue beta-blocker, nitrates, and ARB  -aspirin/Plavix on hold due to hematuria

## 2020-09-15 NOTE — PROGRESS NOTES
Postoperative day 1  Status post TURP with insertion of suprapubic tube  By report abdomen is distended today  CT scan performed shows that the balloon of the suprapubic tube may have ruptured  The tip of the catheter appears just at the junction between the bladder mucosa and the preperitoneal space  I am concerned about preperitoneal extravasation with contrast tracking in the retroperitoneum around the liver  I recommend that the suprapubic tube a capped at this time  I would maintain the Estrella catheter to gravity  A CT cystogram is recommended after my discussion with Radiology

## 2020-09-15 NOTE — ASSESSMENT & PLAN NOTE
-patient has a history of essential hypertension  -continue antihypertensives with Coreg 12 5 mg b i d , isosorbide mononitrate 30 mg daily, losartan 50 mg daily  -monitor closely in light of acute blood loss anemia, for hypotension

## 2020-09-15 NOTE — PROGRESS NOTES
Progress Note - Urology  [facility] Anthony Albert 1935, 82 y o  male MRN: [id number]    Unit/Bed#: E4 MS [place]-01 Encounter: [id number]    Acute blood loss anemia  Assessment & Plan  Secondary to gross hematuria from a bleeding prostate, requiring preoperative cardiac clearance  Hemoglobin at its lowest was 7 1 yesterday preoperatively  3 units of intraoperative packed red blood cells were transfused  Hemoglobin stable at 9 4 at this time with stable vital signs  Continue to monitor hemoglobin, if signs of instability recheck stat H&H  Otherwise repeat CBC in the morning per Internal Medicine  Benign prostatic hyperplasia with lower urinary tract symptoms  Assessment & Plan  Status post TURP yesterday  Continue catheter until directed by Urology team     Constantin Chery hematuria  Assessment & Plan  1 Day Post-Op  Procedure(s):  CYSTOSCOPY EVACUATION OF CLOTS; TURP; SUPRAPUBIC CATHETER INSERTION  Surgeon(s):  Maddie Quiros MD  9/14/2020    Preoperative hemoglobin 7 1, he was transfused 3 units intraoperatively with appropriate response in his hemoglobin - Hgb stable at 9 4 this AM  CBI --   Hand irrigation    Trend degree of hematuria, Hgb, and continue winslow catheter until cleared for removal by Urology team      Addendum: transfer to critical care unit for tachycardia, tachypnea, shortness of breath, fever, increasing abdominal distention with a "heavy heart" - discussed with Dr Peggy Silveira, who planned and coordinated transfer to the Critical Care Unit  CT with intra-abdominal fluid concerning for bladder perforation  Reviewed with Dr Aspen Allen and Radiology  CT Cystogram with intra-abdominal fluid around liver and extraperitoneal bladder perforation with extravasation of fluid into retroperionteum, but not intra-abominally  Bedside rounds performed with HALIMA Nascimento  Discussed with Dr Aspen Allen and Dr Peggy Silveira  Will continue to closely follow in the ICU       Subjective/Objective     Subjective:   CC: "Something isn't right "  HPI:  Preoperative hemoglobin 7 1 yesterday  Received 3 units of packed red blood cells intraoperatively hemoglobin stable at 9 4 overnight  No acute events  No episodes of hypotension  Bladder was irrigated continuously night with intermittent hand irrigation for for one small clot  CBI was being run in through SPT and out through urethral winslow, but was leaking throughout the night  Hematuria had significantly improved  I just feel like something is wrong  My belly is swollen  It's gas-filled  It's hurting  The bladder spasms are better after that suppository early this AM - but my liver and my rib cage, up the top of my abdomen is very sore and swollen  This is new and different today, this morning at 5am         Intraoperative findings large prostate friable bleeding surfaces on the median lobe as well as large lateral coapted lobes  TURP was performed  3 units intraoperatively were given  ROS:  Review of Systems   Constitutional: Negative for activity change and appetite change  HENT: Negative for congestion and ear pain  Eyes: Negative for pain  Respiratory: Negative for cough and shortness of breath  Cardiovascular: Negative for chest pain and palpitations  Gastrointestinal: Positive for abdominal distention, abdominal pain and nausea  Negative for blood in stool, constipation, diarrhea and vomiting  Bloating and abdominal distention  Genitourinary: Negative for difficulty urinating, dysuria and hematuria  Musculoskeletal: Negative for arthralgias and myalgias  Skin: Negative for rash  Allergic/Immunologic: Negative for immunocompromised state  Neurological: Negative for dizziness and headaches  Hematological: Negative for adenopathy  Does not bruise/bleed easily  Psychiatric/Behavioral: Negative for agitation  The patient is not nervous/anxious          Objective:  Vitals: Blood pressure 113/59, pulse 99, temperature 98 °F (36 7 °C), temperature source Temporal, resp  rate 18, height 5' 7" (1 702 m), weight 98 2 kg (216 lb 7 9 oz), SpO2 92 %  ,Body mass index is 33 91 kg/m²  Intake/Output Summary (Last 24 hours) at 9/15/2020 0934  Last data filed at 9/15/2020 1372  Gross per 24 hour   Intake 3000 ml   Output 2150 ml   Net 850 ml     Invasive Devices     Peripheral Intravenous Line            Peripheral IV 09/12/20 Left Antecubital 3 days    Peripheral IV 09/14/20 Right;Dorsal (posterior) Hand less than 1 day          Drain            Continuous Bladder Irrigation less than 1 day    Suprapubic Catheter Non-latex 18 Fr  less than 1 day                Physical Exam  Vitals signs and nursing note reviewed  Constitutional:       General: He is not in acute distress  Appearance: He is well-developed  He is not ill-appearing or diaphoretic  Comments: 82 yr old male who appears uncomfortable in no acute distress on primary exams at 11a and 1p - uncomfortable, tachycardic, sweating at 6pm on exam     HENT:      Head: Normocephalic and atraumatic  Eyes:      Conjunctiva/sclera: Conjunctivae normal    Neck:      Musculoskeletal: Normal range of motion and neck supple  Trachea: No tracheal deviation  Cardiovascular:      Rate and Rhythm: Normal rate and regular rhythm  Heart sounds: Normal heart sounds  No murmur  Pulmonary:      Effort: Pulmonary effort is normal  No respiratory distress  Breath sounds: Normal breath sounds  No wheezing  Abdominal:      General: Bowel sounds are normal  There is no distension  Palpations: Abdomen is soft  There is no mass  Tenderness: There is no abdominal tenderness  There is no right CVA tenderness, left CVA tenderness, guarding or rebound  Hernia: No hernia is present  Comments: Distended with absent bowel sounds  Tender throughout  No rigidity, rebound, guarding  +tympany and no bowel sounds to prolonged auscultation      Genitourinary:     Comments: SPT was capped with catheter plug, CBI running in, but leaking around because of this  Urethral winslow draining clear marlon urine  Slight bloody drainage from the tip of the penis  Musculoskeletal: Normal range of motion  Skin:     General: Skin is warm and dry  Coloration: Skin is not pale  Findings: No erythema or rash  Neurological:      Mental Status: He is alert and oriented to person, place, and time  Psychiatric:         Behavior: Behavior normal  Behavior is cooperative  Thought Content:  Thought content normal          Judgment: Judgment normal          History:    Past Medical History:   Diagnosis Date    Cardiac disease     [facility]cong[facility] failure[facility] ([facility] )     Diabetes mellitus (HCC)     GERD (gastroesophageal reflux disease)     Hyperlipidemia     Hypertension     MI (myocardial infarction) ([facility] )      Past Surgical History:   Procedure Laterality Date    EYE SURGERY       Family History   Problem Relation Age of Onset    Diabetes Mother     Hypertension Mother     Cancer Father     Hypertension Father     Cancer Sister     Hypertension Sister      Social History     Socioeconomic History    Marital status: /Civil Union     Spouse name: None    Number of children: None    Years of education: None    Highest education level: None   Occupational History    None   Social Needs    Financial resource strain: None    Food insecurity     Worry: None     Inability: None    Transportation needs     Medical: None     Non-medical: None   Tobacco Use    Smoking status: Never Smoker    Smokeless tobacco: Never Used   Substance and Sexual Activity    Alcohol use: No     Alcohol/week: 0 0 standard drinks     Frequency: Never     Binge frequency: Never    Drug use: No    Sexual activity: Not Currently   Lifestyle    Physical activity     Days per week: None     Minutes per session: None    Stress: None   Relationships    Social connections     Talks on phone: None Gets together: None     Attends [employer] service: None     Active member of club or organization: None     Attends meetings of clubs or organizations: None     Relationship status: None    Intimate partner violence     Fear of current or ex partner: None     Emotionally abused: None     Physically abused: None     Forced sexual activity: None   Other Topics Concern    None   Social History Narrative    None       Labs:  Results from last 7 days   Lab Units 09/15/20  0446 09/14/20  2043 09/14/20  1905  09/14/20  0601   WBC Thousand/uL 14 77* 12 47*  --   --  8 38   HEMOGLOBIN g/dL 9 4* 9 4*  --   --  7 1*   I STAT HEMOGLOBIN g/dl  --   --  5 1*   < >  --    PLATELETS Thousands/uL 202 182  --   --  202    < > = values in this interval not displayed  Results from last 7 days   Lab Units 09/15/20  0446 09/14/20  2043 09/14/20  1905  09/14/20  0601   SODIUM mmol/L 138 137  --   --  138   POTASSIUM mmol/L 3 9 4 2  --   --  3 7   CHLORIDE mmol/L 105 106  --   --  106   CO2 mmol/L 23 28  --   --  24   CO2, I-STAT mmol/L  --   --  29   < >  --    BUN mg/dL 15 9  --   --  10   CREATININE mg/dL 0 94 0 89  --   --  0 82   EGFR ml/min/1 73sq m 75 79  --   --  82   GLUCOSE, ISTAT mg/dl  --   --  140   < >  --    CALCIUM mg/dL 7 4* 7 6*  --   --  8 3    < > = values in this interval not displayed                 Sri Cordoba PA-C  Date: 9/15/2020 Time: 9:34 AM

## 2020-09-15 NOTE — PHYSICAL THERAPY NOTE
Physical Therapy Cancellation Note  Attempted to see pt for PT treatment  His son reports his dad is in a lot of pain & just tried walking with nursing aide  "He got very tired & had a lot of pain  He is resting now so won't be able to do anything else now " Attempt again as able    Lee Cruz, PTA

## 2020-09-15 NOTE — ASSESSMENT & PLAN NOTE
-patient has a history of chronic indwelling Estrella  -patient was admitted for treatment of gross hematuria, with associated acute blood loss anemia, and pain from his urinary catheterization  -was placed on CBI  -patient did not tolerate CBI clamping and had persistent hematuria  -underwent cardiac preoperative testing    Cardiac catheterization without any evidence of significant coronary disease  -patient underwent operative intervention 9/14, with cystoscopy and TURP, required intraoperative transfusion for significant hematuria  -continue to hold aspirin/Plavix  -patient with distended, firm abdomen today:  CC revealed balloon of the suprapubic tube may have ruptured with possible tip in the pre peritoneal space  -CT cystogram revealed small focal perforation in the anterior wall of the urinary bladder  -pt now with fever, tachycardia, abd pain:  Will give stat cefepime, and transfer to ICU  -urology team at bedside evaluating further intervention

## 2020-09-15 NOTE — ASSESSMENT & PLAN NOTE
Lab Results   Component Value Date    HGBA1C 7 7 (H) 08/17/2020       Recent Labs     09/15/20  0801 09/15/20  1124 09/15/20  1540 09/15/20  1730   POCGLU 241* 296* 215* 214*       Blood Sugar Average: Last 72 hrs:  (P) 242 8125   IASS,     -patient has a history of diabetes type 2, with long-term use of insulin, without complication  -continue Glargine 20U HS    -continue Accu-Cheks and sliding scale insulin  - Hold home oral hypoglycemics (Glipizide, Metformin, and Januvia)

## 2020-09-15 NOTE — ASSESSMENT & PLAN NOTE
-patient has a history of chronic indwelling Estrella  -patient was admitted for treatment of gross hematuria, with associated acute blood loss anemia, and pain from his urinary catheterization  -was placed on CBI  -patient did not tolerate CBI clamping and had persistent hematuria  -underwent cardiac preoperative testing    Cardiac catheterization without any evidence of significant coronary disease  -patient underwent operative intervention today, with cystoscopy and TURP, required intraoperative transfusion for significant hematuria  -continue to hold aspirin/Plavix

## 2020-09-15 NOTE — ASSESSMENT & PLAN NOTE
· Found to have bladder perforation and rupture of suprapubic catheter   · Has extravasation of contrast in the preperitoneal cavity   · Urology following  · Follow abdominal exams  · Check repeat labs

## 2020-09-15 NOTE — PROGRESS NOTES
Progress Note - Merly Pina Shear 1935, 82 y o  male MRN: [id number]    Unit/Bed#: OR [place] Encounter: [id number]    Primary Care Provider: No primary care provider on file  Date and time admitted to hospital: 9/7/2020  4:48 PM        * Gross hematuria  Assessment & Plan  -patient has a history of chronic indwelling Estrella  -patient was admitted for treatment of gross hematuria, with associated acute blood loss anemia, and pain from his urinary catheterization  -was placed on CBI  -patient did not tolerate CBI clamping and had persistent hematuria  -underwent cardiac preoperative testing  Cardiac catheterization without any evidence of significant coronary disease  -patient underwent operative intervention today, with cystoscopy and TURP, required intraoperative transfusion for significant hematuria  -continue to hold aspirin/Plavix    Acute blood loss anemia  Assessment & Plan  Secondary to gross hematuria  Continue monitoring for the time being  Currently asymptomatic  No indication for transfusion at this time unless it continues to drop further      Recent Labs     09/12/20  0631 09/13/20  1144 09/14/20  0601 09/14/20  1835 09/14/20  1905   HGB 7 7* 7 7* 7 1* 8 8* 5 1*   MCV 95 95 94  --   --    RDW 14 1 14 5 14 6  --   --      -patient had acute blood loss anemia  -baseline hemoglobin had been 10-11  -hemoglobin decreased to 7, secondary to acute blood loss anemia from significant hematuria  -patient was transfused 3 units of packed red blood cells intraoperatively  -was recent hemoglobin 5 1  -continue to monitor hemoglobin and transfuse until hemoglobin greater than 8    Chronic combined systolic and diastolic congestive heart failure (HCC)  Assessment & Plan  Wt Readings from Last 3 Encounters:   09/14/20 96 6 kg (212 lb 15 4 oz)   09/07/20 98 kg (216 lb)   09/04/20 99 3 kg (219 lb)     -patient has a history of chronic combined systolic and diastolic congestive heart failure  -most recent echocardiogram 9/8 with left ventricular ejection fraction 30% and grade 1 diastolic dysfunction  -will likely need Lasix with blood transfusions tonight  -continue Coreg, with blood pressure hold parameters, continue isosorbide, ARB  -diuresis needed        Poorly controlled type 2 diabetes mellitus [facility])  Assessment & Plan  Lab Results   Component Value Date    HGBA1C 7 7 (H) 08/17/2020       Recent Labs     09/13/20  2100 09/14/20  0801 09/14/20  1109 09/14/20  1554   POCGLU 292* 191* 273* 176*       Blood Sugar Average: Last 72 hrs:  (P) 453 [id number]   IASS,     -patient has a history of diabetes type 2, with long-term use of insulin, without complication  -continue Glargine 20U HS    -continue Accu-Cheks and sliding scale insulin  - Hold home oral hypoglycemics (Glipizide, Metformin, and Januvia)  Fingerstick monitoring    Hyperlipidemia  Assessment & Plan  Continue statin    Essential hypertension  Assessment & Plan  -patient has a history of essential hypertension  -continue antihypertensives with Coreg 12 5 mg b i d , isosorbide mononitrate 30 mg daily, losartan 50 mg daily  -monitor closely in light of acute blood loss anemia, for hypotension    GERD (gastroesophageal reflux disease)  Assessment & Plan  Continue Famotidine    CAD (coronary artery disease)  Assessment & Plan  -patient has a history of coronary artery disease  -he underwent preoperative cardiac testing with a cardiac catheterization that did not have any significant coronary disease  -continue beta-blocker, nitrates, and ARB  -aspirin/Plavix on hold due to hematuria    Benign prostatic hyperplasia with lower urinary tract symptoms  Assessment & Plan  - Continue Flomax and Finasteride  -underwent TURP 9/14          Family:  Updated son Merly at the bedside  Dw pt nurse and     VTE Pharmacologic Prophylaxis: RX contraindicated due to: Hematuria, acute blood loss anemia  VTE Mechanical Prophylaxis: sequential compression device        Certification Statement: The patient will continue to require additional inpatient hospital stay due to need for further acute intervention for acute blood loss anemia, transfusion    Status: inpatient     ===================================================================    Subjective:  Pt examined after return from the OR  Interviewed in [address] by me at the bedside  Pt c/p pain in bladder and from winslow catheter  Requests pain meds  deneis any chest pain  Denies sob  Denies any nausea  Physical Exam:   Temp:  [97 5 °F (36 4 °C)-98 5 °F (36 9 °C)] 97 8 °F (36 6 °C)  HR:  [] 94  Resp:  [13-21] 14  BP: ()/(51-69) 129/69    Gen:  Pleasant, non-tachypnic, non-dyspnic  Somnolent- has just returned from OR  Heart: regular rate and rhythm, S1S2 present, no murmur, rub or gallop  Lungs: clear to ausculatation bilaterally  No wheezing, crackles, or rhonchi  No accessory muscle use or respiratory distress  Abd: soft, non-tender, non-distended  NABS, no guarding, rebound or peritoneal signs  Extremities: no clubbing, cyanosis or edema  2+pedal pulses bilaterally  Neuro: somnolent  Moving all 4 extremities  Skin: warm and dry: no petechiae, purpura and rash      LABS:   Results from last 7 days   Lab Units 09/14/20 2043 09/14/20 1905 09/14/20 1835 09/14/20  0601 09/13/20  1144   WBC Thousand/uL 12 47*  --   --  8 38 9 39   HEMOGLOBIN g/dL 9 4*  --   --  7 1* 7 7*   I STAT HEMOGLOBIN g/dl  --  5 1* 8 8*  --   --    HEMATOCRIT % 29 0*  --   --  22 1* 24 4*   HEMATOCRIT, ISTAT %  --  15* 26*  --   --    PLATELETS Thousands/uL 182  --   --  202 244     Results from last 7 days   Lab Units 09/14/20 1905 09/14/20 1835 09/14/20  0601 09/12/20  0631 09/10/20  0559   POTASSIUM mmol/L  --   --   --  3 7 3 9 4 3   CHLORIDE mmol/L  --   --   --  106 104 103   CO2 mmol/L  --   --   --  24 26 30   CO2, I-STAT mmol/L 29 27  --   --   --   --    BUN mg/dL  --   --   --  10 16 21   CREATININE mg/dL  --   --   --  0 82 0 94 1 17   GLUCOSE, ISTAT mg/dl 140 142*   < >  --   --   --    CALCIUM mg/dL  --   --   --  8 3 8 1* 8 7    < > = values in this interval not displayed  Hospital Data:  9/9:  CT head:  No acute intracranial abnormality    9/8 CT abdomen/pelvis:  1  Distended urinary bladder despite the presence of a Estrella catheter; correlate with function    2   Hemorrhage dependently in the urinary bladder  Underlying small neoplasm is difficult to definitively exclude    3   Bilateral renal cysts and other subcentimeter hypodensities that are too small to characterize, statistically also cysts    4   Prostatomegaly  Correlate with PSA level  9/8:  2D echocardiogram  SUMMARY:  1  This is a technically difficult but adequate study with the use of echo contrast  2  Left ventricle is mildly dilated with severely reduced systolic function  Left ventricular ejection fraction is estimated at 30%  3  Mild concentric left ventricular hypertrophy  4  Grade 1 diastolic dysfunction  5  Regional wall motion abnormalities consistent with coronary artery disease  6  Mild right ventricular dilatation with normal systolic function  7  Left atrium is mildly dilated  8  Aortic valve sclerotic with adequate separation  9  Fibrocalcific changes of the mitral valve with trace mitral regurgitation  10  Pulmonary artery systolic pressures cannot be estimated due to lack of tricuspid regurgitation jet  11  Aortic root is mildly dilated at 3 5 cm  12  There is no study for comparison    Procedure  9/11:  Cardiac catheterization  CORONARY CIRCULATION:  Left main: Normal   LAD: The vessel was normal sized  Angiography showed minor luminal irregularities  Circumflex: The vessel was normal sized  Angiography showed minor luminal irregularities  The two OM branches were also free of significant disease  RCA: The vessel was normal sized and dominant, giving rise to the PDA and a posterolateral branch   There was minimal plaque  There were no significant lesions      9/14 TURP  ---------------------------------------------------------------------------------------------------------------  This note has been constructed using a voice recognition system

## 2020-09-15 NOTE — PLAN OF CARE
Problem: PAIN - ADULT  Goal: Verbalizes/displays adequate comfort level or baseline comfort level  Description: Interventions:  - Encourage patient to monitor pain and request assistance  - Assess pain using appropriate pain scale  - Administer analgesics based on type and severity of pain and evaluate response  - Implement non-pharmacological measures as appropriate and evaluate response  - Consider cultural and social influences on pain and pain management  - Notify physician/advanced practitioner if interventions unsuccessful or patient reports new pain  Outcome: Progressing     Problem: INFECTION - ADULT  Goal: Absence or prevention of progression during hospitalization  Description: INTERVENTIONS:  - Assess and monitor for signs and symptoms of infection  - Monitor lab/diagnostic results  - Monitor all insertion sites, i e  indwelling lines, tubes, and drains  - Monitor endotracheal if appropriate and nasal secretions for changes in amount and color  - [place] appropriate cooling/warming therapies per order  - Administer medications as ordered  - Instruct and encourage patient and family to use good hand hygiene technique  - Identify and instruct in appropriate isolation precautions for identified infection/condition  Outcome: Progressing     Problem: SAFETY ADULT  Goal: Patient will remain free of falls  Description: INTERVENTIONS:  - Assess patient frequently for physical needs  -  Identify cognitive and physical deficits and behaviors that affect risk of falls    -  [place] fall precautions as indicated by assessment   - Educate patient/family on patient safety including physical limitations  - Instruct patient to call for assistance with activity based on assessment  - Modify environment to reduce risk of injury  - Consider OT/PT consult to assist with strengthening/mobility  Outcome: Progressing  Goal: Maintain or return to baseline ADL function  Description: INTERVENTIONS:  -  Assess patient's ability to carry out ADLs; assess patient's baseline for ADL function and identify physical deficits which impact ability to perform ADLs (bathing, care of mouth/teeth, toileting, grooming, dressing, etc )  - Assess/evaluate cause of self-care deficits   - Assess range of motion  - Assess patient's mobility; develop plan if impaired  - Assess patient's need for assistive devices and provide as appropriate  - Encourage maximum independence but intervene and supervise when necessary  - Involve family in performance of ADLs  - Assess for home care needs following discharge   - Consider OT consult to assist with ADL evaluation and planning for discharge  - Provide patient education as appropriate  Outcome: Progressing  Goal: Maintain or return mobility status to optimal level  Description: INTERVENTIONS:  - Assess patient's baseline mobility status (ambulation, transfers, stairs, etc )    - Identify cognitive and physical deficits and behaviors that affect mobility  - Identify mobility aids required to assist with transfers and/or ambulation (gait belt, sit-to-stand, lift, walker, cane, etc )  - [place] fall precautions as indicated by assessment  - Record patient progress and toleration of activity level on Mobility SBAR; progress patient to next Phase/Stage  - Instruct patient to call for assistance with activity based on assessment  - Consider rehabilitation consult to assist with strengthening/weightbearing, etc   Outcome: Progressing     Problem: DISCHARGE PLANNING  Goal: Discharge to home or other facility with appropriate resources  Description: INTERVENTIONS:  - Identify barriers to discharge w/patient and caregiver  - Arrange for needed discharge resources and transportation as appropriate  - Identify discharge learning needs (meds, wound care, etc )  - Arrange for interpretive services to assist at discharge as needed  - Refer to Case Management Department for coordinating discharge planning if the patient needs post-hospital services based on physician/advanced practitioner order or complex needs related to functional status, cognitive ability, or social support system  Outcome: Progressing     Problem: Knowledge Deficit  Goal: Patient/family/caregiver demonstrates understanding of disease process, treatment plan, medications, and discharge instructions  Description: Complete learning assessment and assess knowledge base  Interventions:  - Provide teaching at level of understanding  - Provide teaching via preferred learning methods  Outcome: Progressing     Problem: Potential for Falls  Goal: Patient will remain free of falls  Description: INTERVENTIONS:  - Assess patient frequently for physical needs  -  Identify cognitive and physical deficits and behaviors that affect risk of falls    -  [place] fall precautions as indicated by assessment   - Educate patient/family on patient safety including physical limitations  - Instruct patient to call for assistance with activity based on assessment  - Modify environment to reduce risk of injury  - Consider OT/PT consult to assist with strengthening/mobility  Outcome: Progressing     Problem: GENITOURINARY - ADULT  Goal: Maintains or returns to baseline urinary function  Description: INTERVENTIONS:  - Assess urinary function  - Encourage oral fluids to ensure adequate hydration if ordered  - Administer IV fluids as ordered to ensure adequate hydration  - Administer ordered medications as needed  - Offer frequent toileting  - Follow urinary retention protocol if ordered  Outcome: Progressing  Goal: Absence of urinary retention  Description: INTERVENTIONS:  - Assess patients ability to void and empty bladder  - Monitor I/O  - Bladder scan as needed  - Discuss with physician/AP medications to alleviate retention as needed  - Discuss catheterization for long term situations as appropriate  Outcome: Progressing  Goal: Urinary catheter remains patent  Description: INTERVENTIONS:  - Assess patency of urinary catheter  - If patient has a chronic winslow, consider changing catheter if non-functioning  - Follow guidelines for intermittent irrigation of non-functioning urinary catheter  Outcome: Progressing     Problem: Prexisting or High Potential for Compromised Skin Integrity  Goal: Skin integrity is maintained or improved  Description: INTERVENTIONS:  - Identify patients at risk for skin breakdown  - Assess and monitor skin integrity  - Assess and monitor nutrition and hydration status  - Monitor labs   - Assess for incontinence   - Turn and reposition patient  - Assist with mobility/ambulation  - Relieve pressure over bony prominences  - Avoid friction and shearing  - Provide appropriate hygiene as needed including keeping skin clean and dry  - Evaluate need for skin moisturizer/barrier cream  - Collaborate with interdisciplinary team   - Patient/family teaching  - Consider wound care consult   Outcome: Progressing

## 2020-09-15 NOTE — ASSESSMENT & PLAN NOTE
Secondary to gross hematuria  Continue monitoring for the time being  Currently asymptomatic  No indication for transfusion at this time unless it continues to drop further      Recent Labs     09/14/20  0601 09/14/20  1835 09/14/20  1905 09/14/20  2043 09/15/20  0446   HGB 7 1* 8 8* 5 1* 9 4* 9 4*   MCV 94  --   --  96 95   RDW 14 6  --   --  14 0 14 6     -patient had acute blood loss anemia  -baseline hemoglobin had been 10-11  -hemoglobin decreased to 7, secondary to acute blood loss anemia from significant hematuria  -patient was transfused 3 units of packed red blood cells intraoperatively  -was recent hemoglobin 5 1- improved to 9 4 post operatively  -continue to monitor hemoglobin - was 9 4 this am     -repeat pending

## 2020-09-15 NOTE — PLAN OF CARE
Problem: PAIN - ADULT  Goal: Verbalizes/displays adequate comfort level or baseline comfort level  Description: Interventions:  - Encourage patient to monitor pain and request assistance  - Assess pain using appropriate pain scale  - Administer analgesics based on type and severity of pain and evaluate response  - Implement non-pharmacological measures as appropriate and evaluate response  - Consider cultural and social influences on pain and pain management  - Notify physician/advanced practitioner if interventions unsuccessful or patient reports new pain  Outcome: Progressing     Problem: INFECTION - ADULT  Goal: Absence or prevention of progression during hospitalization  Description: INTERVENTIONS:  - Assess and monitor for signs and symptoms of infection  - Monitor lab/diagnostic results  - Monitor all insertion sites, i e  indwelling lines, tubes, and drains  - Monitor endotracheal if appropriate and nasal secretions for changes in amount and color  - [place] appropriate cooling/warming therapies per order  - Administer medications as ordered  - Instruct and encourage patient and family to use good hand hygiene technique  - Identify and instruct in appropriate isolation precautions for identified infection/condition  Outcome: Progressing     Problem: SAFETY ADULT  Goal: Patient will remain free of falls  Description: INTERVENTIONS:  - Assess patient frequently for physical needs  -  Identify cognitive and physical deficits and behaviors that affect risk of falls    -  [place] fall precautions as indicated by assessment   - Educate patient/family on patient safety including physical limitations  - Instruct patient to call for assistance with activity based on assessment  - Modify environment to reduce risk of injury  - Consider OT/PT consult to assist with strengthening/mobility  Outcome: Progressing  Goal: Maintain or return to baseline ADL function  Description: INTERVENTIONS:  -  Assess patient's ability to carry out ADLs; assess patient's baseline for ADL function and identify physical deficits which impact ability to perform ADLs (bathing, care of mouth/teeth, toileting, grooming, dressing, etc )  - Assess/evaluate cause of self-care deficits   - Assess range of motion  - Assess patient's mobility; develop plan if impaired  - Assess patient's need for assistive devices and provide as appropriate  - Encourage maximum independence but intervene and supervise when necessary  - Involve family in performance of ADLs  - Assess for home care needs following discharge   - Consider OT consult to assist with ADL evaluation and planning for discharge  - Provide patient education as appropriate  Outcome: Progressing  Goal: Maintain or return mobility status to optimal level  Description: INTERVENTIONS:  - Assess patient's baseline mobility status (ambulation, transfers, stairs, etc )    - Identify cognitive and physical deficits and behaviors that affect mobility  - Identify mobility aids required to assist with transfers and/or ambulation (gait belt, sit-to-stand, lift, walker, cane, etc )  - [place] fall precautions as indicated by assessment  - Record patient progress and toleration of activity level on Mobility SBAR; progress patient to next Phase/Stage  - Instruct patient to call for assistance with activity based on assessment  - Consider rehabilitation consult to assist with strengthening/weightbearing, etc   Outcome: Progressing     Problem: DISCHARGE PLANNING  Goal: Discharge to home or other facility with appropriate resources  Description: INTERVENTIONS:  - Identify barriers to discharge w/patient and caregiver  - Arrange for needed discharge resources and transportation as appropriate  - Identify discharge learning needs (meds, wound care, etc )  - Arrange for interpretive services to assist at discharge as needed  - Refer to Case Management Department for coordinating discharge planning if the patient needs post-hospital services based on physician/advanced practitioner order or complex needs related to functional status, cognitive ability, or social support system  Outcome: Progressing     Problem: Knowledge Deficit  Goal: Patient/family/caregiver demonstrates understanding of disease process, treatment plan, medications, and discharge instructions  Description: Complete learning assessment and assess knowledge base  Interventions:  - Provide teaching at level of understanding  - Provide teaching via preferred learning methods  Outcome: Progressing     Problem: Potential for Falls  Goal: Patient will remain free of falls  Description: INTERVENTIONS:  - Assess patient frequently for physical needs  -  Identify cognitive and physical deficits and behaviors that affect risk of falls    -  [place] fall precautions as indicated by assessment   - Educate patient/family on patient safety including physical limitations  - Instruct patient to call for assistance with activity based on assessment  - Modify environment to reduce risk of injury  - Consider OT/PT consult to assist with strengthening/mobility  Outcome: Progressing     Problem: GENITOURINARY - ADULT  Goal: Maintains or returns to baseline urinary function  Description: INTERVENTIONS:  - Assess urinary function  - Encourage oral fluids to ensure adequate hydration if ordered  - Administer IV fluids as ordered to ensure adequate hydration  - Administer ordered medications as needed  - Offer frequent toileting  - Follow urinary retention protocol if ordered  Outcome: Progressing  Goal: Absence of urinary retention  Description: INTERVENTIONS:  - Assess patients ability to void and empty bladder  - Monitor I/O  - Bladder scan as needed  - Discuss with physician/AP medications to alleviate retention as needed  - Discuss catheterization for long term situations as appropriate  Outcome: Progressing  Goal: Urinary catheter remains patent  Description: INTERVENTIONS:  - Assess patency of urinary catheter  - If patient has a chronic winslow, consider changing catheter if non-functioning  - Follow guidelines for intermittent irrigation of non-functioning urinary catheter  Outcome: Progressing     Problem: Prexisting or High Potential for Compromised Skin Integrity  Goal: Skin integrity is maintained or improved  Description: INTERVENTIONS:  - Identify patients at risk for skin breakdown  - Assess and monitor skin integrity  - Assess and monitor nutrition and hydration status  - Monitor labs   - Assess for incontinence   - Turn and reposition patient  - Assist with mobility/ambulation  - Relieve pressure over bony prominences  - Avoid friction and shearing  - Provide appropriate hygiene as needed including keeping skin clean and dry  - Evaluate need for skin moisturizer/barrier cream  - Collaborate with interdisciplinary team   - Patient/family teaching  - Consider wound care consult   Outcome: Progressing

## 2020-09-15 NOTE — ASSESSMENT & PLAN NOTE
Secondary to gross hematuria  Continue monitoring for the time being  Currently asymptomatic  No indication for transfusion at this time unless it continues to drop further      Recent Labs     09/12/20  0631 09/13/20  1144 09/14/20  0601 09/14/20  1835 09/14/20  1905   HGB 7 7* 7 7* 7 1* 8 8* 5 1*   MCV 95 95 94  --   --    RDW 14 1 14 5 14 6  --   --      -patient had acute blood loss anemia  -baseline hemoglobin had been 10-11  -hemoglobin decreased to 7, secondary to acute blood loss anemia from significant hematuria  -patient was transfused 3 units of packed red blood cells intraoperatively  -was recent hemoglobin 5 1  -continue to monitor hemoglobin and transfuse until hemoglobin greater than 8

## 2020-09-15 NOTE — ASSESSMENT & PLAN NOTE
Status post TURP 9/14/2020  Maintain Estrella catheter until directed by Urology team   Continue proscar, flomax      Maintain urethral Estrella to gravity drainage for 2 weeks with cystogram prior to removal

## 2020-09-15 NOTE — ANESTHESIA POSTPROCEDURE EVALUATION
Post-Op Assessment Note    CV Status:  Stable  Pain Score: 1    Pain management: adequate     Mental Status:  Alert and awake   Hydration Status:  Euvolemic and stable   PONV Controlled:  Controlled   Airway Patency:  Patent      Post Op Vitals Reviewed: Yes      Staff: Anesthesiologist         No complications documented      /69   Pulse 94   Temp 97 8 °F (36 6 °C)   Resp 14   Ht 5' 7" (1 702 m)   Wt 96 6 kg (212 lb 15 4 oz)   SpO2 98%   BMI 33 35 kg/m²       BP      Temp      Pulse     Resp      SpO2

## 2020-09-15 NOTE — ASSESSMENT & PLAN NOTE
Wt Readings from Last 3 Encounters:   09/15/20 98 2 kg (216 lb 7 9 oz)   09/07/20 98 kg (216 lb)   09/04/20 99 3 kg (219 lb)     · Cardiology following underwent cardiac catheterization in 9/2020 and was cleared for OR with no CAD   · EF 30% with grade 1 diastolic dystfunction  · Continue on statin but hold on blood pressure medications for now

## 2020-09-15 NOTE — PROGRESS NOTES
Progress Note - Hortensio Lillard Landau 1935, 82 y o  male MRN: [id number]    Unit/Bed#: E4 -01 Encounter: [id number]    Primary Care Provider: No primary care provider on file  Date and time admitted to hospital: 9/7/2020  4:48 PM        Sepsis [facility])  Assessment & Plan  -patient currently has fever, tachycardia, and lethargy  -quite concerning for sepsis due to intra-abdominal infection/possible perforation  -patient discussed with the urology team at the bedside  -broad-spectrum antibiotics with cefepime started  -discussed with critical care team:  Will transfer patient to ICU  -being evaluated for possible surgical intervention    * Gross hematuria  Assessment & Plan  -patient has a history of chronic indwelling Estrella  -patient was admitted for treatment of gross hematuria, with associated acute blood loss anemia, and pain from his urinary catheterization  -was placed on CBI  -patient did not tolerate CBI clamping and had persistent hematuria  -underwent cardiac preoperative testing  Cardiac catheterization without any evidence of significant coronary disease  -patient underwent operative intervention 9/14, with cystoscopy and TURP, required intraoperative transfusion for significant hematuria  -continue to hold aspirin/Plavix  -patient with distended, firm abdomen today:  CC revealed balloon of the suprapubic tube may have ruptured with possible tip in the pre peritoneal space  -CT cystogram revealed small focal perforation in the anterior wall of the urinary bladder  -pt now with fever, tachycardia, abd pain:  Will give stat cefepime, and transfer to ICU  -urology team at bedside evaluating further intervention    Acute blood loss anemia  Assessment & Plan  Secondary to gross hematuria  Continue monitoring for the time being  Currently asymptomatic  No indication for transfusion at this time unless it continues to drop further      Recent Labs     09/14/20  0601 09/14/20  1835 09/14/20  1905 09/14/20  2043 09/15/20  0446   HGB 7 1* 8 8* 5 1* 9 4* 9 4*   MCV 94  --   --  96 95   RDW 14 6  --   --  14 0 14 6     -patient had acute blood loss anemia  -baseline hemoglobin had been 10-11  -hemoglobin decreased to 7, secondary to acute blood loss anemia from significant hematuria  -patient was transfused 3 units of packed red blood cells intraoperatively  -was recent hemoglobin 5 1- improved to 9 4 post operatively  -continue to monitor hemoglobin - was 9 4 this am     -repeat pending    Chronic combined systolic and diastolic congestive heart failure (HCC)  Assessment & Plan  Wt Readings from Last 3 Encounters:   09/15/20 98 2 kg (216 lb 7 9 oz)   09/07/20 98 kg (216 lb)   09/04/20 99 3 kg (219 lb)     -patient has a history of chronic combined systolic and diastolic congestive heart failure  -most recent echocardiogram 9/8 with left ventricular ejection fraction 30% and grade 1 diastolic dysfunction  -continue Coreg, with blood pressure hold parameters, continue isosorbide, ARB  -CXR stat- to eval IE and need for diuresis        Poorly controlled type 2 diabetes mellitus [facility])  Assessment & Plan  Lab Results   Component Value Date    HGBA1C 7 7 (H) 08/17/2020       Recent Labs     09/15/20  0801 09/15/20  1124 09/15/20  1540 09/15/20  1730   POCGLU 241* 296* 215* 214*       Blood Sugar Average: Last 72 hrs:  (P) 242 8125   IASS,     -patient has a history of diabetes type 2, with long-term use of insulin, without complication  -continue Glargine 20U HS    -continue Accu-Cheks and sliding scale insulin  - Hold home oral hypoglycemics (Glipizide, Metformin, and Januvia)      Hyperlipidemia  Assessment & Plan  Continue statin    Essential hypertension  Assessment & Plan  -patient has a history of essential hypertension  -continue antihypertensives with Coreg 12 5 mg b i d , isosorbide mononitrate 30 mg daily, losartan 50 mg daily  -monitor closely in light of acute blood loss anemia, for hypotension    GERD (gastroesophageal reflux disease)  Assessment & Plan  Continue Famotidine    CAD (coronary artery disease)  Assessment & Plan  -patient has a history of coronary artery disease  -he underwent preoperative cardiac testing with a cardiac catheterization that did not have any significant coronary disease  -continue beta-blocker, nitrates, and ARB  -aspirin/Plavix on hold due to hematuria      Perforation:  CT cystogram with small focal perforation in the anterior wall of the urinary bladder  Patient with abdominal pain, fever, tachycardia, concerning for sepsis  -patient was evaluated with the urology team at the bedside  -will transfer to ICU for closer monitoring  -stat cefepime  -urology team at the bedside to evaluate possible surgical intervention  -stat repeat CBC/BMP      Family:  Updated patient's wife and son Merly in [address] at the bedside    D/w pts nurse at bedside  D/w   D/w urology team: Farrukh Alicea PA-c earlier today, and currently at bedside  D/w T  [facility]  D/w Dr Suresh Nunez earlier today    VTE Pharmacologic Prophylaxis: RX contraindicated due to: Hematuria, acute blood loss anemia  VTE Mechanical Prophylaxis: sequential compression device        Certification Statement: The patient will continue to require additional inpatient hospital stay due to need for further acute intervention for sepsis    Status: inpatient     ===================================================================    Subjective:  Patient currently sitting up in a chair at the bedside  Family notes he is more lethargic than earlier  Patient complains of pain across his abdomen  Also notes chest heaviness  Denies any shortness of breath but appears tachypneic  Denies any nausea or vomiting    Family notes he did not eat at all today as his abdomen felt swollen      Physical Exam:   Temp:  [97 3 °F (36 3 °C)-100 9 °F (38 3 °C)] 100 9 °F (38 3 °C)  HR:  [] 120  Resp:  [13-21] 19  BP: ()/(52-76) 149/76    Gen:  Pleasant, slightly lethargic  Sitting in a chair at the bedside  Slightly tachypneic  Able to speak in complete sentences  Heart:  Tachycardic, regular rhythm, S1S2 present, no murmur, rub or gallop  Lungs:  Decreased air movement bilaterally  Coarse breath sounds  Scattered rhonchi  No wheezing or crackles    No accessory muscle use or respiratory distress  Abd:  Firm  Tender with palpation diffusely  Hypoactive bowel sounds  Neuro:  Somnolent, but awakens voice  Communicative  Skin: warm and dry: no petechiae, purpura and rash  LABS:   Results from last 7 days   Lab Units 09/15/20  0446 09/14/20  2043 09/14/20  1905  09/14/20  0601   WBC Thousand/uL 14 77* 12 47*  --   --  8 38   HEMOGLOBIN g/dL 9 4* 9 4*  --   --  7 1*   I STAT HEMOGLOBIN g/dl  --   --  5 1*   < >  --    HEMATOCRIT % 28 4* 29 0*  --   --  22 1*   HEMATOCRIT, ISTAT %  --   --  15*   < >  --    PLATELETS Thousands/uL 202 182  --   --  202    < > = values in this interval not displayed  Results from last 7 days   Lab Units 09/15/20  0446 09/14/20  2043 09/14/20  1905  09/14/20  0601   POTASSIUM mmol/L 3 9 4 2  --   --  3 7   CHLORIDE mmol/L 105 106  --   --  106   CO2 mmol/L 23 28  --   --  24   CO2, I-STAT mmol/L  --   --  29   < >  --    BUN mg/dL 15 9  --   --  10   CREATININE mg/dL 0 94 0 89  --   --  0 82   GLUCOSE, ISTAT mg/dl  --   --  140   < >  --    CALCIUM mg/dL 7 4* 7 6*  --   --  8 3    < > = values in this interval not displayed  Hospital Data:  9/15:  CT abdomen/pelvis  The tip of a small caliber suprapubic catheter appears to be located at the serosal margin of the anterior lower urinary bladder  No suprapubic catheter balloon is present suggesting that it may have ruptured    Simple appearing fluid in the space of Retzius, retroperitoneum, and peritoneal cavity which could either represent fluid administered at the time of transrectal prostate resection or some combination of mastoid fluid and urine   CT cystogram would be   useful to evaluate for possibility of bladder leakage  Trace record and left costophrenic angle effusions with overlying bibasilar atelectasis    9/15 CT cystogram  Small focal perforation in the anterior wall of the urinary bladder adjacent to the tip of small caliber suprapubic catheter accounting for the simple fluid in space of Retzius, retroperitoneum, and peritoneal cavity    Hospital Data:  9/9:  CT head:  No acute intracranial abnormality     9/8 CT abdomen/pelvis:  1   Distended urinary bladder despite the presence of a Estrella catheter; correlate with function    2   Hemorrhage dependently in the urinary bladder   Underlying small neoplasm is difficult to definitively exclude    3   Bilateral renal cysts and other subcentimeter hypodensities that are too small to characterize, statistically also cysts    4   Prostatomegaly   Correlate with PSA level         9/8:  2D echocardiogram  SUMMARY:  1  This is a technically difficult but adequate study with the use of echo contrast  2  Left ventricle is mildly dilated with severely reduced systolic function  Left ventricular ejection fraction is estimated at 30%  3  Mild concentric left ventricular hypertrophy  4  Grade 1 diastolic dysfunction  5  Regional wall motion abnormalities consistent with coronary artery disease  6  Mild right ventricular dilatation with normal systolic function  7  Left atrium is mildly dilated  8  Aortic valve sclerotic with adequate separation  9  Fibrocalcific changes of the mitral valve with trace mitral regurgitation  10  Pulmonary artery systolic pressures cannot be estimated due to lack of tricuspid regurgitation jet  11  Aortic root is mildly dilated at 3 5 cm  12  There is no study for comparison     Procedure  9/11:  Cardiac catheterization  CORONARY CIRCULATION:  Left main: Normal   LAD: The vessel was normal sized  Angiography showed minor luminal irregularities    Circumflex: The vessel was normal sized  Angiography showed minor luminal irregularities  The two OM branches were also free of significant disease  RCA: The vessel was normal sized and dominant, giving rise to the PDA and a posterolateral branch  There was minimal plaque  There were no significant lesions        9/14 NICOL        ---------------------------------------------------------------------------------------------------------------  This note has been constructed using a voice recognition system

## 2020-09-15 NOTE — ASSESSMENT & PLAN NOTE
Wt Readings from Last 3 Encounters:   09/15/20 98 2 kg (216 lb 7 9 oz)   09/07/20 98 kg (216 lb)   09/04/20 99 3 kg (219 lb)     -patient has a history of chronic combined systolic and diastolic congestive heart failure  -most recent echocardiogram 9/8 with left ventricular ejection fraction 30% and grade 1 diastolic dysfunction  -continue Coreg, with blood pressure hold parameters, continue isosorbide, ARB  -CXR stat- to eval IE and need for diuresis

## 2020-09-16 ENCOUNTER — TELEPHONE (OUTPATIENT)
Dept: UROLOGY | Facility: AMBULATORY SURGERY CENTER | Age: 85
End: 2020-09-16

## 2020-09-16 PROBLEM — J96.01 ACUTE RESPIRATORY FAILURE WITH HYPOXIA (HCC): Status: ACTIVE | Noted: 2020-09-16

## 2020-09-16 LAB
ABO GROUP BLD BPU: NORMAL
ABO GROUP BLD BPU: NORMAL
ANION GAP SERPL CALCULATED.3IONS-SCNC: 6 MMOL/L (ref 4–13)
BPU ID: NORMAL
BPU ID: NORMAL
BUN SERPL-MCNC: 10 MG/DL (ref 5–25)
CALCIUM SERPL-MCNC: 6.8 MG/DL (ref 8.3–10.1)
CHLORIDE SERPL-SCNC: 109 MMOL/L (ref 100–108)
CO2 SERPL-SCNC: 22 MMOL/L (ref 21–32)
CREAT SERPL-MCNC: 0.96 MG/DL (ref 0.6–1.3)
CROSSMATCH: NORMAL
CROSSMATCH: NORMAL
ERYTHROCYTE [DISTWIDTH] IN BLOOD BY AUTOMATED COUNT: 14.6 % (ref 11.6–15.1)
GFR SERPL CREATININE-BSD FRML MDRD: 73 ML/MIN/1.73SQ M
GLUCOSE SERPL-MCNC: 110 MG/DL (ref 65–140)
GLUCOSE SERPL-MCNC: 115 MG/DL (ref 65–140)
GLUCOSE SERPL-MCNC: 125 MG/DL (ref 65–140)
GLUCOSE SERPL-MCNC: 165 MG/DL (ref 65–140)
GLUCOSE SERPL-MCNC: 172 MG/DL (ref 65–140)
GLUCOSE SERPL-MCNC: 175 MG/DL (ref 65–140)
HCT VFR BLD AUTO: 24.9 % (ref 36.5–49.3)
HGB BLD-MCNC: 8.1 G/DL (ref 12–17)
MCH RBC QN AUTO: 31 PG (ref 26.8–34.3)
MCHC RBC AUTO-ENTMCNC: 32.5 G/DL (ref 31.4–37.4)
MCV RBC AUTO: 95 FL (ref 82–98)
NRBC BLD AUTO-RTO: 0 /100 WBCS
PLATELET # BLD AUTO: 151 THOUSANDS/UL (ref 149–390)
PMV BLD AUTO: 9.4 FL (ref 8.9–12.7)
POTASSIUM SERPL-SCNC: 3.1 MMOL/L (ref 3.5–5.3)
PROCALCITONIN SERPL-MCNC: 4.1 NG/ML
RBC # BLD AUTO: 2.61 MILLION/UL (ref 3.88–5.62)
SODIUM SERPL-SCNC: 137 MMOL/L (ref 136–145)
UNIT DISPENSE STATUS: NORMAL
UNIT DISPENSE STATUS: NORMAL
UNIT PRODUCT CODE: NORMAL
UNIT PRODUCT CODE: NORMAL
UNIT RH: NORMAL
UNIT RH: NORMAL
WBC # BLD AUTO: 18.13 THOUSAND/UL (ref 4.31–10.16)

## 2020-09-16 PROCEDURE — NC001 PR NO CHARGE: Performed by: SURGERY

## 2020-09-16 PROCEDURE — 80048 BASIC METABOLIC PNL TOTAL CA: CPT | Performed by: UROLOGY

## 2020-09-16 PROCEDURE — 99291 CRITICAL CARE FIRST HOUR: CPT | Performed by: INTERNAL MEDICINE

## 2020-09-16 PROCEDURE — 94660 CPAP INITIATION&MGMT: CPT

## 2020-09-16 PROCEDURE — 85027 COMPLETE CBC AUTOMATED: CPT | Performed by: UROLOGY

## 2020-09-16 PROCEDURE — 99232 SBSQ HOSP IP/OBS MODERATE 35: CPT | Performed by: INTERNAL MEDICINE

## 2020-09-16 PROCEDURE — 99024 POSTOP FOLLOW-UP VISIT: CPT | Performed by: UROLOGY

## 2020-09-16 PROCEDURE — 94002 VENT MGMT INPAT INIT DAY: CPT

## 2020-09-16 PROCEDURE — 82948 REAGENT STRIP/BLOOD GLUCOSE: CPT

## 2020-09-16 PROCEDURE — 84145 PROCALCITONIN (PCT): CPT | Performed by: INTERNAL MEDICINE

## 2020-09-16 RX ORDER — MAGNESIUM SULFATE HEPTAHYDRATE 40 MG/ML
4 INJECTION, SOLUTION INTRAVENOUS ONCE
Status: COMPLETED | OUTPATIENT
Start: 2020-09-16 | End: 2020-09-16

## 2020-09-16 RX ORDER — POTASSIUM CHLORIDE 14.9 MG/ML
40 INJECTION INTRAVENOUS 2 TIMES DAILY
Status: COMPLETED | OUTPATIENT
Start: 2020-09-16 | End: 2020-09-16

## 2020-09-16 RX ORDER — MAGNESIUM SULFATE HEPTAHYDRATE 40 MG/ML
2 INJECTION, SOLUTION INTRAVENOUS ONCE
Status: COMPLETED | OUTPATIENT
Start: 2020-09-16 | End: 2020-09-16

## 2020-09-16 RX ADMIN — CEFEPIME HYDROCHLORIDE 2000 MG: 2 INJECTION, POWDER, FOR SOLUTION INTRAVENOUS at 05:14

## 2020-09-16 RX ADMIN — PROPOFOL 15 MCG/KG/MIN: 10 INJECTION, EMULSION INTRAVENOUS at 06:01

## 2020-09-16 RX ADMIN — POTASSIUM CHLORIDE 40 MEQ: 14.9 INJECTION, SOLUTION INTRAVENOUS at 09:03

## 2020-09-16 RX ADMIN — PRAVASTATIN SODIUM 40 MG: 40 TABLET ORAL at 16:34

## 2020-09-16 RX ADMIN — TAMSULOSIN HYDROCHLORIDE 0.8 MG: 0.4 CAPSULE ORAL at 16:34

## 2020-09-16 RX ADMIN — MAGNESIUM SULFATE IN WATER 2 G: 40 INJECTION, SOLUTION INTRAVENOUS at 16:31

## 2020-09-16 RX ADMIN — INSULIN LISPRO 1 UNITS: 100 INJECTION, SOLUTION INTRAVENOUS; SUBCUTANEOUS at 12:07

## 2020-09-16 RX ADMIN — MAGNESIUM SULFATE IN WATER 4 G: 40 INJECTION, SOLUTION INTRAVENOUS at 09:08

## 2020-09-16 RX ADMIN — POTASSIUM CHLORIDE 40 MEQ: 14.9 INJECTION, SOLUTION INTRAVENOUS at 21:10

## 2020-09-16 RX ADMIN — GABAPENTIN 300 MG: 300 CAPSULE ORAL at 18:34

## 2020-09-16 RX ADMIN — SODIUM CHLORIDE 125 ML/HR: 0.9 INJECTION, SOLUTION INTRAVENOUS at 00:11

## 2020-09-16 RX ADMIN — DOCUSATE SODIUM 100 MG: 100 CAPSULE, LIQUID FILLED ORAL at 19:31

## 2020-09-16 RX ADMIN — CEFEPIME HYDROCHLORIDE 2000 MG: 2 INJECTION, POWDER, FOR SOLUTION INTRAVENOUS at 18:37

## 2020-09-16 RX ADMIN — INSULIN GLARGINE 20 UNITS: 100 INJECTION, SOLUTION SUBCUTANEOUS at 21:08

## 2020-09-16 RX ADMIN — OXYCODONE HYDROCHLORIDE AND ACETAMINOPHEN 1 TABLET: 5; 325 TABLET ORAL at 19:32

## 2020-09-16 RX ADMIN — ENOXAPARIN SODIUM 40 MG: 40 INJECTION SUBCUTANEOUS at 09:11

## 2020-09-16 NOTE — ASSESSMENT & PLAN NOTE
· Had CBI infusing since his admission on 9/7  · Discontinue for now secondary to perforation  · Hemoglobin remains stable   Received 3 units PRBC on 9/14  · S/P 1 units PRBC on 9/15

## 2020-09-16 NOTE — ASSESSMENT & PLAN NOTE
· S/p  cystoscopy evacuation of clots, turp with suprapubic catheter insertion on 9/14  · He was found to have a small perforation of the bladder with fluid in the extraperitoneal space  · On 9/15 was found to have increasing abdominal distension, tachycardia and an elevated WBC raising the concern for an intraperitoneal process  · 9/15 underwent  Ex lap and closure of cystoscopy  · Urine culture +pseudomonas and morganella   · Blood culture pending

## 2020-09-16 NOTE — PROGRESS NOTES
Progress Note - Urology  [facility] Una Kos 1935, 82 y o  male MRN: [id number]    Unit/Bed#: ICU 13 Encounter: [id number]    Acute blood loss anemia  Assessment & Plan  Secondary to gross hematuria from a bleeding prostate, requiring preoperative cardiac clearance  S/p transfusion 4u PRBCs total over the last 48 hrs  H&H 8 1/24 9 today  No ongoing bleeding today    Gross hematuria  Assessment & Plan  2 Day Post-Op cystoscopy evacuation of clots, turp, spt insertion  1 Day Post-Op exploratory laparotomy, closure of cystotomy    H&H stable  Hematuria improved postop  Currently yellow-peach urine  Maintain Estrella catheter with hand irrigation q4h prn for hematuria    * Benign prostatic hyperplasia with lower urinary tract symptoms  Assessment & Plan  Status post TURP 9/14/2020  Maintain Estrella catheter until directed by Urology team   Continue proscar, flomax  Interval History: An 72-year-old male with refractory hematuria for the past week awaiting cardiac clearance, underwent cystoscopy, clot evacuation, TURP and suprapubic tube insertion on 09/14/2020  Yesterday developed increasing abdominal pain and some distention and a low grade temp 100 with tachycardia and hypotension  CT scan and CT cystogram was performed for concern for bladder perforation, this showed some intraperitoneal and retroperitoneal fluid  Patient re-examined by surgeon throughout the evening  Later taken for exploratory laparotomy last evening jointly by Dr William Marin and Dr Geovany Chirinos  No bowel or bladder perforation noted, however the suprapubic tube balloon had ruptured and the catheter was pulled back into the space of Retzius, 750 cc of clear fluid in the pre-peritoneal space was identified and consistent with saline that had been flowing in via CBI to the displaced suprapubic tube  The previous cystostomy was then buttressed closed  Bladder was filled via Estrella catheter and no leak identified   This Estrella catheter will remain in place  An additional 10Fr flat MARYJANE drain was placed into the preperitoneal space on the right side  He did received additional 1u PRBC intraoperatively  Hornerio did well overnight in the ICU following surgery  Afebrile, normotensive on no pressor support  He is awake and responsive this morning  Questioning seemed to elicit a complaint of back pain  Extubation trial to begin now  CC team at bedside  Estrella catheter with clear peach urine output to gravity drainage, 1000 ml overnight  Active bowel sounds  MARYJANE with serosanguinous output 210 ml overnight  H&H this morning is 8 1/24 9  Creatinine 0 96  Maintain drains and Estrella  Review of Systems   Unable to perform ROS: Intubated       Objective:  Vitals: Blood pressure 119/55, pulse 102, temperature 98 °F (36 7 °C), temperature source Temporal, resp  rate 17, height 5' 7" (1 702 m), weight 106 kg (233 lb 7 5 oz), SpO2 99 %  ,Body mass index is 36 57 kg/m²  Intake/Output Summary (Last 24 hours) at 9/16/2020 8028  Last data filed at 9/16/2020 0800  Gross per 24 hour   Intake 4376 8 ml   Output 3210 ml   Net 1166 8 ml     Invasive Devices     Peripheral Intravenous Line            Peripheral IV 09/15/20 Distal;Left;Upper;Ventral (anterior) Arm less than 1 day    Peripheral IV 09/15/20 Proximal;Right;Ventral (anterior) Forearm less than 1 day          Drain            Closed/Suction Drain Right RLQ Bulb 10 Fr  less than 1 day    Urethral Catheter Three way less than 1 day          Airway            ETT  Cuffed;Oral;Inflated 8 mm less than 1 day                Physical Exam  Vitals signs and nursing note reviewed  Constitutional:       Appearance: He is well-developed  Comments: Intubated  Awake and responsive- nods yes/no   HENT:      Head: Normocephalic and atraumatic  Eyes:      Comments: Makes eye contact and tracks across the room   Cardiovascular:      Rate and Rhythm: Normal rate and regular rhythm        Heart sounds: Normal heart sounds  No murmur  Pulmonary:      Effort: Pulmonary effort is normal       Breath sounds: Normal breath sounds  Abdominal:      General: Bowel sounds are normal  There is no distension  Palpations: Abdomen is soft  There is no mass  Tenderness: There is abdominal tenderness (mild lower abdominal tenderness R>L  No guarding or rebound  RLQ MARYJANE drain with serosanguinous output  Midline laparotomy incision clean and dry staples intact)  There is no guarding or rebound  Hernia: No hernia is present  Genitourinary:     Comments: 3way Estrella catheter per urethra draining clear yellow-peach urine  Penoscrotal edema noted  Musculoskeletal: Normal range of motion  Skin:     General: Skin is warm and dry  Capillary Refill: Capillary refill takes less than 2 seconds  Coloration: Skin is not pale  Neurological:      Mental Status: He is alert and oriented to person, place, and time           History:    Past Medical History:   Diagnosis Date    Cardiac disease     [facility] )     Diabetes mellitus (HCC)     GERD (gastroesophageal reflux disease)     Hyperlipidemia     Hypertension     MI (myocardial infarction) ([place] [address] )      Past Surgical History:   Procedure Laterality Date    CT CYSTOGRAM  9/15/2020    EYE SURGERY      PA CYSTOURETHROSCOPY W/IRRIG & EVAC CLOTS N/A 9/14/2020    Procedure: CYSTOSCOPY EVACUATION OF CLOTS; TURP; SUPRAPUBIC CATHETER INSERTION;  Surgeon: Mami Mac MD;  Location: [facility] OR;  Service: Urology     Family History   Problem Relation Age of Onset    Diabetes Mother     Hypertension Mother     Cancer Father     Hypertension Father     Cancer Sister     Hypertension Sister      Social History     Socioeconomic History    Marital status: /Civil Union     Spouse name: None    Number of children: None    Years of education: None    Highest education level: None   Occupational History    None   Social Needs    Financial resource strain: None    Food insecurity     Worry: None     Inability: None    Transportation needs     Medical: None     Non-medical: None   Tobacco Use    Smoking status: Never Smoker    Smokeless tobacco: Never Used   Substance and Sexual Activity    Alcohol use: No     Alcohol/week: 0 0 standard drinks     Frequency: Never     Binge frequency: Never    Drug use: No    Sexual activity: Not Currently   Lifestyle    Physical activity     Days per week: None     Minutes per session: None    Stress: None   Relationships    Social connections     Talks on phone: None     Gets together: None     Attends Anabaptist service: None     Active member of club or organization: None     Attends meetings of clubs or organizations: None     Relationship status: None    Intimate partner violence     Fear of current or ex partner: None     Emotionally abused: None     Physically abused: None     Forced sexual activity: None   Other Topics Concern    None   Social History Narrative    None       Labs:  Recent Labs     09/13/20  1144 09/14/20  0601 09/14/20  2043 09/15/20  0446 09/15/20  1832 09/15/20  1858 09/16/20  0455   WBC 9 39 8 38 12 47* 14 77* 21 41* 20 78* 18 13*     Recent Labs     09/13/20  1144 09/14/20  0601 09/14/20  1835 09/14/20  1905 09/14/20  2043 09/15/20  0446 09/15/20  1832 09/15/20  1858 09/16/20  0455   HGB 7 7* 7 1* 8 8* 5 1* 9 4* 9 4* 9 4* 9 2* 8 1*       Recent Labs     09/14/20  0601 09/14/20  2043 09/15/20  0446 09/15/20  1832 09/15/20  1858 09/16/20  0455   CREATININE 0 82 0 89 0 94 0 96 1 04 0 96         Wayne Allred PA-C  Date: 9/16/2020 Time: 9:52 AM

## 2020-09-16 NOTE — CONSULTS
Consultation - General Surgery   Merly Garza 82 y o  male MRN: [id number]  Unit/Bed#: ICU 13 Encounter: [id number]    Assessment/Plan     Assessment:  82 y o  male w/ hx of MI, CHF, admitted for intermittent hematuria, currently POD #1 s/p TURP with insertion of suprapubic catheter, now with abdominal distension, tachycardia, hypotension, CT concerning for intra-abdominal fluid    Plan:  --OR emergently for diagnostic laparoscopy, possible ex-lap, possible bowel resection  --Spoke with pt's son, Melissa Robertson, via phone to update him on pt's care  --NPO  --IVF  --IV Abx  --Pain control    History of Present Illness     HPI:  Merly Garza is a 82 y o  male w/ hx of MI, CHF, chronic indwelling winslow, who is currently POD #1 s/p TURP with insertion of suprapubic catheter for intermittent hematuria despite continuous bladder irrigation  During his procedure with Urology yesterday, pt was noted to have significant bleeding from most mucosal surfaces of the prostate, received 3u PRBC intra-op  Today, pt was transferred to the ICU for worsening tachycardia, tachypnea, increasing SOB and abdominal distension  STAT CTAP performed, showing intra-abdominal fluid concerning for bladder or bowel perforation  CT cystogram also performed, showing extraperitoneal bladder perforation with extravasation of fluid into retroperitoneum, but no intra-abdominal fluid  General Surgery consulted for evaluation for possible peritonitis  Upon examination, pt tachycardic to 110s, hypotensive to 95T systolic  Pt lethargic, but arousable  C/o diffuse abdominal pain, denies any N/V/D  Recent labwork shows increasing leukocytosis at 20K from 14K earlier this morning, normal Lactate of 1 9            Inpatient consult to Acute Care Surgery     Performed by  Klai Faustin MD     Authorized by JAYCE Holland              Review of Systems   Unable to perform ROS: Acuity of condition     Historical Information   Past Medical History:   Diagnosis Date    Cardiac disease     [place] (congestive heart failure) [place] [address] )     Diabetes mellitus ([place] [address] )     GERD (gastroesophageal reflux disease)     Hyperlipidemia     Hypertension     MI (myocardial infarction) ([place] [address] )      Past Surgical History:   Procedure Laterality Date    CT CYSTOGRAM  9/15/2020    EYE SURGERY      GA CYSTOURETHROSCOPY W/IRRIG & EVAC CLOTS N/A 9/14/2020    Procedure: CYSTOSCOPY EVACUATION OF CLOTS; TURP; SUPRAPUBIC CATHETER INSERTION;  Surgeon: Jennifer Bassett MD;  Location: [facility];  Service: Urology     Social History   Social History     Substance and Sexual Activity   Alcohol Use No    Alcohol/week: 0 0 standard drinks    Frequency: Never    Binge frequency: Never     Social History     Substance and Sexual Activity   Drug Use No     E-Cigarette/Vaping    E-Cigarette Use Never User      E-Cigarette/Vaping Substances    Nicotine No     THC No     CBD No     Flavoring No     Other No     Unknown No      Social History     Tobacco Use   Smoking Status Never Smoker   Smokeless Tobacco Never Used     Family History:   Family History   Problem Relation Age of Onset    Diabetes Mother     Hypertension Mother     Cancer Father     Hypertension Father     Cancer Sister     Hypertension Sister        Meds/Allergies   current meds:   Current Facility-Administered Medications   Medication Dose Route Frequency    acetaminophen (TYLENOL) tablet 650 mg  650 mg Oral Q6H PRN    Or    oxyCODONE-acetaminophen (PERCOCET) 5-325 mg per tablet 1 tablet  1 tablet Oral Q6H PRN    Or    oxyCODONE-acetaminophen (PERCOCET) 5-325 mg per tablet 2 tablet  2 tablet Oral Q6H PRN    albuterol (PROVENTIL HFA,VENTOLIN HFA) inhaler 2 puff  2 puff Inhalation Q4H PRN    belladonna-opium (B&O SUPPOSITORY) 16 2-30 mg suppository 1 suppository  30 mg Rectal Q8H PRN    cefepime (MAXIPIME) 2,000 mg in dextrose 5 % 50 mL IVPB  2,000 mg Intravenous Q12H    docusate sodium (COLACE) capsule 100 mg  100 mg Oral Q12H    enoxaparin (LOVENOX) subcutaneous injection 40 mg  40 mg Subcutaneous Q24H ERIN    famotidine (PEPCID) tablet 20 mg  20 mg Oral Daily    finasteride (PROSCAR) tablet 5 mg  5 mg Oral Daily    gabapentin (NEURONTIN) capsule 300 mg  300 mg Oral BID    insulin glargine (LANTUS) subcutaneous injection 20 Units 0 2 mL  20 Units Subcutaneous HS    insulin lispro (HumaLOG) 100 units/mL subcutaneous injection 1-6 Units  1-6 Units Subcutaneous TID AC    isosorbide mononitrate (IMDUR) 24 hr tablet 30 mg  30 mg Oral Daily    lactated ringers bolus 1,000 mL  1,000 mL Intravenous Once    morphine injection 1 mg  1 mg Intravenous Q4H PRN    nitroglycerin (NITROSTAT) SL tablet 0 4 mg  0 4 mg Sublingual Q5 Min PRN    oxybutynin (DITROPAN-XL) 24 hr tablet 5 mg  5 mg Oral Daily    pravastatin (PRAVACHOL) tablet 40 mg  40 mg Oral Daily With Dinner    sodium chloride 0 9 % infusion  125 mL/hr Intravenous Continuous    tamsulosin (FLOMAX) capsule 0 8 mg  0 8 mg Oral Daily With Dinner    and PTA meds:   Prior to Admission Medications   Prescriptions Last Dose Informant Patient Reported? Taking?    Cholecalciferol (VITAMIN D3) 5000 units CAPS 9/6/2020 at Unknown time Child Yes Yes   Sig: Take 1 capsule by mouth   Cyanocobalamin (VITAMIN B12 PO) 9/6/2020 at Unknown time Child Yes Yes   Sig: Take 5,000 mcg by mouth   albuterol (PROVENTIL HFA,VENTOLIN HFA) 90 mcg/act inhaler Unknown at Unknown time Child No No   Sig: Inhale 2 puffs every 4 (four) hours as needed for wheezing   aspirin 325 mg tablet 9/6/2020 at Unknown time Child Yes Yes   Sig: Take 325 mg by mouth daily   carvedilol (COREG) 25 mg tablet 9/6/2020 at Unknown time Child No Yes   Sig: Take 0 5 tablets (12 5 mg total) by mouth 2 (two) times a day with meals   clopidogrel (PLAVIX) 75 mg tablet 9/6/2020 at Unknown time Child Yes Yes   Sig: Take 75 mg by mouth daily   docusate sodium (COLACE) 100 mg capsule 9/6/2020 at Unknown time Child No Yes   Sig: Take 1 capsule (100 mg total) by mouth every 12 (twelve) hours   famotidine (PEPCID) 40 MG tablet 9/6/2020 at Unknown time Child Yes Yes   Sig: Take 40 mg by mouth daily   finasteride (PROSCAR) 5 mg tablet 9/6/2020 at Unknown time Child No Yes   Sig: Take 1 tablet (5 mg total) by mouth daily   furosemide (LASIX) 20 mg tablet 9/6/2020 at Unknown time Child Yes Yes   Sig: Take 20 mg by mouth 2 (two) times a day   gabapentin (NEURONTIN) 300 mg capsule 9/6/2020 at Unknown time Child Yes Yes   Sig: Take 300 mg by mouth 2 (two) times a day   insulin glargine (LANTUS) 100 units/mL subcutaneous injection 9/6/2020 at Unknown time Child Yes Yes   Sig: Inject 20 Units under the skin daily at bedtime   irbesartan (AVAPRO) 150 mg tablet 9/6/2020 at Unknown time Child Yes Yes   Sig: Take 150 mg by mouth daily at bedtime   isosorbide mononitrate (IMDUR) 30 mg 24 hr tablet 9/6/2020 at Unknown time Child Yes Yes   Sig: Take 30 mg by mouth daily   meloxicam (MOBIC) 15 mg tablet 9/6/2020 at Unknown time Child Yes Yes   Sig: Take 15 mg by mouth daily   metFORMIN (GLUCOPHAGE) 1000 MG tablet 9/6/2020 at Unknown time Child Yes Yes   Sig: Take 1,000 mg by mouth 2 (two) times a day with meals   nitroglycerin (NITROSTAT) 0 4 mg SL tablet More than a month at Unknown time Child Yes No   Sig: Place 0 4 mg under the tongue every 5 (five) minutes as needed for chest pain   simvastatin (ZOCOR) 20 mg tablet 9/6/2020 at Unknown time Child Yes Yes   Sig: Take 20 mg by mouth daily at bedtime   sitaGLIPtin (JANUVIA) 100 mg tablet Unknown at Unknown time Child Yes No   Sig: Take 100 mg by mouth daily   tamsulosin (FLOMAX) 0 4 mg 9/6/2020 at Unknown time Child No Yes   Sig: Take 2 capsules (0 8 mg total) by mouth daily with dinner      Facility-Administered Medications: None     Allergies   Allergen Reactions    Demerol [Meperidine]        Objective   First Vitals:   Blood Pressure: 136/65 (09/07/20 1640)  Pulse: (!) 106 (09/07/20 1640)  Temperature: 97 8 °F (36 6 °C) (09/07/20 1640)  Temp Source: Temporal (09/07/20 1640)  Respirations: 17 (09/07/20 1640)  Height: 5' 7" (170 2 cm) (09/07/20 1640)  Weight - Scale: 97 1 kg (214 lb 1 1 oz) (09/07/20 1929)  SpO2: 98 % (09/07/20 1640)    Current Vitals:   Blood Pressure: 104/59 (09/15/20 1940)  Pulse: (!) 123 (09/15/20 1940)  Temperature: 99 5 °F (37 5 °C) (09/15/20 1940)  Temp Source: Oral (09/15/20 1940)  Respirations: (!) 32 (09/15/20 1940)  Height: 5' 7" (170 2 cm) (09/07/20 1640)  Weight - Scale: 98 2 kg (216 lb 7 9 oz) (09/15/20 0600)  SpO2: 94 % (09/15/20 1940)      Intake/Output Summary (Last 24 hours) at 9/15/2020 2018  Last data filed at 9/15/2020 1957  Gross per 24 hour   Intake 400 ml   Output 3050 ml   Net -2650 ml       Invasive Devices     Peripheral Intravenous Line            Peripheral IV 09/15/20 Distal;Left;Upper;Ventral (anterior) Arm less than 1 day          Drain            Continuous Bladder Irrigation 1 day    Suprapubic Catheter Non-latex 18 Fr  1 day                Physical Exam  Vitals signs and nursing note reviewed  Constitutional:       General: He is not in acute distress  Appearance: He is well-developed  He is ill-appearing  He is not diaphoretic  HENT:      Head: Normocephalic and atraumatic  Neck:      Musculoskeletal: Normal range of motion and neck supple  Cardiovascular:      Rate and Rhythm: Normal rate and regular rhythm  Pulmonary:      Effort: Pulmonary effort is normal  No respiratory distress  Breath sounds: Normal breath sounds  No stridor  No wheezing  Abdominal:      General: There is distension  Palpations: Abdomen is soft  There is no mass  Tenderness: There is abdominal tenderness  There is guarding  There is no rebound  Hernia: No hernia is present  Comments: Distended abdomen with diffuse tenderness, voluntary guarding, remains soft and without rigidity    Musculoskeletal: Normal range of motion  Skin:     General: Skin is warm  Neurological:      Mental Status: He is oriented to person, place, and time  He is lethargic  Lab Results:   CBC:   Lab Results   Component Value Date    WBC 20 78 (H) 09/15/2020    HGB 9 2 (L) 09/15/2020    HCT 29 1 (L) 09/15/2020    MCV 97 09/15/2020     09/15/2020    MCH 30 7 09/15/2020    MCHC 31 6 09/15/2020    RDW 14 9 09/15/2020    MPV 10 1 09/15/2020    NRBC 0 09/15/2020   , CMP:   Lab Results   Component Value Date    SODIUM 134 (L) 09/15/2020    K 3 7 09/15/2020     09/15/2020    CO2 19 (L) 09/15/2020    BUN 12 09/15/2020    CREATININE 1 04 09/15/2020    CALCIUM 7 1 (L) 09/15/2020    AST 12 09/15/2020    ALT 16 09/15/2020    ALKPHOS 53 09/15/2020    EGFR 66 09/15/2020   , Coagulation: No results found for: PT, INR, APTT, Urinalysis: No results found for: Juline Daren, SPECGRAV, PHUR, LEUKOCYTESUR, NITRITE, PROTEINUA, GLUCOSEU, KETONESU, BILIRUBINUR, BLOODU, Amylase: No results found for: AMYLASE  Imaging:    XR chest portable   Final Result by Tia Cui MD (09/15 1911)      No acute cardiopulmonary disease  Workstation performed: [id number]         CT cystogram   Final Result by Ela Finley MD (09/15 6924)      Small focal perforation in the anterior wall of the urinary bladder adjacent to the tip of small caliber suprapubic catheter accounting for the simple fluid in space of Retzius, retroperitoneum, and peritoneal cavity  I personally discussed this study with Dr Bette Alcala via suture text messaging on 9/15/2020 at 2:32 PM                   Workstation performed: [id number]         CT abdomen pelvis w wo contrast   Final Result by Ela Finley MD (09/15 0514)      The tip of a small caliber suprapubic catheter appears to be located at the serosal margin of the anterior lower urinary bladder  No suprapubic catheter balloon is present suggesting that it may have ruptured        Simple appearing fluid in the space of Retzius, retroperitoneum, and peritoneal cavity which could either represent fluid administered at the time of transrectal prostate resection or some combination of mastoid fluid and urine  CT cystogram would be    useful to evaluate for possibility of bladder leakage  Trace record and left costophrenic angle effusions with overlying bibasilar atelectasis  I personally discussed this study with Irma Cruz and Dr Indira Booteh on 9/15/2020 at 12:50 PM                   Workstation performed: [id number]         CT head wo contrast   Final Result by Amelia Campbell MD (09/09 1423)      No acute intracranial abnormality  Workstation performed: [id number]         CT abdomen pelvis w wo contrast   Final Result by Royer Roblero MD (09/08 1259)      1  Distended urinary bladder despite the presence of a Estrella catheter; correlate with function  2   Hemorrhage dependently in the urinary bladder  Underlying small neoplasm is difficult to definitively exclude  3   Bilateral renal cysts and other subcentimeter hypodensities that are too small to characterize, statistically also cysts  4   Prostatomegaly  Correlate with PSA level        Workstation performed: [id number]

## 2020-09-16 NOTE — PLAN OF CARE
Problem: PAIN - ADULT  Goal: Verbalizes/displays adequate comfort level or baseline comfort level  Description: Interventions:  - Encourage patient to monitor pain and request assistance  - Assess pain using appropriate pain scale  - Administer analgesics based on type and severity of pain and evaluate response  - Implement non-pharmacological measures as appropriate and evaluate response  - Consider cultural and social influences on pain and pain management  - Notify physician/advanced practitioner if interventions unsuccessful or patient reports new pain  9/16/2020 0430 by Damien Becerra RN  Outcome: Progressing  9/16/2020 0430 by Damien Becerra RN  Outcome: Progressing     Problem: INFECTION - ADULT  Goal: Absence or prevention of progression during hospitalization  Description: INTERVENTIONS:  - Assess and monitor for signs and symptoms of infection  - Monitor lab/diagnostic results  - Monitor all insertion sites, i e  indwelling lines, tubes, and drains  - Monitor endotracheal if appropriate and nasal secretions for changes in amount and color  - [place] appropriate cooling/warming therapies per order  - Administer medications as ordered  - Instruct and encourage patient and family to use good hand hygiene technique  - Identify and instruct in appropriate isolation precautions for identified infection/condition  9/16/2020 0430 by Damien Becerra RN  Outcome: Progressing  9/16/2020 0430 by Damien Becerra RN  Outcome: Progressing     Problem: SAFETY ADULT  Goal: Patient will remain free of falls  Description: INTERVENTIONS:  - Assess patient frequently for physical needs  -  Identify cognitive and physical deficits and behaviors that affect risk of falls    -  [place] fall precautions as indicated by assessment   - Educate patient/family on patient safety including physical limitations  - Instruct patient to call for assistance with activity based on assessment  - Modify environment to reduce risk of injury  - Consider OT/PT consult to assist with strengthening/mobility  9/16/2020 0430 by Giuseppe Chavarria RN  Outcome: Progressing  9/16/2020 0430 by Giuseppe Chavarria RN  Outcome: Progressing  Goal: Maintain or return to baseline ADL function  Description: INTERVENTIONS:  -  Assess patient's ability to carry out ADLs; assess patient's baseline for ADL function and identify physical deficits which impact ability to perform ADLs (bathing, care of mouth/teeth, toileting, grooming, dressing, etc )  - Assess/evaluate cause of self-care deficits   - Assess range of motion  - Assess patient's mobility; develop plan if impaired  - Assess patient's need for assistive devices and provide as appropriate  - Encourage maximum independence but intervene and supervise when necessary  - Involve family in performance of ADLs  - Assess for home care needs following discharge   - Consider OT consult to assist with ADL evaluation and planning for discharge  - Provide patient education as appropriate  9/16/2020 0430 by Giuseppe Chavarria RN  Outcome: Progressing  9/16/2020 0430 by Giuseppe Chavarria RN  Outcome: Progressing  Goal: Maintain or return mobility status to optimal level  Description: INTERVENTIONS:  - Assess patient's baseline mobility status (ambulation, transfers, stairs, etc )    - Identify cognitive and physical deficits and behaviors that affect mobility  - Identify mobility aids required to assist with transfers and/or ambulation (gait belt, sit-to-stand, lift, walker, cane, etc )  - [place] fall precautions as indicated by assessment  - Record patient progress and toleration of activity level on Mobility SBAR; progress patient to next Phase/Stage  - Instruct patient to call for assistance with activity based on assessment  - Consider rehabilitation consult to assist with strengthening/weightbearing, etc   9/16/2020 0430 by Giuseppe Chavarria RN  Outcome: Progressing  9/16/2020 0430 by Giuseppe Chavarria RN  Outcome: Progressing Problem: DISCHARGE PLANNING  Goal: Discharge to home or other facility with appropriate resources  Description: INTERVENTIONS:  - Identify barriers to discharge w/patient and caregiver  - Arrange for needed discharge resources and transportation as appropriate  - Identify discharge learning needs (meds, wound care, etc )  - Arrange for interpretive services to assist at discharge as needed  - Refer to Case Management Department for coordinating discharge planning if the patient needs post-hospital services based on physician/advanced practitioner order or complex needs related to functional status, cognitive ability, or social support system  9/16/2020 0430 by Shakeel Herrera RN  Outcome: Progressing  9/16/2020 0430 by Shakeel Herrera RN  Outcome: Progressing     Problem: Knowledge Deficit  Goal: Patient/family/caregiver demonstrates understanding of disease process, treatment plan, medications, and discharge instructions  Description: Complete learning assessment and assess knowledge base  Interventions:  - Provide teaching at level of understanding  - Provide teaching via preferred learning methods  9/16/2020 0430 by Shakeel Herrera RN  Outcome: Progressing  9/16/2020 0430 by Shakeel Herrera RN  Outcome: Progressing     Problem: Potential for Falls  Goal: Patient will remain free of falls  Description: INTERVENTIONS:  - Assess patient frequently for physical needs  -  Identify cognitive and physical deficits and behaviors that affect risk of falls    -  [place] fall precautions as indicated by assessment   - Educate patient/family on patient safety including physical limitations  - Instruct patient to call for assistance with activity based on assessment  - Modify environment to reduce risk of injury  - Consider OT/PT consult to assist with strengthening/mobility  9/16/2020 0430 by Shakeel Herrera RN  Outcome: Progressing  9/16/2020 0430 by Shakeel Herrera RN  Outcome: Progressing     Problem: GENITOURINARY - ADULT  Goal: Maintains or returns to baseline urinary function  Description: INTERVENTIONS:  - Assess urinary function  - Encourage oral fluids to ensure adequate hydration if ordered  - Administer IV fluids as ordered to ensure adequate hydration  - Administer ordered medications as needed  - Offer frequent toileting  - Follow urinary retention protocol if ordered  9/16/2020 0430 by Freddie Gonzales RN  Outcome: Progressing  9/16/2020 0430 by Freddie Gonzales RN  Outcome: Progressing  Goal: Absence of urinary retention  Description: INTERVENTIONS:  - Assess patients ability to void and empty bladder  - Monitor I/O  - Bladder scan as needed  - Discuss with physician/AP medications to alleviate retention as needed  - Discuss catheterization for long term situations as appropriate  9/16/2020 0430 by Freddie Gonzales RN  Outcome: Progressing  9/16/2020 0430 by Freddie Gonzales RN  Outcome: Progressing  Goal: Urinary catheter remains patent  Description: INTERVENTIONS:  - Assess patency of urinary catheter  - If patient has a chronic winslow, consider changing catheter if non-functioning  - Follow guidelines for intermittent irrigation of non-functioning urinary catheter  9/16/2020 0430 by Freddie Gonzales RN  Outcome: Progressing  9/16/2020 0430 by Freddie Gonzales RN  Outcome: Progressing     Problem: Prexisting or High Potential for Compromised Skin Integrity  Goal: Skin integrity is maintained or improved  Description: INTERVENTIONS:  - Identify patients at risk for skin breakdown  - Assess and monitor skin integrity  - Assess and monitor nutrition and hydration status  - Monitor labs   - Assess for incontinence   - Turn and reposition patient  - Assist with mobility/ambulation  - Relieve pressure over bony prominences  - Avoid friction and shearing  - Provide appropriate hygiene as needed including keeping skin clean and dry  - Evaluate need for skin moisturizer/barrier cream  - Collaborate with interdisciplinary team   - Patient/family teaching  - Consider wound care consult   9/16/2020 0430 by Argentina Rain RN  Outcome: Progressing  9/16/2020 0430 by Argentina Rain RN  Outcome: Progressing

## 2020-09-16 NOTE — ANESTHESIA POSTPROCEDURE EVALUATION
Post-Op Assessment Note    CV Status:  Stable       Mental Status:  Unresponsive   Hydration Status:  Stable   Airway patency: intubated  Airway: intubated      Post Op Vitals Reviewed: Yes      Staff: Anesthesiologist       Blood pressure 119/53, pulse (!) 124, temperature 100 4 °F (38 °C), temperature source Temporal, resp  rate (!) 25, height 5' 7" (1 702 m), weight 98 2 kg (216 lb 7 9 oz), SpO2 93 %  No complications documented      BP      Temp      Pulse    Resp     SpO2

## 2020-09-16 NOTE — ASSESSMENT & PLAN NOTE
· Found to have bladder perforation and rupture of suprapubic catheter   · CBI fluid leaked into peritoneal cavity  · Urology following  · Follow abdominal exams  · Estrella to gravity

## 2020-09-16 NOTE — OP NOTE
OPERATIVE REPORT  PATIENT NAME: Kimberly Potter    :  1935  MRN: [id number]  Pt Location: [facility] OR ROOM 05    SURGERY DATE: 9/15/2020    Surgeon(s) and Role:    Surgeon Dr Arsenio Garza MD - Primary    Assistance     * Kush Weems MD - Karen Hackett MD - Observing    Preop Diagnosis:  Retroperitoneal bladder leak    Post-Op Diagnosis Codes:  Same    Procedure(s) (LRB):  EXPLORATORY LAPAROTOMY, closure of cystotomy (N/A)    Specimen(s):  None    Estimated Blood Loss:   Minimal    Drains:  Closed/Suction Drain Right RLQ Bulb 10 Fr  (Active)   Number of days: 0       Suprapubic Catheter Non-latex 18 Fr  (Active)   Site Assessment Clean; Intact 09/15/20 2000   Dressing Status Leaking (Comment) 09/15/20 2000   Dressing Type Dry dressing 09/15/20 2000   Collection Container Leg bag 09/15/20 2000   Securement Method Taped 09/15/20 2000   Irrigant Normal saline 09/15/20 0845   Output (mL) 50 mL 09/15/20 2200   Number of days: 1       Continuous Bladder Irrigation (Active)   Site Assessment Clean;Skin intact 09/15/20 2000   Securement Method Securing device (Describe) 09/15/20 2000   Rate Other (Comment) 09/15/20 2000   Irrigant Normal saline 09/15/20 0845   CBI Irrigation Intake (mL) 0 mL 09/15/20 2200   CBI Estrella Output (mL) 600 mL 09/15/20 2200   CBI Net Output (mL) 600 mL 09/15/20 2200   Estrella Output Appearance Clear;Red flecks 09/15/20 2000   Number of days: 1       Anesthesia Type:   General    Operative Indications:  Bladder perforation, intraoperative [N99 72]    Operative Findings:  750 cc of clear intra-abdominal fluid evacuated  No sign of small or large intestinal injury on exploratory laparotomy  The space of Retzius was developed and entered and a small opening in the bladder was identified at the level of the suprapubic tube  The opening appeared mostly healed at this time    The detrusor muscle was buttressed over the suprapubic tube site   10  Flat MARYJANE drain placed in the preperitoneal space  The balloon of the suprapubic tube was ruptured  Complications:   None    Procedure and Technique:  Merly is an 82-year-old male who underwent TURP on September 14, 2020  He had a large friable bleeding prostate  His hematocrit was 22 at the start of the case  He received 3 units of packed red blood cells intraoperatively  His postoperative hematocrit was 29  Postoperative sodium level was normal   The patient had a suprapubic tube placed  He received continuous bladder irrigation overnight in through the suprapubic tube in out through the Estrella catheter  Earlier today he developed increasing abdominal pain and low-grade temperature of 100°  His white blood cell count was rising  He became tachycardic into the 120 he is  His abdominal examination was noted to be tender  He had a CT scan showing what appeared to be both retro and intraperitoneal fluid  He also had a CT cystogram showing a clear retroperitoneal bladder leak  In addition on the CT scans the suprapubic tube tip was not completely visualized within the bladder and the Estrella balloon was not inflated  It appeared as if the continuous bladder irrigation had run into the preperitoneal space  The patient was monitored in the step-down unit  Serial abdominal examinations were performed  My last examination of the eating was performed just before 7:00 p m     I return to [facility] just after 9:00 p m  And repeated an abdominal examination as the patient's blood pressure had dropped to 90 systolic  He had been making excellent urine and his lab work upon arrival to the ICU was within normal limits including lactate and pH  The concern, however, was that his abdominal examination was worsening and he now had hypotension  Consultation was also obtained with Dr Arlene Singer from General surgery    Dr Arlene Singer and I examined the patient together and discussed continued observation versus exploration in the operating room  Based on his worsening abdominal examination and hypotension a decision was made to take the patient to the operating room for exploratory laparotomy  Informed consent was obtained from both the patient as well as his son  Patient was brought to the operating room on September 15, 2020  After the smooth induction of general endotracheal anesthesia, the patient was placed supine  Benign boots were applied  Arms were placed on arm boards  His Estrella catheter remained in place and connected to gravity  The suprapubic tube was prepped into the field so that it can be followed into the space of Retzius  The entire abdomen was prepped and draped in sterile fashion  15  Blade was used to make a midline skin incision from below the umbilicus to the level of the suprapubic tube  Bovie electrocautery was utilized to dissect through the subcutaneous tissue  The fascia was scored in the midline in open  The peritoneal cavity was entered  Approximately 750 cc of clear fluid was evacuated  The bowel was thoroughly inspected including the visualized portions of the colon as well as the majority of the small bowel  There is no evidence of murky fluid  There is no evidence of succus or stool  There is no evidence of bowel injury  Next the incision was extended inferiorly to include the suprapubic tube  We followed the suprapubic tube into the space of Retzius  The space of Retzius was further opened and developed  Edematous tissue was appreciated with a moderate amount of fluid in the preperitoneal space  The Estrella catheter which had remained in place was connected to a CBI tubing and the bladder was filled  There was very minimal extravasation from a region on the anterior bladder wall where the suprapubic tube had been placed into the bladder  This area actually appeared to be healing well at this time    A figure-of-eight 2 0 Vicryl stitch was placed through the detrusor muscle in this area of anterior bladder wall weakness to buttress and support this  The bladder was filled again and there was no evidence of extravasation in the preperitoneal space  The bladder continued to be filled and we inspected the intra-abdominal compartment and found no evidence of intra-abdominal leak of fluid  At this point the catheter was connected to gravity drainage  The bowel was returned to its natural anatomic position  A MARYJANE drain was placed into the preperitoneal space and brought out through a separate stab incision in the right lower quadrant  The fascia in the midline was closed with running looped PDS from above and below  The subcutaneous tissue was irrigated  The subcutaneous tissue was closed with a running Vicryl  The skin was closed with staples  A drain sponge was applied and the drain was secured in place  The Estrella catheter was connected to gravity drainage at the conclusion of the case  Overall the patient tolerated the procedure well number no complications  The patient remained intubated and was transferred to the ICU in stable condition at the conclusion of the case  Intraoperatively he received 1 unit of packed red blood cells  There was no significant blood loss associated with this procedure      Patient Disposition:  ICU stable    SIGNATURE: Aba Burleson MD  DATE: September 15, 2020  TIME: 11:26 PM

## 2020-09-16 NOTE — PROGRESS NOTES
Daily Progress Note - Critical Care   Merly Fortune 82 y o  male MRN: [id number]  Unit/Bed#: ICU 13 Encounter: [id number]        ----------------------------------------------------------------------------------------  HPI/24hr events: none    On AC 14/450/50/5  I/Os: urine 1 8L/ +2 2L/ -10L  Drain: -210  RASS of 0 on propofol 15  On lovenox for vte ppx    ---------------------------------------------------------------------------------------  SUBJECTIVE  Complaining of back pain    Review of Systems  Review of systems was unable to be performed secondary to intubation, sedation  ---------------------------------------------------------------------------------------  Assessment and Plan:  82year old male with PMH of CAD, hypertension, hyperlipidemia, GERD, diabetes mellitus, and CHF  Presented to [facility] on 09/07, history of lower urinary tract symptoms with retention requiring winslow catheter placement  Urology brought on board, CBI done  Not improving, and with taken for cystoscopy 09/14 - evacuation of clots, TURP with suprapubic catheter insertion  Presented to ICU due to septic picture with tachycardia, tachypnea,  low-grade fever, and abdominal distention  CT AP showed possible bladder/bowel perforation and no suprapubic catheter balloon  CT cystogram done, showing bladder perforation with new retroperitoneal contrast extravasation  General surgery brought on board, exploratory laparotomy done  750cc clear fluid evacuated, no bowel perforation, suprapubic catheter balloon was ruptured, cystostomy closed, MARYJANE drain placed in preperitoneal space  Received 4U PRBCs total as of now      Neuro:    Diagnosis: sedated  o Plan: propofol 15      CV:    Diagnosis: CAD, HTN, HLD, combined HF  o 09/2020 ECHO - EF 30%, mild LVH, G1DD, and mild RV dilatation   o To be assessed outpatient for ICD  o Cardiac catheterization during hospitalization and cleared for OR procedure  o Hold home Anti-HTNsive meds for now, Continue pravastatin when able to  o Monitor vitals, volume overload  o Cardiology following    Pulm:   Diagnosis: elective intubation   o Wean to extubate today  o Maintain o2 sat  >89%  o consider CXR due to bilateral reduced BS which was clear 2 days ago based on charting, likely pleural effusions  GI:    NPO      :    Diagnosis: BPH with chronic winslow - POD #1 exploratory laparotomy and cystoscopy closure  POD #2 cystoscopy, TURP  o 750cc clear fluid evacuated, no bowel perforation, suprapubic catheter balloon was ruptured, cystostomy closed, MARYJANE drain placed in preperitoneal space  CT cystogram during OR yesterday confirms no bladder perforation  o Receiving NS infusion, 125ml/hr --> reduced to 100 due to EF of 30%  o On cefepime for possible peritonitis, will discuss to discontinue  o Now with winslow's  o Monitor I/os, Serial abdominal exams  o Discuss with urology if they still want oxybutynin to be given      F/E/N:    respiratory alkalosis with metabolic acidosis on ABG yesterday - likely from sepsis  Improving with fluids   Hypokalemia - replete >4   Hypomagnesemia - noted to be 1 5, but don't see repletion   replete for >2      Heme/Onc:    Diagnosis: Acute blood Anemia, operative  o Plan: s/p 4U PRBC as of now  o Baseline: 11-12  o 8 today AM, dilutional drop from 9 yesterday    o Monitor Hb, if drops below 8 consider additional PRBC due to cardiomyopathy    Diagnosis: leukocytosis  o Likely 2/2 fluid extravasation in peritoneum, sepsis  o Unsure if fluid studies sent for analysis from OR yesterday   Diagnosis: thrombocytopenia   o Went from 200s to 151  o Could be 2/2 abx  o monitor      Endo:    Diagnosis: Type 2 DM with poorly controlled BG during hospital stay  o On metformin at home, A1c 7 7 in 08/2020  o Continue home lantus 21  o Was on ISS with poorly controlled BG - ISS agorithim increased for better control yesterday, and changed to q6 for NPO status      ID:    Diagnosis: Sepsis  o BC and UC pending  o Receiving cefepime  D2  o Obtain procalc to determine need for Abx, likely not infected and thus could discontinue  MSK/Skin:    No active issues, PT/OT consults have already been placed      Disposition: Continue Critical Care   Code Status: Level 1 - Full Code  ---------------------------------------------------------------------------------------  ICU CORE MEASURES    Prophylaxis   VTE Pharmacologic Prophylaxis: Enoxaparin (Lovenox)  VTE Mechanical Prophylaxis: sequential compression device  Stress Ulcer Prophylaxis: Prophylaxis Not Indicated     ABCDE Protocol (if indicated)  Plan to perform spontaneous awakening trial today? Yes  Plan to perform spontaneous breathing trial today? Yes  Obvious barriers to extubation? No  CAM-ICU: Negative    Invasive Devices Review  Invasive Devices     Peripheral Intravenous Line            Peripheral IV 09/15/20 Distal;Left;Upper;Ventral (anterior) Arm less than 1 day    Peripheral IV 09/15/20 Proximal;Right;Ventral (anterior) Forearm less than 1 day          Drain            Closed/Suction Drain Right RLQ Bulb 10 Fr  less than 1 day    Urethral Catheter Three way less than 1 day          Airway            ETT  Cuffed;Oral;Inflated 8 mm less than 1 day              Can any invasive devices be discontinued today?  Yes  ---------------------------------------------------------------------------------------  OBJECTIVE    Vitals   Vitals:    20 0200 20 0300 20 0400 20 0538   BP:  105/50 (!) 102/45    BP Location:  Right arm Right arm    Pulse: (!) 114 (!) 106 (!) 106    Resp: 19 19 18    Temp:   99 2 °F (37 3 °C)    TempSrc:   Temporal    SpO2: 99% 99% 98%    Weight:    106 kg (233 lb 7 5 oz)   Height:         Temp (24hrs), Av °F (37 2 °C), Min:97 3 °F (36 3 °C), Max:100 9 °F (38 3 °C)  Current: Temperature: 99 2 °F (37 3 °C)   - 120  RR 18-32   MAPs 61-77    Respiratory:  SpO2 Device: O2 Device: Nasal cannula  Nasal Cannula O2 Flow Rate (L/min): 2 L/min    Invasive/non-invasive ventilation settings   Respiratory    Lab Data (Last 4 hours)    None         O2/Vent Data (Last 4 hours)      09/16 0244           Vent Mode AC/VC       Resp Rate (BPM) (BPM) 14       Vt (mL) (mL) 450       FIO2 (%) (%) 60       PEEP (cmH2O) (cmH2O) 5       MV 10 4                   Physical Exam  HENT:      Head: Normocephalic and atraumatic  Eyes:      Pupils: Pupils are equal, round, and reactive to light  Cardiovascular:      Rate and Rhythm: Normal rate and regular rhythm  Pulses: Normal pulses  Heart sounds: Normal heart sounds  Pulmonary:      Effort: Pulmonary effort is normal       Breath sounds: Examination of the right-lower field reveals decreased breath sounds  Examination of the left-lower field reveals decreased breath sounds  Decreased breath sounds present  Comments: ET in place  Abdominal:      General: Bowel sounds are normal  There is distension  Palpations: Abdomen is soft  Tenderness: There is abdominal tenderness  Comments: MARYJANE drain in place - bloody    Genitourinary:     Comments: Estrella's in place - dark yellow urine  Musculoskeletal:         General: No swelling or tenderness  Skin:     General: Skin is warm and dry  Neurological:      Mental Status: He is alert           Laboratory and Diagnostics:  Results from last 7 days   Lab Units 09/16/20  0455 09/15/20  1858 09/15/20  1832 09/15/20  0446 09/14/20  2043 09/14/20  1905 09/14/20  1835 09/14/20  0601 09/13/20  1144 09/12/20  0631 09/10/20  0559   WBC Thousand/uL 18 13* 20 78* 21 41* 14 77* 12 47*  --   --  8 38 9 39 7 53 7 65   HEMOGLOBIN g/dL 8 1* 9 2* 9 4* 9 4* 9 4*  --   --  7 1* 7 7* 7 7* 9 3*   I STAT HEMOGLOBIN g/dl  --   --   --   --   --  5 1* 8 8*  --   --   --   --    HEMATOCRIT % 24 9* 29 1* 29 3* 28 4* 29 0*  --   --  22 1* 24 4* 23 9* 29 4*   HEMATOCRIT, ISTAT %  --   --   --   --   --  15* 26*  --   -- --   --    PLATELETS Thousands/uL 151 219 221 202 182  --   --  202 244 227 229   NEUTROS PCT %  --   --   --  89*  --   --   --  64  --  56 63   BANDS PCT %  --   --  15*  --   --   --   --   --   --   --   --    MONOS PCT %  --   --   --  5  --   --   --  9  --  10 11   MONO PCT %  --   --  2*  --   --   --   --   --   --   --   --      Results from last 7 days   Lab Units 09/16/20  0455 09/15/20  1858 09/15/20  1832 09/15/20  0446 09/14/20  2043 09/14/20  1905 09/14/20  1835 09/14/20  0601 09/12/20  0631   SODIUM mmol/L 137 134* 140 138 137  --   --  138 138   POTASSIUM mmol/L 3 1* 3 7 3 8 3 9 4 2  --   --  3 7 3 9   CHLORIDE mmol/L 109* 105 107 105 106  --   --  106 104   CO2 mmol/L 22 19* 23 23 28  --   --  24 26   CO2, I-STAT mmol/L  --   --   --   --   --  29 27  --   --    ANION GAP mmol/L 6 10 10 10 3*  --   --  8 8   BUN mg/dL 10 12 13 15 9  --   --  10 16   CREATININE mg/dL 0 96 1 04 0 96 0 94 0 89  --   --  0 82 0 94   CALCIUM mg/dL 6 8* 7 1* 7 2* 7 4* 7 6*  --   --  8 3 8 1*   GLUCOSE RANDOM mg/dL 175* 207* 206* 303* 215*  --   --  177* 166*   ALT U/L  --  16  --   --   --   --   --   --   --    AST U/L  --  12  --   --   --   --   --   --   --    ALK PHOS U/L  --  53  --   --   --   --   --   --   --    ALBUMIN g/dL  --  2 9*  --   --   --   --   --   --   --    TOTAL BILIRUBIN mg/dL  --  0 48  --   --   --   --   --   --   --      Results from last 7 days   Lab Units 09/15/20  1858 09/12/20  0631 09/10/20  0559 09/10/20  0015   MAGNESIUM mg/dL 1 5* 2 2 1 9 2 1   PHOSPHORUS mg/dL 2 3  --   --   --                Results from last 7 days   Lab Units 09/15/20  1858   LACTIC ACID mmol/L 1 9     ABG:  Results from last 7 days   Lab Units 09/15/20  1835   PH ART  7 345*   PCO2 ART mm Hg 32 4*   PO2 ART mm Hg 67 4*   HCO3 ART mmol/L 17 3*   BASE EXC ART mmol/L -7 5   ABG SOURCE  Radial, Left     VBG:  Results from last 7 days   Lab Units 09/15/20  1835   ABG SOURCE  Radial, Left           Micro  Results from last 7 days   Lab Units 09/15/20  2016 09/15/20  1859   BLOOD CULTURE  Received in Microbiology Lab  Culture in Progress  Received in Microbiology Lab  Culture in Progress  Imaging:  I have personally reviewed pertinent reports  Intake and Output  I/O       09/14 0701 - 09/15 0700 09/15 0701 - 09/16 0700    I V  (mL/kg) 1850 (18 8) 2709 9 (25 6)    Blood 900 300    NG/GT  0    IV Piggyback 250 1100    Total Intake(mL/kg) 3000 (30 5) 4109 9 (38 8)    Urine (mL/kg/hr) 2150 (0 9) 1750 (0 7)    Drains  210    Total Output 2150 1960    Net +850 +2149 9                  Height and Weights   Height: 5' 7" (170 2 cm)  IBW: 66 1 kg  Body mass index is 36 57 kg/m²  Weight (last 2 days)     Date/Time   Weight    09/16/20 0538   106 (233 47)    09/15/20 0600   98 2 (216 49)    09/14/20 0600   96 6 (212 96)                Nutrition       Diet Orders   (From admission, onward)             Start     Ordered    09/15/20 1838  Diet NPO  Diet effective now     Question Answer Comment   Diet Type NPO    RD to adjust diet per protocol?  Yes        09/15/20 1837                Active Medications  Scheduled Meds:  Current Facility-Administered Medications   Medication Dose Route Frequency Provider Last Rate    acetaminophen  650 mg Oral Q6H PRN René Borrego MD      Or    oxyCODONE-acetaminophen  1 tablet Oral Q6H PRN René Borrego MD      Or    oxyCODONE-acetaminophen  2 tablet Oral Q6H PRN René Borrego MD      albuterol  2 puff Inhalation Q4H PRN René Borrego MD      belladonna-opium  30 mg Rectal Q8H PRN René Borrego MD      cefepime  2,000 mg Intravenous Q12H René Borrego MD Stopped (09/16/20 0600)    docusate sodium  100 mg Oral Q12H René Borrego MD      enoxaparin  40 mg Subcutaneous Q24H [address] René Borrego MD      famotidine  20 mg Oral Daily René Borrego MD      fentaNYL  50 mcg Intravenous Q2H PRN JAYCE Lee      finasteride  5 mg Oral Daily René Borrego MD     [facility] gabapentin  300 mg Oral BID Heath Quevedo MD      insulin glargine  20 Units Subcutaneous HS Heath Quevedo MD      insulin lispro  1-6 Units Subcutaneous TID [facility] Heath Quevedo MD      isosorbide mononitrate  30 mg Oral Daily Heath Quevedo MD      lactated ringers  1,000 mL Intravenous Once PRN Heath Quevedo MD      And    lactated ringers  1,000 mL Intravenous Once PRN Heath Quevedo MD      morphine injection  1 mg Intravenous Q4H PRN Heath Quevedo MD      nitroglycerin  0 4 mg Sublingual Q5 Min PRN Heath Quevedo MD      oxybutynin  5 mg Oral Daily Heath Quevedo MD      pravastatin  40 mg Oral Daily With Ramez Monroe MD      propofol  5-50 mcg/kg/min Intravenous Titrated Eva Bun, CRNP 15 mcg/kg/min (09/16/20 0601)    sodium chloride  1,000 mL Intravenous Once PRN Heath Quevedo MD      And    sodium chloride  1,000 mL Intravenous Once PRN Heath Quevedo MD      sodium chloride  125 mL/hr Intravenous Continuous Heath Quevedo  mL/hr (09/16/20 0011)    tamsulosin  0 8 mg Oral Daily With Ramez Monroe MD       Continuous Infusions:  propofol, 5-50 mcg/kg/min, Last Rate: 15 mcg/kg/min (09/16/20 0601)  sodium chloride, 125 mL/hr, Last Rate: 125 mL/hr (09/16/20 0011)      PRN Meds:   acetaminophen, 650 mg, Q6H PRN    Or  oxyCODONE-acetaminophen, 1 tablet, Q6H PRN    Or  oxyCODONE-acetaminophen, 2 tablet, Q6H PRN  albuterol, 2 puff, Q4H PRN  belladonna-opium, 30 mg, Q8H PRN  fentaNYL, 50 mcg, Q2H PRN  lactated ringers, 1,000 mL, Once PRN    And  lactated ringers, 1,000 mL, Once PRN  morphine injection, 1 mg, Q4H PRN  nitroglycerin, 0 4 mg, Q5 Min PRN  sodium chloride, 1,000 mL, Once PRN    And  sodium chloride, 1,000 mL, Once PRN        Allergies   Allergies   Allergen Reactions    Demerol [Meperidine]      ---------------------------------------------------------------------------------------  Advance Directive and Living Will:      Power of : POLST:    ---------------------------------------------------------------------------------------  Care Time Delivered:   continue Vivien Bond MD      Portions of the record may have been created with voice recognition software  Occasional wrong word or "sound a like" substitutions may have occurred due to the inherent limitations of voice recognition software    Read the chart carefully and recognize, using context, where substitutions have occurred

## 2020-09-16 NOTE — ASSESSMENT & PLAN NOTE
· Received 3 units PRBC intraoperatively on 9/14 and another 1 unit on 9/15  · Will continue to monitor his hgb closely

## 2020-09-16 NOTE — PLAN OF CARE
Problem: PAIN - ADULT  Goal: Verbalizes/displays adequate comfort level or baseline comfort level  Description: Interventions:  - Encourage patient to monitor pain and request assistance  - Assess pain using appropriate pain scale  - Administer analgesics based on type and severity of pain and evaluate response  - Implement non-pharmacological measures as appropriate and evaluate response  - Consider cultural and social influences on pain and pain management  - Notify physician/advanced practitioner if interventions unsuccessful or patient reports new pain  Outcome: Progressing     Problem: INFECTION - ADULT  Goal: Absence or prevention of progression during hospitalization  Description: INTERVENTIONS:  - Assess and monitor for signs and symptoms of infection  - Monitor lab/diagnostic results  - Monitor all insertion sites, i e  indwelling lines, tubes, and drains  - Monitor endotracheal if appropriate and nasal secretions for changes in amount and color  - [place] appropriate cooling/warming therapies per order  - Administer medications as ordered  - Instruct and encourage patient and family to use good hand hygiene technique  - Identify and instruct in appropriate isolation precautions for identified infection/condition  Outcome: Progressing     Problem: SAFETY ADULT  Goal: Patient will remain free of falls  Description: INTERVENTIONS:  - Assess patient frequently for physical needs  -  Identify cognitive and physical deficits and behaviors that affect risk of falls    -  [place] fall precautions as indicated by assessment   - Educate patient/family on patient safety including physical limitations  - Instruct patient to call for assistance with activity based on assessment  - Modify environment to reduce risk of injury  - Consider OT/PT consult to assist with strengthening/mobility  Outcome: Progressing  Goal: Maintain or return to baseline ADL function  Description: INTERVENTIONS:  -  Assess patient's ability to carry out ADLs; assess patient's baseline for ADL function and identify physical deficits which impact ability to perform ADLs (bathing, care of mouth/teeth, toileting, grooming, dressing, etc )  - Assess/evaluate cause of self-care deficits   - Assess range of motion  - Assess patient's mobility; develop plan if impaired  - Assess patient's need for assistive devices and provide as appropriate  - Encourage maximum independence but intervene and supervise when necessary  - Involve family in performance of ADLs  - Assess for home care needs following discharge   - Consider OT consult to assist with ADL evaluation and planning for discharge  - Provide patient education as appropriate  Outcome: Progressing  Goal: Maintain or return mobility status to optimal level  Description: INTERVENTIONS:  - Assess patient's baseline mobility status (ambulation, transfers, stairs, etc )    - Identify cognitive and physical deficits and behaviors that affect mobility  - Identify mobility aids required to assist with transfers and/or ambulation (gait belt, sit-to-stand, lift, walker, cane, etc )  - [place] fall precautions as indicated by assessment  - Record patient progress and toleration of activity level on Mobility SBAR; progress patient to next Phase/Stage  - Instruct patient to call for assistance with activity based on assessment  - Consider rehabilitation consult to assist with strengthening/weightbearing, etc   Outcome: Progressing     Problem: DISCHARGE PLANNING  Goal: Discharge to home or other facility with appropriate resources  Description: INTERVENTIONS:  - Identify barriers to discharge w/patient and caregiver  - Arrange for needed discharge resources and transportation as appropriate  - Identify discharge learning needs (meds, wound care, etc )  - Arrange for interpretive services to assist at discharge as needed  - Refer to Case Management Department for coordinating discharge planning if the patient needs post-hospital services based on physician/advanced practitioner order or complex needs related to functional status, cognitive ability, or social support system  Outcome: Progressing     Problem: Knowledge Deficit  Goal: Patient/family/caregiver demonstrates understanding of disease process, treatment plan, medications, and discharge instructions  Description: Complete learning assessment and assess knowledge base  Interventions:  - Provide teaching at level of understanding  - Provide teaching via preferred learning methods  Outcome: Progressing     Problem: Potential for Falls  Goal: Patient will remain free of falls  Description: INTERVENTIONS:  - Assess patient frequently for physical needs  -  Identify cognitive and physical deficits and behaviors that affect risk of falls    -  [place] fall precautions as indicated by assessment   - Educate patient/family on patient safety including physical limitations  - Instruct patient to call for assistance with activity based on assessment  - Modify environment to reduce risk of injury  - Consider OT/PT consult to assist with strengthening/mobility  Outcome: Progressing     Problem: GENITOURINARY - ADULT  Goal: Maintains or returns to baseline urinary function  Description: INTERVENTIONS:  - Assess urinary function  - Encourage oral fluids to ensure adequate hydration if ordered  - Administer IV fluids as ordered to ensure adequate hydration  - Administer ordered medications as needed  - Offer frequent toileting  - Follow urinary retention protocol if ordered  Outcome: Progressing  Goal: Absence of urinary retention  Description: INTERVENTIONS:  - Assess patients ability to void and empty bladder  - Monitor I/O  - Bladder scan as needed  - Discuss with physician/AP medications to alleviate retention as needed  - Discuss catheterization for long term situations as appropriate  Outcome: Progressing  Goal: Urinary catheter remains patent  Description: INTERVENTIONS:  - Assess patency of urinary catheter  - If patient has a chronic winslow, consider changing catheter if non-functioning  - Follow guidelines for intermittent irrigation of non-functioning urinary catheter  Outcome: Progressing     Problem: Prexisting or High Potential for Compromised Skin Integrity  Goal: Skin integrity is maintained or improved  Description: INTERVENTIONS:  - Identify patients at risk for skin breakdown  - Assess and monitor skin integrity  - Assess and monitor nutrition and hydration status  - Monitor labs   - Assess for incontinence   - Turn and reposition patient  - Assist with mobility/ambulation  - Relieve pressure over bony prominences  - Avoid friction and shearing  - Provide appropriate hygiene as needed including keeping skin clean and dry  - Evaluate need for skin moisturizer/barrier cream  - Collaborate with interdisciplinary team   - Patient/family teaching  - Consider wound care consult   Outcome: Progressing

## 2020-09-16 NOTE — TELEPHONE ENCOUNTER
Post Op Note    Merly Hugo Shoulder is a 82 y o  male s/p CYSTOSCOPY EVACUATION OF CLOTS; TURP; SUPRAPUBIC CATHETER INSERTION (N/A Bladder)  performed 09/14/2020 by Dr Marino Arcos is a patient of Dr Chantel Roblero and is seen at the [facility] office  Patient currently admitted at this time  Will follow up upon discharge

## 2020-09-16 NOTE — QUICK NOTE
The patient has persistent tachycardia, is ill appearing  In addition his labs from this evening are demonstrating a metabolic acidosis with a bicarb of 19  His abdomen is distended with diffuse mid abdominal tenderness with significant guarding, concerning for peritoneal irritation   Urology has requested a general surgery consult which would be appropriate given the patients clinical condition and physical exam     We will continue with IVF administration to optimize his hemodynamics should Operative intervention be necessary

## 2020-09-16 NOTE — ASSESSMENT & PLAN NOTE
Lab Results   Component Value Date    HGBA1C 7 7 (H) 08/17/2020       Recent Labs     09/15/20  1124 09/15/20  1540 09/15/20  1730 09/15/20  2056   POCGLU 296* 215* 214* 202*       Blood Sugar Average: Last 72 hrs:  (P) 154 [id number]     · Blood glucose elevated   · Will increase SSI coverage, if blood glucose does not improve will start on insulin infusion   · Our goal will be to maintain his blood glucose between 140 and 180

## 2020-09-16 NOTE — ANESTHESIA PREPROCEDURE EVALUATION
Procedure:  LAPAROSCOPY DIAGNOSTIC, POSS EXPLORATORY LAPAROTOMY, POSS BOWEL RESECTION (N/A Abdomen)    Relevant Problems   CARDIO  EF 30%    LVH, pt can walk 1 flgiht of steps without CP - but can get angina with long distance walking   he can do full ADLs per son    (+) CAD (coronary artery disease)   (+) Essential hypertension   (+) Hyperlipidemia   (+) Myocardial infarction ([address] )      ENDO   (+) Poorly controlled type 2 diabetes mellitus (HCC)      GI/HEPATIC   (+) GERD (gastroesophageal reflux disease)      /RENAL   (+) Benign prostatic hyperplasia with lower urinary tract symptoms      HEMATOLOGY   (+) Acute blood loss anemia      PULMONARY (within normal limits)        Physical Exam    Airway    Mallampati score: I    Neck ROM: full     Dental       Cardiovascular  Rhythm: regular, Rate: normal,     Pulmonary  Pulmonary exam normal     Other Findings        Anesthesia Plan  ASA Score- 3     Anesthesia Type- general with ASA Monitors  Additional Monitors:   Airway Plan: ETT  Plan Factors-    Induction- intravenous  Postoperative Plan-     Informed Consent- Anesthetic plan and risks discussed with patient  Procedure:  LAPAROSCOPY DIAGNOSTIC, POSS EXPLORATORY LAPAROTOMY, POSS BOWEL RESECTION (N/A Abdomen)    Relevant Problems   CARDIO  EF 30%    LVH, pt can walk 1 flgiht of steps without CP - but can get angina with long distance walking   he can do full ADLs per son    (+) CAD (coronary artery disease)   (+) Essential hypertension   (+) Hyperlipidemia   (+) Myocardial infarction ([address] )      ENDO   (+) Poorly controlled type 2 diabetes mellitus (HCC)      GI/HEPATIC   (+) GERD (gastroesophageal reflux disease)      /RENAL   (+) Benign prostatic hyperplasia with lower urinary tract symptoms      HEMATOLOGY   (+) Acute blood loss anemia      PULMONARY (within normal limits)             Anesthesia Plan  ASA Score- 3     Anesthesia Type- general with ASA Monitors           Additional Monitors:   Airway Plan: ETT  Plan Factors-Exercise tolerance (METS): <4 METS  Induction- intravenous  Postoperative Plan- Plan for postoperative opioid use  Planned trial extubation    Informed Consent- Anesthetic plan and risks discussed with patient and son

## 2020-09-16 NOTE — PROGRESS NOTES
We are monitoring Hortensio tonight with serial abdominal examinations and a close watch of his vital signs  His laboratory studies on arrival to the ICU revealed a stable hematocrit of 29 and a normal lactate  In the last hour he became hypotensive into the 90s  He continues to be tachycardic into the 120 range  His temperature is 100 4°  On examination he continues to have abdominal pain with voluntary guarding  Perhaps his abdominal examination has worsened slightly within the last 2 hours  Dr Williamson from general surgery has evaluated the patient as well  Collectively, we have decided to proceed with exploratory laparotomy  We will inspect the intra-abdominal contents, evacuate any fluid, insure that there was no bowel injury at the time of suprapubic tube insertion, explore the space of Retzius, close the bladder, removed the suprapubic tube and place a drain in the preperitoneal space  Risk and benefits of the procedure were discussed and reviewed informed consent was obtained directly from the patient  I also discussed with the patient's son, Krish Martínez, and he is in agreement with our plan

## 2020-09-16 NOTE — PROGRESS NOTES
Progress Note - General Surgery   Merly Mensah 82 y o  male MRN: [id number]  Unit/Bed#: ICU 13 Encounter: [id number]    Assessment:  82 y o  male w/ hx of MI, CHF, admitted for intermittent hematuria, s/p TURP with insertion of suprapubic catheter on 9/14, with subsequent abdominal distension, tachycardia, hypotension, now s/p ex-lap, closure of cystotomy on 9/15    UOP: 1500 cc   MARYJANE output: 185 cc    Plan:  --Wean to extubate  --Monitor MARYJANE drain output  --Serial abdominal exams  --Strict I/Os, trend urine output  --Trend endpoints, resuscitation per ICU team     Subjective/Objective     Subjective: This morning, pt remains intubated, sedated  BPs remained stable post-op, with continued adequate urine output  Objective:     Blood pressure (!) 102/45, pulse (!) 106, temperature 99 2 °F (37 3 °C), temperature source Temporal, resp  rate 18, height 5' 7" (1 702 m), weight 98 2 kg (216 lb 7 9 oz), SpO2 98 %  ,Body mass index is 33 91 kg/m²        Intake/Output Summary (Last 24 hours) at 9/16/2020 0527  Last data filed at 9/16/2020 0400  Gross per 24 hour   Intake 3792 32 ml   Output 5185 ml   Net -1392 68 ml       Invasive Devices     Peripheral Intravenous Line            Peripheral IV 09/15/20 Distal;Left;Upper;Ventral (anterior) Arm less than 1 day    Peripheral IV 09/15/20 Proximal;Right;Ventral (anterior) Forearm less than 1 day          Drain            Closed/Suction Drain Right RLQ Bulb 10 Fr  less than 1 day    Urethral Catheter Three way less than 1 day          Airway            ETT  Cuffed;Oral;Inflated 8 mm less than 1 day                Physical Exam:    GEN: NAD  HEENT: MMM  CV: RRR  Lung: intubated  Ab: Soft, NT, distended, midline incision dressing c/d/i, MARYJANE drain with serosanguinous output  Extrem: No CCE    Lab, Imaging and other studies:  CBC:   Lab Results   Component Value Date    WBC 18 13 (H) 09/16/2020    HGB 8 1 (L) 09/16/2020    HCT 24 9 (L) 09/16/2020    MCV 95 09/16/2020    PLT 151 09/16/2020    MCH 31 0 09/16/2020    MCHC 32 5 09/16/2020    RDW 14 6 09/16/2020    MPV 9 4 09/16/2020    NRBC 0 09/16/2020   , CMP:   Lab Results   Component Value Date    SODIUM 137 09/16/2020    K 3 1 (L) 09/16/2020     (H) 09/16/2020    CO2 22 09/16/2020    BUN 10 09/16/2020    CREATININE 0 96 09/16/2020    CALCIUM 6 8 (L) 09/16/2020    AST 12 09/15/2020    ALT 16 09/15/2020    ALKPHOS 53 09/15/2020    EGFR 73 09/16/2020   , Coagulation: No results found for: PT, INR, APTT  VTE Pharmacologic Prophylaxis: Heparin  VTE Mechanical Prophylaxis: sequential compression device

## 2020-09-17 PROBLEM — N99.72 BLADDER PERFORATION, INTRAOPERATIVE: Status: RESOLVED | Noted: 2020-09-15 | Resolved: 2020-09-17

## 2020-09-17 LAB
ANION GAP SERPL CALCULATED.3IONS-SCNC: 6 MMOL/L (ref 4–13)
BACTERIA UR CULT: ABNORMAL
BACTERIA UR CULT: ABNORMAL
BUN SERPL-MCNC: 11 MG/DL (ref 5–25)
CALCIUM SERPL-MCNC: 7.5 MG/DL (ref 8.3–10.1)
CHLORIDE SERPL-SCNC: 110 MMOL/L (ref 100–108)
CO2 SERPL-SCNC: 23 MMOL/L (ref 21–32)
CREAT SERPL-MCNC: 0.79 MG/DL (ref 0.6–1.3)
ERYTHROCYTE [DISTWIDTH] IN BLOOD BY AUTOMATED COUNT: 15.1 % (ref 11.6–15.1)
GFR SERPL CREATININE-BSD FRML MDRD: 83 ML/MIN/1.73SQ M
GLUCOSE SERPL-MCNC: 107 MG/DL (ref 65–140)
GLUCOSE SERPL-MCNC: 109 MG/DL (ref 65–140)
GLUCOSE SERPL-MCNC: 111 MG/DL (ref 65–140)
GLUCOSE SERPL-MCNC: 244 MG/DL (ref 65–140)
GLUCOSE SERPL-MCNC: 268 MG/DL (ref 65–140)
HCT VFR BLD AUTO: 24.8 % (ref 36.5–49.3)
HGB BLD-MCNC: 7.9 G/DL (ref 12–17)
MAGNESIUM SERPL-MCNC: 2.9 MG/DL (ref 1.6–2.6)
MCH RBC QN AUTO: 30.6 PG (ref 26.8–34.3)
MCHC RBC AUTO-ENTMCNC: 31.9 G/DL (ref 31.4–37.4)
MCV RBC AUTO: 96 FL (ref 82–98)
PLATELET # BLD AUTO: 173 THOUSANDS/UL (ref 149–390)
PMV BLD AUTO: 10.4 FL (ref 8.9–12.7)
POTASSIUM SERPL-SCNC: 3.6 MMOL/L (ref 3.5–5.3)
PROCALCITONIN SERPL-MCNC: 3.12 NG/ML
RBC # BLD AUTO: 2.58 MILLION/UL (ref 3.88–5.62)
SODIUM SERPL-SCNC: 139 MMOL/L (ref 136–145)
WBC # BLD AUTO: 15.59 THOUSAND/UL (ref 4.31–10.16)

## 2020-09-17 PROCEDURE — 84145 PROCALCITONIN (PCT): CPT | Performed by: INTERNAL MEDICINE

## 2020-09-17 PROCEDURE — 94660 CPAP INITIATION&MGMT: CPT

## 2020-09-17 PROCEDURE — 80048 BASIC METABOLIC PNL TOTAL CA: CPT | Performed by: INTERNAL MEDICINE

## 2020-09-17 PROCEDURE — 99024 POSTOP FOLLOW-UP VISIT: CPT | Performed by: UROLOGY

## 2020-09-17 PROCEDURE — 85027 COMPLETE CBC AUTOMATED: CPT | Performed by: INTERNAL MEDICINE

## 2020-09-17 PROCEDURE — 82948 REAGENT STRIP/BLOOD GLUCOSE: CPT

## 2020-09-17 PROCEDURE — 99232 SBSQ HOSP IP/OBS MODERATE 35: CPT | Performed by: INTERNAL MEDICINE

## 2020-09-17 PROCEDURE — 83735 ASSAY OF MAGNESIUM: CPT | Performed by: INTERNAL MEDICINE

## 2020-09-17 RX ORDER — FUROSEMIDE 10 MG/ML
20 INJECTION INTRAMUSCULAR; INTRAVENOUS EVERY 12 HOURS
Status: DISCONTINUED | OUTPATIENT
Start: 2020-09-17 | End: 2020-09-19 | Stop reason: HOSPADM

## 2020-09-17 RX ORDER — FUROSEMIDE 20 MG/1
20 TABLET ORAL
Status: DISCONTINUED | OUTPATIENT
Start: 2020-09-17 | End: 2020-09-17

## 2020-09-17 RX ORDER — POTASSIUM CHLORIDE 20 MEQ/1
40 TABLET, EXTENDED RELEASE ORAL ONCE
Status: COMPLETED | OUTPATIENT
Start: 2020-09-17 | End: 2020-09-17

## 2020-09-17 RX ORDER — CALCIUM CARBONATE 200(500)MG
500 TABLET,CHEWABLE ORAL DAILY PRN
Status: DISCONTINUED | OUTPATIENT
Start: 2020-09-17 | End: 2020-09-19 | Stop reason: HOSPADM

## 2020-09-17 RX ORDER — FUROSEMIDE 10 MG/ML
20 INJECTION INTRAMUSCULAR; INTRAVENOUS ONCE
Status: COMPLETED | OUTPATIENT
Start: 2020-09-17 | End: 2020-09-17

## 2020-09-17 RX ADMIN — FINASTERIDE 5 MG: 5 TABLET, FILM COATED ORAL at 08:23

## 2020-09-17 RX ADMIN — ENOXAPARIN SODIUM 40 MG: 40 INJECTION SUBCUTANEOUS at 08:25

## 2020-09-17 RX ADMIN — ATROPA BELLADONNA AND OPIUM 1 SUPPOSITORY: 16.2; 3 SUPPOSITORY RECTAL at 20:05

## 2020-09-17 RX ADMIN — DOCUSATE SODIUM 100 MG: 100 CAPSULE, LIQUID FILLED ORAL at 08:22

## 2020-09-17 RX ADMIN — GABAPENTIN 300 MG: 300 CAPSULE ORAL at 17:40

## 2020-09-17 RX ADMIN — OXYCODONE HYDROCHLORIDE AND ACETAMINOPHEN 1 TABLET: 5; 325 TABLET ORAL at 09:02

## 2020-09-17 RX ADMIN — ISOSORBIDE MONONITRATE 30 MG: 30 TABLET, EXTENDED RELEASE ORAL at 09:04

## 2020-09-17 RX ADMIN — FUROSEMIDE 20 MG: 20 TABLET ORAL at 08:23

## 2020-09-17 RX ADMIN — FUROSEMIDE 20 MG: 10 INJECTION, SOLUTION INTRAMUSCULAR; INTRAVENOUS at 21:28

## 2020-09-17 RX ADMIN — FAMOTIDINE 20 MG: 20 TABLET ORAL at 08:22

## 2020-09-17 RX ADMIN — CEFEPIME HYDROCHLORIDE 2000 MG: 2 INJECTION, POWDER, FOR SOLUTION INTRAVENOUS at 04:48

## 2020-09-17 RX ADMIN — INSULIN GLARGINE 20 UNITS: 100 INJECTION, SOLUTION SUBCUTANEOUS at 21:20

## 2020-09-17 RX ADMIN — POTASSIUM CHLORIDE 40 MEQ: 1500 TABLET, EXTENDED RELEASE ORAL at 05:29

## 2020-09-17 RX ADMIN — ACETAMINOPHEN 650 MG: 325 TABLET ORAL at 19:54

## 2020-09-17 RX ADMIN — PRAVASTATIN SODIUM 40 MG: 40 TABLET ORAL at 16:24

## 2020-09-17 RX ADMIN — FUROSEMIDE 20 MG: 10 INJECTION, SOLUTION INTRAMUSCULAR; INTRAVENOUS at 11:28

## 2020-09-17 RX ADMIN — TAMSULOSIN HYDROCHLORIDE 0.8 MG: 0.4 CAPSULE ORAL at 16:25

## 2020-09-17 RX ADMIN — GABAPENTIN 300 MG: 300 CAPSULE ORAL at 08:23

## 2020-09-17 RX ADMIN — SODIUM CHLORIDE 100 ML/HR: 0.9 INJECTION, SOLUTION INTRAVENOUS at 02:56

## 2020-09-17 RX ADMIN — DOCUSATE SODIUM 100 MG: 100 CAPSULE, LIQUID FILLED ORAL at 20:03

## 2020-09-17 RX ADMIN — OXYBUTYNIN CHLORIDE 5 MG: 5 TABLET, EXTENDED RELEASE ORAL at 08:23

## 2020-09-17 RX ADMIN — CALCIUM CARBONATE (ANTACID) CHEW TAB 500 MG 500 MG: 500 CHEW TAB at 20:01

## 2020-09-17 RX ADMIN — INSULIN LISPRO 3 UNITS: 100 INJECTION, SOLUTION INTRAVENOUS; SUBCUTANEOUS at 17:40

## 2020-09-17 RX ADMIN — ACETAMINOPHEN 650 MG: 325 TABLET ORAL at 13:48

## 2020-09-17 RX ADMIN — CEFEPIME HYDROCHLORIDE 2000 MG: 2 INJECTION, POWDER, FOR SOLUTION INTRAVENOUS at 17:44

## 2020-09-17 NOTE — PROGRESS NOTES
Postoperative day 2  Status post exploratory laparotomy with closure of small cystotomy from suprapubic tube  Patient doing very well overall  He was transferred from the ICU to the floor  Vital signs remained stable  Urine output is excellent  MARYJANE drainage is minimal     On examination he is in no acute distress  His incision is clean dry and intact with staples  A MARYJANE drain is in the right lower quadrant  Estrella catheter is in place and is draining clear yellow urine  There is significant penile and scrotal edema      Impression:  Status post TURP, status post extravasation of bladder your again into the preperitoneal space, status post exploratory laparotomy    Plan:  Regular diet  Out of bed and ambulate  Estrella catheter to gravity for discharge  Void trial when edema resolves  Plan to remove MARYJANE drain on the morning of September 18, 2020  Discharge planning possibly tomorrow

## 2020-09-17 NOTE — ASSESSMENT & PLAN NOTE
Secondary to gross hematuria  Continue monitoring for the time being  Currently asymptomatic  No indication for transfusion at this time unless it continues to drop further  Hematuria resolved post surgery 9/17- hb was 7 9 repeat yvette     Recent Labs     09/15/20  0446 09/15/20  1832 09/15/20  1858 09/16/20  0455 09/17/20  0350   HGB 9 4* 9 4* 9 2* 8 1* 7 9*   MCV 95 97 97 95 96   RDW 14 6 14 9 14 9 14 6 15 1     ? s/p 4U PRBC as of now when in icu   ? Baseline: 11-12  ? 8 today AM, satble  ?  Monitor Hb, if drops below 8 consider additional PRBC due to cardiomyopathy

## 2020-09-17 NOTE — PLAN OF CARE
Problem: PAIN - ADULT  Goal: Verbalizes/displays adequate comfort level or baseline comfort level  Description: Interventions:  - Encourage patient to monitor pain and request assistance  - Assess pain using appropriate pain scale  - Administer analgesics based on type and severity of pain and evaluate response  - Implement non-pharmacological measures as appropriate and evaluate response  - Consider cultural and social influences on pain and pain management  - Notify physician/advanced practitioner if interventions unsuccessful or patient reports new pain  Outcome: Progressing     Problem: INFECTION - ADULT  Goal: Absence or prevention of progression during hospitalization  Description: INTERVENTIONS:  - Assess and monitor for signs and symptoms of infection  - Monitor lab/diagnostic results  - Monitor all insertion sites, i e  indwelling lines, tubes, and drains  - Monitor endotracheal if appropriate and nasal secretions for changes in amount and color  - [place] appropriate cooling/warming therapies per order  - Administer medications as ordered  - Instruct and encourage patient and family to use good hand hygiene technique  - Identify and instruct in appropriate isolation precautions for identified infection/condition  Outcome: Progressing     Problem: SAFETY ADULT  Goal: Patient will remain free of falls  Description: INTERVENTIONS:  - Assess patient frequently for physical needs  -  Identify cognitive and physical deficits and behaviors that affect risk of falls    -  [place] fall precautions as indicated by assessment   - Educate patient/family on patient safety including physical limitations  - Instruct patient to call for assistance with activity based on assessment  - Modify environment to reduce risk of injury  - Consider OT/PT consult to assist with strengthening/mobility  Outcome: Progressing  Goal: Maintain or return to baseline ADL function  Description: INTERVENTIONS:  -  Assess patient's ability to carry out ADLs; assess patient's baseline for ADL function and identify physical deficits which impact ability to perform ADLs (bathing, care of mouth/teeth, toileting, grooming, dressing, etc )  - Assess/evaluate cause of self-care deficits   - Assess range of motion  - Assess patient's mobility; develop plan if impaired  - Assess patient's need for assistive devices and provide as appropriate  - Encourage maximum independence but intervene and supervise when necessary  - Involve family in performance of ADLs  - Assess for home care needs following discharge   - Consider OT consult to assist with ADL evaluation and planning for discharge  - Provide patient education as appropriate  Outcome: Progressing  Goal: Maintain or return mobility status to optimal level  Description: INTERVENTIONS:  - Assess patient's baseline mobility status (ambulation, transfers, stairs, etc )    - Identify cognitive and physical deficits and behaviors that affect mobility  - Identify mobility aids required to assist with transfers and/or ambulation (gait belt, sit-to-stand, lift, walker, cane, etc )  - [place] fall precautions as indicated by assessment  - Record patient progress and toleration of activity level on Mobility SBAR; progress patient to next Phase/Stage  - Instruct patient to call for assistance with activity based on assessment  - Consider rehabilitation consult to assist with strengthening/weightbearing, etc   Outcome: Progressing     Problem: DISCHARGE PLANNING  Goal: Discharge to home or other facility with appropriate resources  Description: INTERVENTIONS:  - Identify barriers to discharge w/patient and caregiver  - Arrange for needed discharge resources and transportation as appropriate  - Identify discharge learning needs (meds, wound care, etc )  - Arrange for interpretive services to assist at discharge as needed  - Refer to Case Management Department for coordinating discharge planning if the patient needs post-hospital services based on physician/advanced practitioner order or complex needs related to functional status, cognitive ability, or social support system  Outcome: Progressing     Problem: Knowledge Deficit  Goal: Patient/family/caregiver demonstrates understanding of disease process, treatment plan, medications, and discharge instructions  Description: Complete learning assessment and assess knowledge base  Interventions:  - Provide teaching at level of understanding  - Provide teaching via preferred learning methods  Outcome: Progressing     Problem: Potential for Falls  Goal: Patient will remain free of falls  Description: INTERVENTIONS:  - Assess patient frequently for physical needs  -  Identify cognitive and physical deficits and behaviors that affect risk of falls    -  [place] fall precautions as indicated by assessment   - Educate patient/family on patient safety including physical limitations  - Instruct patient to call for assistance with activity based on assessment  - Modify environment to reduce risk of injury  - Consider OT/PT consult to assist with strengthening/mobility  Outcome: Progressing     Problem: GENITOURINARY - ADULT  Goal: Maintains or returns to baseline urinary function  Description: INTERVENTIONS:  - Assess urinary function  - Encourage oral fluids to ensure adequate hydration if ordered  - Administer IV fluids as ordered to ensure adequate hydration  - Administer ordered medications as needed  - Offer frequent toileting  - Follow urinary retention protocol if ordered  Outcome: Progressing  Goal: Absence of urinary retention  Description: INTERVENTIONS:  - Assess patients ability to void and empty bladder  - Monitor I/O  - Bladder scan as needed  - Discuss with physician/AP medications to alleviate retention as needed  - Discuss catheterization for long term situations as appropriate  Outcome: Progressing  Goal: Urinary catheter remains patent  Description: INTERVENTIONS:  - Assess patency of urinary catheter  - If patient has a chronic winslow, consider changing catheter if non-functioning  - Follow guidelines for intermittent irrigation of non-functioning urinary catheter  Outcome: Progressing     Problem: Prexisting or High Potential for Compromised Skin Integrity  Goal: Skin integrity is maintained or improved  Description: INTERVENTIONS:  - Identify patients at risk for skin breakdown  - Assess and monitor skin integrity  - Assess and monitor nutrition and hydration status  - Monitor labs   - Assess for incontinence   - Turn and reposition patient  - Assist with mobility/ambulation  - Relieve pressure over bony prominences  - Avoid friction and shearing  - Provide appropriate hygiene as needed including keeping skin clean and dry  - Evaluate need for skin moisturizer/barrier cream  - Collaborate with interdisciplinary team   - Patient/family teaching  - Consider wound care consult   Outcome: Progressing     Problem: Nutrition/Hydration-ADULT  Goal: Nutrient/Hydration intake appropriate for improving, restoring or maintaining nutritional needs  Description: Monitor and assess patient's nutrition/hydration status for malnutrition  Collaborate with interdisciplinary team and initiate plan and interventions as ordered  Monitor patient's weight and dietary intake as ordered or per policy  Utilize nutrition screening tool and intervene as necessary  Determine patient's food preferences and provide high-protein, high-caloric foods as appropriate       INTERVENTIONS:  - Monitor oral intake, urinary output, labs, and treatment plans  - Assess nutrition and hydration status and recommend course of action  - Evaluate amount of meals eaten  - Assist patient with eating if necessary   - Allow adequate time for meals  - Recommend/ encourage appropriate diets, oral nutritional supplements, and vitamin/mineral supplements  - Order, calculate, and assess calorie counts as needed  - Recommend, monitor, and adjust tube feedings and TPN/PPN based on assessed needs  - Assess need for intravenous fluids  - Provide specific nutrition/hydration education as appropriate  - Include patient/family/caregiver in decisions related to nutrition  Outcome: Progressing

## 2020-09-17 NOTE — ASSESSMENT & PLAN NOTE
- Continue Flomax and Finasteride- bph with obstruction   - previously s/p Status post TURP, status post extravasation of bladder your again into the preperitoneal space, status post exploratory laparotomy and now POD #3- Status post exploratory laparotomy with closure of small cystotomy from suprapubic tube    · Estrella draining well, the MARYJANE drain there- to be removed by Urology  · Pain is controlled  · No hematuria  · Continue flomax

## 2020-09-17 NOTE — PLAN OF CARE
Problem: PAIN - ADULT  Goal: Verbalizes/displays adequate comfort level or baseline comfort level  Description: Interventions:  - Encourage patient to monitor pain and request assistance  - Assess pain using appropriate pain scale  - Administer analgesics based on type and severity of pain and evaluate response  - Implement non-pharmacological measures as appropriate and evaluate response  - Consider cultural and social influences on pain and pain management  - Notify physician/advanced practitioner if interventions unsuccessful or patient reports new pain  Outcome: Progressing     Problem: INFECTION - ADULT  Goal: Absence or prevention of progression during hospitalization  Description: INTERVENTIONS:  - Assess and monitor for signs and symptoms of infection  - Monitor lab/diagnostic results  - Monitor all insertion sites, i e  indwelling lines, tubes, and drains  - Monitor endotracheal if appropriate and nasal secretions for changes in amount and color  - [place] appropriate cooling/warming therapies per order  - Administer medications as ordered  - Instruct and encourage patient and family to use good hand hygiene technique  - Identify and instruct in appropriate isolation precautions for identified infection/condition  Outcome: Progressing     Problem: SAFETY ADULT  Goal: Patient will remain free of falls  Description: INTERVENTIONS:  - Assess patient frequently for physical needs  -  Identify cognitive and physical deficits and behaviors that affect risk of falls    -  [place] fall precautions as indicated by assessment   - Educate patient/family on patient safety including physical limitations  - Instruct patient to call for assistance with activity based on assessment  - Modify environment to reduce risk of injury  - Consider OT/PT consult to assist with strengthening/mobility  Outcome: Progressing  Goal: Maintain or return to baseline ADL function  Description: INTERVENTIONS:  -  Assess patient's ability to carry out ADLs; assess patient's baseline for ADL function and identify physical deficits which impact ability to perform ADLs (bathing, care of mouth/teeth, toileting, grooming, dressing, etc )  - Assess/evaluate cause of self-care deficits   - Assess range of motion  - Assess patient's mobility; develop plan if impaired  - Assess patient's need for assistive devices and provide as appropriate  - Encourage maximum independence but intervene and supervise when necessary  - Involve family in performance of ADLs  - Assess for home care needs following discharge   - Consider OT consult to assist with ADL evaluation and planning for discharge  - Provide patient education as appropriate  Outcome: Progressing  Goal: Maintain or return mobility status to optimal level  Description: INTERVENTIONS:  - Assess patient's baseline mobility status (ambulation, transfers, stairs, etc )    - Identify cognitive and physical deficits and behaviors that affect mobility  - Identify mobility aids required to assist with transfers and/or ambulation (gait belt, sit-to-stand, lift, walker, cane, etc )  - [place] fall precautions as indicated by assessment  - Record patient progress and toleration of activity level on Mobility SBAR; progress patient to next Phase/Stage  - Instruct patient to call for assistance with activity based on assessment  - Consider rehabilitation consult to assist with strengthening/weightbearing, etc   Outcome: Progressing     Problem: DISCHARGE PLANNING  Goal: Discharge to home or other facility with appropriate resources  Description: INTERVENTIONS:  - Identify barriers to discharge w/patient and caregiver  - Arrange for needed discharge resources and transportation as appropriate  - Identify discharge learning needs (meds, wound care, etc )  - Arrange for interpretive services to assist at discharge as needed  - Refer to Case Management Department for coordinating discharge planning if the patient needs post-hospital services based on physician/advanced practitioner order or complex needs related to functional status, cognitive ability, or social support system  Outcome: Progressing     Problem: Knowledge Deficit  Goal: Patient/family/caregiver demonstrates understanding of disease process, treatment plan, medications, and discharge instructions  Description: Complete learning assessment and assess knowledge base  Interventions:  - Provide teaching at level of understanding  - Provide teaching via preferred learning methods  Outcome: Progressing     Problem: Potential for Falls  Goal: Patient will remain free of falls  Description: INTERVENTIONS:  - Assess patient frequently for physical needs  -  Identify cognitive and physical deficits and behaviors that affect risk of falls    -  [place] fall precautions as indicated by assessment   - Educate patient/family on patient safety including physical limitations  - Instruct patient to call for assistance with activity based on assessment  - Modify environment to reduce risk of injury  - Consider OT/PT consult to assist with strengthening/mobility  Outcome: Progressing     Problem: GENITOURINARY - ADULT  Goal: Maintains or returns to baseline urinary function  Description: INTERVENTIONS:  - Assess urinary function  - Encourage oral fluids to ensure adequate hydration if ordered  - Administer IV fluids as ordered to ensure adequate hydration  - Administer ordered medications as needed  - Offer frequent toileting  - Follow urinary retention protocol if ordered  Outcome: Progressing  Goal: Absence of urinary retention  Description: INTERVENTIONS:  - Assess patients ability to void and empty bladder  - Monitor I/O  - Bladder scan as needed  - Discuss with physician/AP medications to alleviate retention as needed  - Discuss catheterization for long term situations as appropriate  Outcome: Progressing  Goal: Urinary catheter remains patent  Description: INTERVENTIONS:  - Assess patency of urinary catheter  - If patient has a chronic winslow, consider changing catheter if non-functioning  - Follow guidelines for intermittent irrigation of non-functioning urinary catheter  Outcome: Progressing     Problem: Prexisting or High Potential for Compromised Skin Integrity  Goal: Skin integrity is maintained or improved  Description: INTERVENTIONS:  - Identify patients at risk for skin breakdown  - Assess and monitor skin integrity  - Assess and monitor nutrition and hydration status  - Monitor labs   - Assess for incontinence   - Turn and reposition patient  - Assist with mobility/ambulation  - Relieve pressure over bony prominences  - Avoid friction and shearing  - Provide appropriate hygiene as needed including keeping skin clean and dry  - Evaluate need for skin moisturizer/barrier cream  - Collaborate with interdisciplinary team   - Patient/family teaching  - Consider wound care consult   Outcome: Progressing     Problem: Nutrition/Hydration-ADULT  Goal: Nutrient/Hydration intake appropriate for improving, restoring or maintaining nutritional needs  Description: Monitor and assess patient's nutrition/hydration status for malnutrition  Collaborate with interdisciplinary team and initiate plan and interventions as ordered  Monitor patient's weight and dietary intake as ordered or per policy  Utilize nutrition screening tool and intervene as necessary  Determine patient's food preferences and provide high-protein, high-caloric foods as appropriate       INTERVENTIONS:  - Monitor oral intake, urinary output, labs, and treatment plans  - Assess nutrition and hydration status and recommend course of action  - Evaluate amount of meals eaten  - Assist patient with eating if necessary   - Allow adequate time for meals  - Recommend/ encourage appropriate diets, oral nutritional supplements, and vitamin/mineral supplements  - Order, calculate, and assess calorie counts as needed  - Recommend, monitor, and adjust tube feedings and TPN/PPN based on assessed needs  - Assess need for intravenous fluids  - Provide specific nutrition/hydration education as appropriate  - Include patient/family/caregiver in decisions related to nutrition  Outcome: Progressing

## 2020-09-17 NOTE — PROGRESS NOTES
Received responsibility of patient care 9/17/2020 1330 from ICU  Patient in no apparent distress and vital signs stable  Per ICU RN, patient lower pubic incision slightly reddened at lower portion; upon assessment, most of incision covered by dressing and lower portion slightly reddened  Kendal Lance with urology messaged to make her aware and see if urology could assess       Carlos Maher RN 9/17/2020 2:16 PM

## 2020-09-17 NOTE — ASSESSMENT & PLAN NOTE
Lab Results   Component Value Date    HGBA1C 7 7 (H) 08/17/2020       Recent Labs     09/16/20 2029 09/16/20  2339 09/17/20  0515 09/17/20  0658   POCGLU 115 110 111 109       Blood Sugar Average: Last 72 hrs:  (P) 182 625   IASS,     ? On metformin, lantus 20, and januvia at home, A1c 7 7 in 08/2020  ?  Continue Lantus 20 at night his fasting is controlled but his pre meals   uncontrolled will start him on Humalog b i d  4 units with lunch and dinner

## 2020-09-17 NOTE — ASSESSMENT & PLAN NOTE
-patient has a history of essential hypertension  Blood pressure is actually controlled his only on isosorbide , his arb coreg on hold due to previous hypotension - recheck throughout stay if able to restart 1 x 1 avoid hypertension

## 2020-09-17 NOTE — ASSESSMENT & PLAN NOTE
? In light of bladder perforation - was extubated in icu - now on RA satting well   ?  No baseline needs  · Seen by pulmonary today

## 2020-09-17 NOTE — ASSESSMENT & PLAN NOTE
Wt Readings from Last 3 Encounters:   09/17/20 108 kg (237 lb 14 oz)   09/07/20 98 kg (216 lb)   09/04/20 99 3 kg (219 lb)     -patient has a history of chronic combined systolic and diastolic congestive heart failure  -most recent echocardiogram 9/8 with left ventricular ejection fraction 30% and grade 1 diastolic dysfunction  HOLD Coreg, with blood pressure hold parameters, continue isosorbide, ARB- on hold   ? To be assessed outpatient for ICD, life vest discussed with patient - if wishes to get one, patient will reach out to team- patient expressed today 9/18 he wishes for life vest contacted cardiology PA will ask rep to come in either today will come in or yvette   · Patient has gained from September 12 he was 209 lb to now he is 243 lb he has been started on Lasix 20 mg IV b i d  On 09/17 he put out 4 L but his weight is questionable accuracy I did ask nursing to weigh him standing and continue Lasix at same dose- as good output -   · He does have some leg edema and some crackles  · Start potassium supplementation KCL 20 mEq p o   Daily  · Renal function tomorrow with potassium

## 2020-09-17 NOTE — PLAN OF CARE
Problem: PAIN - ADULT  Goal: Verbalizes/displays adequate comfort level or baseline comfort level  Description: Interventions:  - Encourage patient to monitor pain and request assistance  - Assess pain using appropriate pain scale  - Administer analgesics based on type and severity of pain and evaluate response  - Implement non-pharmacological measures as appropriate and evaluate response  - Consider cultural and social influences on pain and pain management  - Notify physician/advanced practitioner if interventions unsuccessful or patient reports new pain  Outcome: Progressing     Problem: INFECTION - ADULT  Goal: Absence or prevention of progression during hospitalization  Description: INTERVENTIONS:  - Assess and monitor for signs and symptoms of infection  - Monitor lab/diagnostic results  - Monitor all insertion sites, i e  indwelling lines, tubes, and drains  - Monitor endotracheal if appropriate and nasal secretions for changes in amount and color  - [place] appropriate cooling/warming therapies per order  - Administer medications as ordered  - Instruct and encourage patient and family to use good hand hygiene technique  - Identify and instruct in appropriate isolation precautions for identified infection/condition  Outcome: Progressing     Problem: SAFETY ADULT  Goal: Patient will remain free of falls  Description: INTERVENTIONS:  - Assess patient frequently for physical needs  -  Identify cognitive and physical deficits and behaviors that affect risk of falls    -  [place] fall precautions as indicated by assessment   - Educate patient/family on patient safety including physical limitations  - Instruct patient to call for assistance with activity based on assessment  - Modify environment to reduce risk of injury  - Consider OT/PT consult to assist with strengthening/mobility  Outcome: Progressing  Goal: Maintain or return to baseline ADL function  Description: INTERVENTIONS:  -  Assess patient's ability to carry out ADLs; assess patient's baseline for ADL function and identify physical deficits which impact ability to perform ADLs (bathing, care of mouth/teeth, toileting, grooming, dressing, etc )  - Assess/evaluate cause of self-care deficits   - Assess range of motion  - Assess patient's mobility; develop plan if impaired  - Assess patient's need for assistive devices and provide as appropriate  - Encourage maximum independence but intervene and supervise when necessary  - Involve family in performance of ADLs  - Assess for home care needs following discharge   - Consider OT consult to assist with ADL evaluation and planning for discharge  - Provide patient education as appropriate  Outcome: Progressing  Goal: Maintain or return mobility status to optimal level  Description: INTERVENTIONS:  - Assess patient's baseline mobility status (ambulation, transfers, stairs, etc )    - Identify cognitive and physical deficits and behaviors that affect mobility  - Identify mobility aids required to assist with transfers and/or ambulation (gait belt, sit-to-stand, lift, walker, cane, etc )  - [place] fall precautions as indicated by assessment  - Record patient progress and toleration of activity level on Mobility SBAR; progress patient to next Phase/Stage  - Instruct patient to call for assistance with activity based on assessment  - Consider rehabilitation consult to assist with strengthening/weightbearing, etc   Outcome: Progressing     Problem: DISCHARGE PLANNING  Goal: Discharge to home or other facility with appropriate resources  Description: INTERVENTIONS:  - Identify barriers to discharge w/patient and caregiver  - Arrange for needed discharge resources and transportation as appropriate  - Identify discharge learning needs (meds, wound care, etc )  - Arrange for interpretive services to assist at discharge as needed  - Refer to Case Management Department for coordinating discharge planning if the patient needs post-hospital services based on physician/advanced practitioner order or complex needs related to functional status, cognitive ability, or social support system  Outcome: Progressing     Problem: Knowledge Deficit  Goal: Patient/family/caregiver demonstrates understanding of disease process, treatment plan, medications, and discharge instructions  Description: Complete learning assessment and assess knowledge base  Interventions:  - Provide teaching at level of understanding  - Provide teaching via preferred learning methods  Outcome: Progressing     Problem: Potential for Falls  Goal: Patient will remain free of falls  Description: INTERVENTIONS:  - Assess patient frequently for physical needs  -  Identify cognitive and physical deficits and behaviors that affect risk of falls    -  [place] fall precautions as indicated by assessment   - Educate patient/family on patient safety including physical limitations  - Instruct patient to call for assistance with activity based on assessment  - Modify environment to reduce risk of injury  - Consider OT/PT consult to assist with strengthening/mobility  Outcome: Progressing     Problem: GENITOURINARY - ADULT  Goal: Maintains or returns to baseline urinary function  Description: INTERVENTIONS:  - Assess urinary function  - Encourage oral fluids to ensure adequate hydration if ordered  - Administer IV fluids as ordered to ensure adequate hydration  - Administer ordered medications as needed  - Offer frequent toileting  - Follow urinary retention protocol if ordered  Outcome: Progressing  Goal: Absence of urinary retention  Description: INTERVENTIONS:  - Assess patients ability to void and empty bladder  - Monitor I/O  - Bladder scan as needed  - Discuss with physician/AP medications to alleviate retention as needed  - Discuss catheterization for long term situations as appropriate  Outcome: Progressing  Goal: Urinary catheter remains patent  Description: INTERVENTIONS:  - Assess patency of urinary catheter  - If patient has a chronic winslow, consider changing catheter if non-functioning  - Follow guidelines for intermittent irrigation of non-functioning urinary catheter  Outcome: Progressing     Problem: Prexisting or High Potential for Compromised Skin Integrity  Goal: Skin integrity is maintained or improved  Description: INTERVENTIONS:  - Identify patients at risk for skin breakdown  - Assess and monitor skin integrity  - Assess and monitor nutrition and hydration status  - Monitor labs   - Assess for incontinence   - Turn and reposition patient  - Assist with mobility/ambulation  - Relieve pressure over bony prominences  - Avoid friction and shearing  - Provide appropriate hygiene as needed including keeping skin clean and dry  - Evaluate need for skin moisturizer/barrier cream  - Collaborate with interdisciplinary team   - Patient/family teaching  - Consider wound care consult   Outcome: Progressing     Problem: Nutrition/Hydration-ADULT  Goal: Nutrient/Hydration intake appropriate for improving, restoring or maintaining nutritional needs  Description: Monitor and assess patient's nutrition/hydration status for malnutrition  Collaborate with interdisciplinary team and initiate plan and interventions as ordered  Monitor patient's weight and dietary intake as ordered or per policy  Utilize nutrition screening tool and intervene as necessary  Determine patient's food preferences and provide high-protein, high-caloric foods as appropriate       INTERVENTIONS:  - Monitor oral intake, urinary output, labs, and treatment plans  - Assess nutrition and hydration status and recommend course of action  - Evaluate amount of meals eaten  - Assist patient with eating if necessary   - Allow adequate time for meals  - Recommend/ encourage appropriate diets, oral nutritional supplements, and vitamin/mineral supplements  - Order, calculate, and assess calorie counts as needed  - Recommend, monitor, and adjust tube feedings and TPN/PPN based on assessed needs  - Assess need for intravenous fluids  - Provide specific nutrition/hydration education as appropriate  - Include patient/family/caregiver in decisions related to nutrition  Outcome: Progressing

## 2020-09-17 NOTE — PROGRESS NOTES
Cystoscopy    Date/Time: 9/17/2020 6:24 AM  Performed by: Horace Mills MD  Authorized by: Horace Mills MD     Procedure details: cystoscopy        Written Consent Obtained      Indications for Procedure:   urinary retention     Physical Exam     Constitutional   General appearance: No acute distress, well appearing and well nourished  Pulmonary   Respiratory effort: No increased work of breathing or signs of respiratory distress  Cardiovascular   Examination of extremities for edema and/or varicosities: Normal     Abdomen   Abdomen: Non-tender, no masses  Liver and spleen: No hepatomegaly or splenomegaly  Genitourinary   Normal phallus and meatus   Musculoskeletal   Gait and station: Normal     Skin   Skin and subcutaneous tissue: Normal without rashes or lesions  Lymphatic   Palpation of lymph nodes in groin: No lymphadenopathy  Additional Exam: Neuro exam nonfocal   The flexible cystoscope was introduced into the urethra and advanced into the bladder  URETHRA:  Normal,  without strictures or lesions  PROSTATE:  Very large, long prostate with severe trilobar obstruction  TRIGONE & UOs:   Normal anatomy with efflux of clear urine  No mucosal lesions or subtrigonal masses  BLADDER MUCOSA:  Normal, without neoplasms or other lesions  DETRUSOR: Normal capacity, without flaccidity, without excessive compliance, moderate trabeculation with up the diverticula, without uninhibited bladder contractions on fillings  RETROFLEXED SCOPE VIEW: Normal bladder neck    Plan:  I had a lengthy discussion with the patient and his son  He has a very large obstructing prostate  Unfortunately he has a lot of comorbidities and [place]  I would like to refer him to primary care for further evaluation and risk stratification for surgery  We discussed surgical options such as transurethral resection of the prostate and simple prostatectomy    We discussed that the size of his prostate would normally be treated with simple prostatectomy but I also offered him transurethral resection of the prostate given his comorbidities  After lengthy discussion the patient would like to proceed with trans urethral resection of the prostate  He will 1st however see a primary care doctor and get evaluated and before scheduling surgery  In addition to the cystoscopy today I spent over 15 minutes discussing treatment options for his enlarged prostate and urinary retention

## 2020-09-17 NOTE — ASSESSMENT & PLAN NOTE
-patient has a history of coronary artery disease  -he underwent preoperative cardiac testing with a cardiac catheterization that did not have any significant coronary disease  -continue beta-blocker, nitrates, and ARB  -aspirin/Plavix on hold due to hematuria- hematuria has resolved- there is some blood in the MARYJANE drainage    Hemoglobin was 7 9 9/17- recheck hemoglobin in sure stability and then  discuss with Urology when  may resume the antiplatelet

## 2020-09-17 NOTE — PROGRESS NOTES
Patient stating feelings of "gas" and bloating  Estrella draining adequately and no acute changes in abdominal assessment since coming to floor  Dr Agustin hSen made aware, however he stated that he not patient's attending  Dr Marian Washburn on floor so RN explained situation  Tums PRN order put in  Oncoming RN made aware       Chelo Jose, RN 9/17/2020 7:07 PM

## 2020-09-17 NOTE — PROGRESS NOTES
Assumed pt care from previous RN  Pt currently up in chair resting  VSS at this time  No complaints  I agree with previous RN assessment

## 2020-09-17 NOTE — UTILIZATION REVIEW
Continued Stay Review    Date: 9-17-20                       Current Patient Class:  Inpatient  Current Level of Care:  Medical surgical      HPI:85 y o  male initially admitted on  9-7-20 for BPH with UTI s/p TURP 9-14  Exploratory lap 9-15  with repair of perforated bladder  Assessment/Plan:     MARYJANE Drain output is 85  Extubated  9-16  On 4L O2nc  Restart home dose iv  lasix  Iv fluids discontinued  Continuous bladder irrigation  Suprapubic catheter  Iv antibiotics  Continue to monitor hemoglobin s/p transfusions on 9-14 and 9-15  Pertinent Labs/Diagnostic Results:       Results from last 7 days   Lab Units 09/17/20  0350 09/16/20  0455 09/15/20  1858 09/15/20  1832 09/15/20  0446  09/14/20  0601  09/12/20  0631   WBC Thousand/uL 15 59* 18 13* 20 78* 21 41* 14 77*   < > 8 38   < > 7 53   HEMOGLOBIN g/dL 7 9* 8 1* 9 2* 9 4* 9 4*   < > 7 1*   < > 7 7*   I STAT HEMOGLOBIN   --   --   --   --   --    < >  --   --   --    HEMATOCRIT % 24 8* 24 9* 29 1* 29 3* 28 4*   < > 22 1*   < > 23 9*   HEMATOCRIT, ISTAT   --   --   --   --   --    < >  --   --   --    PLATELETS Thousands/uL 173 151 219 221 202   < > 202   < > 227   NEUTROS ABS Thousands/µL  --   --   --   --  13 27*  --  5 42  --  4 25   BANDS PCT %  --   --   --  15*  --   --   --   --   --     < > = values in this interval not displayed           Results from last 7 days   Lab Units 09/17/20  0350 09/16/20  0455 09/15/20  1858 09/15/20  1832 09/15/20  0446  09/14/20  1905 09/14/20  1835  09/12/20  0631   SODIUM mmol/L 139 137 134* 140 138   < >  --   --    < > 138   POTASSIUM mmol/L 3 6 3 1* 3 7 3 8 3 9   < >  --   --    < > 3 9   CHLORIDE mmol/L 110* 109* 105 107 105   < >  --   --    < > 104   CO2 mmol/L 23 22 19* 23 23   < >  --   --    < > 26   CO2, I-STAT mmol/L  --   --   --   --   --   --  29 27   < >  --    ANION GAP mmol/L 6 6 10 10 10   < >  --   --    < > 8   BUN mg/dL 11 10 12 13 15   < >  --   --    < > 16   CREATININE mg/dL 0 79 0  96 1 04 0 96 0 94   < >  --   --    < > 0 94   EGFR ml/min/1 73sq m 83 73 66 73 75   < >  --   --    < > 75   CALCIUM mg/dL 7 5* 6 8* 7 1* 7 2* 7 4*   < >  --   --    < > 8 1*   CALCIUM, IONIZED mmol/L  --   --  1 01*  --   --   --   --   --   --   --    CALCIUM, IONIZED, ISTAT mmol/L  --   --   --   --   --   --  1 23 1 16  --   --    MAGNESIUM mg/dL 2 9*  --  1 5*  --   --   --   --   --   --  2 2   PHOSPHORUS mg/dL  --   --  2 3  --   --   --   --   --   --   --     < > = values in this interval not displayed       Results from last 7 days   Lab Units 09/15/20  1858   AST U/L 12   ALT U/L 16   ALK PHOS U/L 53   TOTAL PROTEIN g/dL 6 8   ALBUMIN g/dL 2 9*   TOTAL BILIRUBIN mg/dL 0 48     Results from last 7 days   Lab Units 09/17/20  0658 09/17/20  0515 09/16/20  2339 09/16/20  2029 09/16/20  1743 09/16/20  1133 09/16/20  0719 09/15/20  2056 09/15/20  1730 09/15/20  1540 09/15/20  1124 09/15/20  0801   POC GLUCOSE mg/dl 109 111 110 115 125 165* 172* 202* 214* 215* 296* 241*     Results from last 7 days   Lab Units 09/17/20  0350 09/16/20  0455 09/15/20  1858 09/15/20  1832 09/15/20  0446 09/14/20  2043 09/14/20  0601 09/12/20  0631   GLUCOSE RANDOM mg/dL 107 175* 207* 206* 303* 215* 177* 166*             No results found for: BETA-HYDROXYBUTYRATE   Results from last 7 days   Lab Units 09/15/20  1835   PH ART  7 345*   PCO2 ART mm Hg 32 4*   PO2 ART mm Hg 67 4*   HCO3 ART mmol/L 17 3*   BASE EXC ART mmol/L -7 5   O2 CONTENT ART mL/dL 12 5*   O2 HGB, ARTERIAL % 91 8*   ABG SOURCE  Radial, Left         Results from last 7 days   Lab Units 09/14/20  1905 09/14/20  1835   PH, SOPHIA I-STAT  7 302 7 429*   PCO2, SOPHIA ISTAT mm HG 55 1* 39 0*   PO2, SOPHIA ISTAT mm HG 66 0* 57 0*   HCO3, SOPHIA ISTAT mmol/L 27 3 25 8   I STAT BASE EXC mmol/L 1 1   I STAT O2 SAT % 90* 90*     Results from last 7 days   Lab Units 09/17/20  0430 09/16/20  1208   PROCALCITONIN ng/ml 3 12* 4 10*     Results from last 7 days   Lab Units 09/15/20  1858 LACTIC ACID mmol/L 1 9       Results from last 7 days   Lab Units 09/15/20  2016 09/15/20  1859 09/14/20  1737   BLOOD CULTURE  No Growth at 24 hrs  No Growth at 24 hrs   --    URINE CULTURE   --   --  >100,000 cfu/ml Pseudomonas aeruginosa*  >100,000 cfu/ml Morganella morganii*       Vital Signs:      Vitals:    09/17/20 0645 09/17/20 0715 09/17/20 0815 09/17/20 1325   BP: (!) 106/41 114/56 110/60 127/59   Pulse: 88 88 (!) 108 101   Resp: 15 20 (!) 27    Temp:  98 4 °F (36 9 °C)     TempSrc:  Temporal     SpO2: 97% 97% 98% 96%   Weight:       Height:         Medications:     cefepime, 2,000 mg, Intravenous, Q12H  docusate sodium, 100 mg, Oral, Q12H  enoxaparin, 40 mg, Subcutaneous, Q24H ERIN  famotidine, 20 mg, Oral, Daily  finasteride, 5 mg, Oral, Daily  furosemide, 20 mg, Intravenous, Q12H  gabapentin, 300 mg, Oral, BID  insulin glargine, 20 Units, Subcutaneous, HS  insulin lispro, 1-6 Units, Subcutaneous, TID With Meals  isosorbide mononitrate, 30 mg, Oral, Daily  oxybutynin, 5 mg, Oral, Daily  pravastatin, 40 mg, Oral, Daily With Dinner  tamsulosin, 0 8 mg, Oral, Daily With Dinner      Continuous IV Infusions:     PRN Meds:  acetaminophen, 650 mg, Oral, Q6H PRN    Or  oxyCODONE-acetaminophen, 1 tablet, Oral, Q6H PRN    Or  oxyCODONE-acetaminophen, 2 tablet, Oral, Q6H PRN  albuterol, 2 puff, Inhalation, Q4H PRN  belladonna-opium, 30 mg, Rectal, Q8H PRN  morphine injection, 1 mg, Intravenous, Q4H PRN  nitroglycerin, 0 4 mg, Sublingual, Q5 Min PRN        Discharge Plan: To be determined     Network Utilization Review Department  [email or web address]  ATTENTION: Please call with any questions or concerns to [phone] and carefully listen to the prompts so that you are directed to the right person   All voicemails are confidential   Reggie Chin all requests for admission clinical reviews, approved or denied determinations and any other requests to dedicated fax number below belonging to the [facility] where the patient is receiving treatment   List of dedicated fax numbers for the Facilities:  [address] DENIALS (Administrative/Medical Necessity) [phone]   [address] (Maternity/NICU/Pediatrics) [phone]   Reyna Castro [phone]   [facility] [phone]   Sudha Oviedo [phone]   [address] [phone]   [address] [place] [address] [address] [facility] [phone]   [facility]  [phone]   [address] [place] [address], [address]  [address] [facility] [address] [facility] [phone]

## 2020-09-17 NOTE — PROGRESS NOTES
Daily Progress Note - Critical Care   Merly Brownlee 82 y o  male MRN: [id number]  Unit/Bed#: ICU 04 Encounter: [id number]        ----------------------------------------------------------------------------------------  HPI/24hr events: fluids discontinued, started on home lasix 20mg BID, diet ordered  On 4L via NC  I/O: urine 2 2 L/ +2 3L/ -9 3L(CBI)  MARYJANE drain: 85  VTE ppx with lovenox  ---------------------------------------------------------------------------------------  SUBJECTIVE  No complaints  Review of Systems  Review of systems was reviewed and negative unless stated above in HPI/24-hour events   ---------------------------------------------------------------------------------------  Assessment and Plan:  82year old male with PMH of CAD, hypertension, hyperlipidemia, GERD, diabetes mellitus, and CHF  Presented to [address] on 09/07, history of lower urinary tract symptoms with retention requiring winslow catheter placement  Urology brought on board, CBI done  Not improving, and with taken for cystoscopy 09/14 - evacuation of clots, TURP with suprapubic catheter insertion  Presented to ICU due to septic picture with tachycardia, tachypnea,  low-grade fever, and abdominal distention  CT AP showed possible bladder/bowel perforation and no suprapubic catheter balloon  CT cystogram done, showing bladder perforation with new retroperitoneal contrast extravasation  General surgery brought on board, exploratory laparotomy done  750cc clear fluid evacuated, no bowel perforation, suprapubic catheter balloon was ruptured, cystostomy closed, MARYJANE drain placed in preperitoneal space  Received 4U PRBCs total as of now      Neuro:   · No active issues        CV:   · Diagnosis: CAD, HTN, HLD, combined HF  ? 09/2020 ECHO - EF 30%, mild LVH, G1DD, and mild RV dilatation   ?  To be assessed outpatient for ICD, life vest discussed with patient - if wishes to get one, patient will reach out to team  ? Cardiac catheterization during hospitalization and cleared for OR procedure  ? Hold home Anti-HTNsive meds for now, started diuretics - considering reducing dose to daily for now to maintain MAPs, Continue pravastatin   ? Monitor vitals, volume overload  ? Cardiology following     Pulm:  · Diagnosis: elective intubation, extubated 09/16   ? On NC, wean off  ? No baseline needs  ? Maintain o2 sat  >89%     GI:   · NPO        :   · Diagnosis: BPH with chronic winslow - POD #1 exploratory laparotomy and cystoscopy closure  POD #2 cystoscopy, TURP  ? 750cc clear fluid evacuated, no bowel perforation, suprapubic catheter balloon was ruptured, cystostomy closed, MARYJANE drain placed in preperitoneal space  CT cystogram during OR yesterday confirms no bladder perforation  ? NS infusion discontinued due to signs of volume overload, and home lasix restarted  ? On cefepime for possible peritonitis, urine culture showing pseudomonas and morganella with a procalc of 4 - continue Abx for 5 day course  ? Monitor I/os, Serial abdominal exams  ? As per urology, if MARYJANE drain to remain minimal urology will discharge          F/E/N:   · respiratory alkalosis with metabolic acidosis on ABG yesterday - likely from sepsis  Improving with fluids  · Hypokalemia - replete prn >4        Heme/Onc:   · Diagnosis: Acute blood Anemia, operative  ? s/p 4U PRBC as of now  ? Baseline: 11-12  ? 8 today AM, satble  ? Monitor Hb, if drops below 8 consider additional PRBC due to cardiomyopathy   · Diagnosis: leukocytosis  ? Likely 2/2 fluid extravasation in peritoneum, sepsis  ? Down trending  · Diagnosis: thrombocytopenia   ? Went from 200s to 80  ? liekly 2/2 abx with contribution of sepsis  ? stable, monitor        Endo:   · Diagnosis: Type 2 DM with poorly controlled BG during hospital stay  ? On metformin, lantus 20, and januvia at home, A1c 7 7 in 08/2020  ? Continue home lantus 20  ?  ISS agorithim increased for better control - BG acceptable        ID: · Diagnosis: Sepsis  ? Received fluids  ? BC negative at 24  ? UC - showing pseudomonas and morganella  ? Receiving cefepime  D3, continue        MSK/Skin:   · No active issues, PT/OT consults have already been placed      Disposition: Transfer to Med-Surg   Code Status: Level 1 - Full Code  ---------------------------------------------------------------------------------------  ICU CORE MEASURES    Prophylaxis   VTE Pharmacologic Prophylaxis: Enoxaparin (Lovenox)  VTE Mechanical Prophylaxis: sequential compression device  Stress Ulcer Prophylaxis: Prophylaxis Not Indicated     ABCDE Protocol (if indicated)  Plan to perform spontaneous awakening trial today? Not applicable  Plan to perform spontaneous breathing trial today? Not applicable  Obvious barriers to extubation? Not applicable  CAM-ICU: Negative    Invasive Devices Review  Invasive Devices     Peripheral Intravenous Line            Peripheral IV 09/15/20 Distal;Left;Upper;Ventral (anterior) Arm 1 day          Drain            Closed/Suction Drain Right RLQ Bulb 10 Fr  1 day    Urethral Catheter Three way 1 day              Can any invasive devices be discontinued today?  No  ---------------------------------------------------------------------------------------  OBJECTIVE    Vitals   Vitals:    20 0400 20 0415 20 0531 20 0545   BP:  110/53  113/53   Pulse: 86 78  88   Resp:    Temp: 98 8 °F (37 1 °C)      TempSrc: Temporal      SpO2: 99% 99%  97%   Weight:   108 kg (237 lb 14 oz)    Height:         Temp (24hrs), Av 6 °F (37 °C), Min:98 °F (36 7 °C), Max:99 1 °F (37 3 °C)  Current: Temperature: 98 8 °F (37 1 °C)  HR 78-90  RR 15-22  MAPs 67-75    Respiratory:  SpO2 Device: O2 Device: Nasal cannula  Nasal Cannula O2 Flow Rate (L/min): 4 L/min    Invasive/non-invasive ventilation settings   Respiratory    Lab Data (Last 4 hours)    None         O2/Vent Data (Last 4 hours)       0307          Non-Invasive Ventilation Mode CPAP                   Physical Exam  HENT:      Head: Normocephalic and atraumatic  Eyes:      Pupils: Pupils are equal, round, and reactive to light  Cardiovascular:      Rate and Rhythm: Normal rate and regular rhythm  Pulses: Normal pulses  Heart sounds: Normal heart sounds  Pulmonary:      Effort: Pulmonary effort is normal       Breath sounds: Examination of the right-lower field reveals decreased breath sounds  Examination of the left-lower field reveals decreased breath sounds  Decreased breath sounds present  Comments: NC in place  Abdominal:      General: Bowel sounds are normal  There is distension  Palpations: Abdomen is soft  Tenderness: There is no abdominal tenderness  Comments: MARYJANE drain in place - bloody    Genitourinary:     Comments: Estrella's in place  Musculoskeletal:         General: No swelling or tenderness  Skin:     General: Skin is warm and dry  Neurological:      Mental Status: He is alert           Laboratory and Diagnostics:  Results from last 7 days   Lab Units 09/17/20  0350 09/16/20  0455 09/15/20  1858 09/15/20  1832 09/15/20  0446 09/14/20  2043 09/14/20  1905  09/14/20  0601  09/12/20  0631   WBC Thousand/uL 15 59* 18 13* 20 78* 21 41* 14 77* 12 47*  --   --  8 38   < > 7 53   HEMOGLOBIN g/dL 7 9* 8 1* 9 2* 9 4* 9 4* 9 4*  --   --  7 1*   < > 7 7*   I STAT HEMOGLOBIN g/dl  --   --   --   --   --   --  5 1*   < >  --   --   --    HEMATOCRIT % 24 8* 24 9* 29 1* 29 3* 28 4* 29 0*  --   --  22 1*   < > 23 9*   HEMATOCRIT, ISTAT %  --   --   --   --   --   --  15*   < >  --   --   --    PLATELETS Thousands/uL 173 151 219 221 202 182  --   --  202   < > 227   NEUTROS PCT %  --   --   --   --  89*  --   --   --  64  --  56   BANDS PCT %  --   --   --  15*  --   --   --   --   --   --   --    MONOS PCT %  --   --   --   --  5  --   --   --  9  --  10   MONO PCT %  --   --   --  2*  --   --   --   --   --   --   --     < > = values in this interval not displayed  Results from last 7 days   Lab Units 09/17/20  0350 09/16/20  0455 09/15/20  1858 09/15/20  1832 09/15/20  0446 09/14/20  2043 09/14/20  1905  09/14/20  0601   SODIUM mmol/L 139 137 134* 140 138 137  --   --  138   POTASSIUM mmol/L 3 6 3 1* 3 7 3 8 3 9 4 2  --   --  3 7   CHLORIDE mmol/L 110* 109* 105 107 105 106  --   --  106   CO2 mmol/L 23 22 19* 23 23 28  --   --  24   CO2, I-STAT mmol/L  --   --   --   --   --   --  29   < >  --    ANION GAP mmol/L 6 6 10 10 10 3*  --   --  8   BUN mg/dL 11 10 12 13 15 9  --   --  10   CREATININE mg/dL 0 79 0 96 1 04 0 96 0 94 0 89  --   --  0 82   CALCIUM mg/dL 7 5* 6 8* 7 1* 7 2* 7 4* 7 6*  --   --  8 3   GLUCOSE RANDOM mg/dL 107 175* 207* 206* 303* 215*  --   --  177*   ALT U/L  --   --  16  --   --   --   --   --   --    AST U/L  --   --  12  --   --   --   --   --   --    ALK PHOS U/L  --   --  53  --   --   --   --   --   --    ALBUMIN g/dL  --   --  2 9*  --   --   --   --   --   --    TOTAL BILIRUBIN mg/dL  --   --  0 48  --   --   --   --   --   --     < > = values in this interval not displayed  Results from last 7 days   Lab Units 09/17/20  0350 09/15/20  1858 09/12/20  0631   MAGNESIUM mg/dL 2 9* 1 5* 2 2   PHOSPHORUS mg/dL  --  2 3  --                Results from last 7 days   Lab Units 09/15/20  1858   LACTIC ACID mmol/L 1 9     ABG:  Results from last 7 days   Lab Units 09/15/20  1835   PH ART  7 345*   PCO2 ART mm Hg 32 4*   PO2 ART mm Hg 67 4*   HCO3 ART mmol/L 17 3*   BASE EXC ART mmol/L -7 5   ABG SOURCE  Radial, Left     VBG:  Results from last 7 days   Lab Units 09/15/20  1835   ABG SOURCE  Radial, Left     Results from last 7 days   Lab Units 09/16/20  1208   PROCALCITONIN ng/ml 4 10*       Micro  Results from last 7 days   Lab Units 09/15/20  2016 09/15/20  1859 09/14/20  1737   BLOOD CULTURE  No Growth at 24 hrs   No Growth at 24 hrs   --    URINE CULTURE   --   --  >100,000 cfu/ml Pseudomonas aeruginosa*  >100,000 cfu/ml Morganella morganii*       Imaging:  I have personally reviewed pertinent reports  Intake and Output  I/O       09/15 0701 - 09/16 0700 09/16 0701 - 09/17 0700    I V  (mL/kg) 2709 9 (25 6) 2360 (21 9)    Blood 300     NG/GT 0 0    IV Piggyback 1100 650    Total Intake(mL/kg) 4109 9 (38 8) 3010 (27 9)    Urine (mL/kg/hr) 1750 (0 7) 2225 (0 9)    Drains 210 85    Total Output 1960 2310    Net +2149 9 +700              Height and Weights   Height: 5' 7" (170 2 cm)  IBW: 66 1 kg  Body mass index is 37 26 kg/m²  Weight (last 2 days)     Date/Time   Weight    09/17/20 0531   108 (237 88)    09/16/20 0902   106 (233 47)    09/16/20 0538   106 (233 47)    09/15/20 0600   98 2 (216 49)                Nutrition       Diet Orders   (From admission, onward)             Start     Ordered    09/17/20 0513  Diet Franco/CHO Controlled; Consistent Carbohydrate Diet Level 2 (5 carb servings/75 grams CHO/meal)  Diet effective now     Question Answer Comment   Diet Type Franco/CHO Controlled    Franco/CHO Controlled Consistent Carbohydrate Diet Level 2 (5 carb servings/75 grams CHO/meal)    RD to adjust diet per protocol?  Yes        09/17/20 0030              Active Medications  Scheduled Meds:  Current Facility-Administered Medications   Medication Dose Route Frequency Provider Last Rate    acetaminophen  650 mg Oral Q6H PRN Anya Sam MD      Or    oxyCODONE-acetaminophen  1 tablet Oral Q6H PRN Anya Sam MD      Or    oxyCODONE-acetaminophen  2 tablet Oral Q6H PRN Anya Sam MD      albuterol  2 puff Inhalation Q4H PRN Anya Sam MD      belladonna-opium  30 mg Rectal Q8H PRN Anya Sam MD      cefepime  2,000 mg Intravenous Q12H Anya Sam MD Stopped (09/17/20 0545)    docusate sodium  100 mg Oral Q12H Anya Sam MD      enoxaparin  40 mg Subcutaneous Q24H [address] Anya Sam MD      famotidine  20 mg Oral Daily Anya Sam MD      finasteride  5 mg Oral Daily MD Pedro Plasencia furosemide  20 mg Oral BID (diuretic) Relda Kelly WorthingtonJAYCE      gabapentin  300 mg Oral BID Pushpa Coy MD      insulin glargine  20 Units Subcutaneous HS Pushpa Coy MD      insulin lispro  1-6 Units Subcutaneous Q6H Zhanna Phillips MD      isosorbide mononitrate  30 mg Oral Daily Pushpa Coy MD      morphine injection  1 mg Intravenous Q4H PRN Pushpa Coy MD      nitroglycerin  0 4 mg Sublingual Q5 Min PRN Pushpa Coy MD      oxybutynin  5 mg Oral Daily Pushpa Coy MD      pravastatin  40 mg Oral Daily With Radha Camilo MD      tamsulosin  0 8 mg Oral Daily With Radha Camilo MD       Continuous Infusions:     PRN Meds:   acetaminophen, 650 mg, Q6H PRN    Or  oxyCODONE-acetaminophen, 1 tablet, Q6H PRN    Or  oxyCODONE-acetaminophen, 2 tablet, Q6H PRN  albuterol, 2 puff, Q4H PRN  belladonna-opium, 30 mg, Q8H PRN  morphine injection, 1 mg, Q4H PRN  nitroglycerin, 0 4 mg, Q5 Min PRN        Allergies   Allergies   Allergen Reactions    Demerol [Meperidine]      ---------------------------------------------------------------------------------------  Advance Directive and Living Will:      Power of :    POLST:    ---------------------------------------------------------------------------------------  Care Time Delivered:   transfer to Deepthi Grigsby MD      Portions of the record may have been created with voice recognition software  Occasional wrong word or "sound a like" substitutions may have occurred due to the inherent limitations of voice recognition software    Read the chart carefully and recognize, using context, where substitutions have occurred

## 2020-09-18 PROBLEM — N32.89 EXTRAPERITONEAL BLADDER PERFORATION: Status: ACTIVE | Noted: 2020-07-27

## 2020-09-18 PROBLEM — R31.0 GROSS HEMATURIA: Status: RESOLVED | Noted: 2020-09-07 | Resolved: 2020-09-18

## 2020-09-18 PROBLEM — I50.43 ACUTE ON CHRONIC COMBINED SYSTOLIC AND DIASTOLIC CONGESTIVE HEART FAILURE (HCC): Status: ACTIVE | Noted: 2017-12-18

## 2020-09-18 LAB
GLUCOSE SERPL-MCNC: 138 MG/DL (ref 65–140)
GLUCOSE SERPL-MCNC: 205 MG/DL (ref 65–140)
GLUCOSE SERPL-MCNC: 218 MG/DL (ref 65–140)
GLUCOSE SERPL-MCNC: 262 MG/DL (ref 65–140)

## 2020-09-18 PROCEDURE — 97116 GAIT TRAINING THERAPY: CPT

## 2020-09-18 PROCEDURE — 99232 SBSQ HOSP IP/OBS MODERATE 35: CPT | Performed by: INTERNAL MEDICINE

## 2020-09-18 PROCEDURE — 82948 REAGENT STRIP/BLOOD GLUCOSE: CPT

## 2020-09-18 PROCEDURE — 99232 SBSQ HOSP IP/OBS MODERATE 35: CPT | Performed by: FAMILY MEDICINE

## 2020-09-18 PROCEDURE — 97530 THERAPEUTIC ACTIVITIES: CPT

## 2020-09-18 PROCEDURE — 97535 SELF CARE MNGMENT TRAINING: CPT

## 2020-09-18 PROCEDURE — 99024 POSTOP FOLLOW-UP VISIT: CPT | Performed by: PHYSICIAN ASSISTANT

## 2020-09-18 PROCEDURE — 97110 THERAPEUTIC EXERCISES: CPT

## 2020-09-18 RX ORDER — POTASSIUM CHLORIDE 20 MEQ/1
20 TABLET, EXTENDED RELEASE ORAL DAILY
Status: DISCONTINUED | OUTPATIENT
Start: 2020-09-18 | End: 2020-09-19 | Stop reason: HOSPADM

## 2020-09-18 RX ADMIN — FUROSEMIDE 20 MG: 10 INJECTION, SOLUTION INTRAMUSCULAR; INTRAVENOUS at 08:16

## 2020-09-18 RX ADMIN — OXYCODONE HYDROCHLORIDE AND ACETAMINOPHEN 2 TABLET: 5; 325 TABLET ORAL at 13:43

## 2020-09-18 RX ADMIN — INSULIN GLARGINE 20 UNITS: 100 INJECTION, SOLUTION SUBCUTANEOUS at 21:32

## 2020-09-18 RX ADMIN — DOCUSATE SODIUM 100 MG: 100 CAPSULE, LIQUID FILLED ORAL at 08:16

## 2020-09-18 RX ADMIN — CEFEPIME HYDROCHLORIDE 2000 MG: 2 INJECTION, POWDER, FOR SOLUTION INTRAVENOUS at 05:53

## 2020-09-18 RX ADMIN — DOCUSATE SODIUM 100 MG: 100 CAPSULE, LIQUID FILLED ORAL at 19:30

## 2020-09-18 RX ADMIN — INSULIN LISPRO 4 UNITS: 100 INJECTION, SOLUTION INTRAVENOUS; SUBCUTANEOUS at 17:20

## 2020-09-18 RX ADMIN — POTASSIUM CHLORIDE 20 MEQ: 1500 TABLET, EXTENDED RELEASE ORAL at 14:38

## 2020-09-18 RX ADMIN — INSULIN LISPRO 3 UNITS: 100 INJECTION, SOLUTION INTRAVENOUS; SUBCUTANEOUS at 17:21

## 2020-09-18 RX ADMIN — FINASTERIDE 5 MG: 5 TABLET, FILM COATED ORAL at 08:15

## 2020-09-18 RX ADMIN — FAMOTIDINE 20 MG: 20 TABLET ORAL at 08:15

## 2020-09-18 RX ADMIN — GABAPENTIN 300 MG: 300 CAPSULE ORAL at 08:15

## 2020-09-18 RX ADMIN — ISOSORBIDE MONONITRATE 30 MG: 30 TABLET, EXTENDED RELEASE ORAL at 08:15

## 2020-09-18 RX ADMIN — CEFEPIME HYDROCHLORIDE 2000 MG: 2 INJECTION, POWDER, FOR SOLUTION INTRAVENOUS at 17:15

## 2020-09-18 RX ADMIN — GABAPENTIN 300 MG: 300 CAPSULE ORAL at 17:15

## 2020-09-18 RX ADMIN — FUROSEMIDE 20 MG: 10 INJECTION, SOLUTION INTRAMUSCULAR; INTRAVENOUS at 21:34

## 2020-09-18 RX ADMIN — ENOXAPARIN SODIUM 40 MG: 40 INJECTION SUBCUTANEOUS at 08:15

## 2020-09-18 RX ADMIN — TAMSULOSIN HYDROCHLORIDE 0.8 MG: 0.4 CAPSULE ORAL at 17:15

## 2020-09-18 RX ADMIN — PRAVASTATIN SODIUM 40 MG: 40 TABLET ORAL at 17:15

## 2020-09-18 RX ADMIN — INSULIN LISPRO 2 UNITS: 100 INJECTION, SOLUTION INTRAVENOUS; SUBCUTANEOUS at 12:28

## 2020-09-18 RX ADMIN — OXYBUTYNIN CHLORIDE 5 MG: 5 TABLET, EXTENDED RELEASE ORAL at 08:15

## 2020-09-18 NOTE — TELEPHONE ENCOUNTER
Patient remains inpatient at this time  Will continue to monitor for discharge and follow up with patient at that time

## 2020-09-18 NOTE — PHYSICAL THERAPY NOTE
PT PROGRESS NOTE    Name: Clarisa Buckner  AGE: 82 y o  MRN: [id number]  LENGTH OF STAY: 11 09/18/20 1538   PT Last Visit   PT Visit Date 09/18/20   Pain Assessment   Pain Score 7   Pain Location/Orientation Orientation: Lower; Location: Abdomen   Hospital Pain Intervention(s) Repositioned; Ambulation/increased activity; Emotional support; Rest   Restrictions/Precautions   Weight Bearing Precautions Per Order No   Other Precautions Multiple lines; Fall Risk;Pain  (winslow catheter)   General   Chart Reviewed Yes   Response to Previous Treatment Patient reporting fatigue but able to participate  Family/Caregiver Present No   Cognition   Overall Cognitive Status WFL   Arousal/Participation Alert; Cooperative   Attention Within functional limits   Following Commands Follows one step commands without difficulty   Comments Pt Polish speaking only but able to speak/understanding some English  Utilized [employer] bernice for translation  Subjective   Subjective Pt c/o lower abdominal pain but agreeable to therapy after encouragement  Bed Mobility   Supine to Sit 4  Minimal assistance   Additional items Assist x 1;HOB elevated; Bedrails; Increased time required;Verbal cues   Additional Comments cues for techniques & safety   Transfers   Sit to Stand 5  Supervision   Additional items Increased time required;Verbal cues   Stand to Sit 5  Supervision   Additional items Armrests; Increased time required;Verbal cues   Additional Comments cues for techniques & safety   Ambulation/Elevation   Gait pattern Wide JAKE; Forward Flexion;Decreased foot clearance; Excessively slow   Gait Assistance 5  Supervision   Additional items Verbal cues   Assistive Device Rolling walker   Distance 150'x1   Stair Management Assistance Not tested   Balance   Static Sitting Good   Static Standing Fair +  (w/ RW)   Ambulatory Fair  (w/ RW)   Endurance Deficit   Endurance Deficit Yes   Endurance Deficit Description pain   Activity Tolerance   Activity Tolerance Patient limited by fatigue;Patient limited by pain   Medical Staff Made Aware OT Francisco   Nurse Made Aware HALIMA Murray   Exercises   Heelslides Supine;10 reps;AROM; Bilateral   Hip Flexion Supine;10 reps;AROM; Bilateral   Hip Abduction Supine;10 reps;AROM; Bilateral   Hip Adduction Supine;10 reps;AROM; Bilateral   Knee AROM Long Arc Quad Sitting;10 reps;AROM; Bilateral   Ankle Pumps Supine;15 reps;AROM; Bilateral   Assessment   Prognosis Good   Problem List Decreased strength;Decreased endurance; Impaired balance;Decreased mobility;Obesity;Pain   Assessment Pt seen for PT per POC  Improved mobility & activity tolerance noted this tx session  Pt progressed to S for amb w/ RW + improved amb tolerance compared to previous PT session  However pt require minAx1 for supine to sit 2* to inc lower abdominal pain  See above levels of assistance required for all functional tasks  Gait deviations as above, slow w/ dec foot clearance but no gross LOB noted  Occasional cues to stay within RW JAKE  Pt tolerated above mentioned thera  ex well, AROM  No dizziness reported t/o session  Nsg staff most recent vital signs as follows: /71 (BP Location: Left arm)   Pulse (!) 108   Temp 98 7 °F (37 1 °C) (Oral)   Resp 17   Ht 5' 7" (1 702 m)   Wt 110 kg (243 lb 2 7 oz)   SpO2 94%   BMI 38 09 kg/m²   Will continue PT per POC  PT goals extended [id number]  At end of session, pt OOB in chair w/o issues, call bell & phone in reach  Fall precautions reinforced w/ good understanding  Will continue to recommend HHPT and inc family support at D/C  CM to follow  Nsg staff to continue to mobilized pt (OOB in chair for all meals & ambulate in room/unit) as tolerated to prevent decline in function  Nsg notified      Barriers to Discharge Inaccessible home environment   Barriers to Discharge Comments (+) CRUZ   Goals   Patient Goals to get better   STG Expiration Date 10/02/20   Short Term Goal #1 Goals to be met in 14 days; pt will be able to: 1) inc strength & balance by 1/2 grade to improve overall functional mobility & dec fall risk; 2) inc bed mobility to modified I for pt to be able to get in/OOB safely w/ proper techniques 100% of the time, to dec caregiver burden & safely function at home; 3) inc transfers to modified I for pt to transition safely from one surface to another w/o % of the time, to dec caregiver burden & safely function at home; 4) inc amb w/ RW approx  >350' w/ S for pt to ambulate community distances w/o any % of the time, to dec caregiver burden & safely function at home; 5) negotiate stairs w/ S for inc safety during stair mgt inside/outside of home & dec caregiver burden; 6) pt/caregiver ed   PT Treatment Day 3   Plan   Treatment/Interventions Functional transfer training;LE strengthening/ROM; Elevations; Therapeutic exercise; Endurance training;Patient/family training;Bed mobility;Gait training;Spoke to nursing;OT   Progress Progressing toward goals   PT Frequency Other (Comment)  (3-5x/wk)   Recommendation   PT Discharge Recommendation Home with skilled therapy; Return to previous environment with social support  (HHPT w/ family support)   Equipment Recommended Walker  (RW)   William Choudhury PT

## 2020-09-18 NOTE — PLAN OF CARE
Problem: PAIN - ADULT  Goal: Verbalizes/displays adequate comfort level or baseline comfort level  Description: Interventions:  - Encourage patient to monitor pain and request assistance  - Assess pain using appropriate pain scale  - Administer analgesics based on type and severity of pain and evaluate response  - Implement non-pharmacological measures as appropriate and evaluate response  - Consider cultural and social influences on pain and pain management  - Notify physician/advanced practitioner if interventions unsuccessful or patient reports new pain  Outcome: Progressing     Problem: INFECTION - ADULT  Goal: Absence or prevention of progression during hospitalization  Description: INTERVENTIONS:  - Assess and monitor for signs and symptoms of infection  - Monitor lab/diagnostic results  - Monitor all insertion sites, i e  indwelling lines, tubes, and drains  - Monitor endotracheal if appropriate and nasal secretions for changes in amount and color  - [place] appropriate cooling/warming therapies per order  - Administer medications as ordered  - Instruct and encourage patient and family to use good hand hygiene technique  - Identify and instruct in appropriate isolation precautions for identified infection/condition  Outcome: Progressing     Problem: SAFETY ADULT  Goal: Patient will remain free of falls  Description: INTERVENTIONS:  - Assess patient frequently for physical needs  -  Identify cognitive and physical deficits and behaviors that affect risk of falls    -  [place] fall precautions as indicated by assessment   - Educate patient/family on patient safety including physical limitations  - Instruct patient to call for assistance with activity based on assessment  - Modify environment to reduce risk of injury  - Consider OT/PT consult to assist with strengthening/mobility  Outcome: Progressing  Goal: Maintain or return to baseline ADL function  Description: INTERVENTIONS:  -  Assess patient's ability to carry out ADLs; assess patient's baseline for ADL function and identify physical deficits which impact ability to perform ADLs (bathing, care of mouth/teeth, toileting, grooming, dressing, etc )  - Assess/evaluate cause of self-care deficits   - Assess range of motion  - Assess patient's mobility; develop plan if impaired  - Assess patient's need for assistive devices and provide as appropriate  - Encourage maximum independence but intervene and supervise when necessary  - Involve family in performance of ADLs  - Assess for home care needs following discharge   - Consider OT consult to assist with ADL evaluation and planning for discharge  - Provide patient education as appropriate  Outcome: Progressing  Goal: Maintain or return mobility status to optimal level  Description: INTERVENTIONS:  - Assess patient's baseline mobility status (ambulation, transfers, stairs, etc )    - Identify cognitive and physical deficits and behaviors that affect mobility  - Identify mobility aids required to assist with transfers and/or ambulation (gait belt, sit-to-stand, lift, walker, cane, etc )  - [place] fall precautions as indicated by assessment  - Record patient progress and toleration of activity level on Mobility SBAR; progress patient to next Phase/Stage  - Instruct patient to call for assistance with activity based on assessment  - Consider rehabilitation consult to assist with strengthening/weightbearing, etc   Outcome: Progressing     Problem: DISCHARGE PLANNING  Goal: Discharge to home or other facility with appropriate resources  Description: INTERVENTIONS:  - Identify barriers to discharge w/patient and caregiver  - Arrange for needed discharge resources and transportation as appropriate  - Identify discharge learning needs (meds, wound care, etc )  - Arrange for interpretive services to assist at discharge as needed  - Refer to Case Management Department for coordinating discharge planning if the patient needs post-hospital services based on physician/advanced practitioner order or complex needs related to functional status, cognitive ability, or social support system  Outcome: Progressing     Problem: Knowledge Deficit  Goal: Patient/family/caregiver demonstrates understanding of disease process, treatment plan, medications, and discharge instructions  Description: Complete learning assessment and assess knowledge base  Interventions:  - Provide teaching at level of understanding  - Provide teaching via preferred learning methods  Outcome: Progressing     Problem: Potential for Falls  Goal: Patient will remain free of falls  Description: INTERVENTIONS:  - Assess patient frequently for physical needs  -  Identify cognitive and physical deficits and behaviors that affect risk of falls    -  [place] fall precautions as indicated by assessment   - Educate patient/family on patient safety including physical limitations  - Instruct patient to call for assistance with activity based on assessment  - Modify environment to reduce risk of injury  - Consider OT/PT consult to assist with strengthening/mobility  Outcome: Progressing     Problem: GENITOURINARY - ADULT  Goal: Maintains or returns to baseline urinary function  Description: INTERVENTIONS:  - Assess urinary function  - Encourage oral fluids to ensure adequate hydration if ordered  - Administer IV fluids as ordered to ensure adequate hydration  - Administer ordered medications as needed  - Offer frequent toileting  - Follow urinary retention protocol if ordered  Outcome: Progressing  Goal: Absence of urinary retention  Description: INTERVENTIONS:  - Assess patients ability to void and empty bladder  - Monitor I/O  - Bladder scan as needed  - Discuss with physician/AP medications to alleviate retention as needed  - Discuss catheterization for long term situations as appropriate  Outcome: Progressing  Goal: Urinary catheter remains patent  Description: INTERVENTIONS:  - Assess patency of urinary catheter  - If patient has a chronic winslow, consider changing catheter if non-functioning  - Follow guidelines for intermittent irrigation of non-functioning urinary catheter  Outcome: Progressing     Problem: Prexisting or High Potential for Compromised Skin Integrity  Goal: Skin integrity is maintained or improved  Description: INTERVENTIONS:  - Identify patients at risk for skin breakdown  - Assess and monitor skin integrity  - Assess and monitor nutrition and hydration status  - Monitor labs   - Assess for incontinence   - Turn and reposition patient  - Assist with mobility/ambulation  - Relieve pressure over bony prominences  - Avoid friction and shearing  - Provide appropriate hygiene as needed including keeping skin clean and dry  - Evaluate need for skin moisturizer/barrier cream  - Collaborate with interdisciplinary team   - Patient/family teaching  - Consider wound care consult   Outcome: Progressing     Problem: Nutrition/Hydration-ADULT  Goal: Nutrient/Hydration intake appropriate for improving, restoring or maintaining nutritional needs  Description: Monitor and assess patient's nutrition/hydration status for malnutrition  Collaborate with interdisciplinary team and initiate plan and interventions as ordered  Monitor patient's weight and dietary intake as ordered or per policy  Utilize nutrition screening tool and intervene as necessary  Determine patient's food preferences and provide high-protein, high-caloric foods as appropriate       INTERVENTIONS:  - Monitor oral intake, urinary output, labs, and treatment plans  - Assess nutrition and hydration status and recommend course of action  - Evaluate amount of meals eaten  - Assist patient with eating if necessary   - Allow adequate time for meals  - Recommend/ encourage appropriate diets, oral nutritional supplements, and vitamin/mineral supplements  - Order, calculate, and assess calorie counts as needed  - Recommend, monitor, and adjust tube feedings and TPN/PPN based on assessed needs  - Assess need for intravenous fluids  - Provide specific nutrition/hydration education as appropriate  - Include patient/family/caregiver in decisions related to nutrition  Outcome: Progressing

## 2020-09-18 NOTE — PROGRESS NOTES
Progress Note - Urology  Kraig Bumpers 1935, 82 y o  male MRN: [id number]    Unit/Bed#: [facility] 2 -01 Encounter: [id number]    Acute blood loss anemia  Assessment & Plan  Hemoglobin 7 9 today  Trend  * Extraperitoneal bladder perforation  Assessment & Plan  Status post TURP 9/14/2020  Maintain Estrella catheter until directed by Urology team   Continue proscar, flomax  Maintain urethral Estrella to gravity drainage for 2 weeks with cystogram prior to removal       Gross hematuriaresolved as of 9/18/2020  Assessment & Plan  2 Day Post-Op cystoscopy evacuation of clots, turp, spt insertion  1 Day Post-Op exploratory laparotomy, closure of cystotomy    H&H stable  Hematuria improved postop  Currently yellow-peach urine  Maintain Estrella catheter with hand irrigation q4h prn for hematuria      Bedside rounds performed with Abeba Patton RN  Discussed with Dr Rahul Easton  Subjective/Objective     Subjective:   CC: "I feel OK today "  HPI:  Reports abdominal pain is site of the incision  Has been ambulating around the room  Passing gas  Not having bowel movements  Feeling hungry  ROS:  Review of Systems   Constitutional: Negative for activity change and appetite change  HENT: Negative for congestion and ear pain  Eyes: Negative for pain  Respiratory: Negative for cough and shortness of breath  Cardiovascular: Negative for chest pain and palpitations  Gastrointestinal: Positive for abdominal pain (At the site of the incision, staples  )  Negative for abdominal distention, blood in stool, constipation, diarrhea and nausea  Genitourinary: Negative for difficulty urinating, dysuria, flank pain and hematuria  Musculoskeletal: Negative for arthralgias and myalgias  Skin: Negative for rash  Allergic/Immunologic: Negative for immunocompromised state  Neurological: Negative for dizziness and headaches  Hematological: Negative for adenopathy  Does not bruise/bleed easily  Psychiatric/Behavioral: Negative for agitation  The patient is not nervous/anxious  Objective:  Vitals: Blood pressure 123/71, pulse (!) 108, temperature 98 7 °F (37 1 °C), temperature source Oral, resp  rate 17, height 5' 7" (1 702 m), weight 110 kg (243 lb 2 7 oz), SpO2 94 %  ,Body mass index is 38 09 kg/m²  Intake/Output Summary (Last 24 hours) at 9/18/2020 1537  Last data filed at 9/18/2020 1420  Gross per 24 hour   Intake 0 ml   Output 5660 ml   Net -5660 ml     Invasive Devices     Peripheral Intravenous Line            Peripheral IV 09/15/20 Distal;Left;Upper;Ventral (anterior) Arm 2 days          Drain            Suprapubic Catheter Non-latex 16 Fr  3 days    Urethral Catheter Non-latex 24 Fr  3 days    Closed/Suction Drain Right RLQ Bulb 10 Fr  2 days    Urethral Catheter Three way 2 days                Physical Exam  Vitals signs and nursing note reviewed  Constitutional:       General: He is not in acute distress  Appearance: He is well-developed  He is not ill-appearing or diaphoretic  Comments: Pleasant well-appearing 82-year-old male, smiling  No acute distress  HENT:      Head: Normocephalic and atraumatic  Eyes:      Conjunctiva/sclera: Conjunctivae normal    Neck:      Musculoskeletal: Normal range of motion and neck supple  Trachea: No tracheal deviation  Cardiovascular:      Rate and Rhythm: Normal rate and regular rhythm  Heart sounds: Normal heart sounds  No murmur  Pulmonary:      Effort: Pulmonary effort is normal  No respiratory distress  Breath sounds: Normal breath sounds  No wheezing  Abdominal:      General: Bowel sounds are normal  There is no distension  Palpations: Abdomen is soft  There is no mass  Tenderness: There is no abdominal tenderness  Comments: Abdomen slightly distended with midline laparotomy incision with staples clean dry and intact  Right lower quadrant MARYJANE drain with serosanguineous drainage    Removed, clean dry dressing applied  Genitourinary:     Comments: Urethral Estrella draining clear yellow urine  Musculoskeletal: Normal range of motion  Skin:     General: Skin is warm and dry  Coloration: Skin is not pale  Findings: No erythema or rash  Neurological:      Mental Status: He is alert and oriented to person, place, and time  Psychiatric:         Behavior: Behavior normal  Behavior is cooperative  Thought Content:  Thought content normal          Judgment: Judgment normal          History:    Past Medical History:   Diagnosis Date    Cardiac disease     CHF (congestive heart failure) ([place] [id number] )     Diabetes mellitus (HCC)     GERD (gastroesophageal reflux disease)     Hyperlipidemia     Hypertension     MI (myocardial infarction) ([place] [id number] )      Past Surgical History:   Procedure Laterality Date    CT CYSTOGRAM  9/15/2020    EYE SURGERY      IN CYSTOURETHROSCOPY W/IRRIG & EVAC CLOTS N/A 9/14/2020    Procedure: CYSTOSCOPY EVACUATION OF CLOTS; TURP; SUPRAPUBIC CATHETER INSERTION;  Surgeon: Froilan Coley MD;  Location: [facility];  Service: Urology     Family History   Problem Relation Age of Onset    Diabetes Mother     Hypertension Mother     Cancer Father     Hypertension Father     Cancer Sister     Hypertension Sister      Social History     Socioeconomic History    Marital status: /Civil Union     Spouse name: None    Number of children: None    Years of education: None    Highest education level: None   Occupational History    None   Social Needs    Financial resource strain: None    Food insecurity     Worry: None     Inability: None    Transportation needs     Medical: None     Non-medical: None   Tobacco Use    Smoking status: Never Smoker    Smokeless tobacco: Never Used   Substance and Sexual Activity    Alcohol use: No     Alcohol/week: 0 0 standard drinks     Frequency: Never     Binge frequency: Never    Drug use: No    Sexual activity: Not Currently Lifestyle    Physical activity     Days per week: None     Minutes per session: None    Stress: None   Relationships    Social connections     Talks on phone: None     Gets together: None     Attends [employer] service: None     Active member of club or organization: None     Attends meetings of clubs or organizations: None     Relationship status: None    Intimate partner violence     Fear of current or ex partner: None     Emotionally abused: None     Physically abused: None     Forced sexual activity: None   Other Topics Concern    None   Social History Narrative    None       Labs:  Results from last 7 days   Lab Units 09/17/20  0350 09/16/20  0455 09/15/20  1858   WBC Thousand/uL 15 59* 18 13* 20 78*   HEMOGLOBIN g/dL 7 9* 8 1* 9 2*   PLATELETS Thousands/uL 173 151 219     Results from last 7 days   Lab Units 09/17/20  0350 09/16/20  0455 09/15/20  1858  09/14/20  1905   SODIUM mmol/L 139 137 134*   < >  --    POTASSIUM mmol/L 3 6 3 1* 3 7   < >  --    CHLORIDE mmol/L 110* 109* 105   < >  --    CO2 mmol/L 23 22 19*   < >  --    CO2, I-STAT mmol/L  --   --   --   --  29   BUN mg/dL 11 10 12   < >  --    CREATININE mg/dL 0 79 0 96 1 04   < >  --    EGFR ml/min/1 73sq m 83 73 66   < >  --    GLUCOSE, ISTAT mg/dl  --   --   --   --  140   CALCIUM mg/dL 7 5* 6 8* 7 1*   < >  --    AST U/L  --   --  12  --   --    ALT U/L  --   --  16  --   --    ALK PHOS U/L  --   --  53  --   --     < > = values in this interval not displayed  Results from last 7 days   Lab Units 09/15/20  2016 09/15/20  1859 09/14/20  1737   BLOOD CULTURE  No Growth at 48 hrs  No Growth at 48 hrs    --    URINE CULTURE   --   --  >100,000 cfu/ml Pseudomonas aeruginosa*  >100,000 cfu/ml Morganella morganii*     Results from last 7 days   Lab Units 09/17/20  0430 09/16/20  1208   PROCALCITONIN ng/ml 3 12* 4 10*       Pamela Muñoz PA-C  Date: 9/18/2020 Time: 3:37 PM

## 2020-09-18 NOTE — PROGRESS NOTES
Progress Note - Pulmonary   Merly Hutchison 82 y o  male MRN: [id number]  Unit/Bed#: [facility] 2 -01 Encounter: [id number]      Assessment/Plan:  1  Acute hypoxic respiratory failure requiring intubation s/p extubation 9/16/20  1  Currently oxygenating well on RA  2  Titrate oxygen as needed to maintain SpO2>/=89%  3  Pulmonary toilet: IS, cough deep breathe-noted to be walking throughout his room with walker without additional assist  2  SIRS secondary to UTI with bladder perforation  1  Improving  2  Continue cefepime, plan for minimum 7 days, day 4/7  3  Check PCT on day 7, if noted decrease by 80%, as well as stable clinical progress then likely appropriate to D/C abx  4  Urology following s/p surgical intervention   3  UTI with pseudomonas and morganella  1  ABX as indicated above  4  BPH s/p TURP now with repair of suprapubic catheter with winslow catheter in place  1  Urology managemenrt  5  Acute blood loss anemia  1  HGB remains stable  2  Further management per IM  6  Acute on chronic systolic and diastolic heart failure with volume overload  1  Needs continue diuresis with noted weight gain  2  Strict I &O  3  Daily weights    *we will see again 9/21/20, please call during the interim      Subjective:     Merly was noted to be walking in his room upon entering  His daughter was in the room and provided translation  Denies: Chest pain, SOB or cough  He is anxious for discharge    Objective:         Vitals: Blood pressure 126/90, pulse 95, temperature 98 3 °F (36 8 °C), temperature source Oral, resp  rate 20, height 5' 7" (1 702 m), weight 110 kg (243 lb 2 7 oz), SpO2 94 % , RA, Body mass index is 38 09 kg/m²        Intake/Output Summary (Last 24 hours) at 9/18/2020 0932  Last data filed at 9/18/2020 0401  Gross per 24 hour   Intake 0 ml   Output 3810 ml   Net -3810 ml         Physical Exam  Gen: Awake, alert, oriented x 3, no acute distress  HEENT: Mucous membranes moist, no oral lesions, no thrush  NECK: no accessory muscle use, JVP not elevated  Cardiac: Regular, single S1, single S2, no murmurs, no rubs, no gallops  Lungs: bibasilar rales  Abdomen: obese, normoactive bowel sounds, soft nontender, nondistended, no rebound or rigidity, no guarding  Extremities: no cyanosis, no clubbing, no edema    Labs: I have personally reviewed pertinent lab results  , CBC: No results found for: WBC, HGB, HCT, MCV, PLT, ADJUSTEDWBC, MCH, MCHC, RDW, MPV, NRBC, CMP: No results found for: SODIUM, K, CL, CO2, ANIONGAP, BUN, CREATININE, GLUCOSE, CALCIUM, AST, ALT, ALKPHOS, PROT, BILITOT, EGFR  Imaging and other studies: none      Tristan Miller

## 2020-09-18 NOTE — PLAN OF CARE
Problem: PHYSICAL THERAPY ADULT  Goal: Performs mobility at highest level of function for planned discharge setting  See evaluation for individualized goals  Description: Treatment/Interventions: Functional transfer training, LE strengthening/ROM, Therapeutic exercise, Endurance training, Patient/family training, Equipment eval/education, Bed mobility, Gait training, Spoke to nursing, OT  Equipment Recommended: Nazario Zheng) Walker       See flowsheet documentation for full assessment, interventions and recommendations  Outcome: Progressing  Note: Prognosis: Good  Problem List: Decreased strength, Decreased endurance, Impaired balance, Decreased mobility, Obesity, Pain  Assessment: Pt seen for PT per POC  Improved mobility & activity tolerance noted this tx session  Pt progressed to S for amb w/ RW + improved amb tolerance compared to previous PT session  However pt require minAx1 for supine to sit 2* to inc lower abdominal pain  See above levels of assistance required for all functional tasks  Gait deviations as above, slow w/ dec foot clearance but no gross LOB noted  Occasional cues to stay within RW JAKE  Pt tolerated above mentioned thera  ex well, AROM  No dizziness reported t/o session  Nsg staff most recent vital signs as follows: /71 (BP Location: Left arm)   Pulse (!) 108   Temp 98 7 °F (37 1 °C) (Oral)   Resp 17   Ht 5' 7" (1 702 m)   Wt 110 kg (243 lb 2 7 oz)   SpO2 94%   BMI 38 09 kg/m²   Will continue PT per POC  PT goals extended [id number]  At end of session, pt OOB in chair w/o issues, call bell & phone in reach  Fall precautions reinforced w/ good understanding  Will continue to recommend HHPT and inc family support at D/C  CM to follow  Nsg staff to continue to mobilized pt (OOB in chair for all meals & ambulate in room/unit) as tolerated to prevent decline in function  Nsg notified     Barriers to Discharge: Inaccessible home environment  Barriers to Discharge Comments: (+) CRUZ  PT Discharge Recommendation: Home with skilled therapy, Return to previous environment with social support(HHPT w/ family support)     PT - OK to Discharge: Yes(when med cleared)    See flowsheet documentation for full assessment

## 2020-09-18 NOTE — PROGRESS NOTES
Progress Note - Merly Rizvi [facility] [place] 1935, 82 y o  male MRN: [id number]    Unit/Bed#: [facility] 68 2 -01 Encounter: [id number]    Primary Care Provider: No primary care provider on file  Date and time admitted to hospital: 9/7/2020  4:48 PM        Acute respiratory failure with hypoxia [facility])  Assessment & Plan  ? In light of bladder perforation - was extubated in icu - now on RA satting well   ? No baseline needs  · Seen by pulmonary today     Acute blood loss anemia  Assessment & Plan  Secondary to gross hematuria  Continue monitoring for the time being  Currently asymptomatic  No indication for transfusion at this time unless it continues to drop further  Hematuria resolved post surgery 9/17- hb was 7 9 repeat yvette     Recent Labs     09/15/20  0446 09/15/20  1832 09/15/20  1858 09/16/20  0455 09/17/20  0350   HGB 9 4* 9 4* 9 2* 8 1* 7 9*   MCV 95 97 97 95 96   RDW 14 6 14 9 14 9 14 6 15 1     ? s/p 4U PRBC as of now when in icu   ? Baseline: 11-12  ? 8 today AM, satble  ? Monitor Hb, if drops below 8 consider additional PRBC due to cardiomyopathy     Complicated UTI (urinary tract infection)  Assessment & Plan  · Cefepime for 7 days - 4/7    Sepsis [facility])  Assessment & Plan  ? Secondary to complicated UTI resolved continue antibiotics he is on day 4/7    Poorly controlled type 2 diabetes mellitus [facility])  Assessment & Plan  Lab Results   Component Value Date    HGBA1C 7 7 (H) 08/17/2020       Recent Labs     09/16/20  2029 09/16/20  2339 09/17/20  0515 09/17/20  0658   POCGLU 115 110 111 109       Blood Sugar Average: Last 72 hrs:  (P) 182 625   IASS,     ? On metformin, lantus 20, and januvia at home, A1c 7 7 in 08/2020  ?  Continue Lantus 20 at night his fasting is controlled but his pre meals   uncontrolled will start him on Humalog b i d  4 units with lunch and dinner    Hyperlipidemia  Assessment & Plan  Continue statin    Essential hypertension  Assessment & Plan  -patient has a history of essential hypertension  Blood pressure is actually controlled his only on isosorbide , his arb coreg on hold due to previous hypotension - recheck throughout stay if able to restart 1 x 1 avoid hypertension    GERD (gastroesophageal reflux disease)  Assessment & Plan  Continue Famotidine    Acute on chronic combined systolic and diastolic congestive heart failure (HCC)  Assessment & Plan  Wt Readings from Last 3 Encounters:   09/17/20 108 kg (237 lb 14 oz)   09/07/20 98 kg (216 lb)   09/04/20 99 3 kg (219 lb)     -patient has a history of chronic combined systolic and diastolic congestive heart failure  -most recent echocardiogram 9/8 with left ventricular ejection fraction 30% and grade 1 diastolic dysfunction  HOLD Coreg, with blood pressure hold parameters, continue isosorbide, ARB- on hold   ? To be assessed outpatient for ICD, life vest discussed with patient - if wishes to get one, patient will reach out to team- patient expressed today 9/18 he wishes for life vest contacted cardiology PA will ask rep to come in either today will come in or yvette   · Patient has gained from September 12 he was 209 lb to now he is 243 lb he has been started on Lasix 20 mg IV b i d  On 09/17 he put out 4 L but his weight is questionable accuracy I did ask nursing to weigh him standing and continue Lasix at same dose- as good output -   · He does have some leg edema and some crackles  · Start potassium supplementation KCL 20 mEq p o  Daily  · Renal function tomorrow with potassium        CAD (coronary artery disease)  Assessment & Plan  -patient has a history of coronary artery disease  -he underwent preoperative cardiac testing with a cardiac catheterization that did not have any significant coronary disease  -continue beta-blocker, nitrates, and ARB  -aspirin/Plavix on hold due to hematuria- hematuria has resolved- there is some blood in the MARYJANE drainage    Hemoglobin was 7 9 9/17- recheck hemoglobin in sure stability and then  discuss with Urology when  may resume the antiplatelet    * Extraperitoneal bladder perforation  Assessment & Plan  - Continue Flomax and Finasteride- bph with obstruction   - previously s/p Status post TURP, status post extravasation of bladder your again into the preperitoneal space, status post exploratory laparotomy and now POD #3- Status post exploratory laparotomy with closure of small cystotomy from suprapubic tube  · Estrella draining well, the MARYJANE drain there- to be removed by Urology  · Pain is controlled  · No hematuria  · Continue flomax       Gross hematuriaresolved as of 9/18/2020  Assessment & Plan  -patient has a history of chronic indwelling Estrella  -patient was admitted for treatment of gross hematuria, with associated acute blood loss anemia, and pain from his urinary catheterization  -was placed on CBI  -patient did not tolerate CBI clamping and had persistent hematuria  -underwent cardiac preoperative testing  Cardiac catheterization without any evidence of significant coronary disease  -patient underwent operative intervention 9/14, with cystoscopy and TURP, required intraoperative transfusion for significant hematuria  -continue to hold aspirin/Plavix  -patient with distended, firm abdomen today:  CC revealed balloon of the suprapubic tube may have ruptured with possible tip in the pre peritoneal space  -CT cystogram revealed small focal perforation in the anterior wall of the urinary bladder  -pt now with fever, tachycardia, abd pain:  Will give stat cefepime, and transfer to ICU  -urology team at bedside evaluating further intervention        VTE Pharmacologic Prophylaxis:   Pharmacologic: Enoxaparin (Lovenox)  Mechanical VTE Prophylaxis in Place: Yes    Patient Centered Rounds: I have performed bedside rounds with nursing staff today      Discussions with Specialists or Other Care Team Provider:  Pulmonology    Education and Discussions with Family / Patient:  Daughter and wife at bedside    Time Spent for Care: 30 minutes  More than 50% of total time spent on counseling and coordination of care as described above  Current Length of Stay: 11 day(s)    Current Patient Status: Inpatient   Certification Statement: The patient will continue to require additional inpatient hospital stay due to Diureses, IV antibiotics    Discharge Plan:  After the weekend    Code Status: Level 1 - Full Code      Subjective:   Patient seen and examined us complaining of incisional pain but he had a BM today no chest pain or shortness of breath    Objective:     Vitals:   Temp (24hrs), Av 6 °F (37 °C), Min:98 3 °F (36 8 °C), Max:99 1 °F (37 3 °C)    Temp:  [98 3 °F (36 8 °C)-99 1 °F (37 3 °C)] 98 3 °F (36 8 °C)  HR:  [] 95  Resp:  [17-20] 20  BP: (100-132)/() 126/90  SpO2:  [93 %-98 %] 94 %  Body mass index is 38 09 kg/m²  Input and Output Summary (last 24 hours): Intake/Output Summary (Last 24 hours) at 2020 1426  Last data filed at 2020 0933  Gross per 24 hour   Intake 0 ml   Output 4910 ml   Net -4910 ml       Physical Exam:     Physical Exam  Constitutional:       Appearance: Normal appearance  Eyes:      Extraocular Movements: Extraocular movements intact  Pupils: Pupils are equal, round, and reactive to light  Neck:      Musculoskeletal: Normal range of motion and neck supple  Cardiovascular:      Rate and Rhythm: Normal rate and regular rhythm  Pulmonary:      Breath sounds: Rales present  Abdominal:      General: Abdomen is flat  Bowel sounds are normal       Tenderness: There is abdominal tenderness (lower abdomen)  Musculoskeletal:         General: Swelling (mild) present  Neurological:      General: No focal deficit present  Mental Status: He is alert and oriented to person, place, and time  Mental status is at baseline     Psychiatric:         Mood and Affect: Mood normal            Additional Data:     Labs:    Results from last 7 days   Lab Units 20  8605 09/15/20  1832 09/15/20  0446   WBC Thousand/uL 15 59*   < > 21 41* 14 77*   HEMOGLOBIN g/dL 7 9*   < > 9 4* 9 4*   HEMATOCRIT % 24 8*   < > 29 3* 28 4*   PLATELETS Thousands/uL 173   < > 221 202   BANDS PCT %  --   --  15*  --    NEUTROS PCT %  --   --   --  89*   LYMPHS PCT %  --   --   --  5*   LYMPHO PCT %  --   --  6*  --    MONOS PCT %  --   --   --  5   MONO PCT %  --   --  2*  --    EOS PCT %  --   --  0 0    < > = values in this interval not displayed  Results from last 7 days   Lab Units 09/17/20  0350  09/15/20  1858   SODIUM mmol/L 139   < > 134*   POTASSIUM mmol/L 3 6   < > 3 7   CHLORIDE mmol/L 110*   < > 105   CO2 mmol/L 23   < > 19*   BUN mg/dL 11   < > 12   CREATININE mg/dL 0 79   < > 1 04   ANION GAP mmol/L 6   < > 10   CALCIUM mg/dL 7 5*   < > 7 1*   ALBUMIN g/dL  --   --  2 9*   TOTAL BILIRUBIN mg/dL  --   --  0 48   ALK PHOS U/L  --   --  53   ALT U/L  --   --  16   AST U/L  --   --  12   GLUCOSE RANDOM mg/dL 107   < > 207*    < > = values in this interval not displayed  Results from last 7 days   Lab Units 09/18/20  1118 09/18/20  0703 09/17/20  2107 09/17/20  1610 09/17/20  8652 09/17/20  0515 09/16/20  2339 09/16/20  2029 09/16/20  1743 09/16/20  1133 09/16/20  0719 09/15/20  2056   POC GLUCOSE mg/dl 205* 138 268* 244* 109 111 110 115 125 165* 172* 202*         Results from last 7 days   Lab Units 09/17/20  0430 09/16/20  1208 09/15/20  1858   LACTIC ACID mmol/L  --   --  1 9   PROCALCITONIN ng/ml 3 12* 4 10*  --            * I Have Reviewed All Lab Data Listed Above  * Additional Pertinent Lab Tests Reviewed: All Labs Within Last 24 Hours Reviewed    Imaging:    Imaging Reports Reviewed Today Include: none today   Imaging Personally Reviewed by Myself Includes:      Recent Cultures (last 7 days):     Results from last 7 days   Lab Units 09/15/20  2016 09/15/20  1859 09/14/20  1737   BLOOD CULTURE  No Growth at 48 hrs  No Growth at 48 hrs    --    URINE CULTURE   --   --  >100,000 cfu/ml Pseudomonas aeruginosa*  >100,000 cfu/ml Morganella morganii*       Last 24 Hours Medication List:   Current Facility-Administered Medications   Medication Dose Route Frequency Provider Last Rate    acetaminophen  650 mg Oral Q6H PRN Musa Ruelas MD      Or    oxyCODONE-acetaminophen  1 tablet Oral Q6H PRN Musa Ruelas MD      Or    oxyCODONE-acetaminophen  2 tablet Oral Q6H PRN Musa Ruelas MD      albuterol  2 puff Inhalation Q4H PRN Musa Ruelas MD      belladonna-opium  30 mg Rectal Q8H PRN Musa Ruelas MD      calcium carbonate  500 mg Oral Daily PRN Madalyn Jeronimo MD      cefepime  2,000 mg Intravenous Q12H Musa Ruelas MD 2,000 mg (09/18/20 0553)    docusate sodium  100 mg Oral Q12H Anita Avendaño MD      enoxaparin  40 mg Subcutaneous Q24H [address] Anita Avendaño MD      famotidine  20 mg Oral Daily Anita Avendaño MD      finasteride  5 mg Oral Daily Musa Ruelas MD      furosemide  20 mg Intravenous Q12H Musa Ruelas MD      gabapentin  300 mg Oral BID Musa Ruelas MD      insulin glargine  20 Units Subcutaneous HS Musa Ruelas MD      insulin lispro  1-6 Units Subcutaneous TID With Meals Musa Ruelas MD      insulin lispro  4 Units Subcutaneous BID before lunch/dinner Gabriele Muro MD      isosorbide mononitrate  30 mg Oral Daily Musa Ruelas MD      morphine injection  1 mg Intravenous Q4H PRN Musa Ruelas MD      nitroglycerin  0 4 mg Sublingual Q5 Min PRN Musa Ruelas MD      oxybutynin  5 mg Oral Daily Musa Ruelas MD      potassium chloride  20 mEq Oral Daily Gabriele Muro MD      pravastatin  40 mg Oral Daily With Xavier Carvre MD      tamsulosin  0 8 mg Oral Daily With Xavier Carver MD          Today, Patient Was Seen By: Gabriele Muro MD    ** Please Note: Dictation voice to text software may have been used in the creation of this document   **

## 2020-09-18 NOTE — PLAN OF CARE
Problem: OCCUPATIONAL THERAPY ADULT  Goal: Performs self-care activities at highest level of function for planned discharge setting  See evaluation for individualized goals  Description: Treatment Interventions: ADL retraining, Functional transfer training, UE strengthening/ROM, Endurance training, Patient/family training, Equipment evaluation/education, Compensatory technique education, Energy conservation, Activityengagement  Equipment Recommended: Other (comment)(RW)       See flowsheet documentation for full assessment, interventions and recommendations  Note: Limitation: Decreased ADL status, Decreased Safe judgement during ADL, Decreased endurance, Decreased self-care trans, Decreased high-level ADLs, Decreased UE strength  Prognosis: Good  Assessment: Pt seen for 30 min tx session with focus on functional balance, functional mobility, ADL status, activity tolerance, and transfer safety  Pt able to tolerate OOB mobility; sitting balance=f/f+, standing balance=f/f-  Pt required minimal assistance with LE ADLs  Pt required assistance with bed mobility and to maintain transfer safety  Pt able to demonstrate good cognition(i e orientation)  Tx tolerated well  Will continue        OT Discharge Recommendation: (home PT/OT)  OT - OK to Discharge: (when medically cleared)

## 2020-09-19 ENCOUNTER — TELEPHONE (OUTPATIENT)
Dept: UROLOGY | Facility: AMBULATORY SURGERY CENTER | Age: 85
End: 2020-09-19

## 2020-09-19 VITALS
RESPIRATION RATE: 19 BRPM | DIASTOLIC BLOOD PRESSURE: 63 MMHG | SYSTOLIC BLOOD PRESSURE: 124 MMHG | BODY MASS INDEX: 37.51 KG/M2 | OXYGEN SATURATION: 95 % | TEMPERATURE: 97.8 F | WEIGHT: 238.98 LBS | HEIGHT: 67 IN | HEART RATE: 93 BPM

## 2020-09-19 PROBLEM — E87.6 HYPOKALEMIA: Status: ACTIVE | Noted: 2020-09-19

## 2020-09-19 LAB
ANION GAP SERPL CALCULATED.3IONS-SCNC: 6 MMOL/L (ref 4–13)
BASOPHILS # BLD AUTO: 0.02 THOUSANDS/ΜL (ref 0–0.1)
BASOPHILS NFR BLD AUTO: 0 % (ref 0–1)
BUN SERPL-MCNC: 6 MG/DL (ref 5–25)
CALCIUM SERPL-MCNC: 8.3 MG/DL (ref 8.3–10.1)
CHLORIDE SERPL-SCNC: 104 MMOL/L (ref 100–108)
CO2 SERPL-SCNC: 30 MMOL/L (ref 21–32)
CREAT SERPL-MCNC: 0.9 MG/DL (ref 0.6–1.3)
EOSINOPHIL # BLD AUTO: 0.59 THOUSAND/ΜL (ref 0–0.61)
EOSINOPHIL NFR BLD AUTO: 7 % (ref 0–6)
ERYTHROCYTE [DISTWIDTH] IN BLOOD BY AUTOMATED COUNT: 14.2 % (ref 11.6–15.1)
GFR SERPL CREATININE-BSD FRML MDRD: 79 ML/MIN/1.73SQ M
GLUCOSE SERPL-MCNC: 146 MG/DL (ref 65–140)
GLUCOSE SERPL-MCNC: 148 MG/DL (ref 65–140)
GLUCOSE SERPL-MCNC: 220 MG/DL (ref 65–140)
HCT VFR BLD AUTO: 27.2 % (ref 36.5–49.3)
HGB BLD-MCNC: 8.8 G/DL (ref 12–17)
IMM GRANULOCYTES # BLD AUTO: 0.07 THOUSAND/UL (ref 0–0.2)
IMM GRANULOCYTES NFR BLD AUTO: 1 % (ref 0–2)
LYMPHOCYTES # BLD AUTO: 0.96 THOUSANDS/ΜL (ref 0.6–4.47)
LYMPHOCYTES NFR BLD AUTO: 11 % (ref 14–44)
MCH RBC QN AUTO: 30.2 PG (ref 26.8–34.3)
MCHC RBC AUTO-ENTMCNC: 32.4 G/DL (ref 31.4–37.4)
MCV RBC AUTO: 94 FL (ref 82–98)
MONOCYTES # BLD AUTO: 0.81 THOUSAND/ΜL (ref 0.17–1.22)
MONOCYTES NFR BLD AUTO: 9 % (ref 4–12)
NEUTROPHILS # BLD AUTO: 6.68 THOUSANDS/ΜL (ref 1.85–7.62)
NEUTS SEG NFR BLD AUTO: 72 % (ref 43–75)
NRBC BLD AUTO-RTO: 0 /100 WBCS
PLATELET # BLD AUTO: 251 THOUSANDS/UL (ref 149–390)
PMV BLD AUTO: 10.1 FL (ref 8.9–12.7)
POTASSIUM SERPL-SCNC: 3.2 MMOL/L (ref 3.5–5.3)
RBC # BLD AUTO: 2.91 MILLION/UL (ref 3.88–5.62)
SODIUM SERPL-SCNC: 140 MMOL/L (ref 136–145)
WBC # BLD AUTO: 9.13 THOUSAND/UL (ref 4.31–10.16)

## 2020-09-19 PROCEDURE — 85025 COMPLETE CBC W/AUTO DIFF WBC: CPT | Performed by: FAMILY MEDICINE

## 2020-09-19 PROCEDURE — 99024 POSTOP FOLLOW-UP VISIT: CPT | Performed by: UROLOGY

## 2020-09-19 PROCEDURE — 82948 REAGENT STRIP/BLOOD GLUCOSE: CPT

## 2020-09-19 PROCEDURE — 80048 BASIC METABOLIC PNL TOTAL CA: CPT | Performed by: FAMILY MEDICINE

## 2020-09-19 PROCEDURE — 99239 HOSP IP/OBS DSCHRG MGMT >30: CPT | Performed by: FAMILY MEDICINE

## 2020-09-19 RX ORDER — POTASSIUM CHLORIDE 20 MEQ/1
20 TABLET, EXTENDED RELEASE ORAL DAILY
Qty: 30 TABLET | Refills: 3 | Status: SHIPPED | OUTPATIENT
Start: 2020-09-20 | End: 2020-11-03 | Stop reason: SDUPTHER

## 2020-09-19 RX ORDER — POTASSIUM CHLORIDE 20 MEQ/1
20 TABLET, EXTENDED RELEASE ORAL ONCE
Status: COMPLETED | OUTPATIENT
Start: 2020-09-19 | End: 2020-09-19

## 2020-09-19 RX ADMIN — CEFEPIME HYDROCHLORIDE 2000 MG: 2 INJECTION, POWDER, FOR SOLUTION INTRAVENOUS at 06:07

## 2020-09-19 RX ADMIN — GABAPENTIN 300 MG: 300 CAPSULE ORAL at 08:13

## 2020-09-19 RX ADMIN — POTASSIUM CHLORIDE 20 MEQ: 1500 TABLET, EXTENDED RELEASE ORAL at 12:33

## 2020-09-19 RX ADMIN — OXYCODONE HYDROCHLORIDE AND ACETAMINOPHEN 1 TABLET: 5; 325 TABLET ORAL at 08:13

## 2020-09-19 RX ADMIN — POTASSIUM CHLORIDE 20 MEQ: 1500 TABLET, EXTENDED RELEASE ORAL at 08:13

## 2020-09-19 RX ADMIN — ISOSORBIDE MONONITRATE 30 MG: 30 TABLET, EXTENDED RELEASE ORAL at 08:12

## 2020-09-19 RX ADMIN — FAMOTIDINE 20 MG: 20 TABLET ORAL at 08:13

## 2020-09-19 RX ADMIN — FUROSEMIDE 20 MG: 10 INJECTION, SOLUTION INTRAMUSCULAR; INTRAVENOUS at 08:11

## 2020-09-19 RX ADMIN — ENOXAPARIN SODIUM 40 MG: 40 INJECTION SUBCUTANEOUS at 08:11

## 2020-09-19 RX ADMIN — DOCUSATE SODIUM 100 MG: 100 CAPSULE, LIQUID FILLED ORAL at 08:13

## 2020-09-19 RX ADMIN — FINASTERIDE 5 MG: 5 TABLET, FILM COATED ORAL at 08:13

## 2020-09-19 RX ADMIN — OXYBUTYNIN CHLORIDE 5 MG: 5 TABLET, EXTENDED RELEASE ORAL at 08:13

## 2020-09-19 NOTE — ASSESSMENT & PLAN NOTE
Wt Readings from Last 3 Encounters:   09/19/20 108 kg (238 lb 15 7 oz)   09/07/20 98 kg (216 lb)   09/04/20 99 3 kg (219 lb)     -patient has a history of chronic combined systolic and diastolic congestive heart failure  -most recent echocardiogram 9/8 with left ventricular ejection fraction 30% and grade 1 diastolic dysfunction  HOLD Coreg, with blood pressure hold parameters, continue isosorbide, ARB- on hold   ? To be assessed outpatient for ICD, life vest discussed with patient - if wishes to get one, patient will reach out to team- patient expressed today 9/18 he wishes for life vest contacted cardiology PA will ask rep to come in either today will come in or yvette   · Patient has gained from September 12 he was 209 lb to now he is 243 lb he has been started on Lasix 20 mg IV b i d  On 09/17 he put out 4 L but his weight is questionable accuracy   · He does have some leg edema and some crackles  · Continue potassium supplementation KCL 20 mEq p o   Daily  ·

## 2020-09-19 NOTE — PROGRESS NOTES
Status post TURP followed by exploratory laparotomy for retroperitoneal fluid with removal of suprapubic tube  Pathology from TURP reveals 50 g of benign tissue  His MARYJANE drain has been removed  CBI has discontinued  His abdomen is soft nontender nondistended  Staples are in place    Estrella catheter is in place draining clear yellow urine    Impression:  BPH, status post TURP, status post exploratory laparotomy with closure of small bladder cystotomy from suprapubic tube    Plan:  Out of bed and ambulate  Hep-Lock IV fluids  Regular diet  Discharge home with Estrella catheter to gravity drainage  Follow up in the outpatient setting for catheter removal this week

## 2020-09-19 NOTE — ASSESSMENT & PLAN NOTE
- Continue Flomax and Finasteride- bph with obstruction   - previously s/p Status post TURP, status post extravasation of bladder your again into the preperitoneal space, status post exploratory laparotomy and now POD #4- Status post exploratory laparotomy with closure of small cystotomy from suprapubic tube    · Estrella draining well, the MARYJANE drain there- to be removed by Urology  · Pain is controlled  · No hematuria  · Continue flomax   · Discharge home

## 2020-09-19 NOTE — ASSESSMENT & PLAN NOTE
-patient has a history of coronary artery disease  -he underwent preoperative cardiac testing with a cardiac catheterization that did not have any significant coronary disease  -continue beta-blocker, nitrates, and ARB

## 2020-09-19 NOTE — PLAN OF CARE
Problem: PAIN - ADULT  Goal: Verbalizes/displays adequate comfort level or baseline comfort level  Description: Interventions:  - Encourage patient to monitor pain and request assistance  - Assess pain using appropriate pain scale  - Administer analgesics based on type and severity of pain and evaluate response  - Implement non-pharmacological measures as appropriate and evaluate response  - Consider cultural and social influences on pain and pain management  - Notify physician/advanced practitioner if interventions unsuccessful or patient reports new pain  Outcome: Progressing     Problem: INFECTION - ADULT  Goal: Absence or prevention of progression during hospitalization  Description: INTERVENTIONS:  - Assess and monitor for signs and symptoms of infection  - Monitor lab/diagnostic results  - Monitor all insertion sites, i e  indwelling lines, tubes, and drains  - Monitor endotracheal if appropriate and nasal secretions for changes in amount and color  - [place] appropriate cooling/warming therapies per order  - Administer medications as ordered  - Instruct and encourage patient and family to use good hand hygiene technique  - Identify and instruct in appropriate isolation precautions for identified infection/condition  Outcome: Progressing     Problem: SAFETY ADULT  Goal: Patient will remain free of falls  Description: INTERVENTIONS:  - Assess patient frequently for physical needs  -  Identify cognitive and physical deficits and behaviors that affect risk of falls    -  [place] fall precautions as indicated by assessment   - Educate patient/family on patient safety including physical limitations  - Instruct patient to call for assistance with activity based on assessment  - Modify environment to reduce risk of injury  - Consider OT/PT consult to assist with strengthening/mobility  Outcome: Progressing  Goal: Maintain or return to baseline ADL function  Description: INTERVENTIONS:  -  Assess patient's ability to carry out ADLs; assess patient's baseline for ADL function and identify physical deficits which impact ability to perform ADLs (bathing, care of mouth/teeth, toileting, grooming, dressing, etc )  - Assess/evaluate cause of self-care deficits   - Assess range of motion  - Assess patient's mobility; develop plan if impaired  - Assess patient's need for assistive devices and provide as appropriate  - Encourage maximum independence but intervene and supervise when necessary  - Involve family in performance of ADLs  - Assess for home care needs following discharge   - Consider OT consult to assist with ADL evaluation and planning for discharge  - Provide patient education as appropriate  Outcome: Progressing  Goal: Maintain or return mobility status to optimal level  Description: INTERVENTIONS:  - Assess patient's baseline mobility status (ambulation, transfers, stairs, etc )    - Identify cognitive and physical deficits and behaviors that affect mobility  - Identify mobility aids required to assist with transfers and/or ambulation (gait belt, sit-to-stand, lift, walker, cane, etc )  - [place] fall precautions as indicated by assessment  - Record patient progress and toleration of activity level on Mobility SBAR; progress patient to next Phase/Stage  - Instruct patient to call for assistance with activity based on assessment  - Consider rehabilitation consult to assist with strengthening/weightbearing, etc   Outcome: Progressing     Problem: DISCHARGE PLANNING  Goal: Discharge to home or other facility with appropriate resources  Description: INTERVENTIONS:  - Identify barriers to discharge w/patient and caregiver  - Arrange for needed discharge resources and transportation as appropriate  - Identify discharge learning needs (meds, wound care, etc )  - Arrange for interpretive services to assist at discharge as needed  - Refer to Case Management Department for coordinating discharge planning if the patient needs post-hospital services based on physician/advanced practitioner order or complex needs related to functional status, cognitive ability, or social support system  Outcome: Progressing     Problem: Knowledge Deficit  Goal: Patient/family/caregiver demonstrates understanding of disease process, treatment plan, medications, and discharge instructions  Description: Complete learning assessment and assess knowledge base  Interventions:  - Provide teaching at level of understanding  - Provide teaching via preferred learning methods  Outcome: Progressing     Problem: Potential for Falls  Goal: Patient will remain free of falls  Description: INTERVENTIONS:  - Assess patient frequently for physical needs  -  Identify cognitive and physical deficits and behaviors that affect risk of falls    -  [place] fall precautions as indicated by assessment   - Educate patient/family on patient safety including physical limitations  - Instruct patient to call for assistance with activity based on assessment  - Modify environment to reduce risk of injury  - Consider OT/PT consult to assist with strengthening/mobility  Outcome: Progressing     Problem: GENITOURINARY - ADULT  Goal: Maintains or returns to baseline urinary function  Description: INTERVENTIONS:  - Assess urinary function  - Encourage oral fluids to ensure adequate hydration if ordered  - Administer IV fluids as ordered to ensure adequate hydration  - Administer ordered medications as needed  - Offer frequent toileting  - Follow urinary retention protocol if ordered  Outcome: Progressing  Goal: Absence of urinary retention  Description: INTERVENTIONS:  - Assess patients ability to void and empty bladder  - Monitor I/O  - Bladder scan as needed  - Discuss with physician/AP medications to alleviate retention as needed  - Discuss catheterization for long term situations as appropriate  Outcome: Progressing  Goal: Urinary catheter remains patent  Description: INTERVENTIONS:  - Assess patency of urinary catheter  - If patient has a chronic [place], consider changing catheter if non-functioning  - Follow guidelines for intermittent irrigation of non-functioning urinary catheter  Outcome: Progressing     Problem: Prexisting or High Potential for Compromised Skin Integrity  Goal: Skin integrity is maintained or improved  Description: INTERVENTIONS:  - Identify patients at risk for skin breakdown  - Assess and monitor skin integrity  - Assess and monitor nutrition and hydration status  - Monitor labs   - Assess for incontinence   - Turn and reposition patient  - Assist with mobility/ambulation  - Relieve pressure over bony prominences  - Avoid friction and shearing  - Provide appropriate hygiene as needed including keeping skin clean and dry  - Evaluate need for skin moisturizer/barrier cream  - Collaborate with interdisciplinary team   - Patient/family teaching  - Consider wound care consult   Outcome: Progressing     Problem: Nutrition/Hydration-ADULT  Goal: Nutrient/Hydration intake appropriate for improving, restoring or maintaining nutritional needs  Description: Monitor and assess patient's nutrition/hydration status for malnutrition  Collaborate with interdisciplinary team and initiate plan and interventions as ordered  Monitor patient's weight and dietary intake as ordered or per policy  Utilize nutrition screening tool and intervene as necessary  Determine patient's food preferences and provide high-protein, high-caloric foods as appropriate       INTERVENTIONS:  - Monitor oral intake, urinary output, labs, and treatment plans  - Assess nutrition and hydration status and recommend course of action  - Evaluate amount of meals eaten  - Assist patient with eating if necessary   - Allow adequate time for meals  - Recommend/ encourage appropriate diets, oral nutritional supplements, and vitamin/mineral supplements  - Order, calculate, and assess calorie counts as needed  - Recommend, monitor, and adjust tube feedings and TPN/PPN based on assessed needs  - Assess need for intravenous fluids  - Provide specific nutrition/hydration education as appropriate  - Include patient/family/caregiver in decisions related to nutrition  Outcome: Progressing

## 2020-09-19 NOTE — SOCIAL WORK
CECILE was contacted by Oseas Yen from [employer] [phone] who requested CM fax him a copy of pt's insurance card  CM did this  Informed Oseas Yen that pt has a Medicare out of [address] [place] [address] if Life Vest will be covered or not  Oseas Yen will get back to   CM spoke with pt's son Anabell Rothman: [phone]  Explained that  is aware that PT/OT recommends home PT but unfortunately [place] insurance will not cover this while pt is in [place]  Son reports he continues to work on switching Medicare to [place]  He has spoken with social security several times about this over the past few weeks and reports he believes the transfer should occur within the next week and that pt will be able to switch to Medicare primary on October 1  He reports he will follow up with the social security administration on Monday to continue working towards this process  CECILE following

## 2020-09-19 NOTE — TELEPHONE ENCOUNTER
Patient underwent TURP along with exploratory laparotomy  He has staples in place  Recommend follow-up with AP in [facility] for Estrella catheter removal as well as staple removal in the next 1 week  Please call the patient's son to arrange

## 2020-09-19 NOTE — ASSESSMENT & PLAN NOTE
Lab Results   Component Value Date    HGBA1C 7 7 (H) 08/17/2020       Recent Labs     09/18/20  1559 09/18/20  2109 09/19/20  0730 09/19/20  1114   POCGLU 262* 218* 146* 220*       Blood Sugar Average: Last 72 hrs:  (P) 684 [id number]   IASS,     ? On metformin, lantus 20, and januvia at home, A1c 7 7 in 08/2020  ?  Continue Lantus 20 at night

## 2020-09-19 NOTE — ASSESSMENT & PLAN NOTE
Secondary to gross hematuria  Continue monitoring for the time being  Currently asymptomatic  No indication for transfusion    Recent Labs     09/17/20  0350 09/19/20  0619   HGB 7 9* 8 8*   MCV 96 94   RDW 15 1 14 2     ? s/p 4U PRBC as of now when in icu   ? Baseline: 11-12  ? 8 today AM, satble  ?  Monitor Hb, if drops below 8 consider additional PRBC due to cardiomyopathy

## 2020-09-19 NOTE — DISCHARGE SUMMARY
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 In light of bladder perforation - was extubated in icu - now on RA satting well   ? No baseline needs  · Seen by pulmonary today     Acute blood loss anemia  Assessment & Plan  Secondary to gross hematuria  Continue monitoring for the time being  Currently asymptomatic  No indication for transfusion    Recent Labs     09/17/20  0350 09/19/20  0619   HGB 7 9* 8 8*   MCV 96 94   RDW 15 1 14 2     ? s/p 4U PRBC as of now when in icu   ? Baseline: 11-12  ? 8 today AM, satble  ? Monitor Hb, if drops below 8 consider additional PRBC due to cardiomyopathy     Complicated UTI (urinary tract infection)  Assessment & Plan  · Cefepime for 7 days - 5/7  · No more antibiotic need it    Sepsis [facility])  Assessment & Plan  ?  Secondary to complicated UTI resolved continue antibiotics he is on day 4/7    Poorly controlled type 2 diabetes mellitus [facility])  Assessment & Plan  Lab Results   Component Value Date    HGBA1C 7 7 (H) 08/17/2020       Recent Labs     09/18/20  1559 09/18/20  2109 09/19/20  0730 09/19/20  1114   POCGLU 262* 218* 146* 220*       Blood Sugar Average: Last 72 hrs:  (P) 713 [id number]   IASS,     ? On metformin, lantus 20, and januvia at home, A1c 7 7 in 08/2020  ? Continue Lantus 20 at night    Hyperlipidemia  Assessment & Plan  Continue statin    Essential hypertension  Assessment & Plan  -patient has a history of essential hypertension  Blood pressure is actually controlled his only on isosorbide , his arb coreg on hold due to previous hypotension - recheck throughout stay if able to restart 1 x 1 avoid hypertension    GERD (gastroesophageal reflux disease)  Assessment & Plan  Continue Famotidine    Acute on chronic combined systolic and diastolic congestive heart failure (HCC)  Assessment & Plan  Wt Readings from Last 3 Encounters:   09/19/20 108 kg (238 lb 15 7 oz)   09/07/20 98 kg (216 lb)   09/04/20 99 3 kg (219 lb)     -patient has a history of chronic combined systolic and diastolic congestive heart failure  -most recent echocardiogram 9/8 with left ventricular ejection fraction 30% and grade 1 diastolic dysfunction  HOLD Coreg, with blood pressure hold parameters, continue isosorbide, ARB- on hold   ? To be assessed outpatient for ICD, life vest discussed with patient - if wishes to get one, patient will reach out to team- patient expressed today 9/18 he wishes for life vest contacted cardiology PA will ask rep to come in either today will come in or yvette   · Patient has gained from September 12 he was 209 lb to now he is 243 lb he has been started on Lasix 20 mg IV b i d  On 09/17 he put out 4 L but his weight is questionable accuracy   · He does have some leg edema and some crackles  · Continue potassium supplementation KCL 20 mEq p o   Daily  ·         CAD (coronary artery disease)  Assessment & Plan  -patient has a history of coronary artery disease  -he underwent preoperative cardiac testing with a cardiac catheterization that did not have any significant coronary disease  -continue beta-blocker, nitrates, and ARB          Discharging Physician / Practitioner: Gopi Ruiz MD  PCP: No primary care provider on file  Admission Date:   Admission Orders (From admission, onward)     Ordered        09/07/20 1928  Inpatient Admission  Once                   Discharge Date: 09/19/20    Resolved Problems  Date Reviewed: 9/19/2020          Resolved    Gross hematuria 9/18/2020     Resolved by  Maurisio Sanders MD    Bladder perforation, intraoperative 9/17/2020     Resolved by  Jimmie Ventura MD          Consultations During Hospital Stay:  · Urology    Procedures Performed:   · TURB    Significant Findings / Test Results:   ·     Incidental Findings:   ·     Test Results Pending at Discharge (will require follow up):   ·      Outpatient Tests Requested:  ·     Complications:      Reason for Admission: Hematuria    Hospital Course:     Kandi Mcgowan is a 82 y o  male patient who originally presented to the hospital on 9/7/2020 due to gross hematuria reason why he was evaluated by urology  Found to have complicated UTI, Given IV antibiotic 5 days already  He developed sepsis which has improved and acute respiratory failure status post intubation  Currently asymptomatic  Electrolytes imbalance has been replaced  [place] combined with acute exacerbation was managed  Continue Lasix and K supplement  Please see above list of diagnoses and related plan for additional information  Condition at Discharge: stable     Discharge Day Visit / Exam:     Subjective:  I am good  Vitals: Blood Pressure: 124/63 (09/19/20 0716)  Pulse: 93 (09/19/20 0716)  Temperature: 97 8 °F (36 6 °C) (09/19/20 0716)  Temp Source: Oral (09/19/20 0716)  Respirations: 19 (09/19/20 0716)  Height: 5' 7" (170 2 cm) (09/16/20 0902)  Weight - Scale: 108 kg (238 lb 15 7 oz) (09/19/20 0546)  SpO2: 95 % (09/19/20 0716)  Exam:   Physical Exam  Constitutional:       Appearance: He is obese  He is not ill-appearing or diaphoretic     Cardiovascular:      Rate and Rhythm: Normal rate and regular rhythm  Pulses: Normal pulses  Heart sounds: Normal heart sounds  Pulmonary:      Effort: Pulmonary effort is normal       Breath sounds: Normal breath sounds  No stridor  Abdominal:      General: There is no distension  Palpations: Abdomen is soft  Tenderness: There is no abdominal tenderness  There is no guarding  Skin:     General: Skin is warm and dry  Neurological:      General: No focal deficit present  Mental Status: He is oriented to person, place, and time  Mental status is at baseline  Psychiatric:         Mood and Affect: Mood normal          Discussion with Family: spouse    Discharge instructions/Information to patient and family:   See after visit summary for information provided to patient and family  Provisions for Follow-Up Care:  See after visit summary for information related to follow-up care and any pertinent home health orders  Disposition:     Home    For Discharges to [facility]:   · Not Applicable to this Patient - Not Applicable to this Patient    Planned Readmission:      Discharge Statement:  I spent 30 minutes discharging the patient  This time was spent on the day of discharge  I had direct contact with the patient on the day of discharge  Greater than 50% of the total time was spent examining patient, answering all patient questions, arranging and discussing plan of care with patient as well as directly providing post-discharge instructions  Additional time then spent on discharge activities  Discharge Medications:  See after visit summary for reconciled discharge medications provided to patient and family        ** Please Note: This note has been constructed using a voice recognition system **

## 2020-09-20 LAB — BACTERIA BLD CULT: NORMAL

## 2020-09-21 ENCOUNTER — APPOINTMENT (EMERGENCY)
Dept: CT IMAGING | Facility: HOSPITAL | Age: 85
End: 2020-09-21
Payer: MEDICARE

## 2020-09-21 ENCOUNTER — APPOINTMENT (EMERGENCY)
Dept: RADIOLOGY | Facility: HOSPITAL | Age: 85
End: 2020-09-21
Payer: MEDICARE

## 2020-09-21 ENCOUNTER — HOSPITAL ENCOUNTER (OUTPATIENT)
Facility: HOSPITAL | Age: 85
Setting detail: OBSERVATION
Discharge: HOME | End: 2020-09-21
Attending: EMERGENCY MEDICINE | Admitting: INTERNAL MEDICINE
Payer: MEDICARE

## 2020-09-21 VITALS
SYSTOLIC BLOOD PRESSURE: 123 MMHG | HEIGHT: 67 IN | DIASTOLIC BLOOD PRESSURE: 89 MMHG | TEMPERATURE: 98.3 F | BODY MASS INDEX: 33.48 KG/M2 | OXYGEN SATURATION: 97 % | WEIGHT: 213.3 LBS | HEART RATE: 84 BPM | RESPIRATION RATE: 18 BRPM

## 2020-09-21 DIAGNOSIS — I50.43 ACUTE ON CHRONIC COMBINED SYSTOLIC AND DIASTOLIC CONGESTIVE HEART FAILURE (HCC): ICD-10-CM

## 2020-09-21 DIAGNOSIS — R06.02 SHORTNESS OF BREATH: ICD-10-CM

## 2020-09-21 DIAGNOSIS — R07.9 CHEST PAIN: ICD-10-CM

## 2020-09-21 DIAGNOSIS — N32.89 EXTRAPERITONEAL BLADDER PERFORATION: ICD-10-CM

## 2020-09-21 DIAGNOSIS — R79.89 ELEVATED BRAIN NATRIURETIC PEPTIDE (BNP) LEVEL: Primary | ICD-10-CM

## 2020-09-21 DIAGNOSIS — R91.1 PULMONARY NODULE: ICD-10-CM

## 2020-09-21 LAB
ALBUMIN SERPL BCP-MCNC: 2.6 G/DL (ref 3.5–5)
ALP SERPL-CCNC: 69 U/L (ref 46–116)
ALT SERPL W P-5'-P-CCNC: 23 U/L (ref 12–78)
ANION GAP SERPL CALCULATED.3IONS-SCNC: 7 MMOL/L (ref 4–13)
APTT PPP: 31 SECONDS (ref 23–37)
AST SERPL W P-5'-P-CCNC: 19 U/L (ref 5–45)
BACTERIA BLD CULT: NORMAL
BACTERIA UR QL AUTO: ABNORMAL /HPF
BASOPHILS # BLD AUTO: 0.03 THOUSANDS/ΜL (ref 0–0.1)
BASOPHILS NFR BLD AUTO: 0 % (ref 0–1)
BILIRUB SERPL-MCNC: 0.2 MG/DL (ref 0.2–1)
BILIRUB UR QL STRIP: NEGATIVE
BUN SERPL-MCNC: 8 MG/DL (ref 5–25)
CALCIUM ALBUM COR SERPL-MCNC: 9.8 MG/DL (ref 8.3–10.1)
CALCIUM SERPL-MCNC: 8.7 MG/DL (ref 8.3–10.1)
CHLORIDE SERPL-SCNC: 104 MMOL/L (ref 100–108)
CLARITY UR: CLEAR
CO2 SERPL-SCNC: 27 MMOL/L (ref 21–32)
COLOR UR: YELLOW
CREAT SERPL-MCNC: 0.92 MG/DL (ref 0.6–1.3)
EOSINOPHIL # BLD AUTO: 0.73 THOUSAND/ΜL (ref 0–0.61)
EOSINOPHIL NFR BLD AUTO: 6 % (ref 0–6)
ERYTHROCYTE [DISTWIDTH] IN BLOOD BY AUTOMATED COUNT: 14.3 % (ref 11.6–15.1)
GFR SERPL CREATININE-BSD FRML MDRD: 77 ML/MIN/1.73SQ M
GLUCOSE SERPL-MCNC: 162 MG/DL (ref 65–140)
GLUCOSE SERPL-MCNC: 169 MG/DL (ref 65–140)
GLUCOSE SERPL-MCNC: 226 MG/DL (ref 65–140)
GLUCOSE SERPL-MCNC: 301 MG/DL (ref 65–140)
GLUCOSE UR STRIP-MCNC: NEGATIVE MG/DL
HCT VFR BLD AUTO: 25.9 % (ref 36.5–49.3)
HGB BLD-MCNC: 8.6 G/DL (ref 12–17)
HGB UR QL STRIP.AUTO: ABNORMAL
IMM GRANULOCYTES # BLD AUTO: 0.14 THOUSAND/UL (ref 0–0.2)
IMM GRANULOCYTES NFR BLD AUTO: 1 % (ref 0–2)
INR PPP: 1.01 (ref 0.84–1.19)
KETONES UR STRIP-MCNC: NEGATIVE MG/DL
LEUKOCYTE ESTERASE UR QL STRIP: ABNORMAL
LYMPHOCYTES # BLD AUTO: 1.11 THOUSANDS/ΜL (ref 0.6–4.47)
LYMPHOCYTES NFR BLD AUTO: 9 % (ref 14–44)
MCH RBC QN AUTO: 30.9 PG (ref 26.8–34.3)
MCHC RBC AUTO-ENTMCNC: 33.2 G/DL (ref 31.4–37.4)
MCV RBC AUTO: 93 FL (ref 82–98)
MONOCYTES # BLD AUTO: 1.09 THOUSAND/ΜL (ref 0.17–1.22)
MONOCYTES NFR BLD AUTO: 9 % (ref 4–12)
NEUTROPHILS # BLD AUTO: 8.95 THOUSANDS/ΜL (ref 1.85–7.62)
NEUTS SEG NFR BLD AUTO: 75 % (ref 43–75)
NITRITE UR QL STRIP: NEGATIVE
NON-SQ EPI CELLS URNS QL MICRO: ABNORMAL /HPF
NRBC BLD AUTO-RTO: 0 /100 WBCS
NT-PROBNP SERPL-MCNC: 1870 PG/ML
PH UR STRIP.AUTO: 7.5 [PH]
PLATELET # BLD AUTO: 307 THOUSANDS/UL (ref 149–390)
PMV BLD AUTO: 10.2 FL (ref 8.9–12.7)
POTASSIUM SERPL-SCNC: 4.4 MMOL/L (ref 3.5–5.3)
PROT SERPL-MCNC: 5.8 G/DL (ref 6.4–8.2)
PROT UR STRIP-MCNC: ABNORMAL MG/DL
PROTHROMBIN TIME: 13.3 SECONDS (ref 11.6–14.5)
RBC # BLD AUTO: 2.78 MILLION/UL (ref 3.88–5.62)
RBC #/AREA URNS AUTO: ABNORMAL /HPF
SODIUM SERPL-SCNC: 138 MMOL/L (ref 136–145)
SP GR UR STRIP.AUTO: <=1.005 (ref 1–1.03)
TROPONIN I SERPL-MCNC: 0.03 NG/ML
TSH SERPL DL<=0.05 MIU/L-ACNC: 3.74 UIU/ML (ref 0.36–3.74)
UROBILINOGEN UR QL STRIP.AUTO: 0.2 E.U./DL
WBC # BLD AUTO: 12.05 THOUSAND/UL (ref 4.31–10.16)
WBC #/AREA URNS AUTO: ABNORMAL /HPF

## 2020-09-21 PROCEDURE — 71045 X-RAY EXAM CHEST 1 VIEW: CPT

## 2020-09-21 PROCEDURE — 85025 COMPLETE CBC W/AUTO DIFF WBC: CPT

## 2020-09-21 PROCEDURE — 81001 URINALYSIS AUTO W/SCOPE: CPT | Performed by: EMERGENCY MEDICINE

## 2020-09-21 PROCEDURE — 85610 PROTHROMBIN TIME: CPT

## 2020-09-21 PROCEDURE — 84443 ASSAY THYROID STIM HORMONE: CPT | Performed by: NURSE PRACTITIONER

## 2020-09-21 PROCEDURE — 82948 REAGENT STRIP/BLOOD GLUCOSE: CPT

## 2020-09-21 PROCEDURE — 36415 COLL VENOUS BLD VENIPUNCTURE: CPT

## 2020-09-21 PROCEDURE — 80053 COMPREHEN METABOLIC PANEL: CPT

## 2020-09-21 PROCEDURE — 71275 CT ANGIOGRAPHY CHEST: CPT

## 2020-09-21 PROCEDURE — 99217 PR OBSERVATION CARE DISCHARGE MANAGEMENT: CPT | Performed by: INTERNAL MEDICINE

## 2020-09-21 PROCEDURE — 93005 ELECTROCARDIOGRAM TRACING: CPT

## 2020-09-21 PROCEDURE — 99285 EMERGENCY DEPT VISIT HI MDM: CPT

## 2020-09-21 PROCEDURE — 96374 THER/PROPH/DIAG INJ IV PUSH: CPT

## 2020-09-21 PROCEDURE — 83880 ASSAY OF NATRIURETIC PEPTIDE: CPT

## 2020-09-21 PROCEDURE — NC001 PR NO CHARGE: Performed by: PHYSICIAN ASSISTANT

## 2020-09-21 PROCEDURE — 99219 PR INITIAL OBSERVATION CARE/DAY 50 MINUTES: CPT | Performed by: INTERNAL MEDICINE

## 2020-09-21 PROCEDURE — 99205 OFFICE O/P NEW HI 60 MIN: CPT | Performed by: INTERNAL MEDICINE

## 2020-09-21 PROCEDURE — 84484 ASSAY OF TROPONIN QUANT: CPT

## 2020-09-21 PROCEDURE — G1004 CDSM NDSC: HCPCS

## 2020-09-21 PROCEDURE — 74177 CT ABD & PELVIS W/CONTRAST: CPT

## 2020-09-21 PROCEDURE — 85730 THROMBOPLASTIN TIME PARTIAL: CPT

## 2020-09-21 PROCEDURE — 99285 EMERGENCY DEPT VISIT HI MDM: CPT | Performed by: EMERGENCY MEDICINE

## 2020-09-21 RX ORDER — FUROSEMIDE 10 MG/ML
20 INJECTION INTRAMUSCULAR; INTRAVENOUS ONCE
Status: COMPLETED | OUTPATIENT
Start: 2020-09-21 | End: 2020-09-21

## 2020-09-21 RX ORDER — PRAVASTATIN SODIUM 40 MG
40 TABLET ORAL
Status: DISCONTINUED | OUTPATIENT
Start: 2020-09-21 | End: 2020-09-21 | Stop reason: HOSPADM

## 2020-09-21 RX ORDER — MORPHINE SULFATE 4 MG/ML
4 INJECTION, SOLUTION INTRAMUSCULAR; INTRAVENOUS ONCE
Status: COMPLETED | OUTPATIENT
Start: 2020-09-21 | End: 2020-09-21

## 2020-09-21 RX ORDER — ASPIRIN 325 MG
325 TABLET ORAL DAILY
Status: DISCONTINUED | OUTPATIENT
Start: 2020-09-21 | End: 2020-09-21 | Stop reason: HOSPADM

## 2020-09-21 RX ORDER — ALBUTEROL SULFATE 90 UG/1
2 AEROSOL, METERED RESPIRATORY (INHALATION) EVERY 4 HOURS PRN
Status: DISCONTINUED | OUTPATIENT
Start: 2020-09-21 | End: 2020-09-21 | Stop reason: HOSPADM

## 2020-09-21 RX ORDER — GABAPENTIN 300 MG/1
300 CAPSULE ORAL 2 TIMES DAILY
Status: DISCONTINUED | OUTPATIENT
Start: 2020-09-21 | End: 2020-09-21 | Stop reason: HOSPADM

## 2020-09-21 RX ORDER — FINASTERIDE 5 MG/1
5 TABLET, FILM COATED ORAL DAILY
Status: DISCONTINUED | OUTPATIENT
Start: 2020-09-21 | End: 2020-09-21 | Stop reason: HOSPADM

## 2020-09-21 RX ORDER — CARVEDILOL 25 MG/1
25 TABLET ORAL 2 TIMES DAILY WITH MEALS
Qty: 60 TABLET | Refills: 0 | Status: SHIPPED | OUTPATIENT
Start: 2020-09-22 | End: 2020-10-22 | Stop reason: SDUPTHER

## 2020-09-21 RX ORDER — CARVEDILOL 12.5 MG/1
25 TABLET ORAL 2 TIMES DAILY WITH MEALS
Status: DISCONTINUED | OUTPATIENT
Start: 2020-09-21 | End: 2020-09-21 | Stop reason: HOSPADM

## 2020-09-21 RX ORDER — MELOXICAM 15 MG/1
15 TABLET ORAL DAILY
Status: DISCONTINUED | OUTPATIENT
Start: 2020-09-21 | End: 2020-09-21 | Stop reason: HOSPADM

## 2020-09-21 RX ORDER — MELATONIN
1000 DAILY
Status: DISCONTINUED | OUTPATIENT
Start: 2020-09-21 | End: 2020-09-21 | Stop reason: HOSPADM

## 2020-09-21 RX ORDER — FAMOTIDINE 20 MG/1
40 TABLET, FILM COATED ORAL DAILY
Status: DISCONTINUED | OUTPATIENT
Start: 2020-09-21 | End: 2020-09-21 | Stop reason: HOSPADM

## 2020-09-21 RX ORDER — LOSARTAN POTASSIUM 50 MG/1
50 TABLET ORAL DAILY
Status: DISCONTINUED | OUTPATIENT
Start: 2020-09-21 | End: 2020-09-21 | Stop reason: HOSPADM

## 2020-09-21 RX ORDER — FUROSEMIDE 40 MG/1
40 TABLET ORAL 2 TIMES DAILY
Qty: 60 TABLET | Refills: 0 | Status: SHIPPED | OUTPATIENT
Start: 2020-09-21 | End: 2020-11-03 | Stop reason: SDUPTHER

## 2020-09-21 RX ORDER — NITROGLYCERIN 0.4 MG/1
0.4 TABLET SUBLINGUAL ONCE
Status: DISCONTINUED | OUTPATIENT
Start: 2020-09-21 | End: 2020-09-21

## 2020-09-21 RX ORDER — ISOSORBIDE MONONITRATE 30 MG/1
30 TABLET, EXTENDED RELEASE ORAL DAILY
Status: DISCONTINUED | OUTPATIENT
Start: 2020-09-21 | End: 2020-09-21 | Stop reason: HOSPADM

## 2020-09-21 RX ORDER — DOCUSATE SODIUM 100 MG/1
100 CAPSULE, LIQUID FILLED ORAL EVERY 12 HOURS
Status: DISCONTINUED | OUTPATIENT
Start: 2020-09-21 | End: 2020-09-21 | Stop reason: HOSPADM

## 2020-09-21 RX ORDER — ASPIRIN 81 MG/1
324 TABLET, CHEWABLE ORAL ONCE
Status: COMPLETED | OUTPATIENT
Start: 2020-09-21 | End: 2020-09-21

## 2020-09-21 RX ORDER — CLOPIDOGREL BISULFATE 75 MG/1
75 TABLET ORAL DAILY
Status: DISCONTINUED | OUTPATIENT
Start: 2020-09-21 | End: 2020-09-21 | Stop reason: HOSPADM

## 2020-09-21 RX ORDER — TAMSULOSIN HYDROCHLORIDE 0.4 MG/1
0.8 CAPSULE ORAL
Status: DISCONTINUED | OUTPATIENT
Start: 2020-09-21 | End: 2020-09-21 | Stop reason: HOSPADM

## 2020-09-21 RX ORDER — FUROSEMIDE 40 MG/1
40 TABLET ORAL
Status: DISCONTINUED | OUTPATIENT
Start: 2020-09-21 | End: 2020-09-21 | Stop reason: HOSPADM

## 2020-09-21 RX ORDER — CARVEDILOL 12.5 MG/1
12.5 TABLET ORAL 2 TIMES DAILY WITH MEALS
Status: DISCONTINUED | OUTPATIENT
Start: 2020-09-21 | End: 2020-09-21

## 2020-09-21 RX ADMIN — VITAMIN D, TAB 1000IU (100/BT) 1000 UNITS: 25 TAB at 08:51

## 2020-09-21 RX ADMIN — INSULIN LISPRO 2 UNITS: 100 INJECTION, SOLUTION INTRAVENOUS; SUBCUTANEOUS at 11:27

## 2020-09-21 RX ADMIN — FINASTERIDE 5 MG: 5 TABLET, FILM COATED ORAL at 08:51

## 2020-09-21 RX ADMIN — TAMSULOSIN HYDROCHLORIDE 0.8 MG: 0.4 CAPSULE ORAL at 16:39

## 2020-09-21 RX ADMIN — IOHEXOL 100 ML: 350 INJECTION, SOLUTION INTRAVENOUS at 02:56

## 2020-09-21 RX ADMIN — CARVEDILOL 12.5 MG: 12.5 TABLET, FILM COATED ORAL at 08:54

## 2020-09-21 RX ADMIN — FUROSEMIDE 40 MG: 40 TABLET ORAL at 16:39

## 2020-09-21 RX ADMIN — PRAVASTATIN SODIUM 40 MG: 40 TABLET ORAL at 16:39

## 2020-09-21 RX ADMIN — ENOXAPARIN SODIUM 40 MG: 40 INJECTION SUBCUTANEOUS at 08:51

## 2020-09-21 RX ADMIN — CARVEDILOL 25 MG: 12.5 TABLET, FILM COATED ORAL at 16:39

## 2020-09-21 RX ADMIN — INSULIN LISPRO 1 UNITS: 100 INJECTION, SOLUTION INTRAVENOUS; SUBCUTANEOUS at 08:52

## 2020-09-21 RX ADMIN — DOCUSATE SODIUM 100 MG: 100 CAPSULE, LIQUID FILLED ORAL at 08:54

## 2020-09-21 RX ADMIN — FAMOTIDINE 40 MG: 20 TABLET, FILM COATED ORAL at 08:51

## 2020-09-21 RX ADMIN — CLOPIDOGREL BISULFATE 75 MG: 75 TABLET ORAL at 08:51

## 2020-09-21 RX ADMIN — GABAPENTIN 300 MG: 300 CAPSULE ORAL at 08:52

## 2020-09-21 RX ADMIN — INSULIN LISPRO 4 UNITS: 100 INJECTION, SOLUTION INTRAVENOUS; SUBCUTANEOUS at 16:39

## 2020-09-21 RX ADMIN — MELOXICAM 15 MG: 15 TABLET ORAL at 08:51

## 2020-09-21 RX ADMIN — ASPIRIN 325 MG ORAL TABLET 325 MG: 325 PILL ORAL at 08:51

## 2020-09-21 RX ADMIN — FUROSEMIDE 20 MG: 10 INJECTION, SOLUTION INTRAVENOUS at 05:04

## 2020-09-21 RX ADMIN — MORPHINE SULFATE 4 MG: 4 INJECTION INTRAVENOUS at 02:12

## 2020-09-21 RX ADMIN — LOSARTAN POTASSIUM 50 MG: 50 TABLET, FILM COATED ORAL at 08:51

## 2020-09-21 RX ADMIN — ISOSORBIDE MONONITRATE 30 MG: 30 TABLET, EXTENDED RELEASE ORAL at 08:51

## 2020-09-21 RX ADMIN — ASPIRIN 81 MG 324 MG: 81 TABLET ORAL at 02:12

## 2020-09-21 RX ADMIN — GABAPENTIN 300 MG: 300 CAPSULE ORAL at 17:05

## 2020-09-21 NOTE — ASSESSMENT & PLAN NOTE
· Continue home Coreg and Avapro  · Carvedilol increased to 25 mg twice daily per Cardiology recommendations

## 2020-09-21 NOTE — ASSESSMENT & PLAN NOTE
Wt Readings from Last 3 Encounters:   09/21/20 96 8 kg (213 lb 4 8 oz)   09/19/20 108 kg (238 lb 15 7 oz)   09/07/20 98 kg (216 lb)       · Patient presented with Chest pain that started at midnight  · Chest pain likely secondary to fluid overload present on admission  · Echocardiogram 09/08/2020:  EF 29% grade 1 diastolic dysfunction - per cardiology patient declined life vest  · BNP-1870  · Lasix IV 20 mg given in ER  · Troponin negative  · EKG-no ST changes  · Cardiology consulted - appreciate input    Furosemide increased to 40 mg twice daily, continue on discharge  · Patient is medically stable for discharge per Cardiology with outpatient follow-up

## 2020-09-21 NOTE — CONSULTS
Consultation - Urology   Merly Webb Diss 82 y o  male MRN: [id number]  Unit/Bed#: -01 Encounter: [id number]      Assessment/Plan     Acute on chronic systolic and diastolic congestive heart failure  With weight increase of 10 kg and BNP of 1800  EKG with no ST changes and troponins negative  Continue medical management    POD# 7 s/p TURP with insertion of suprapubic catheter secondary to intractable gross hematuria   POD#6 s/p exploratory laparotomy with closure of cystotomy  Estrella catheter remains in place with drainage of clear yellow urine  Patient currently denies abdominal pain  He is tolerating his diet without nausea or vomiting  Continue per medical team    CAD  On ASA and Plavix    Poorly controlled DM type 2  Hemoglobin A1c is 7 7 on admission    Other medical comorbidities  GERD  Hypertension  Hyperlipidemia    History of Present Illness   Attending: Panchito Ball MD  Reason for Consult / Principal Problem: abdominal pain post ex lap  HPI: Anju Schwartz is a 82y o  year old male with PMH of CAD, HTN, HLD, DM2, and GERD who presented to [facility] on 9/7 x 2 with gross hematuria, he was eventually transferred to [facility] and underwent TURP with placement of SPT on 9/14  The following day there was concern for bladder perforation with extravasation of contrast from the SPT into the retroperitoneal space  The patient underwent exploratory laparotomy with closure of SPT cystotomy site on 9/15  He did well post operatively and was discharged home  He presented to [facility] this weekend with shortness of breath and chest pain and was found to be in acute CHF  He reported increased abdominal pain this am located in the LUQ  I used the  phone this am to interview the patient and he does not clearly answer the questions, he repeatedly tries to hand back the phone and denies abdominal pain  He does report he is eating without difficulty, nausea or vomiting       Inpatient consult to Urology  Consult performed by: Moo Melo PA-C  Consult ordered by: Fabiano Pastor MD          Review of Systems   Constitutional: Negative for activity change, appetite change, diaphoresis, fatigue and fever  HENT: Negative  Eyes: Negative  Respiratory: Positive for cough and shortness of breath  Cardiovascular: Negative  Gastrointestinal: Negative  Genitourinary: Negative  Estrella catheter in place     Musculoskeletal: Negative  Neurological: Negative  Historical Information   Past Medical History:   Diagnosis Date    Cardiac disease     CHF (congestive heart failure) ([place] [address] )     Diabetes mellitus ([place] [address] )     GERD (gastroesophageal reflux disease)     Hyperlipidemia     Hypertension     MI (myocardial infarction) ([place] [address] )      Past Surgical History:   Procedure Laterality Date    CT CYSTOGRAM  9/15/2020    EYE SURGERY      NM CYSTOURETHROSCOPY W/IRRIG & EVAC CLOTS N/A 9/14/2020    Procedure: CYSTOSCOPY EVACUATION OF CLOTS; TURP; SUPRAPUBIC CATHETER INSERTION;  Surgeon: Jennifer Bassett MD;  Location: [facility];  Service: Urology    NM LAP,DIAGNOSTIC ABDOMEN N/A 9/15/2020    Procedure: EXPLORATORY LAPAROTOMY, closure of cystotomy;  Surgeon: Roselia Quintanilla MD;  Location: [facility];  Service: General     Social History   Social History     Substance and Sexual Activity   Alcohol Use No    Alcohol/week: 0 0 standard drinks    Frequency: Never    Binge frequency: Never     @DRUGHX  E-Cigarette/Vaping    E-Cigarette Use Never User      E-Cigarette/Vaping Substances    Nicotine No     THC No     CBD No     Flavoring No     Other No     Unknown No      Social History     Tobacco Use   Smoking Status Never Smoker   Smokeless Tobacco Never Used     Family History: non-contributory    Meds/Allergies   PTA meds:   Prior to Admission Medications   Prescriptions Last Dose Informant Patient Reported? Taking?    Cholecalciferol (VITAMIN D3) 5000 units CAPS  Child Yes No   Sig: Take 1 capsule by mouth   Cyanocobalamin (VITAMIN B12 PO)  Child Yes No   Sig: Take 5,000 mcg by mouth   albuterol (PROVENTIL HFA,VENTOLIN HFA) 90 mcg/act inhaler  Child No No   Sig: Inhale 2 puffs every 4 (four) hours as needed for wheezing   aspirin 325 mg tablet  Child Yes No   Sig: Take 325 mg by mouth daily   carvedilol (COREG) 25 mg tablet  Child No No   Sig: Take 0 5 tablets (12 5 mg total) by mouth 2 (two) times a day with meals   clopidogrel (PLAVIX) 75 mg tablet  Child Yes No   Sig: Take 75 mg by mouth daily   docusate sodium (COLACE) 100 mg capsule  Child No No   Sig: Take 1 capsule (100 mg total) by mouth every 12 (twelve) hours   famotidine (PEPCID) 40 MG tablet  Child Yes No   Sig: Take 40 mg by mouth daily   finasteride (PROSCAR) 5 mg tablet  Child No No   Sig: Take 1 tablet (5 mg total) by mouth daily   furosemide (LASIX) 20 mg tablet  Child Yes No   Sig: Take 20 mg by mouth 2 (two) times a day   gabapentin (NEURONTIN) 300 mg capsule  Child Yes No   Sig: Take 300 mg by mouth 2 (two) times a day   insulin glargine (LANTUS) 100 units/mL subcutaneous injection  Child Yes No   Sig: Inject 20 Units under the skin daily at bedtime   irbesartan (AVAPRO) 150 mg tablet  Child Yes No   Sig: Take 150 mg by mouth daily at bedtime   isosorbide mononitrate (IMDUR) 30 mg 24 hr tablet  Child Yes No   Sig: Take 30 mg by mouth daily   meloxicam (MOBIC) 15 mg tablet  Child Yes No   Sig: Take 15 mg by mouth daily   metFORMIN (GLUCOPHAGE) 1000 MG tablet  Child Yes No   Sig: Take 1,000 mg by mouth 2 (two) times a day with meals   nitroglycerin (NITROSTAT) 0 4 mg SL tablet  Child Yes No   Sig: Place 0 4 mg under the tongue every 5 (five) minutes as needed for chest pain   potassium chloride (K-DUR,KLOR-CON) 20 mEq tablet   No No   Sig: Take 1 tablet (20 mEq total) by mouth daily   simvastatin (ZOCOR) 20 mg tablet  Child Yes No   Sig: Take 20 mg by mouth daily at bedtime   sitaGLIPtin (JANUVIA) 100 mg tablet Child Yes No   Sig: Take 100 mg by mouth daily   tamsulosin (FLOMAX) 0 4 mg  Child No No   Sig: Take 2 capsules (0 8 mg total) by mouth daily with dinner      Facility-Administered Medications: None     Allergies   Allergen Reactions    Demerol [Meperidine]        Objective   Vitals: Blood pressure 115/83, pulse 75, temperature 97 9 °F (36 6 °C), resp  rate 18, height 5' 7" (1 702 m), weight 96 8 kg (213 lb 4 8 oz), SpO2 96 %  I/O last 24 hours: In: -   Out: 1700 [Urine:1700]    Invasive Devices     Drain            Urethral Catheter Three way 5 days                Physical Exam  Constitutional:       General: He is not in acute distress  HENT:      Head: Normocephalic and atraumatic  Nose: Nose normal       Mouth/Throat:      Mouth: Mucous membranes are moist    Eyes:      Extraocular Movements: Extraocular movements intact  Pupils: Pupils are equal, round, and reactive to light  Neck:      Musculoskeletal: Normal range of motion  Cardiovascular:      Rate and Rhythm: Normal rate and regular rhythm  Heart sounds: No murmur  No friction rub  No gallop  Pulmonary:      Effort: Pulmonary effort is normal       Breath sounds: Rales present  No wheezing or rhonchi  Abdominal:      General: There is distension  Tenderness: There is no abdominal tenderness  There is no guarding  Genitourinary:     Comments: Estrella catheter in place draining clear yellow urine    Musculoskeletal: Normal range of motion  Skin:     General: Skin is warm and dry  Capillary Refill: Capillary refill takes less than 2 seconds  Neurological:      Mental Status: He is alert  He is disoriented           Lab Results:   CBC:   Lab Results   Component Value Date    WBC 12 05 (H) 09/21/2020    HGB 8 6 (L) 09/21/2020    HCT 25 9 (L) 09/21/2020    MCV 93 09/21/2020     09/21/2020    MCH 30 9 09/21/2020    MCHC 33 2 09/21/2020    RDW 14 3 09/21/2020    MPV 10 2 09/21/2020    NRBC 0 09/21/2020     CMP: Lab Results   Component Value Date    SODIUM 138 09/21/2020     09/21/2020    CO2 27 09/21/2020    BUN 8 09/21/2020    CREATININE 0 92 09/21/2020    CALCIUM 8 7 09/21/2020    AST 19 09/21/2020    ALT 23 09/21/2020    ALKPHOS 69 09/21/2020    EGFR 77 09/21/2020     Imaging Studies: I have personally reviewed pertinent reports  EKG, Pathology, and Other Studies: I have personally reviewed pertinent reports      VTE Prophylaxis: Enoxaparin (Lovenox)    Code Status: Level 1 - Full Code

## 2020-09-21 NOTE — DISCHARGE SUMMARY
Discharge- Emily King 1935, 82 y o  male MRN: [id number]    Unit/Bed#: -01 Encounter: [id number]    Primary Care Provider: No primary care provider on file  Date and time admitted to hospital: 9/21/2020  1:36 AM    Acute on chronic combined systolic and diastolic congestive heart failure (HCC)resolved as of 9/21/2020  Assessment & Plan  Wt Readings from Last 3 Encounters:   09/21/20 96 8 kg (213 lb 4 8 oz)   09/19/20 108 kg (238 lb 15 7 oz)   09/07/20 98 kg (216 lb)       · Patient presented with Chest pain that started at midnight  · Chest pain likely secondary to fluid overload present on admission  · Echocardiogram 09/08/2020:  EF 62% grade 1 diastolic dysfunction - per cardiology patient declined life vest  · BNP-1870  · Lasix IV 20 mg given in ER  · Troponin negative  · EKG-no ST changes  · Cardiology consulted - appreciate input  Furosemide increased to 40 mg twice daily, continue on discharge  · Patient is medically stable for discharge per Cardiology with outpatient follow-up      Poorly controlled type 2 diabetes mellitus [facility])  Assessment & Plan  Lab Results   Component Value Date    HGBA1C 7 7 (H) 08/17/2020       Recent Labs     09/19/20  1114 09/21/20  0727 09/21/20  1057 09/21/20  1526   POCGLU 220* 162* 226* 301*       Blood Sugar Average: Last 72 hrs:    · Resume oral agents on discharge  · Continue Lantus 20 units q h s  - was not receiving while inpatient, noted to have some hyperglycemia    Discussed with son to continue Lantus on discharge  (P) 153 [id number]    Hyperlipidemia  Assessment & Plan  · Continue home statin    Essential hypertension  Assessment & Plan  · Continue home Coreg and Avapro  · Carvedilol increased to 25 mg twice daily per Cardiology recommendations    GERD (gastroesophageal reflux disease)  Assessment & Plan  · Continue home Pepcid    Enlarged prostate  Assessment & Plan  · Continue Flomax  · Recent TURP procedure, evaluated by Urology while inpatient  · Continue Estrella catheter in place until follow-up this week with urology for removal  · Per chart review office to contact patient after discharge to schedule, phone number provided to family    CAD (coronary artery disease)  Assessment & Plan  · Nitro sublingual p r n  · Continue Imdur and Plavix  · Cardiac catheterization from September 2020 with luminal irregularities in all vessels    * Chest painresolved as of 9/21/2020  Assessment & Plan  · Chest pain started at midnight, unresolved with nitro  · Rest of plan as above  · Resolved    Discharging Physician / Practitioner: Corrie Gunter PA-C  PCP: No primary care provider on file  Admission Date:   Admission Orders (From admission, onward)     Ordered        09/21/20 0500  Place in Observation (expected length of stay for this patient is less than two midnights)  Once                   Discharge Date: 09/21/20    Resolved Problems  Date Reviewed: 9/21/2020          Resolved    Acute on chronic combined systolic and diastolic congestive heart failure ([facility] [address] ) 9/21/2020     Resolved by  Corrie Gunter PA-C    * (Principal) Chest pain 9/21/2020     Resolved by  Corrie Gunter PA-C          Consultations During Hospital Stay:  · Cardiology  · Urology    Procedures Performed:   · None    Significant Findings / Test Results:   · CXR:  No acute cardiopulmonary disease  · CTA chest abdomen pelvis:  "No evidence of pulmonary embolism is seen  There is a 6 mm pulmonary nodule within the right upper lobe of the lung  Follow-up is required  Please see above Fleischner Society recommendations  Pulmonology consultation and follow-up is also recommended  The prostate is enlarged and heterogeneous  A TURP defect is present  Prominent vascular structures in the right hemipelvis appear similar to the prior study  There remains some fluid and stranding in the anterior pelvis, however, this is markedly improved when compared with September 15, 2020  A Estrella catheter is present within the urinary bladder  The urinary bladder wall remains diffusely thickened  Correlation with urinalysis and urine culture and sensitivity is recommended "  · Troponin normal  · Hemoglobin A1c 7 7  · BNP 1870      Incidental Findings:   · As above     Test Results Pending at Discharge (will require follow up): · None     Outpatient Tests Requested:  · Repeat BMP 1-2 days prior to cardiology appointment per their recommendations    Complications:  None    Reason for Admission: Acute on chronic CHF    Hospital Course:     Emily King is a 82 y o  male patient who originally presented to the hospital on 9/21/2020 due to chest pain  Past medical history significant for BPH s/p TURP procedure, essential hypertension, hyperlipidemia, GERD, congestive heart failure, type 2 diabetes mellitus  Patient presented to the emergency department on 09/20/2020 with new onset chest pain and shortness of breath  Did not appear to be resolved with nitro and patient presented to the hospital   Patient recently had extensive stay at [facility] where he had undergone a TURP with urology  Patient was admitted under observation status and monitored on telemetry and started on IV diuresis as he had an elevated BNP and concern for volume overload  Urology was consulted due to recent TURP procedure and reported abdominal pain  Patient to continue Estrella catheter in place until follow-up with Urology later this week as an outpatient  Cardiology evaluated the patient and increased his Lasix to 40 mg twice daily and also increase his carvedilol to 25 mg twice daily  On day of discharge patient was hemodynamically stable, shortness of breath and chest pain had since resolved and family was eager to have patient discharged this evening  Patient's son verbalized understanding for requested outpatient follow up      Please see above list of diagnoses and related plan for additional information  Condition at Discharge: stable     Discharge Day Visit / Exam:     * Please refer to separate progress note for these details *    Discussion with Family: Discussed with patient's son directly at bedside  Discharge instructions/Information to patient and family:   See after visit summary for information provided to patient and family  Provisions for Follow-Up Care:  See after visit summary for information related to follow-up care and any pertinent home health orders  Disposition:     Home    For Discharges to [facility]:   · Not Applicable to this Patient - Not Applicable to this Patient    Planned Readmission: None     Discharge Statement:  I spent 30 minutes discharging the patient  This time was spent on the day of discharge  I had direct contact with the patient on the day of discharge  Greater than 50% of the total time was spent examining patient, answering all patient questions, arranging and discussing plan of care with patient as well as directly providing post-discharge instructions  Additional time then spent on discharge activities  Discharge Medications:  See after visit summary for reconciled discharge medications provided to patient and family        ** Please Note: This note has been constructed using a voice recognition system **

## 2020-09-21 NOTE — UTILIZATION REVIEW
Initial Clinical Review    Admission: Date/Time/Statement:   Admission Orders (From admission, onward)     Ordered        09/21/20 0500  Place in Observation (expected length of stay for this patient is less than two midnights)  Once                   Orders Placed This Encounter   Procedures    Place in Observation (expected length of stay for this patient is less than two midnights)     Standing Status:   Standing     Number of Occurrences:   1     Order Specific Question:   Admitting Physician     Answer:   Edwina Daniel [[id number]]     Order Specific Question:   Level of Care     Answer:   Med Surg [[id number]]     ED Arrival Information     Expected Arrival Acuity Means of Arrival Escorted By Service Admission Type    - 9/21/2020 01:26 Urgent Walk-In Family Member General Medicine Urgent    Arrival Complaint    SOB        Chief Complaint   Patient presents with    Shortness of Breath     post op pt c/o of sob of bretah and chets pressure  HPI: 82 y o  male with PMH of type 2 diabetes, CHF, HTN, HLD, CAD, GERD, TURP who presents with new onset or shortness of breath and chest pain which started around midnight  Son states he attempted to give him nitro with no resolution approximately 2:00 a m  Decided to bring him to the hospital Of note, patient has an extensive stay at [facility] recently with a discharged on 09/19  Patient had hematuria which resulted in a TURP with multiple clogged Estrella's  A suprapubic was placed, however there were issues with the Estrella balloon and patient ended up having CBI fluid as well as urine seep into peritoneal cavity  Patient underwent an ex lap at that time  Patient now has a Estrella placed again  Patient has had abdominal pain on and off but is much better than it was at discharge  Patient required no oxygen initially, sats in the high 90s, however short of breath  BNP elevated in the ER patient was given 20 IV Lasix    CTA chest done negative for PE however did note a 6 mm nodule discussed with son will need follow-up in 6-12 months per scan  Plan: Admit to Observation for evaluation and treatment of acute on chronic CHF and chest pain: IV Lasix BID,  I&O and daily weight, telemetry, consult Cardiology and Urology  9/21 Cardiology consult: Acute on chronic systolic CHF-increase furosemide to 40 mg p o  B i d , euvolemic, okay for discharge  Agree with increasing carvedilol to maximize medical therapy as well  He is already on a good dose of irbesartan home, home med, formulary of losartan being used inpatient  Nonsustained VT/nonischemic cardiomyopathy-now entertaining the idea of ICD, considering his longstanding reduced EF, on appropriate medical therapy, he is an appropriate candidate for ICD, discussed life vest with him, indicated that this would require longer hospitalization to get this fitted and approved, but he is not interested  Will have him follow up with electrophysiology in the outpatient setting  Urology consult:    POD# 7 s/p TURP with insertion of suprapubic catheter secondary to intractable gross hematuria  POD#6 s/p exploratory laparotomy with closure of cystotomy  Estrella catheter remains in place with drainage of clear yellow urine  Patient currently denies abdominal pain  He is tolerating his diet without nausea or vomiting   Continue per medical team     ED Triage Vitals   Temperature Pulse Respirations Blood Pressure SpO2   09/21/20 0210 09/21/20 0150 09/21/20 0150 09/21/20 0150 09/21/20 0150   98 3 °F (36 8 °C) 90 19 141/63 97 %      Temp Source Heart Rate Source Patient Position - Orthostatic VS BP Location FiO2 (%)   09/21/20 0210 09/21/20 0300 09/21/20 0300 09/21/20 0300 --   Temporal Monitor Lying Left arm       Pain Score       09/21/20 0212       5          Wt Readings from Last 1 Encounters:   09/21/20 96 8 kg (213 lb 4 8 oz)     Additional Vital Signs:     Date/Time   Temp   Pulse   Resp   BP   MAP (mmHg)   SpO2   O2 Device     09/21/20 15:27:28   98 3 °F (36 8 °C)   84   18   123/89   100   97 %        09/21/20 0900                  96 %   None (Room air)     09/21/20 07:30:23   97 9 °F (36 6 °C)   75   18   115/83   94   94 %        09/21/20 06:40:32   98 1 °F (36 7 °C)   83   18   132/61   85   96 %        09/21/20 06:39:15   98 1 °F (36 7 °C)   90   18   132/61   85   96 %        09/21/20 0430      77   18   126/57   82   97 %   None (Room air)     09/21/20 0300      88   18   122/59   85   96 %   None (Room air)     09/21/20 0220      94   19   117/50   79   96 %        09/21/20 0210   98 3 °F (36 8 °C)   85   21   128/58   84   97 %                Pertinent Labs/Diagnostic Test Results:        9/20 CT chest:  No evidence of pulmonary embolism is seen  9/21 CXR: no acute cardiopulmonary disease      Results from last 7 days   Lab Units 09/21/20 0157 09/19/20 0619 09/17/20  0350 09/16/20  0455 09/15/20  1858 09/15/20  1832 09/15/20  0446   WBC Thousand/uL 12 05* 9 13 15 59* 18 13* 20 78* 21 41* 14 77*   HEMOGLOBIN g/dL 8 6* 8 8* 7 9* 8 1* 9 2* 9 4* 9 4*   HEMATOCRIT % 25 9* 27 2* 24 8* 24 9* 29 1* 29 3* 28 4*   PLATELETS Thousands/uL 307 251 173 151 219 221 202   NEUTROS ABS Thousands/µL 8 95* 6 68  --   --   --   --  13 27*   BANDS PCT %  --   --   --   --   --  15*  --          Results from last 7 days   Lab Units 09/21/20  0157 09/19/20 0619 09/17/20  0350 09/16/20  0455 09/15/20  1858  09/14/20  1905 09/14/20  1835   SODIUM mmol/L 138 140 139 137 134*   < >  --   --    POTASSIUM mmol/L 4 4 3 2* 3 6 3 1* 3 7   < >  --   --    CHLORIDE mmol/L 104 104 110* 109* 105   < >  --   --    CO2 mmol/L 27 30 23 22 19*   < >  --   --    CO2, I-STAT mmol/L  --   --   --   --   --   --  29 27   ANION GAP mmol/L 7 6 6 6 10   < >  --   --    BUN mg/dL 8 6 11 10 12   < >  --   --    CREATININE mg/dL 0 92 0 90 0 79 0 96 1 04   < >  --   --    EGFR ml/min/1 73sq m 77 79 83 73 66   < >  --   --    CALCIUM mg/dL 8 7 8 3 7 5* 6 8* 7 1*   < > --   --    CALCIUM, IONIZED mmol/L  --   --   --   --  1 01*  --   --   --    CALCIUM, IONIZED, ISTAT mmol/L  --   --   --   --   --   --  1 23 1 16   MAGNESIUM mg/dL  --   --  2 9*  --  1 5*  --   --   --    PHOSPHORUS mg/dL  --   --   --   --  2 3  --   --   --     < > = values in this interval not displayed       Results from last 7 days   Lab Units 09/21/20  0157 09/15/20  1858   AST U/L 19 12   ALT U/L 23 16   ALK PHOS U/L 69 53   TOTAL PROTEIN g/dL 5 8* 6 8   ALBUMIN g/dL 2 6* 2 9*   TOTAL BILIRUBIN mg/dL 0 20 0 48     Results from last 7 days   Lab Units 09/21/20  1526 09/21/20  1057 09/21/20  0727 09/19/20  1114 09/19/20  0730 09/18/20  2109 09/18/20  1559 09/18/20  1118 09/18/20  0703 09/17/20  2107 09/17/20  1610 09/17/20  0658   POC GLUCOSE mg/dl 301* 226* 162* 220* 146* 218* 262* 205* 138 268* 244* 109     Results from last 7 days   Lab Units 09/21/20  0157 09/19/20  0619 09/17/20  0350 09/16/20  0455 09/15/20  1858 09/15/20  1832 09/15/20  0446 09/14/20  2043   GLUCOSE RANDOM mg/dL 169* 148* 107 175* 207* 206* 303* 215*             No results found for: BETA-HYDROXYBUTYRATE   Results from last 7 days   Lab Units 09/15/20  1835   PH ART  7 345*   PCO2 ART mm Hg 32 4*   PO2 ART mm Hg 67 4*   HCO3 ART mmol/L 17 3*   BASE EXC ART mmol/L -7 5   O2 CONTENT ART mL/dL 12 5*   O2 HGB, ARTERIAL % 91 8*   ABG SOURCE  Radial, Left         Results from last 7 days   Lab Units 09/14/20  1905 09/14/20  1835   PH, SOPHIA I-STAT  7 302 7 429*   PCO2, SOPHIA ISTAT mm HG 55 1* 39 0*   PO2, SOPHIA ISTAT mm HG 66 0* 57 0*   HCO3, SOPHIA ISTAT mmol/L 27 3 25 8   I STAT BASE EXC mmol/L 1 1   I STAT O2 SAT % 90* 90*         Results from last 7 days   Lab Units 09/21/20  0157   TROPONIN I ng/mL 0 03         Results from last 7 days   Lab Units 09/21/20  0200   PROTIME seconds 13 3   INR  1 01   PTT seconds 31     Results from last 7 days   Lab Units 09/21/20  0157   TSH 3RD GENERATON uIU/mL 3 743*     Results from last 7 days   Lab Units 09/17/20  0430 09/16/20  1208   PROCALCITONIN ng/ml 3 12* 4 10*     Results from last 7 days   Lab Units 09/15/20  1858   LACTIC ACID mmol/L 1 9             Results from last 7 days   Lab Units 09/21/20  0157   NT-PRO BNP pg/mL 1,870*                         Results from last 7 days   Lab Units 09/21/20  0441   CLARITY UA  Clear   COLOR UA  Yellow   SPEC GRAV UA  <=1 005   PH UA  7 5   GLUCOSE UA mg/dl Negative   KETONES UA mg/dl Negative   BLOOD UA  Large*   PROTEIN UA mg/dl 30 (1+)*   NITRITE UA  Negative   BILIRUBIN UA  Negative   UROBILINOGEN UA E U /dl 0 2   LEUKOCYTES UA  Trace*   WBC UA /hpf 2-4*   RBC UA /hpf Innumerable*   BACTERIA UA /hpf Occasional   EPITHELIAL CELLS WET PREP /hpf Occasional                                 Results from last 7 days   Lab Units 09/15/20  2016 09/15/20  1859   BLOOD CULTURE  No Growth After 5 Days  No Growth After 5 Days       Results from last 7 days   Lab Units 09/15/20  1832   TOTAL COUNTED  100           ED Treatment:   Medication Administration from 09/21/2020 0125 to 09/21/2020 2169       Date/Time Order Dose Route Action     09/21/2020 0200 nitroglycerin (NITROSTAT) SL tablet 0 4 mg 0 mg Sublingual Hold     09/21/2020 3407 aspirin chewable tablet 324 mg 324 mg Oral Given     09/21/2020 4945 morphine (PF) 4 mg/mL injection 4 mg 4 mg Intravenous Given     09/21/2020 0256 iohexol (OMNIPAQUE) 350 MG/ML injection (SINGLE-DOSE) 100 mL 100 mL Intravenous Given     09/21/2020 0504 furosemide (LASIX) injection 20 mg 20 mg Intravenous Given        Past Medical History:   Diagnosis Date    Cardiac disease     [facility]congestive [facility] failure) ([facility] )     Diabetes mellitus ([facility] )     GERD (gastroesophageal reflux disease)     Hyperlipidemia     Hypertension     MI (myocardial infarction) ([facility] )      Present on Admission:   Poorly controlled type 2 diabetes mellitus ([facility] )   Essential hypertension   (Resolved) Acute on chronic combined systolic and diastolic congestive heart failure ([facility] )   Hyperlipidemia   GERD (gastroesophageal reflux disease)   CAD (coronary artery disease)   Enlarged prostate   (Resolved) Chest pain      Admitting Diagnosis: Shortness of breath [R06 02]  Chest pain [R07 9]  SOB (shortness of breath) [R06 02]  Pulmonary nodule [R91 1]  Elevated brain natriuretic peptide (BNP) level [R79 89]  Age/Sex: 82 y o  male       Admission Orders: Telemetry, SCD, I&), daily weight  Scheduled Medications:  aspirin, 325 mg, Oral, Daily  carvedilol, 25 mg, Oral, BID With Meals  cholecalciferol, 1,000 Units, Oral, Daily  clopidogrel, 75 mg, Oral, Daily  docusate sodium, 100 mg, Oral, Q12H  enoxaparin, 40 mg, Subcutaneous, Daily  famotidine, 40 mg, Oral, Daily  finasteride, 5 mg, Oral, Daily  furosemide, 40 mg, Oral, BID (diuretic)  gabapentin, 300 mg, Oral, BID  insulin lispro, 1-6 Units, Subcutaneous, TID AC  isosorbide mononitrate, 30 mg, Oral, Daily  losartan, 50 mg, Oral, Daily  meloxicam, 15 mg, Oral, Daily  pravastatin, 40 mg, Oral, Daily With Dinner  tamsulosin, 0 8 mg, Oral, Daily With Dinner      Continuous IV Infusions: None  PRN Meds:  albuterol, 2 puff, Inhalation, Q4H PRN        IP CONSULT TO CARDIOLOGY  IP CONSULT TO UROLOGY    Network Utilization Review Department  [email or web address]  ATTENTION: Please call with any questions or concerns to [phone] and carefully listen to the prompts so that you are directed to the right person  All voicemails are confidential   [facility] all requests for admission clinical reviews, approved or denied determinations and any other requests to dedicated fax number below belonging to the campus where the patient is receiving treatment   List of dedicated fax numbers for the Facilities:  [address] DENIALS (Administrative/Medical Necessity) [phone]   [address] (Maternity/NICU/Pediatrics) [phone]   Eleno Jara [phone]   Gorge Hernandez [facility] [phone]   Mayra Marv [phone]   [facility] [address] [facility] [phone]   [facility]  [phone]   [facility]  [phone]   [address] [place] [address] 1000 [facility] [phone]

## 2020-09-21 NOTE — ASSESSMENT & PLAN NOTE
Lab Results   Component Value Date    HGBA1C 7 7 (H) 08/17/2020       Recent Labs     09/18/20  1559 09/18/20  2109 09/19/20  0730 09/19/20  1114   POCGLU 262* 218* 146* 220*       Blood Sugar Average: Last 72 hrs:    · Holding home diabetes meds due to contrast  · Sliding scale insulin

## 2020-09-21 NOTE — TELEPHONE ENCOUNTER
Jules Smallwood MD 2 days ago          Patient underwent TURP along with exploratory laparotomy  He has staples in place  Recommend follow-up with [facility] in [place] for Estrella catheter removal as well as staple removal in the next 1 week    Please call the patient's son to arrange

## 2020-09-21 NOTE — INCIDENTAL FINDINGS
The following findings require follow up:  Radiographic finding   Finding: "There is a 6 mm pulmonary nodule within the periphery of the posterolateral aspect of the right upper lobe of the lung  This appears unchanged compared with August 16, 2020  Based on current Fleischner Society 2017 Guidelines   on incidental pulmonary nodule, followup non-contrast CT is recommended at 6-12 months from the initial examination and, if stable at that time, an additional followup is recommended for 18-24 months from the initial examination  Pulmonology   consultation and follow-up is also recommended "     Follow up required:  CT scan 6-12 months, outpatient follow-up with pulmonology   Follow up should be done within 6-12 month(s)    Please contact primary care provider as an outpatient for follow-up

## 2020-09-21 NOTE — PLAN OF CARE
Problem: Potential for Falls  Goal: Patient will remain free of falls  Description: INTERVENTIONS:  - Assess patient frequently for physical needs  -  Identify cognitive and physical deficits and behaviors that affect risk of falls    -  [place] fall precautions as indicated by assessment   - Educate patient/family on patient safety including physical limitations  - Instruct patient to call for assistance with activity based on assessment  - Modify environment to reduce risk of injury  - Consider OT/PT consult to assist with strengthening/mobility  Outcome: Progressing

## 2020-09-21 NOTE — ASSESSMENT & PLAN NOTE
Lab Results   Component Value Date    HGBA1C 7 7 (H) 08/17/2020       Recent Labs     09/19/20  1114 09/21/20  0727 09/21/20  1057 09/21/20  1526   POCGLU 220* 162* 226* 301*       Blood Sugar Average: Last 72 hrs:    · Resume oral agents on discharge  · Continue Lantus 20 units q h s  - was not receiving while inpatient, noted to have some hyperglycemia    Discussed with son to continue Lantus on discharge  [phone]

## 2020-09-21 NOTE — PLAN OF CARE
Problem: Potential for Falls  Goal: Patient will remain free of falls  Description: INTERVENTIONS:  - Assess patient frequently for physical needs  -  Identify cognitive and physical deficits and behaviors that affect risk of falls    -  [place] fall precautions as indicated by assessment   - Educate patient/family on patient safety including physical limitations  - Instruct patient to call for assistance with activity based on assessment  - Modify environment to reduce risk of injury  - Consider OT/PT consult to assist with strengthening/mobility  Outcome: Adequate for Discharge     Problem: PAIN - ADULT  Goal: Verbalizes/displays adequate comfort level or baseline comfort level  Description: Interventions:  - Encourage patient to monitor pain and request assistance  - Assess pain using appropriate pain scale  - Administer analgesics based on type and severity of pain and evaluate response  - Implement non-pharmacological measures as appropriate and evaluate response  - Consider cultural and social influences on pain and pain management  - Notify physician/advanced practitioner if interventions unsuccessful or patient reports new pain  Outcome: Adequate for Discharge     Problem: INFECTION - ADULT  Goal: Absence or prevention of progression during hospitalization  Description: INTERVENTIONS:  - Assess and monitor for signs and symptoms of infection  - Monitor lab/diagnostic results  - Monitor all insertion sites, i e  indwelling lines, tubes, and drains  - Monitor endotracheal if appropriate and nasal secretions for changes in amount and color  - [place] appropriate cooling/warming therapies per order  - Administer medications as ordered  - Instruct and encourage patient and family to use good hand hygiene technique  - Identify and instruct in appropriate isolation precautions for identified infection/condition  Outcome: Adequate for Discharge  Goal: Absence of fever/infection during neutropenic period  Description: INTERVENTIONS:  - Monitor WBC    Outcome: Adequate for Discharge     Problem: SAFETY ADULT  Goal: Patient will remain free of falls  Description: INTERVENTIONS:  - Assess patient frequently for physical needs  -  Identify cognitive and physical deficits and behaviors that affect risk of falls    -  [place] fall precautions as indicated by assessment   - Educate patient/family on patient safety including physical limitations  - Instruct patient to call for assistance with activity based on assessment  - Modify environment to reduce risk of injury  - Consider OT/PT consult to assist with strengthening/mobility  Outcome: Adequate for Discharge  Goal: Maintain or return to baseline ADL function  Description: INTERVENTIONS:  -  Assess patient's ability to carry out ADLs; assess patient's baseline for ADL function and identify physical deficits which impact ability to perform ADLs (bathing, care of mouth/teeth, toileting, grooming, dressing, etc )  - Assess/evaluate cause of self-care deficits   - Assess range of motion  - Assess patient's mobility; develop plan if impaired  - Assess patient's need for assistive devices and provide as appropriate  - Encourage maximum independence but intervene and supervise when necessary  - Involve family in performance of ADLs  - Assess for home care needs following discharge   - Consider OT consult to assist with ADL evaluation and planning for discharge  - Provide patient education as appropriate  Outcome: Adequate for Discharge  Goal: Maintain or return mobility status to optimal level  Description: INTERVENTIONS:  - Assess patient's baseline mobility status (ambulation, transfers, stairs, etc )    - Identify cognitive and physical deficits and behaviors that affect mobility  - Identify mobility aids required to assist with transfers and/or ambulation (gait belt, sit-to-stand, lift, walker, cane, etc )  - [place] fall precautions as indicated by assessment  - Record patient progress and toleration of activity level on Mobility SBAR; progress patient to next Phase/Stage  - Instruct patient to call for assistance with activity based on assessment  - Consider rehabilitation consult to assist with strengthening/weightbearing, etc   Outcome: Adequate for Discharge     Problem: DISCHARGE PLANNING  Goal: Discharge to home or other facility with appropriate resources  Description: INTERVENTIONS:  - Identify barriers to discharge w/patient and caregiver  - Arrange for needed discharge resources and transportation as appropriate  - Identify discharge learning needs (meds, wound care, etc )  - Arrange for interpretive services to assist at discharge as needed  - Refer to Case Management Department for coordinating discharge planning if the patient needs post-hospital services based on physician/advanced practitioner order or complex needs related to functional status, cognitive ability, or social support system  Outcome: Adequate for Discharge     Problem: Knowledge Deficit  Goal: Patient/family/caregiver demonstrates understanding of disease process, treatment plan, medications, and discharge instructions  Description: Complete learning assessment and assess knowledge base    Interventions:  - Provide teaching at level of understanding  - Provide teaching via preferred learning methods  Outcome: Adequate for Discharge     Problem: CARDIOVASCULAR - ADULT  Goal: Maintains optimal cardiac output and hemodynamic stability  Description: INTERVENTIONS:  - Monitor I/O, vital signs and rhythm  - Monitor for S/S and trends of decreased cardiac output  - Administer and titrate ordered vasoactive medications to optimize hemodynamic stability  - Assess quality of pulses, skin color and temperature  - Assess for signs of decreased coronary artery perfusion  - Instruct patient to report change in severity of symptoms  Outcome: Adequate for Discharge  Goal: Absence of cardiac dysrhythmias or at baseline rhythm  Description: INTERVENTIONS:  - Continuous cardiac monitoring, vital signs, obtain 12 lead EKG if ordered  - Administer antiarrhythmic and heart rate control medications as ordered  - Monitor electrolytes and administer replacement therapy as ordered  Outcome: Adequate for Discharge     Problem: GENITOURINARY - ADULT  Goal: Maintains or returns to baseline urinary function  Description: INTERVENTIONS:  - Assess urinary function  - Encourage oral fluids to ensure adequate hydration if ordered  - Administer IV fluids as ordered to ensure adequate hydration  - Administer ordered medications as needed  - Offer frequent toileting  - Follow urinary retention protocol if ordered  Outcome: Adequate for Discharge  Goal: Absence of urinary retention  Description: INTERVENTIONS:  - Assess patients ability to void and empty bladder  - Monitor I/O  - Bladder scan as needed  - Discuss with physician/AP medications to alleviate retention as needed  - Discuss catheterization for long term situations as appropriate  Outcome: Adequate for Discharge  Goal: Urinary catheter remains patent  Description: INTERVENTIONS:  - Assess patency of urinary catheter  - If patient has a chronic winslow, consider changing catheter if non-functioning  - Follow guidelines for intermittent irrigation of non-functioning urinary catheter  Outcome: Adequate for Discharge

## 2020-09-21 NOTE — ED PROVIDER NOTES
History  Chief Complaint   Patient presents with    Shortness of Breath     post op pt c/o of sob of bretah and chets pressure  82-year-old male with past medical history of hypertension, hyperlipidemia, diabetes, recent exploratory laparoscopy after a suprapubic catheter that was placed recently for hematuria balloon popped and urine was leaking out into his abdomen  He was discharged on Saturday and felt well, today he started complaining of shortness of breath this evening as well as 8/10 nonradiating, chest pressure which was not relieved by nitroglycerin and that he had at home, it was not positional, not pleuritic  It was not exertional no associated nausea vomiting or diaphoresis  They tried having him sit outside with fresh air but it did not help either, he was also complaining of left lower quadrant pain which he has been having since surgery and for which he was taking pain medication every 8 hours with relief  Otherwise no fevers, no cough  Son does report that he had some episodes of diarrhea today however that resolved  Prior to Admission Medications   Prescriptions Last Dose Informant Patient Reported? Taking?    Cholecalciferol (VITAMIN D3) 5000 units CAPS  Child Yes No   Sig: Take 1 capsule by mouth   Cyanocobalamin (VITAMIN B12 PO)  Child Yes No   Sig: Take 5,000 mcg by mouth   albuterol (PROVENTIL HFA,VENTOLIN HFA) 90 mcg/act inhaler  Child No No   Sig: Inhale 2 puffs every 4 (four) hours as needed for wheezing   aspirin 325 mg tablet  Child Yes No   Sig: Take 325 mg by mouth daily   carvedilol (COREG) 25 mg tablet  Child No No   Sig: Take 0 5 tablets (12 5 mg total) by mouth 2 (two) times a day with meals   clopidogrel (PLAVIX) 75 mg tablet  Child Yes No   Sig: Take 75 mg by mouth daily   docusate sodium (COLACE) 100 mg capsule  Child No No   Sig: Take 1 capsule (100 mg total) by mouth every 12 (twelve) hours   famotidine (PEPCID) 40 MG tablet  Child Yes No   Sig: Take 40 mg by mouth daily   finasteride (PROSCAR) 5 mg tablet  Child No No   Sig: Take 1 tablet (5 mg total) by mouth daily   furosemide (LASIX) 20 mg tablet  Child Yes No   Sig: Take 20 mg by mouth 2 (two) times a day   gabapentin (NEURONTIN) 300 mg capsule  Child Yes No   Sig: Take 300 mg by mouth 2 (two) times a day   insulin glargine (LANTUS) 100 units/mL subcutaneous injection  Child Yes No   Sig: Inject 20 Units under the skin daily at bedtime   irbesartan (AVAPRO) 150 mg tablet  Child Yes No   Sig: Take 150 mg by mouth daily at bedtime   isosorbide mononitrate (IMDUR) 30 mg 24 hr tablet  Child Yes No   Sig: Take 30 mg by mouth daily   meloxicam (MOBIC) 15 mg tablet  Child Yes No   Sig: Take 15 mg by mouth daily   metFORMIN (GLUCOPHAGE) 1000 MG tablet  Child Yes No   Sig: Take 1,000 mg by mouth 2 (two) times a day with meals   nitroglycerin (NITROSTAT) 0 4 mg SL tablet  Child Yes No   Sig: Place 0 4 mg under the tongue every 5 (five) minutes as needed for chest pain   potassium chloride (K-DUR,KLOR-CON) 20 mEq tablet   No No   Sig: Take 1 tablet (20 mEq total) by mouth daily   simvastatin (ZOCOR) 20 mg tablet  Child Yes No   Sig: Take 20 mg by mouth daily at bedtime   sitaGLIPtin (JANUVIA) 100 mg tablet  Child Yes No   Sig: Take 100 mg by mouth daily   tamsulosin (FLOMAX) 0 4 mg  Child No No   Sig: Take 2 capsules (0 8 mg total) by mouth daily with dinner      Facility-Administered Medications: None       Past Medical History:   Diagnosis Date    Cardiac disease     CHF (congestive heart failure) (HCC)     Diabetes mellitus (HCC)     GERD (gastroesophageal reflux disease)     Hyperlipidemia     Hypertension     MI (myocardial infarction) ([facility] [address] )        Past Surgical History:   Procedure Laterality Date    CT CYSTOGRAM  9/15/2020    EYE SURGERY      NH CYSTOURETHROSCOPY W/IRRIG & EVAC CLOTS N/A 9/14/2020    Procedure: CYSTOSCOPY EVACUATION OF CLOTS; TURP; SUPRAPUBIC CATHETER INSERTION;  Surgeon: Jose Arce MD; Location: [facility] OR;  Service: Urology       Family History   Problem Relation Age of Onset    Diabetes Mother     Hypertension Mother     Cancer Father     Hypertension Father     Cancer Sister     Hypertension Sister      I have reviewed and agree with the history as documented  E-Cigarette/Vaping    E-Cigarette Use Never User      E-Cigarette/Vaping Substances    Nicotine No     THC No     CBD No     Flavoring No     Other No     Unknown No      Social History     Tobacco Use    Smoking status: Never Smoker    Smokeless tobacco: Never Used   Substance Use Topics    Alcohol use: No     Alcohol/week: 0 0 standard drinks     Frequency: Never     Binge frequency: Never    Drug use: No       Review of Systems   Constitutional: Negative for appetite change, chills and fever  HENT: Negative for rhinorrhea and sore throat  Eyes: Negative for photophobia and visual disturbance  Respiratory: Positive for shortness of breath  Negative for cough  Cardiovascular: Negative for chest pain and palpitations  Gastrointestinal: Positive for abdominal pain  Negative for diarrhea and vomiting  Genitourinary: Negative for dysuria, frequency and urgency  Skin: Negative for rash  Neurological: Negative for dizziness and weakness  All other systems reviewed and are negative  Physical Exam  Physical Exam  Vitals signs and nursing note reviewed  Constitutional:       Appearance: He is well-developed  HENT:      Head: Normocephalic and atraumatic  Right Ear: External ear normal       Left Ear: External ear normal    Eyes:      Conjunctiva/sclera: Conjunctivae normal       Pupils: Pupils are equal, round, and reactive to light  Neck:      Musculoskeletal: Normal range of motion and neck supple  Vascular: No JVD  Trachea: No tracheal deviation  Cardiovascular:      Rate and Rhythm: Normal rate and regular rhythm  Heart sounds: Normal heart sounds  No murmur   No friction rub  No gallop  Pulmonary:      Effort: Pulmonary effort is normal  No respiratory distress  Breath sounds: No stridor  No wheezing or rales  Abdominal:      General: There is no distension  Palpations: Abdomen is soft  There is no mass  Tenderness: There is no abdominal tenderness  There is no guarding or rebound  Comments: Midline lower abdominal scar with staple line intact, no overlying erythema, area minimally tender to palpation without guarding   Genitourinary:     Penis: Normal        Comments: Estrella Catheter noted  Musculoskeletal: Normal range of motion  Skin:     General: Skin is warm and dry  Coloration: Skin is not pale  Findings: No erythema or rash  Neurological:      Mental Status: He is alert and oriented to person, place, and time  Cranial Nerves: No cranial nerve deficit           Vital Signs  ED Triage Vitals   Temperature Pulse Respirations Blood Pressure SpO2   09/21/20 0210 09/21/20 0150 09/21/20 0150 09/21/20 0150 09/21/20 0150   98 3 °F (36 8 °C) 90 19 141/63 97 %      Temp Source Heart Rate Source Patient Position - Orthostatic VS BP Location FiO2 (%)   09/21/20 0210 09/21/20 0300 09/21/20 0300 09/21/20 0300 --   Temporal Monitor Lying Left arm       Pain Score       09/21/20 0212       5           Vitals:    09/21/20 0210 09/21/20 0220 09/21/20 0300 09/21/20 0430   BP: 128/58 117/50 122/59 126/57   Pulse: 85 94 88 77   Patient Position - Orthostatic VS:   Lying Lying         Visual Acuity      ED Medications  Medications   nitroglycerin (NITROSTAT) SL tablet 0 4 mg (0 mg Sublingual Hold 9/21/20 0200)   insulin lispro (HumaLOG) 100 units/mL subcutaneous injection 1-6 Units (has no administration in time range)   aspirin chewable tablet 324 mg (324 mg Oral Given 9/21/20 0212)   morphine (PF) 4 mg/mL injection 4 mg (4 mg Intravenous Given 9/21/20 0212)   iohexol (OMNIPAQUE) 350 MG/ML injection (SINGLE-DOSE) 100 mL (100 mL Intravenous Given 9/21/20 0256)   furosemide (LASIX) injection 20 mg (20 mg Intravenous Given 9/21/20 6322)       Diagnostic Studies  Results Reviewed     Procedure Component Value Units Date/Time    Urine Microscopic [[id number]]  (Abnormal) Collected:  09/21/20 0441    Lab Status:  Final result Specimen:  Urine, Indwelling Estrella Catheter Updated:  09/21/20 0529     RBC, UA Innumerable /hpf      WBC, UA 2-4 /hpf      Epithelial Cells Occasional /hpf      Bacteria, UA Occasional /hpf     UA w Reflex to Microscopic w Reflex to Culture [[id number]]  (Abnormal) Collected:  09/21/20 0441    Lab Status:  Final result Specimen:  Urine, Indwelling Estrella Catheter Updated:  09/21/20 0517     Color, UA Yellow     Clarity, UA Clear     Specific Gravity, UA <=1 005     pH, UA 7 5     Leukocytes, UA Trace     Nitrite, UA Negative     Protein, UA 30 (1+) mg/dl      Glucose, UA Negative mg/dl      Ketones, UA Negative mg/dl      Urobilinogen, UA 0 2 E U /dl      Bilirubin, UA Negative     Blood, UA Large    NT-BNP PRO [[id number]]  (Abnormal) Collected:  09/21/20 0157    Lab Status:  Final result Specimen:  Blood from Line, Venous Updated:  09/21/20 0232     NT-proBNP 1,870 pg/mL     Troponin I [[id number]]  (Normal) Collected:  09/21/20 0157    Lab Status:  Final result Specimen:  Blood from Line, Venous Updated:  09/21/20 0229     Troponin I 0 03 ng/mL     Comprehensive metabolic panel [[id number]]  (Abnormal) Collected:  09/21/20 0157    Lab Status:  Final result Specimen:  Blood from Line, Venous Updated:  09/21/20 0227     Sodium 138 mmol/L      Potassium 4 4 mmol/L      Chloride 104 mmol/L      CO2 27 mmol/L      ANION GAP 7 mmol/L      BUN 8 mg/dL      Creatinine 0 92 mg/dL      Glucose 169 mg/dL      Calcium 8 7 mg/dL      Corrected Calcium 9 8 mg/dL      AST 19 U/L      ALT 23 U/L      Alkaline Phosphatase 69 U/L      Total Protein 5 8 g/dL      Albumin 2 6 g/dL      Total Bilirubin 0 20 mg/dL      eGFR 77 ml/min/1 73sq m     Narrative:       [employer] guidelines for Chronic Kidney Disease (CKD):     Stage 1 with normal or high GFR (GFR > 90 mL/min/1 73 square meters)    Stage 2 Mild CKD (GFR = 60-89 mL/min/1 73 square meters)    Stage 3A Moderate CKD (GFR = 45-59 mL/min/1 73 square meters)    Stage 3B Moderate CKD (GFR = 30-44 mL/min/1 73 square meters)    Stage 4 Severe CKD (GFR = 15-29 mL/min/1 73 square meters)    Stage 5 End Stage CKD (GFR <15 mL/min/1 73 square meters)  Note: GFR calculation is accurate only with a steady state creatinine    APTT [[id number]]  (Normal) Collected:  09/21/20 0200    Lab Status:  Final result Specimen:  Blood from Arm, Right Updated:  09/21/20 0224     PTT 31 seconds     Protime-INR [[id number]]  (Normal) Collected:  09/21/20 0200    Lab Status:  Final result Specimen:  Blood from Arm, Right Updated:  09/21/20 0224     Protime 13 3 seconds      INR 1 01    CBC and differential [[id number]]  (Abnormal) Collected:  09/21/20 0157    Lab Status:  Final result Specimen:  Blood from Line, Venous Updated:  09/21/20 0208     WBC 12 05 Thousand/uL      RBC 2 78 Million/uL      Hemoglobin 8 6 g/dL      Hematocrit 25 9 %      MCV 93 fL      MCH 30 9 pg      MCHC 33 2 g/dL      RDW 14 3 %      MPV 10 2 fL      Platelets 947 Thousands/uL      nRBC 0 /100 WBCs      Neutrophils Relative 75 %      Immat GRANS % 1 %      Lymphocytes Relative 9 %      Monocytes Relative 9 %      Eosinophils Relative 6 %      Basophils Relative 0 %      Neutrophils Absolute 8 95 Thousands/µL      Immature Grans Absolute 0 14 Thousand/uL      Lymphocytes Absolute 1 11 Thousands/µL      Monocytes Absolute 1 09 Thousand/µL      Eosinophils Absolute 0 73 Thousand/µL      Basophils Absolute 0 03 Thousands/µL                  PE Study with CT abdomen & pelvis with contrast   Final Result by Isa Yang MD (09/21 7216)      No evidence of pulmonary embolism is seen        There is a 6 mm pulmonary nodule within the right upper lobe of the lung  Follow-up is required  Please see above Fleischner Society recommendations  Pulmonology consultation and follow-up is also recommended  The prostate is enlarged and heterogeneous  A TURP defect is present  Prominent vascular structures in the right hemipelvis appear similar to the prior study  There remains some fluid and stranding in the anterior pelvis, however, this is markedly    improved when compared with September 15, 2020  A Estrella catheter is present within the urinary bladder  The urinary bladder wall remains diffusely thickened  Correlation with urinalysis and urine culture and sensitivity is recommended  Other findings as described above, please see discussion  This examination demonstrates findings requiring imaging follow-up and was logged as such in EPIC            I personally discussed this study with TIFFANIE TENNILLE on 9/21/2020 at 4:34 AM                   Workstation performed: [id number]         XR chest 1 view portable    (Results Pending)              Procedures  Procedures         ED Course  ED Course as of Sep 21 0603   Mon Sep 21, 2020   0237 Baseline   Hemoglobin(!): 8 6   0307 Abdominal pain resolved, does still have some chest heaviness and shortness of breath, saturating 97% on room air, feels better when he is sitting up      0427 Called radiology as am still awaiting the CT scan read                                          MDM  Number of Diagnoses or Management Options  Diagnosis management comments: 62-year-old male presents for evaluation of shortness of breath and cough status post surgery, will obtain CTA of the chest, blood work, will re-evaluate      Disposition  Final diagnoses:   Elevated brain natriuretic peptide (BNP) level   Shortness of breath   Chest pain   Pulmonary nodule     Time reflects when diagnosis was documented in both MDM as applicable and the Disposition within this note     Time User Action Codes Description Comment    9/21/2020 4:59 AM Darron Felton Add [R79 89] Elevated brain natriuretic peptide (BNP) level     9/21/2020  4:59 AM Darron Felton Add [R06 02] Shortness of breath     9/21/2020  4:59 AM Darron Felton Add [R07 9] Chest pain     9/21/2020  4:59 AM Darron Felton Add [R91 1] Pulmonary nodule       ED Disposition     ED Disposition Condition Date/Time Comment    Admit Stable Mon Sep 21, 2020  4:59 AM Case was discussed with NOLAN and the patient's admission status was agreed to be Admission Status: observation status to the service of Dr Chito Burroughs   Follow-up Information    None         Patient's Medications   Discharge Prescriptions    No medications on file     No discharge procedures on file      PDMP Review       Value Time User    PDMP Reviewed  Yes 8/19/2020 10:17 AM Bertha Ackerman MD          ED Provider  Electronically Signed by           Vu Foster MD  09/21/20 0356

## 2020-09-21 NOTE — NURSING NOTE
Reviewed d/c instructions, new rx scripts, bloodwork script and /fu appts  With pt 's son  Pt  Provided copy of d/c instructions in Syriac  Pt  Ambulated to elevator/lobby with son and this RN  Pt  Was d/c'd to home

## 2020-09-21 NOTE — ASSESSMENT & PLAN NOTE
Wt Readings from Last 3 Encounters:   09/19/20 108 kg (238 lb 15 7 oz)   09/07/20 98 kg (216 lb)   09/04/20 99 3 kg (219 lb)       · Patient presented with Chest pain that started at midnight  · Chest pain likely secondary to fluid overload  · BNP-1870  · Lasix IV 20 mg given in ER  · Strict I/O  · Daily weight  · Consider 2nd dose of IV Lasix if chest pain continues, otherwise can restart home p o   Lasix  · Troponin negative  · EKG-no ST changes

## 2020-09-21 NOTE — ASSESSMENT & PLAN NOTE
· Continue Flomax    Patient had TURP done recently with extensive stay at [facility] per HPI

## 2020-09-21 NOTE — DISCHARGE INSTRUCTIONS
Insuficiencia cardíaca   LO QUE NECESITA SABER:   La insuficiencia cardiaca (IC) es can condición que no permite que major corazón se llene o bombee correctamente  No hay suficiente oxígeno en la azael que llega a myron órganos y tejidos  La insuficiencia cardíaca puede ocurrir en el lado derecho, el lado hipolito o en las dos cavidades inferiores del corazón  La insuficiencia cardíaca a menudo es causada por daños o lesiones en el corazón  El daño puede deberse a un ataque al corazón, otras condiciones del corazón o presión arterial ashley  La insuficiencia cardíaca es can afección a stan plazo que tiende a empeorar con el paso del [place]  Es importante controlar major kelsy para mejorar major calidad de corrie  La insuficiencia cardíaca puede empeorar por consumo excesivo de alcohol, fumar, diabetes no controlada u obesidad  INSTRUCCIONES SOBRE EL [facility]:   Llame al 911 si presenta:   · Usted tiene alguno de los siguientes signos de un ataque cardíaco:      ¨ Estornudos, presión, o dolor en major pecho que dura mas de 5 minutos o regresa  ¨ Malestar o dolor en major espalda, araceli, mandíbula, abdomen, o brazo     ¨ Dificultad para respirar    ¨ Náuseas o vómito    ¨ Siente un desvanecimiento o tiene sudores fríos especialmente en el pecho o dificultad para respirar  Regrese a la nupur de emergencias si:   · Usted aumenta más de 3 libras (1 4 kg) en un día, o más de lo que major médico le indica que debería  · Major latido cardíaco es acelerado, lento o irregular todo el [place]  Pregúntele a major [place] vitaminas y minerales son adecuados para usted  · Usted tiene síntomas de empeoramiento de la insuficiencia cardíaca:      ¨ Falta de aliento mientas está en reposo o carlos la noche, o que está empeorando de cualquier forma  ¨ Aumento de peso de 5 libras (2 2 kg) o más en [facility]       ¨ Inflamación en exceso en myron piernas o tobillos     ¨ Dolor o inflamación abdominal     ¨ Aumento de la tos ¨ Pérdida del apetito     ¨ Sensación de cansancio todo el tiempo    · Usted se siente desesperado o deprimido, o ha perdido el interés en las cosas que antes disfrutaba  · Usted se siente preocupado o tiene miedo con frecuencia  · Usted tiene preguntas o inquietudes acerca de major condición o cuidado  Medicamentos:  Es posible que usted necesite alguno de los siguientes:  · Medicamentos,  pueden ser necesarios para ayudar a regular major ritmo cardíaco  Puede que también necesite medicamento para bajar la presión arterial y para deshacerse del exceso de líquidos  · [place] myron medicamentos jaswant se le haya indicado  Consulte con major médico si usted hi que major medicamento no le está ayudando o si presenta efectos secundarios  Infórmele si es alérgico a algún medicamento  Mantenga can lista actualizada de los [place], las vitaminas y los productos herbales que cathy  Incluya los siguientes datos de los medicamentos: cantidad, frecuencia y motivo de administración  Traiga con usted la lista o los envases de la píldoras a myron citas de seguimiento  Lleve la lista de los medicamentos con usted en fausto de can emergencia  Acuda a myron consultas de control con major médico o cardiólogo dentro de 2 semanas o según le indicaron  Es posible que necesite regresar para que le lex otros exámenes  Puede que necesite atención médica domiciliaria  Un médico controlará myron signos vitales y major peso y se asegurará de que los medicamentos están funcionando  Anote myron preguntas para que se acuerde de hacerlas carlos myron visitas  Acuda a la rehabilitación cardíaca según le indicaron:  La rehabilitación cardíaca es un programa dirigido por especialistas que le ayudan a fortalecer major corazón de forma badillo  El programa incluye ejercicios, relajación, manejo del estrés y nutrición para un corazón saludable  Los [employer] se cerciorarán de que myron [place] están ayudando a reducir myron síntomas     Controle la insuficiencia cardíaca:   · No fume  La nicotina y otros químicos en los cigarrillos y cigarros pueden provocar daño a los pulmones y dificultar el manejo de la insuficiencia cardíaca  Pida información a major médico si usted actualmente fuma y necesita ayuda para dejar de fumar  Los cigarrillos electrónicos o tabaco sin humo todavía contienen nicotina  Consulte con major médico antes de [employer]  · No consuma alcohol ni drogas ilegales  El alcohol y las drogas pueden empeorar myron síntomas rápidamente  · Pésese todas las mañanas  Use la misma báscula en el mismo sitio  Sheree esto después de ir al baño magdalena antes de consumir cualquier alimento o bebida  Use el mismo tipo de ropa  No utilice zapatos  Registre la lectura de major peso todos los días para que pueda notar cualquier aumento de peso repentino  La inflamación y [place] son signos de retención de líquidos  En fausto que usted tenga sobrepeso, pregúntele cómo puede perder peso de forma badillo  · Revise major presión arterial y frecuencia cardíaca todos los días  Pida información acerca de cómo tomarse la presión arterial y la frecuencia cardíaca correctamente  Pregunte cuáles son las lecturas apropiadas para usted  · Controle cualquier afección médica que tenga  Estas incluyen presión arterial melecio, diabetes, obesidad, colesterol alto, síndrome metabólico y EPOC  Tendrá menos síntomas si controla estas afecciones de [place]  Siga las recomendaciones de major médico y realice un seguimento de forma regular  · Consuma alimentos saludables para el corazón y limite el sodio (sal)  Blanca forma fácil de hacer ésto es consumiendo más fruta y verdura fresca y menos alimentos enlatados y procesados  Reemplace la mantequilla y la margarina con aceites saludables para el corazón jaswant el aceite de pal y el aceite de canola   Otros alimentos para el corazón saludables incluyen las nueces, panes integrales, productos lácteos bajos en grasa, frijoles, y Juanis Law  También el pescado, jaswant el salmón y el atún son saludables para el corazón  Pregunte cuánta sal usted puede consumir diariamente  No use substitutos de la sal      · Glide líquidos jaswant se le haya indicado  Es posible que usted necesite limitar la cantidad de líquidos que cathy si los está reteniendo  Pregunte cuánto líquido debe marlin cada día y cuáles líquidos son los más adecuados para usted  · Manténgase activo  Si usted no es [facility] persona Dobrovo v Brdih, es muy probable que myron síntomas empeoren rápidamente  El caminar, montar en bicicleta y otros tipos de actividad física ayudan a mantener brumfield fortaleza y a mejorar brumfield estado de ánimo  La actividad física también ayuda a controlar brumfield peso corporal  Colabore con brumfield médico para desarrollar un plan de ejercicios que sea adecuado para usted  · Vaya a que lo vacunen jaswant se le indique  Vacúnese todos los [place]  Es posible que también necesite can vacuna contra la neumonía  La gripe y la neumonía pueden ser graves para can persona que tiene insuficiencia cardíaca  Las vacunas lo protegen contra estas infecciones  Únase a un grey de apoyo:  Vivir con insuficiencia cardíaca puede ser difícil  Puede ser útil hablar con otras personas con insuficiencia cardíaca  Usted puede aprender cómo manejar brumfield condición mejor o recibir apoyo emocional  Para más información:   · American Heart Association  [address] [facility] [address]   Phone: [phone]  Web Address: https://www strong [employer]/  org   © 2017 [facility] Information is for End User's use only and may not be sold, redistributed or otherwise used for commercial purposes  All illustrations and images included in CareNotes® are the copyrighted property of A D A M , Inc  or Bruce Guerrero  Esta información es sólo para uso en educación  [place] intención no es darle un consejo médico sobre enfermedades o tratamientos   Colsulte con brumfield [place] farmacéutico antes de seguir cualquier régimen médico para saber si es seguro y efectivo para usted  Dieta baja en sodio   CUIDADO AMBULATORIO:   Can dieta baja en sodio  limita el consumo de alimentos que tienen alto contenido de sodio (sal)  Usted tendrá que seguir can dieta baja en sodio si padece de presión arterial melecio, enfermedad del riñón o insuficiencia cardíaca  Es posible también que tenga que seguir esta dieta si padece can condición que hace que major cuerpo retenga líquidos  Es posible que deba limitar la cantidad de sodio que consume a 1500 mg  Pregúntele a major médico cuánto sodio puede consumir por día  Cómo usar las etiquetas de los alimentos para escoger los que son bajos en sodio:  Myriam las etiquetas de los alimentos para encontrar la cantidad de sodio que contienen  La cantidad de sodio está incluida en miligramos (mg)  La columna del porcentaje de valor diario indica la cantidad de necesidades diarias satisfechas con 1 porción del alimento para cada nutriente en la lista  Escoja alimentos que tengan menos de 5% del porcentaje diario de [place]  Estos alimentos se consideran bajos en sodio  Los alimentos que tienen 20% o más del porcentaje diario de sodio se consideran alimentos altos en sodio  Algunas etiquetas de alimentos podrían también incluir cualquiera de los siguientes términos que indican el contenido de sodio en los alimentos:  · Reza de sodio:  Menos de 5 mg de sodio en cada porción    · Sodio muy bajo:  35 mg de sodio o menos por porción    · Bajo sodio:  140 mg de sodio o menos por porción    · Sodio reducido:  Por lo menos 25% menos de sodio en cada porción que el tipo regular    · [place] en sodio:  50% menos de sodio en cada porción    · Sin sal o sin sal adicional:  No se ha agregado sal adicional carlos el procesamiento de los alimentos (el alimento en sí aún podría contener sodio)  Alimentos que se deben evitar:  Los alimentos salados son altos en sodio   Se debe evitar lo siguiente:  · Alimentos procesados:      ¨ Mezclas para hacer pan de maíz, panecitos, pastel, y pudin     [place] [address] instantáneos, jawsant kimberly, cereales, macarrones y arroz     ¨ [employer], jaswant relleno de pan, mezclas para preparar arroz y pastas, mezclas para preparar dips, y macarrones con queso     ¨ Alimentos enlatados, jaswant vegetales enlatados, sopas, consomés, salsas, y [place] de vegetales o tomate    ¨ Alimentos para merendar, jaswant kimberly tostadas, palomitas de [place], pretzels, piel de cerdo, galletas de soda [place], y nueces saladas    ¨ Alimentos congelados, jaswant cenas, entradas, vegetales en salsa, y marii empanizadas    ¨ Sauerkraut, vegetales en escabeche, y otros alimentos preparados con vinagre    · Marii y quesos:      ¨ Marii ahumadas o curadas, jaswant carne preparada con [place], tocineta, jamón, perros calientes, y salchichas    ¨ Marii o pastas enlatadas, jaswant marii preparadas en ollas de arcilla, shannon, anchoas, e imitación de [address] [facility] [address] para [address] [facility], jaswant [facility], y carne en [facility]    ¨ Queso procesado, jaswant queso americano y pastas de queso    · Condimentos, salsas y especias:      ¨ Leonel (¼ de cucharadita de leonel contiene 575 mg de sodio)    ¨ Especias hechas con leonel, jaswant sal de ajo, sal de apio, sal de cebolla y sal con condimentos    ¨ Salsa de soya regular, salsa de [address] [facility], [address] [facility], salsa para [facility], [employer], y la mayoría de las vinagres con sabor    ¨ Salsa enlatada para marii y mezclas enlatadas     ¨ Condimentos regulares, jaswant la mostaza, la salsa de [place], y los aderezos para ensalada    ¨ Pepinillos y aceitunas    ¨ Ablandadores de marii y glutamato de monosodio  Alimentos que puede incluir:  Myriam las etiquetas de los alimentos para encontrar la cantidad exacta de sodio de cada porción  · Serrano y cereal:  Trate de elegir panes con menos de 80 mg de sodio por porción       ¨ Pan, panecillo, pan estilo jean claude, tortilla o galletas sin sal     ¨ Cereales preparados con menos de 5% del valor diario de sodio (por ejemplo, kiana triturado y arroz inflado)    ¨ Pasta    · Verduras y frutas:      ¨ Vegetales frescos sin sal, congelados o enlatados    ¨ Frutas frescas, congeladas o enlatadas    ¨ Jugo de frutas    · Productos lácteos:  Can porción tiene aproximadamente 150 mg de sodio  Kathrin Hassan, todos los tipos    ¨ Yogur    ¨ Queso mikael, jaswant queso cheddar, suizo, [employer] o Stylr    · Rebeccaside y otros alimentos con proteína:  Algunas marii crudas Joi Ramal sodio extra  ¨ Marii sin aditivos, pescado y carne de ave     ¨ Huevos    · Otros alimentos:      ¨ Pudín casero    ¨ [place] sin sal, palomitas de maíz o galletas saladas    ¨ Mantequilla o margarina sin sal  Modos de disminuir el consumo de sodio:   · Agregue hierbas y especias a los alimentos en lugar de sal carlos la cocción  Utilice condimentos sin sal para agregar sabor a los alimentos  Por ejemplo: cebolla en polvo, ajo en polvo, [place], ortega en polvo, pimentón y perejil  Use jugo de lima, harika o vinagre para darle a los alimentos un sabor ácido  Use chiles picantes, jhonny o jhonny de [place] para agregar un sabor picante a los alimentos  · No ponga el salero en la scott de la cocina  [place] puede evitar que añada sal a los alimentos en la scott  Puede llevar un tiempo acostumbrarse a disfrutar el sabor natural de los alimentos en lugar de agregar sal  Consulte con major médico antes de usar sustitutos de la sal  Algunos sustitutos de la sal tienen can melecio cantidad de potasio y deben evitarse si usted tiene can enfermedad en los riñones  · Escoja alimentos bajos en sodio en los restaurantes  Las comidas de los restaurantes eliecer siempre son altas en sodio  Algunos restaurantes ofrecen información nutricional en el menú que indica la cantidad de sodio de myron alimentos   De ser posible, pida que preparen major comida con menos sal o sin sal      · Compre alimentos y aperitivos sin sal o bajos en sodio en el supermercado  Por ejemplo: caldos, sopas y vegetales enlatados sin moose o bajos en sodio  Elija verduras frescas o congeladas en brumfield lugar  Elija nueces o semillas sin sal o frutas o verduras frescas jaswant bocadillos  Myriam las etiquetas de los alimentos y elija los alimentos sin sal o con bajo o muy bajo contenido de [place]  © 2017 [address] Information is for End User's use only and may not be sold, redistributed or otherwise used for commercial purposes  All illustrations and images included in CareNotes® are the copyrighted property of A JONH KELLER , Inc  or Bruce Guerrero  Esta información es sólo para uso en educación  [place] intención no es darle un consejo médico sobre enfermedades o tratamientos  Colsulte con brumfield [place] farmacéutico antes de seguir cualquier régimen médico para saber si es seguro y efectivo para usted

## 2020-09-21 NOTE — H&P
H&P- Hortensio Leva Bosworth 1935, 82 y o  male MRN: [id number]    Unit/Bed#: ED 12 Encounter: [id number]    Primary Care Provider: No primary care provider on file  Date and time admitted to hospital: 9/21/2020  1:36 AM        Acute on chronic combined systolic and diastolic congestive heart failure (HCC)  Assessment & Plan  Wt Readings from Last 3 Encounters:   09/19/20 108 kg (238 lb 15 7 oz)   09/07/20 98 kg (216 lb)   09/04/20 99 3 kg (219 lb)       · Patient presented with Chest pain that started at midnight  · Chest pain likely secondary to fluid overload  · BNP-1870  · Lasix IV 20 mg given in ER  · Strict I/O  · Daily weight  · Consider 2nd dose of IV Lasix if chest pain continues, otherwise can restart home p o  Lasix  · Troponin negative  · EKG-no ST changes      * Chest pain  Assessment & Plan  · Chest pain started at midnight, unresolved with nitro  · Rest of plan as above    Poorly controlled type 2 diabetes mellitus [facility])  Assessment & Plan  Lab Results   Component Value Date    HGBA1C 7 7 (H) 08/17/2020       Recent Labs     09/18/20  1559 09/18/20  2109 09/19/20  0730 09/19/20  1114   POCGLU 262* 218* 146* 220*       Blood Sugar Average: Last 72 hrs:    · Holding home diabetes meds due to contrast  · Sliding scale insulin      Essential hypertension  Assessment & Plan  · Continue home Coreg and Avapro    Hyperlipidemia  Assessment & Plan  · Continue home statin    GERD (gastroesophageal reflux disease)  Assessment & Plan  · Continue home Pepcid    CAD (coronary artery disease)  Assessment & Plan  · Nitro sublingual p r n  · Continue Imdur and Plavix    Enlarged prostate  Assessment & Plan  · Continue Flomax    Patient had TURP done recently with extensive stay at [facility] per HPI        VTE Prophylaxis: Enoxaparin (Lovenox)  / sequential compression device   Code Status: Level 1  POLST: POLST form is not discussed and not completed at this time    Discussion with family: Son at bedside    Anticipated Length of Stay:  Patient will be admitted on an Observation basis with an anticipated length of stay of  Less than 2 midnights  Justification for Hospital Stay: Acute CHF    Total Time for Visit, including Counseling / Coordination of Care: 30 minutes  Greater than 50% of this total time spent on direct patient counseling and coordination of care  Chief Complaint:   SOB which started at midnight     History of Present Illness:    Merly Hammond is a 82 y o  male with PMH of type 2 diabetes, CHF, HTN, HLD, CAD, GERD, TURP who presents with new onset or shortness of breath and chest pain which started around midnight  Son states he attempted to give him nitro with no resolution approximately 2:00 a m  Decided to bring him to the hospital     Of note, patient has an extensive stay at [facility] recently with a discharged on 09/19  Patient had hematuria which resulted in a TURP with multiple clogged Estrella's  A suprapubic was placed, however there were issues with the Estrella balloon and patient ended up having CBI fluid as well as urine seep into peritoneal cavity  Patient underwent an ex lap at that time  Patient now has a Estrella placed again  Patient has had abdominal pain on and off but is much better than it was at discharge  Patient required no oxygen initially, sats in the high 90s, however short of breath  BNP elevated in the ER patient was given 20 IV Lasix  CTA chest done negative for PE however did note a 6 mm nodule discussed with son will need follow-up in 6-12 months per scan  Patient denies nausea/vomiting, mild shortness of breath and mild chest pain currently      Review of Systems:    Review of Systems    Past Medical and Surgical History:     Past Medical History:   Diagnosis Date    Cardiac disease     [facility]     [facility]     GERD (gastroesophageal reflux disease)     Hyperlipidemia     Hypertension     MI (myocardial infarction[facility])        Past Surgical History:   Procedure Laterality Date    CT CYSTOGRAM  9/15/2020    EYE SURGERY      NC CYSTOURETHROSCOPY W/IRRIG & EVAC CLOTS N/A 9/14/2020    Procedure: CYSTOSCOPY EVACUATION OF CLOTS; TURP; SUPRAPUBIC CATHETER INSERTION;  Surgeon: Rubi Bryan MD;  Location: [facility];  Service: Urology       Meds/Allergies:    Prior to Admission medications    Medication Sig Start Date End Date Taking?  Authorizing Provider   albuterol (PROVENTIL HFA,VENTOLIN HFA) 90 mcg/act inhaler Inhale 2 puffs every 4 (four) hours as needed for wheezing 12/11/17   JAYCE Underwood   aspirin 325 mg tablet Take 325 mg by mouth daily    Historical Provider, MD   carvedilol (COREG) 25 mg tablet Take 0 5 tablets (12 5 mg total) by mouth 2 (two) times a day with meals 8/19/20   Tara Doss MD   Cholecalciferol (VITAMIN D3) 5000 units CAPS Take 1 capsule by mouth    Historical Provider, MD   clopidogrel (PLAVIX) 75 mg tablet Take 75 mg by mouth daily    Historical Provider, MD   Cyanocobalamin (VITAMIN B12 PO) Take 5,000 mcg by mouth    Historical Provider, MD   docusate sodium (COLACE) 100 mg capsule Take 1 capsule (100 mg total) by mouth every 12 (twelve) hours 9/1/20   Ade Harper MD   famotidine (PEPCID) 40 MG tablet Take 40 mg by mouth daily    Historical Provider, MD   finasteride (PROSCAR) 5 mg tablet Take 1 tablet (5 mg total) by mouth daily 7/24/20   Emily Noonan MD   furosemide (LASIX) 20 mg tablet Take 20 mg by mouth 2 (two) times a day    Historical Provider, MD   gabapentin (NEURONTIN) 300 mg capsule Take 300 mg by mouth 2 (two) times a day    Historical Provider, MD   insulin glargine (LANTUS) 100 units/mL subcutaneous injection Inject 20 Units under the skin daily at bedtime    Historical Provider, MD   irbesartan (AVAPRO) 150 mg tablet Take 150 mg by mouth daily at bedtime    Historical Provider, MD   isosorbide mononitrate (IMDUR) 30 mg 24 hr tablet Take 30 mg by mouth daily    Historical Provider, MD   meloxicam (MOBIC) 15 mg tablet Take 15 mg by mouth daily    Historical Provider, MD   metFORMIN (GLUCOPHAGE) 1000 MG tablet Take 1,000 mg by mouth 2 (two) times a day with meals    Historical Provider, MD   nitroglycerin (NITROSTAT) 0 4 mg SL tablet Place 0 4 mg under the tongue every 5 (five) minutes as needed for chest pain    Historical Provider, MD   potassium chloride (K-DUR,KLOR-CON) 20 mEq tablet Take 1 tablet (20 mEq total) by mouth daily 9/20/20   Praneeth Berman MD   simvastatin (ZOCOR) 20 mg tablet Take 20 mg by mouth daily at bedtime    Historical Provider, MD   sitaGLIPtin (JANUVIA) 100 mg tablet Take 100 mg by mouth daily    Historical Provider, MD   tamsulosin (FLOMAX) 0 4 mg Take 2 capsules (0 8 mg total) by mouth daily with dinner 7/24/20 10/22/20  Rosanna Peace MD     I have reviewed home medications with patient family member  Allergies:    Allergies   Allergen Reactions    Demerol [Meperidine]        Social History:     Marital Status: /Civil Union   Occupation: retired  Patient Pre-hospital Living Situation: with family  Patient Pre-hospital Level of Mobility: independent  Patient Pre-hospital Diet Restrictions: diabetic  Substance Use History:   Social History     Substance and Sexual Activity   Alcohol Use No    Alcohol/week: 0 0 standard drinks    Frequency: Never    Binge frequency: Never     Social History     Tobacco Use   Smoking Status Never Smoker   Smokeless Tobacco Never Used     Social History     Substance and Sexual Activity   Drug Use No       Family History:    Family History   Problem Relation Age of Onset    Diabetes Mother     Hypertension Mother     Cancer Father     Hypertension Father     Cancer Sister     Hypertension Sister        Physical Exam:     Vitals:   Blood Pressure: 126/57 (09/21/20 0430)  Pulse: 77 (09/21/20 0430)  Temperature: 98 3 °F (36 8 °C) (09/21/20 0210)  Temp Source: Temporal (09/21/20 0210)  Respirations: 18 (09/21/20 0430)  SpO2: 97 % (09/21/20 0430)    Physical Exam  HENT:      Head: Normocephalic  Eyes:      Pupils: Pupils are equal, round, and reactive to light  Neck:      Musculoskeletal: Normal range of motion  Cardiovascular:      Rate and Rhythm: Normal rate and regular rhythm  Pulses: Normal pulses  Pulmonary:      Effort: Pulmonary effort is normal    Abdominal:      General: Bowel sounds are normal    Musculoskeletal: Normal range of motion  Skin:     General: Skin is warm  Capillary Refill: Capillary refill takes less than 2 seconds  Neurological:      Mental Status: He is alert and oriented to person, place, and time  Psychiatric:         Mood and Affect: Mood normal            Additional Data:     Lab Results: I have personally reviewed pertinent reports  and I have personally reviewed pertinent films in PACS    Results from last 7 days   Lab Units 09/21/20  0157  09/15/20  1832   WBC Thousand/uL 12 05*   < > 21 41*   HEMOGLOBIN g/dL 8 6*   < > 9 4*   HEMATOCRIT % 25 9*   < > 29 3*   PLATELETS Thousands/uL 307   < > 221   BANDS PCT %  --   --  15*   NEUTROS PCT % 75   < >  --    LYMPHS PCT % 9*   < >  --    LYMPHO PCT %  --   --  6*   MONOS PCT % 9   < >  --    MONO PCT %  --   --  2*   EOS PCT % 6   < > 0    < > = values in this interval not displayed       Results from last 7 days   Lab Units 09/21/20  0157   SODIUM mmol/L 138   POTASSIUM mmol/L 4 4   CHLORIDE mmol/L 104   CO2 mmol/L 27   BUN mg/dL 8   CREATININE mg/dL 0 92   ANION GAP mmol/L 7   CALCIUM mg/dL 8 7   ALBUMIN g/dL 2 6*   TOTAL BILIRUBIN mg/dL 0 20   ALK PHOS U/L 69   ALT U/L 23   AST U/L 19   GLUCOSE RANDOM mg/dL 169*     Results from last 7 days   Lab Units 09/21/20  0200   INR  1 01     Results from last 7 days   Lab Units 09/19/20  1114 09/19/20  0730 09/18/20  2109 09/18/20  1559 09/18/20  1118 09/18/20  0703 09/17/20  2107 09/17/20  1610 09/17/20  2588 09/17/20  0515 09/16/20  2339 09/16/20 2029   POC GLUCOSE mg/dl 220* 146* 218* 262* 205* 138 268* 244* 109 111 110 115         Results from last 7 days   Lab Units 09/17/20  0430 09/16/20  1208 09/15/20  1858   LACTIC ACID mmol/L  --   --  1 9   PROCALCITONIN ng/ml 3 12* 4 10*  --        Imaging: I have personally reviewed pertinent reports  and I have personally reviewed pertinent films in PACS    PE Study with CT abdomen & pelvis with contrast   Final Result by Doug Smart MD (09/21 0515)      No evidence of pulmonary embolism is seen  There is a 6 mm pulmonary nodule within the right upper lobe of the lung  Follow-up is required  Please see above Fleischner Society recommendations  Pulmonology consultation and follow-up is also recommended  The prostate is enlarged and heterogeneous  A TURP defect is present  Prominent vascular structures in the right hemipelvis appear similar to the prior study  There remains some fluid and stranding in the anterior pelvis, however, this is markedly    improved when compared with September 15, 2020  A Estrella catheter is present within the urinary bladder  The urinary bladder wall remains diffusely thickened  Correlation with urinalysis and urine culture and sensitivity is recommended  Other findings as described above, please see discussion  This examination demonstrates findings requiring imaging follow-up and was logged as such in EPIC  I personally discussed this study with TIFFANIE NULL on 9/21/2020 at 4:34 AM                   Workstation performed: [id number]         XR chest 1 view portable    (Results Pending)       EKG, Pathology, and Other Studies Reviewed on Admission:   · EKG: NSR, no acute ST changes    Allscripts / Epic Records Reviewed: Yes     ** Please Note: This note has been constructed using a voice recognition system   **

## 2020-09-21 NOTE — CONSULTS
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:  · Patient presents to ED early this AM with c/o chest pain and SOB  · Echo from 9/2020 reveals: EF 30%, mild concentric LVH, grade 1 DD; LA mildly dilated; Aortic root is mildly dilated at 3 5 cm; sclerotic MV/AV changes but no major valvular dysfunction noted  · proBNP 1870 on admit  · Chest xray with very mild pulm  Vascular congestion  · CT chest/abdomen/pelvis: no PE noted; There is a 6 mm pulmonary nodule within the right upper lobe of the lung  · Lasix IV 20 mg given in ED with adequate diuresis noted as patient is net -1 7 L since admit  · Patient appears euvolemic by physical exam; home dose of lasix is typically 20 mg BID would increase to 40 mg BID and start this PO dosing today  · Daily weights with standing scale   · Strict I/O's   · I have educated the patient and his son regarding low sodium diet    2  Chest pain:  · Patient notes chest pain that began at midnight last evening; likely etiology is HF exacerbation  · EKG in ED reveals NSR with rate of 84; no ST/T-wave abnormalities noted  · Troponin #1 0 03 (slight elevation likely non-MI elevated troponin)  · Cardiac cath completed 9/11/2020 reveals no evidence of obstructive CAD (completed in the setting of patient continued c/o CP and pre-op eval for exploratory lap)  · Patient on Plavix 75 mg QD and aspirin 325 mg QD ( agree with aspirin; reason for Plavix is unknown; no evidence of CAD via cath 9/2020)  · Home med Imdur 30 mg QD      3   Non-ischemic cardiomyopathy:  · Echo from 9/2020 reveals: EF 30%  · Cardiac cath completed 9/11/2020 reveals no evidence of obstructive CAD  · Unknown underlying etiology;  Per patient's son he has carried this diagnosis for at least 15 years  · Carvedilol 12 5 mg BID, losartan 50 mg QD as home meds; will increase carvedilol to 25 mg BID       3  NSVT:  · EF 30% by echo completed 9/2020  · Patient with frequent PVC's on tele; 15 beat run of NSVT on tele this AM  · Carvedilol 12 5 mg BID, will increase to 25 mg BID and continue losartan 50 mg QD  · Will discuss possible EP workup for ICD; likely can be done as outpatient    3  DM2  · HgbA1C 8/2020 7 7  · Management per primary team    4  Hyperlipidemia  · Simvastatin 20 mg QD    5  Essential HTN:  · Controlled; SBP averaging 120's-130's  · Carvedilol 12 5 mg BID; Imdur 30 mg QD, losartan 50 mg QD    6  Anemia (acute blood loss anemia in setting of recent hematuria)  · Recent exploratory laparotomy and TURP  · Hgb 8 6    7  GERD:  · Pepcid 40 mg QD     Plan/Recommendations:  -transition patient to PO lasix at increased dose of 40 mg BID  -Kdur 20 mEq QD  -increase Carvedilol to 25 mg BID  -continue Losartan 50 mg QD  -continue all other home meds  -recommend outpatient f/u general cardiology for HF management as well as EP to discuss possible ICD implantation  -will review with Cardiology attending      History of Present Illness   HPI: Elma Escoto is a 82y o  year old male who has a history of DM2, HTN, hyperlipidemia, GERD, BPH with recent gross hematuria/TURP, and chronic combined systolic/diastolic HF  The patient is originally from [facility] and [place],  Per his son ( acting as  at the request of the patient),  He used to follow with a cardiologist in [facility]      The patient presents to the ED  late last evening  Secondary to  Complaints of " chest tightness" and SOB    These  Symptoms began last evening around midnight, the patient's son attempted to give him one sublingual nitroglycerin which did not relieve this tightness,  He subsequently brought the patient to the ED  In the ED the patient noted this chest tightness to be an 8/10,  With no associated symptoms, occurring at rest and non-radiating, non positional        proNP 1870 on admit, Chest xray with very mild pulm  Vascular congestion  Troponin #1 0 03 (given recent cath revealing non-obstructive CAD, it is likely this is non-MI elevated troponin secondary to HF exacerbation)  Lasix IV 20 mg given in ED with adequate diuresis noted as patient is net -1 7 L since admit  The patient is also noted to have a Hgb of 8 6, this is likely secondary to his recent extended hospital stay from 9/7/2020-9/19/2020 for gross hematuria  cardiology is asked to see the patient to assist with acute exacerbation of combined heart failure as well as evidence of NSVT on telemetry  Patient with couplets/triplets on tele as well as one 15 beat run of NSVT on telemetry  Will increase carvedilol to 25 mg b i d  and continue losartan 50 mg once daily  Will discuss with Cardiology attending likely need for patient to meet with EP regarding eventual ICD implantation  Per patient's son this has been discussed with the patient in the past however he had declined, now he is willing to except this procedure  EKG reviewed personally: EKG in ED reveals NSR with rate of 84; no ST/T-wave abnormalities noted            Telemetry reviewed personally: sinus with couplets/triplets and one 15 beat run of NSVT at 0800 this AM      Review of Systems   Constitution: Negative for malaise/fatigue  Cardiovascular: Positive for chest pain (mild left sided chest "squeezing")  Negative for dyspnea on exertion, leg swelling, orthopnea and palpitations  Respiratory: Negative for shortness of breath (no SOB at rest)  Gastrointestinal: Positive for abdominal pain (mild RLQ pain)  Negative for nausea and vomiting  Neurological: Negative for dizziness and light-headedness       Historical Information   Past Medical History:   Diagnosis Date    Cardiac disease     CHF (congestive heart failure) [place] [address] )     Diabetes mellitus ([place] [address] )     GERD (gastroesophageal reflux disease)     Hyperlipidemia     Hypertension     MI (myocardial infarction) ([place] [address] )      Past Surgical History:   Procedure Laterality Date    CT CYSTOGRAM  9/15/2020    EYE SURGERY      OK CYSTOURETHROSCOPY W/IRRIG & EVAC CLOTS N/A 9/14/2020    Procedure: CYSTOSCOPY EVACUATION OF CLOTS; TURP; SUPRAPUBIC CATHETER INSERTION;  Surgeon: Rubi Bryan MD;  Location: [facility];  Service: Urology    OK LAP,DIAGNOSTIC ABDOMEN N/A 9/15/2020    Procedure: EXPLORATORY LAPAROTOMY, closure of cystotomy;  Surgeon: Eduardo Navarro MD;  Location: [facility];  Service: General     Social History     Substance and Sexual Activity   Alcohol Use No    Alcohol/week: 0 0 standard drinks    Frequency: Never    Binge frequency: Never     Social History     Substance and Sexual Activity   Drug Use No     Social History     Tobacco Use   Smoking Status Never Smoker   Smokeless Tobacco Never Used     Family History:   Family History   Problem Relation Age of Onset    Diabetes Mother     Hypertension Mother     Cancer Father     Hypertension Father     Cancer Sister     Hypertension Sister        Meds/Allergies   current meds:   Current Facility-Administered Medications   Medication Dose Route Frequency    albuterol (PROVENTIL HFA,VENTOLIN HFA) inhaler 2 puff  2 puff Inhalation Q4H PRN    aspirin tablet 325 mg  325 mg Oral Daily    carvedilol (COREG) tablet 12 5 mg  12 5 mg Oral BID With Meals    cholecalciferol (VITAMIN D3) tablet 1,000 Units  1,000 Units Oral Daily    clopidogrel (PLAVIX) tablet 75 mg  75 mg Oral Daily    docusate sodium (COLACE) capsule 100 mg  100 mg Oral Q12H    enoxaparin (LOVENOX) subcutaneous injection 40 mg  40 mg Subcutaneous Daily    famotidine (PEPCID) tablet 40 mg  40 mg Oral Daily    finasteride (PROSCAR) tablet 5 mg  5 mg Oral Daily    gabapentin (NEURONTIN) capsule 300 mg  300 mg Oral BID    insulin lispro (HumaLOG) 100 units/mL subcutaneous injection 1-6 Units  1-6 Units Subcutaneous TID AC    isosorbide mononitrate (IMDUR) 24 hr tablet 30 mg  30 mg Oral Daily    losartan (COZAAR) tablet 50 mg  50 mg Oral Daily    meloxicam (MOBIC) tablet 15 mg  15 mg Oral Daily    pravastatin (PRAVACHOL) tablet 40 mg  40 mg Oral Daily With Dinner    tamsulosin (FLOMAX) capsule 0 8 mg  0 8 mg Oral Daily With Dinner          Allergies   Allergen Reactions    Demerol [Meperidine]        Objective   Vitals: Blood pressure 115/83, pulse 75, temperature 97 9 °F (36 6 °C), resp  rate 18, height 5' 7" (1 702 m), weight 96 8 kg (213 lb 4 8 oz), SpO2 96 %  ,     Body mass index is 33 41 kg/m²  ,     Systolic (84TIZ), OQY:400 , Min:115 , OWZ:041     Diastolic (48EQZ), TNE:46, Min:50, Max:83            Intake/Output Summary (Last 24 hours) at 9/21/2020 1143  Last data filed at 9/21/2020 0925  Gross per 24 hour   Intake    Output 1700 ml   Net -1700 ml     Weight (last 2 days)     Date/Time   Weight    09/21/20 0635   96 8 (213 3)            Invasive Devices     Drain            Urethral Catheter Three way 5 days                  Physical Exam  Vitals signs reviewed  Cardiovascular:      Rate and Rhythm: Normal rate and regular rhythm  Heart sounds: S1 normal and S2 normal  No murmur  Pulmonary:      Effort: Pulmonary effort is normal  No respiratory distress  Breath sounds: Normal breath sounds  Abdominal:      General: Bowel sounds are normal  There is no distension  Palpations: Abdomen is soft  Tenderness: There is abdominal tenderness (RLQ)  Musculoskeletal:      Right lower leg: No edema  Left lower leg: No edema  Skin:     General: Skin is warm and dry     Neurological:      Mental Status: He is alert and oriented to person, place, and time  Psychiatric:         Mood and Affect: Mood normal            LABORATORY RESULTS:  Results from last 7 days   Lab Units 09/21/20 0157   TROPONIN I ng/mL 0 03     CBC with diff:   Results from last 7 days   Lab Units 09/21/20 0157 09/19/20 0619 09/17/20 0350 09/16/20  0455 09/15/20  1858 09/15/20  1832 09/15/20  0446   WBC Thousand/uL 12 05* 9 13 15 59* 18 13* 20 78* 21 41* 14 77*   HEMOGLOBIN g/dL 8 6* 8 8* 7 9* 8 1* 9 2* 9 4* 9 4*   HEMATOCRIT % 25 9* 27 2* 24 8* 24 9* 29 1* 29 3* 28 4*   MCV fL 93 94 96 95 97 97 95   PLATELETS Thousands/uL 307 251 173 151 219 221 202   MCH pg 30 9 30 2 30 6 31 0 30 7 31 0 31 4   MCHC g/dL 33 2 32 4 31 9 32 5 31 6 32 1 33 1   RDW % 14 3 14 2 15 1 14 6 14 9 14 9 14 6   MPV fL 10 2 10 1 10 4 9 4 10 1 10 3 10 4   NRBC AUTO /100 WBCs 0 0  --  0  --  0 0       CMP:  Results from last 7 days   Lab Units 09/21/20 0157 09/19/20 0619 09/17/20 0350 09/16/20 0455 09/15/20  1858 09/15/20  1832 09/15/20  0446  09/14/20  1905 09/14/20  1835   POTASSIUM mmol/L 4 4 3 2* 3 6 3 1* 3 7 3 8 3 9   < >  --   --    CHLORIDE mmol/L 104 104 110* 109* 105 107 105   < >  --   --    CO2 mmol/L 27 30 23 22 19* 23 23   < >  --   --    CO2, I-STAT mmol/L  --   --   --   --   --   --   --   --  29 27   BUN mg/dL 8 6 11 10 12 13 15   < >  --   --    CREATININE mg/dL 0 92 0 90 0 79 0 96 1 04 0 96 0 94   < >  --   --    GLUCOSE, ISTAT mg/dl  --   --   --   --   --   --   --   --  140 142*   CALCIUM mg/dL 8 7 8 3 7 5* 6 8* 7 1* 7 2* 7 4*   < >  --   --    AST U/L 19  --   --   --  12  --   --   --   --   --    ALT U/L 23  --   --   --  16  --   --   --   --   --    ALK PHOS U/L 69  --   --   --  53  --   --   --   --   --    EGFR ml/min/1 73sq m 77 79 83 73 66 73 75   < >  --   --     < > = values in this interval not displayed         BMP:  Results from last 7 days   Lab Units 09/21/20  0157 09/19/20  5180 09/17/20  0350 09/16/20  0455 09/15/20  1858 09/15/20  1832 09/15/20  0446 20  1905   POTASSIUM mmol/L 4 4 3 2* 3 6 3 1* 3 7 3 8 3 9   < >  --    CHLORIDE mmol/L 104 104 110* 109* 105 107 105   < >  --    CO2 mmol/L 27 30 23 22 19* 23 23   < >  --    CO2, I-STAT mmol/L  --   --   --   --   --   --   --   --  29   BUN mg/dL 8 6 11 10 12 13 15   < >  --    CREATININE mg/dL 0 92 0 90 0 79 0 96 1 04 0 96 0 94   < >  --    GLUCOSE, ISTAT mg/dl  --   --   --   --   --   --   --   --  140   CALCIUM mg/dL 8 7 8 3 7 5* 6 8* 7 1* 7 2* 7 4*   < >  --     < > = values in this interval not displayed  Lab Results   Component Value Date    NTBNP 1,870 (H) 2020    NTBNP 443 2017            Results from last 7 days   Lab Units 20  0350 09/15/20  1858   MAGNESIUM mg/dL 2 9* 1 5*                 Results from last 7 days   Lab Units 20  0157   TSH 3RD GENERATON uIU/mL 3 743*       Results from last 7 days   Lab Units 20  0200   INR  1 01     Lipid Profile:   No results found for: CHOL  No results found for: HDL  No results found for: LDLCALC  No results found for: TRIG      Cardiac testing:   Results for orders placed during the hospital encounter of 20   Echo complete with contrast if indicated    Narrative [address] [place] [address], [address] [facility]  [phone]    Transthoracic Echocardiogram  2D, M-mode, Doppler, and Color Doppler    Study date:  08-Sep-2020    Patient: Angie Elias  MR number: [id number]  Account number: [de-identified]  : 1935  Age: 82 years  Gender: Male  Status: Inpatient  Location: Bedside  Height: 67 in  Weight: 213 6 lb  BP: 102/ 55 mmHg    Indications: Dyspnea  Diagnoses: R06 00 - Dyspnea, unspecified    Sonographer:  Ying Alford RDCS  Referring Physician:  Erik Moncada MD  Group:  [facility]  Interpreting Physician:  Machelle Denis DO    SUMMARY    SUMMARY:  1  This is a technically difficult but adequate study with the use of echo contrast  2  Left ventricle is mildly dilated with severely reduced systolic function  Left ventricular ejection fraction is estimated at 30%  3  Mild concentric left ventricular hypertrophy  4  Grade 1 diastolic dysfunction  5  Regional wall motion abnormalities consistent with coronary artery disease  6  Mild right ventricular dilatation with normal systolic function  7  Left atrium is mildly dilated  8  Aortic valve sclerotic with adequate separation  9  Fibrocalcific changes of the mitral valve with trace mitral regurgitation  10  Pulmonary artery systolic pressures cannot be estimated due to lack of tricuspid regurgitation jet  11  Aortic root is mildly dilated at 3 5 cm  12  There is no study for comparison    SUMMARY MEASUREMENTS  2D measurements:  Unspecified Anatomy:   RWT was 0 4   MM measurements:  Unspecified Anatomy:   TAPSE was 2 3 cm  PW measurements:  Unspecified Anatomy:   E' Sept was 0 1 m/s  E/E' Sept was 8 8   MV A Scott was 0 7 m/s  MV Dec Hopewell was 2 3 m/s2  MV DecT was 213 5 ms   MV E Scott was 0 5 m/s  MV E/A Ratio was 0 7   MV PHT was 61 9 ms  MVA By PHT was 3 6 cm2  HISTORY: PRIOR HISTORY: CHF  GERD  Hypertension  Hyperlipidemia  Diabetes  CAD  MI  Sepsis  PROCEDURE: The procedure was performed at the bedside  This was a routine study  The transthoracic approach was used  The study included complete 2D imaging, M-mode, complete spectral Doppler, and color Doppler  The heart rate was 87 bpm,  at the start of the study  Images were obtained from the parasternal, apical, subcostal, and suprasternal notch acoustic windows  Intravenous contrast ( 0 6 ml Definity in NSS) was administered to opacify the left ventricle  Echocardiographic views were limited due to restricted patient mobility, poor acoustic window availability, and decreased penetration  This was a technically difficult study  LEFT VENTRICLE: Left ventricle is mildly dilated with severely reduced systolic function   Left ventricular ejection fraction is estimated at 30%  Mild concentric left ventricular hypertrophy  Grade 1 diastolic dysfunction  There is severe  hypokinesis of the anterior and anteroseptal wall from base to mid  The remaining walls are mildly hypokinetic  RIGHT VENTRICLE: Right ventricle is mildly dilated with normal systolic function  LEFT ATRIUM: Left atrium is mildly dilated  ATRIAL SEPTUM: The interatrial septum appears to be grossly normal without evidence of shunting by color-flow Doppler  RIGHT ATRIUM: Right atrium is top normal in size    MITRAL VALVE: Mitral valve opens well  There are fibrocalcific changes of the mitral valve and chordae  No evidence of mitral stenosis  Trace mitral regurgitation  AORTIC VALVE: Aortic valve is sclerotic with adequate separation  No evidence aortic stenosis or aortic regurgitation  TRICUSPID VALVE: Tricuspid valve opens well  No evidence tricuspid regurgitation  Pulmonary artery systolic pressures cannot be estimated due to lack of tricuspid regurgitation jet  PULMONIC VALVE: Pulmonic valve is not well visualized  No evidence of pulmonic stenosis or pulmonic regurgitation  PERICARDIUM: There is no evidence of significant pericardial effusion  AORTA: The aortic root is mildly dilated at 3 5 cm  SYSTEMIC VEINS: IVC: The IVC is normal size with collapse      SYSTEM MEASUREMENT TABLES    2D  %FS: 24 5 %  Ao Diam: 3 5 cm  EDV(Teich): 198 9 ml  EF(Teich): 47 7 %  ESV(Teich): 104 1 ml  IVSd: 1 1 cm  LA Area: 21 4 cm2  LA Diam: 4 1 cm  LVIDd: 6 3 cm  LVIDs: 4 7 cm  LVPWd: 1 2 cm  RA Area: 18 4 cm2  RVIDd: 4 4 cm  RWT: 0 4  SV(Teich): 94 8 ml    MM  TAPSE: 2 3 cm    PW  E' Sept: 0 1 m/s  E/E' Sept: 8 8  MV A Scott: 0 7 m/s  MV Dec Oliver: 2 3 m/s2  MV DecT: 213 5 ms  MV E Scott: 0 5 m/s  MV E/A Ratio: 0 7  MV PHT: 61 9 ms  MVA By PHT: 3 6 cm2    [employer] Accredited Echocardiography Laboratory    Prepared and electronically signed by    Awilda Barajas Keerthi Mercer DO  Signed 09-Sep-2020 11:02:18       No results found for this or any previous visit  No procedure found  No results found for this or any previous visit  Imaging: I have personally reviewed pertinent reports  Ct Abdomen Pelvis W Wo Contrast    Result Date: 9/15/2020  Narrative: CT ABDOMEN AND PELVIS WITH AND WITHOUT IV CONTRAST INDICATION:   Abdominal distension  Patient underwent suprapubic catheter placement and transurethral prostate resection yesterday  Now with increasing abdominal distention and absent bowel sounds  COMPARISON:  September 8, 2020 TECHNIQUE: Initial CT of the abdomen and pelvis was performed without intravenous contrast   Subsequent dynamic CT evaluation of the abdomen and pelvis was performed after the administration of intravenous contrast in both nephrographic and delayed phases after the administration of intravenous contrast   Axial, sagittal, and coronal 2D reformatted images were created from the source data and submitted for interpretation  Radiation dose length product (DLP) for this visit:  8112 mGy-cm   This examination, like all CT scans performed in the [facility], was performed utilizing techniques to minimize radiation dose exposure, including the use of iterative reconstruction and automated exposure control  IV Contrast:  100 mL of iohexol (OMNIPAQUE) Enteric Contrast:  Enteric contrast was not administered  FINDINGS: ABDOMEN RIGHT KIDNEY AND URETER: No solid renal mass  No detectable urothelial mass  Multiple cysts and other subcentimeter hypodensities that are too small to characterize, statistically also cysts  No hydronephrosis or hydroureter  No urinary tract calculi  No perinephric collection  LEFT KIDNEY AND URETER: No solid renal mass  No detectable urothelial mass  Multiple subcentimeter hypodensities that are too small to characterize, statistically cysts  No hydronephrosis or hydroureter  No urinary tract calculi   No perinephric collection  URINARY BLADDER AND PROSTATE: There is a small caliber suprapubic catheter present  The tip of this catheter is difficult to visualize, but appears to be along the serosal anterior wall the urinary bladder on image 25 of series 3  Also noted is a Estrella catheter with inflated balloon in the lumen of the urinary bladder  The urinary bladder is collapsed and there is diffuse bladder wall thickening  There is irregular fluid in the perivesical space and space of Retzius as well as, in the retroperitoneum and also a moderate to large volume of abdominopelvic simple appearing fluid  The appearance is suggestive of either leaking urine or leakage of fluid which may have been instilled at the time of transurethral prostate resection  There has been transurethral prostate resection  The prostate is enlarged  LOWER CHEST:  Trace bilateral pleural effusions and bibasilar atelectasis noted  Heart is enlarged similar from prior examination  LIVER/BILIARY TREE:  Unremarkable  GALLBLADDER:  No calcified gallstones  No pericholecystic inflammatory change  SPLEEN:  Unremarkable  PANCREAS:  Unremarkable  ADRENAL GLANDS:  Unremarkable  STOMACH AND BOWEL: Small sliding type hernia noted  Sigmoid diverticulosis noted without evidence of acute diverticulitis  ABDOMINOPELVIC CAVITY:  Moderate volume of simple appearing fluid in the abdomen, pelvis, retroperitoneum, and perivesical space suspicious for either leakage of urine or leakage of fluid instilled at the time of transurethral prostate resection  CT cystogram would be useful to evaluate for possibility of bladder leakage  Few small bubbles of pneumoperitoneum are noted  VESSELS:  Unremarkable for patient's age  PELVIS REPRODUCTIVE ORGANS:  Unremarkable for patient's age  APPENDIX: No findings to suggest appendicitis  ABDOMINAL WALL/INGUINAL REGIONS:  Unremarkable  OSSEOUS STRUCTURES:  No acute fracture or destructive osseous lesion       Impression: The tip of a small caliber suprapubic catheter appears to be located at the serosal margin of the anterior lower urinary bladder  No suprapubic catheter balloon is present suggesting that it may have ruptured  Simple appearing fluid in the space of Retzius, retroperitoneum, and peritoneal cavity which could either represent fluid administered at the time of transrectal prostate resection or some combination of mastoid fluid and urine  CT cystogram would be useful to evaluate for possibility of bladder leakage  Trace record and left costophrenic angle effusions with overlying bibasilar atelectasis  I personally discussed this study with Del Moreno and Dr Enrique Vernon on 9/15/2020 at 12:50 PM  Workstation performed: [id number]     Ct Abdomen Pelvis W Wo Contrast    Result Date: 9/8/2020  Narrative: CT ABDOMEN AND PELVIS WITH AND WITHOUT IV CONTRAST INDICATION:   Hematuria, unknown cause  COMPARISON:  CT chest/abdomen/pelvis 8/16/2020 TECHNIQUE: Initial CT of the abdomen and pelvis was performed without intravenous contrast   Subsequent dynamic CT evaluation of the abdomen and pelvis was performed after the administration of intravenous contrast in both nephrographic and delayed phases after the administration of intravenous contrast   Axial, sagittal, and coronal 2D reformatted images were created from the source data and submitted for interpretation  Radiation dose length product (DLP) for this visit:  1672 mGy-cm   This examination, like all CT scans performed in the [facility], was performed utilizing techniques to minimize radiation dose exposure, including the use of iterative reconstruction and automated exposure control  IV Contrast:  100 mL of iohexol (OMNIPAQUE) Enteric Contrast:  Enteric contrast was not administered  FINDINGS: ABDOMEN RIGHT KIDNEY AND URETER: No solid renal mass  No detectable urothelial mass    Multiple cysts and other subcentimeter hypodensities that are too small to characterize, statistically also cysts  No hydronephrosis or hydroureter  No urinary tract calculi  No perinephric collection  LEFT KIDNEY AND URETER: No solid renal mass  No detectable urothelial mass  Multiple subcentimeter hypodensities that are too small to characterize, statistically cysts  No hydronephrosis or hydroureter  No urinary tract calculi  No perinephric collection  URINARY BLADDER: Distended with urine and gas despite the presence of a Estrella catheter  Multilobular hyperdense material dependently that is nonenhancing, likely hemorrhage  No definite bladder wall mass  No calculi  Enlarged prostate gland, which protrudes into the bladder base  LOWER CHEST:  No clinically significant abnormality identified in the visualized lower chest  LIVER/BILIARY TREE:  Unremarkable  GALLBLADDER:  No calcified gallstones  No pericholecystic inflammatory change  SPLEEN:  Unremarkable  PANCREAS:  Unremarkable  ADRENAL GLANDS:  Unremarkable  STOMACH AND BOWEL:  No bowel obstruction  There is colonic diverticulosis without evidence of acute diverticulitis  ABDOMINOPELVIC CAVITY:  No ascites  No free intraperitoneal air  No lymphadenopathy  VESSELS:  Atherosclerotic changes without an aortic aneurysm  Circumaortic left renal vein  PELVIS REPRODUCTIVE ORGANS:  Unremarkable for patient's age  APPENDIX: Normal  ABDOMINAL WALL/INGUINAL REGIONS:  Unremarkable  OSSEOUS STRUCTURES:  No acute fracture or destructive osseous lesion  Impression: 1  Distended urinary bladder despite the presence of a Estrella catheter; correlate with function  2   Hemorrhage dependently in the urinary bladder  Underlying small neoplasm is difficult to definitively exclude  3   Bilateral renal cysts and other subcentimeter hypodensities that are too small to characterize, statistically also cysts  4   Prostatomegaly  Correlate with PSA level   Workstation performed: [id number]     Xr Chest 1 View Portable    Result Date: 9/21/2020  Narrative: CHEST INDICATION:   sob  COMPARISON:  September 15, 2020 EXAM PERFORMED/VIEWS:  XR CHEST PORTABLE FINDINGS: Cardiomediastinal silhouette appears unremarkable  The lungs are clear  No pneumothorax or pleural effusion  Osseous structures appear within normal limits for patient age  Impression: No acute cardiopulmonary disease  Workstation performed: [id number]     Xr Chest Portable    Result Date: 9/15/2020  Narrative: CHEST INDICATION:   dyspnea  COMPARISON:  Chest radiograph from 8/18/2020  Abdomen CT from today  Chest CT from 8/16/2020  EXAM PERFORMED/VIEWS:  XR CHEST PORTABLE FINDINGS: Mild cardiomegaly  No acute disease  Low lung volumes with mild bibasilar atelectasis  No pneumothorax or pleural effusion  Osseous structures appear within normal limits for patient age  Impression: No acute cardiopulmonary disease  Workstation performed: [id number]     Ct Head Wo Contrast    Result Date: 9/9/2020  Narrative: CT BRAIN - WITHOUT CONTRAST INDICATION:   Altered mental status  COMPARISON:  None  TECHNIQUE:  CT examination of the brain was performed  In addition to axial images, sagittal and coronal 2D reformatted images were created and submitted for interpretation  Radiation dose length product (DLP) for this visit:  912 mGy-cm   This examination, like all CT scans performed in the [place], was performed utilizing techniques to minimize radiation dose exposure, including the use of iterative reconstruction and automated exposure control  IMAGE QUALITY:  Diagnostic  FINDINGS: PARENCHYMA:  No intracranial mass, mass effect or midline shift  No CT signs of acute infarction  No acute parenchymal hemorrhage  VENTRICLES AND EXTRA-AXIAL SPACES:  Normal for the patient's age  VISUALIZED ORBITS AND PARANASAL SINUSES:  Unremarkable  CALVARIUM AND EXTRACRANIAL SOFT TISSUES:  Normal      Impression: No acute intracranial abnormality   Workstation performed: [id number] Pe Study With Ct Abdomen & Pelvis With Contrast    Result Date: 9/21/2020  Narrative: CT PULMONARY ANGIOGRAM OF THE CHEST AND CT ABDOMEN AND PELVIS WITH INTRAVENOUS CONTRAST INDICATION:   recent exlap, chest pain, sob, abdominal pain  Recent (September 15, 2020) exploratory laparotomy and surgical closure of cystotomy for treatment of bladder perforation related to suprapubic catheter complication which resulted in urinary bladder leak  TURP performed September 14, 2020  Patient discharged from the hospital on Saturday  Now complaining of shortness of breath, cough and chest pressure  Patient has had left lower quadrant pain since surgery  Some diarrhea today, presently resolved  History of diabetes and hypertension  History of coronary artery disease, prior myocardial infarction  COMPARISON:  CT of the abdomen and pelvis dated September 15, 2020 and CT of the chest dated August 16, 2020  TECHNIQUE:  CT examination of the chest, abdomen and pelvis was performed  Thin section CT angiographic technique was used in the chest in order to evaluate for pulmonary embolus and coronal 3D MIP postprocessing was performed on the acquisition scanner  Axial, sagittal, and coronal 2D reformatted images were created from the source data and submitted for interpretation  Radiation dose length product (DLP) for this visit:  1187 mGy-cm   This examination, like all CT scans performed in the [facility], was performed utilizing techniques to minimize radiation dose exposure, including the use of iterative reconstruction and automated exposure control  IV Contrast:  100 mL of iohexol (OMNIPAQUE) Enteric Contrast:  Enteric contrast was not administered  FINDINGS: CHEST PULMONARY ARTERIAL TREE:  No pulmonary embolus is seen  LUNGS: There is a 6 mm pulmonary nodule within the periphery of the posterolateral aspect of the right upper lobe of the lung (series 5 image 26)    This appears unchanged compared with August 16, 2020  Based on current Fleischner Society 2017 Guidelines on incidental pulmonary nodule, followup non-contrast CT is recommended at 6-12 months from the initial examination and, if stable at that time, an additional followup is recommended for 18-24 months from the initial examination  Pulmonology consultation and follow-up is also recommended  Mild dependent atelectasis is present at the lung bases  PLEURA:  Trace pleural effusions  HEART/AORTA:  Coronary artery calcifications are present  Mild cardiomegaly appears similar  There is atherosclerosis of the thoracic aorta  MEDIASTINUM AND KARL:  Unremarkable  CHEST WALL AND LOWER NECK:   Unremarkable  ABDOMEN LIVER/BILIARY TREE:  Unremarkable  GALLBLADDER:  No calcified gallstones  No pericholecystic inflammatory change  SPLEEN:  Unremarkable  PANCREAS:  Unremarkable  ADRENAL GLANDS:  Unremarkable  KIDNEYS/URETERS:  Small bilateral renal cysts  No hydronephrosis  STOMACH AND BOWEL:  There is no evidence of bowel obstruction  There is colonic diverticulosis without evidence of acute diverticulitis  APPENDIX:  No findings to suggest appendicitis  ABDOMINOPELVIC CAVITY:  There remains some fluid and stranding in the anterior pelvis, slightly more pronounced to the left of midline, however, this is markedly improved compared with September 15, 2020  No evidence of pneumoperitoneum  VESSELS:  There is atherosclerosis  There is no abdominal aortic aneurysm  PELVIS REPRODUCTIVE ORGANS:  The prostate is enlarged and heterogeneous  A TURP defect is present  Prominent vascular structures are again evident in the right hemipelvis  URINARY BLADDER:  A Estrella catheter is present within the urinary bladder  There is some gas within the urinary bladder lumen  The urinary bladder wall is thickened  ABDOMINAL WALL/INGUINAL REGIONS:  There are postoperative changes of the lower anterior abdominal wall   OSSEOUS STRUCTURES:  No acute fracture or destructive osseous lesion  Impression: No evidence of pulmonary embolism is seen  There is a 6 mm pulmonary nodule within the right upper lobe of the lung  Follow-up is required  Please see above Fleischner Society recommendations  Pulmonology consultation and follow-up is also recommended  The prostate is enlarged and heterogeneous  A TURP defect is present  Prominent vascular structures in the right hemipelvis appear similar to the prior study  There remains some fluid and stranding in the anterior pelvis, however, this is markedly improved when compared with September 15, 2020  A Winslow catheter is present within the urinary bladder  The urinary bladder wall remains diffusely thickened  Correlation with urinalysis and urine culture and sensitivity is recommended  Other findings as described above, please see discussion  This examination demonstrates findings requiring imaging follow-up and was logged as such in EPIC  I personally discussed this study with TIFFANIE NULL on 9/21/2020 at 4:34 AM  Workstation performed: [id number]     Ct Cystogram    Result Date: 9/15/2020  Narrative: CT PELVIS CYSTOGRAM INDICATION:   evaluate for bladder perforation  COMPARISON:  Earlier on September 15, 2020 TECHNIQUE: CT examination of the pelvis was performed both prior to and after the instillation of contrast into the urinary bladder via winslow catheter  Axial, sagittal, and coronal 2D reformatted images were created from the source data and submitted for interpretation  Radiation dose length product (DLP) for this visit:  1739 mGy-cm   This examination, like all CT scans performed in the [facility], was performed utilizing techniques to minimize radiation dose exposure, including the use of iterative reconstruction and automated exposure control  Contrast instilled into urinary bladder:  515 mL of a solution of 15 mL Omnipaque 350 : 500 mL of sterile saline   IV Contrast:  15 mL of iohexol injection SOLN Enteric Contrast:  Enteric contrast was not administered  FINDINGS: URINARY BLADDER:  Contrast instilled into the lumen of the urinary bladder via Estrella catheter leaks into the space of Retzius via small perforation in the lower anterior urinary bladder wall visible on axial image 31 of series 4 and on sagittal image 98 of series 605  This tiny defect in the bladder wall is located adjacent to the tip of a small caliber suprapubic catheter which lies just outside the serosal wall of the bladder  VISUALIZED KIDNEYS/URETERS:  Distal ureters a contrast-filled because of IV contrast administered for examination earlier in the day, and the distal ureters appear unremarkable  REPRODUCTIVE ORGANS:  Prostate is enlarged  Some instilled contrast is seen within the defect of transurethral prostate resection  APPENDIX:  No findings to suggest appendicitis  VISUALIZED BOWEL:  Unremarkable  ABDOMINOPELVIC CAVITY:  Fluid in space of Retzius, retroperitoneum, and peritoneal cavity as described in report of earlier CT examination  VISUALIZED VESSELS:  Atherosclerotic calcifications  ABDOMINOPELVIC WALL/INGUINAL REGIONS: Postsurgical/postprocedural changes  OSSEOUS STRUCTURES:  No acute fracture or destructive osseous lesion  Impression: Small focal perforation in the anterior wall of the urinary bladder adjacent to the tip of small caliber suprapubic catheter accounting for the simple fluid in space of Retzius, retroperitoneum, and peritoneal cavity    I personally discussed this study with Dr Shahab Killian via [employer] text messaging on 9/15/2020 at 2:32 PM  Workstation performed: [id number]         Assessment  Principal Problem:    Chest pain  Active Problems:    CAD (coronary artery disease)    Acute on chronic combined systolic and diastolic congestive heart failure ([address] )    Enlarged prostate    GERD (gastroesophageal reflux disease)    Essential hypertension    Hyperlipidemia    Poorly controlled type 2 diabetes mellitus [facility])                   Thank you for allowing us to participate in this patient's care  Counseling / Coordination of Care  Total floor / unit time spent today 45 minutes  Greater than 50% of total time was spent with the patient and / or family counseling and / or coordination of care  A description of the counseling / coordination of care: Review of history, current assessment, development of a plan  Code Status: Level 1 - Full Code    ** Please Note: Dragon 360 Dictation voice to text software may have been used in the creation of this document   **

## 2020-09-21 NOTE — ASSESSMENT & PLAN NOTE
· Continue Flomax  · Recent TURP procedure, evaluated by Urology while inpatient  · Continue Estrella catheter in place until follow-up this week with urology for removal  · Per chart review office to contact patient after discharge to schedule, phone number provided to family

## 2020-09-21 NOTE — ASSESSMENT & PLAN NOTE
· Nitro sublingual p r n    · Continue Imdur and Plavix  · Cardiac catheterization from September 2020 with luminal irregularities in all vessels

## 2020-09-21 NOTE — DISCHARGE INSTR - AVS FIRST PAGE
· Follow-up with primary care provider as an outpatient within 1 week  · Follow-up with cardiology as an outpatient  · Please call the urology office in regards to when you may shower fully, for now please continue local hygiene until cleared by surgeon to shower  The office should be in contact with you to schedule an appointment at the [facility] office  Should do not hear from the office within the next couple of days you can contact them at the number provided  · Medication changes:  · Increase furosemide to 40 mg twice daily  · Increase carvedilol to 25 mg twice daily  · You may continue to use Tylenol 650 mg every 6 hours as needed for pain  · Continue to follow a low-sodium diet of maximum 2 g daily  Also continue to weigh yourself every day  Should you notice sudden weight gain within a couple of day time span please call the cardiology office  · Continue to check your blood sugars before meals and at bedtime  Please keep a log of these numbers for your primary care provider so they can better adjust your diabetes medications if needed  · Incidentally a pulmonary nodule was found on your CT scan of the chest   It is recommended to have repeat CT scan in about 6-12 months per radiology recommendations to monitor this to make sure it remains stable  A referral has been placed to follow up with a pulmonologist (lung doctor) as an outpatient  Return to the emergency department for further evaluation should you experience chest pain/palpitations, shortness of breath, nausea/vomiting, worsening abdominal pain, leg swelling

## 2020-09-22 LAB
ATRIAL RATE: 84 BPM
P AXIS: 52 DEGREES
PR INTERVAL: 156 MS
QRS AXIS: 14 DEGREES
QRSD INTERVAL: 92 MS
QT INTERVAL: 404 MS
QTC INTERVAL: 477 MS
T WAVE AXIS: 35 DEGREES
VENTRICULAR RATE: 84 BPM

## 2020-09-22 PROCEDURE — 93010 ELECTROCARDIOGRAM REPORT: CPT | Performed by: INTERNAL MEDICINE

## 2020-09-22 NOTE — TELEPHONE ENCOUNTER
Called [facility] and scheduled for winslow removal and stent removal for 1:00 on 9/30   Mahamed aware it is on the nurse's schedule

## 2020-09-23 ENCOUNTER — TELEPHONE (OUTPATIENT)
Dept: UROLOGY | Facility: MEDICAL CENTER | Age: 85
End: 2020-09-23

## 2020-09-23 NOTE — TELEPHONE ENCOUNTER
Patient called to see if he can take a full shower  Eliane advised that he would need to get clearance from Dr Ashley Parson

## 2020-09-24 NOTE — PROGRESS NOTES
Heart Failure   Follow Up Office Visit Note -     Merly Atkins   82 y o    male   MRN: [id number]  [place] [address] [facility] [id number] [facility] [address]  [phone]    PCP: No primary care provider on file  Cardiologist : Will be Dr Renuka Kim            Summary of Recommendations  Low-sodium diet Heart failure education as below  Provided in Mosotho  DC nitrate  Decrease aspirin to 81 mg daily  Unclear why he is on Plavix  Advised to bring in papers from [place] about it  He is also on Mobic- recommend he stop given hematuria  Consider referral to electrophysiology regarding ICD candidacy, at the next OV  Follow up will be scheduled with Dr Kimberly Vargas pt  CBC,BMP prior to        Impression/plan  Dilated nonischemic cardiomyopathy, LVIDd: 6 3 cm, EF 30%; catheterization on 9/11/20:  Minor luminal regularities  Chronic combined heart failure, recent admission for decompensation 9/7-9/19/20 + ED visit 9/21  Labs after DC: Cr 1 03, BUN 12 K 4 1  Wt Readings from Last 3 Encounters:   09/29/20 94 8 kg (209 lb)   09/21/20 96 8 kg (213 lb 4 8 oz)   09/19/20 108 kg (238 lb 15 7 oz)       Wt Readings from Last 3 Encounters:   09/21/20 96 8 kg (213 lb 4 8 oz)   09/19/20 108 kg (238 lb 15 7 oz)   09/07/20 98 kg (216 lb)      Last labs: 9/21  Cr 0 9  K 4 4   Beta-Blocker:  Carvedilol 25 b i d , recently increased from 12 5 b i d    ACEi, ARB or ARNi:  Irbesartan 150 mg daily   Aldosterone Receptor Blocker:   Diuretic:  Furosemide 40 b i d , recently increased from 40 daily   ICD:  Prior refusal; reconsidering  Refused life vest  His son reports: "his heart has been weak since he was 64 yo"   Educated regarding adherence to a 1 5g -2 gram sodium diet/ 2000 ml fluid restriction, daily weights and to call us if  he gains  2-3 lbs in 1 day or 5 lbs/ 1 week  NSVT- on beta blocker  Hypertension, essential  /60   On beta-blocker, ARB, loop diuretic, nitrate  Will DC nitrate  Hyperlipidemia  On simvastatin 20 mg daily  Will need assessment in the future  Type 2 diabetes mellitus  Hemoglobin A1c 7 7, September 2020  On metformin, Januvia, insulin  Anemia secondary to acute blood loss secondary to bleeding prostate  In ICU, received 4 units of packed cells pre op and 2 unit  Post op  Hemoglobin now 8 6 ( 9/21), lowest 7 1  Refractory hematuria; 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; status post TURP/insertion suprapubic tube 9/14/20; followed by exploratory laparotomy for retroperitoneal fluid with removal of suprapubic tube 9/15/20  Cardiac testing  · TTE 9/8/20, with contrast  EF 30% mild LVH  Grade 1 DD + wall motion abnormalities  Mild LAD  Aortic valve sclerosis  Trace MR  Aortic root mildly dilated 3 5 cm  There is severe hypokinesis of the anterior and anteroseptal wall from base to mid  The remaining walls are mildly hypokinetic   cardiac catheterization 9/11/20  LAD: The vessel was normal sized  Angiography showed minor luminal irregularities  Circumflex: The vessel was normal sized  Angiography showed minor luminal irregularities  The two OM branches were also free of significant disease  RCA: The vessel was normal sized and dominant, giving rise to the PDA and a posterolateral branch  There was minimal plaque  There were no significant lesions  Rght femoral access  Hemostasis was achieved with an Angioseal device  HPI:   Ramírez Huang is an 83 yo Sami-speaking gentleman who resides in [place] and is visiting his son locally  Fabrizio Merritt He was   recently was hospitalized at [facility], twice  Firstly, admitted September 8th at [facility], with severe hematuria and obstructive BPH  He was followed by Cardiology for preoperative evaluation  The patient reported he follows with Cardiology in [facility]   His history was not entirely clear   He reported an "enlarged heart" and prior refusal of  an ICD as well   He admitted to exertional angina  An echocardiogram disclosed a severe cardiomyopathy, ejection fraction 30% with LAD wall motion territory abnormalities  He did undergo cardiac catheterization September 11th, to risk stratify prior to urologic surgery  This demonstrated minor luminal irregularities  He was diagnosed with a severe, dilated nonischemic cardiomyopathy; etiology unclear  He underwent his urological procedure September 14, a TURP and insertion of suprapubic catheter  He later complained of some abdominal discomfort and was tachycardic  He returned to the operating room for exploratory laparotomy for retroperitoneal fluid with removal of suprapubic tube 9/15/20 the following day, treated by urology and general surgery   During his hospital course he was treated for UTI, sepsis and anemia  He did receive 5 units of packed cells, required intubation was treated in ICU  Once recovered, from a cardiac standpoint, he was placed on guideline directed medical therapy for his cardiomyopathy  He refused LifeVest or ICD  He was discharged on Lasix 20 b i d , irbesartan 150, carvedilol 12 5 b i d  he was discharged September 19  Notably discharge weight was 238, adm weight 9/7 was recorded as 216  Dry weight unclear    He was readmitted to the emergency room at [place] [address] September 21st with chest heaviness and shortness of breath  He was found to be volume overloaded and followed by Cardiology  His proBNP was elevated at 1800  His EKG showed sinus rhythm, narrow QRS  He was given 1 dose of IV Lasix  Cardiology recommended up titrating his beta-blocker and diuretic  He was discharged on Coreg 25 b i d  and Lasix 40 b i d       9/29/20  Here for follow-up, accompanied by son who functions as   He tells me he feels bad  He is short of breath  He denies chest pain  He is dizzy  No chest pain  He appears comfortable  /60   HR 81 and reg by exam  Will stop isosorbide  Will decrease aspirin to 81  Is unclear why he is on Plavix  He may not need it  I made no change today  His wife has papers at home about it  Asked to bring to the next OV  Would recommend he stop Mobic-son will also check into this  Has a suprapubic catheter- in bag- dark cherry urine  "its been that way a long time"  CBC 9/21: Hgb 8 6  BMP from September 28:  Creatinine 1 0 BUN 12 potassium 4 1   Reviewed the importance of adhering to a low-sodium diet  Provided education and [place]se  Recommended daily weights, record and call us if his weight increases 2-3 lb in 1 day or 5 lb in 5 days  His son tells me his father's heart has been weak since 63years old  Consider ICD in the future; he will return to speak to [address] [place] [address] cardiologist in a short interval        Assessment  Diagnoses and all orders for this visit:    Hospital discharge follow-up    Chronic combined systolic (congestive) and diastolic (congestive) heart failure (HCC)    Coronary artery disease involving native heart without angina pectoris, unspecified vessel or lesion type    Hyperlipidemia, unspecified hyperlipidemia type    Essential hypertension    Poorly controlled type 2 diabetes mellitus ([facility] )        Past Medical History:   Diagnosis Date    Cardiac disease     CHF (congestive heart failure) [facility]     Diabetes mellitus ([facility]     GERD (gastroesophageal reflux disease)     Hyperlipidemia     Hypertension     MI (myocardial infarction) ([facility] )        Review of Systems   Constitution: Negative for chills  Cardiovascular: Negative for chest pain, claudication, cyanosis, dyspnea on exertion, irregular heartbeat, leg swelling, near-syncope, orthopnea, palpitations, paroxysmal nocturnal dyspnea and syncope  Respiratory: Positive for shortness of breath  Negative for cough  Gastrointestinal: Negative for heartburn and nausea     Genitourinary:        + urinary catheter in place   Neurological: Negative for dizziness, focal weakness, headaches, light-headedness and weakness  All other systems reviewed and are negative  Allergies   Allergen Reactions    Demerol [Meperidine]            Current Outpatient Medications:     albuterol (PROVENTIL HFA,VENTOLIN HFA) 90 mcg/act inhaler, Inhale 2 puffs every 4 (four) hours as needed for wheezing, Disp: 1 Inhaler, Rfl: 0    aspirin 325 mg tablet, Take 325 mg by mouth daily, Disp: , Rfl:     carvedilol (COREG) 25 mg tablet, Take 1 tablet (25 mg total) by mouth 2 (two) times a day with meals, Disp: 60 tablet, Rfl: 0    Cholecalciferol (VITAMIN D3) 5000 units CAPS, Take 1 capsule by mouth, Disp: , Rfl:     clopidogrel (PLAVIX) 75 mg tablet, Take 75 mg by mouth daily, Disp: , Rfl:     Cyanocobalamin (VITAMIN B12 PO), Take 5,000 mcg by mouth, Disp: , Rfl:     docusate sodium (COLACE) 100 mg capsule, Take 1 capsule (100 mg total) by mouth every 12 (twelve) hours, Disp: 60 capsule, Rfl: 0    famotidine (PEPCID) 40 MG tablet, Take 40 mg by mouth daily, Disp: , Rfl:     finasteride (PROSCAR) 5 mg tablet, Take 1 tablet (5 mg total) by mouth daily, Disp: 90 tablet, Rfl: 3    furosemide (LASIX) 40 mg tablet, Take 1 tablet (40 mg total) by mouth 2 (two) times a day, Disp: 60 tablet, Rfl: 0    gabapentin (NEURONTIN) 300 mg capsule, Take 300 mg by mouth 2 (two) times a day, Disp: , Rfl:     insulin glargine (LANTUS) 100 units/mL subcutaneous injection, Inject 20 Units under the skin daily at bedtime, Disp: , Rfl:     irbesartan (AVAPRO) 150 mg tablet, Take 150 mg by mouth daily at bedtime, Disp: , Rfl:     isosorbide mononitrate (IMDUR) 30 mg 24 hr tablet, Take 30 mg by mouth daily, Disp: , Rfl:     meloxicam (MOBIC) 15 mg tablet, Take 15 mg by mouth daily, Disp: , Rfl:     metFORMIN (GLUCOPHAGE) 1000 MG tablet, Take 1,000 mg by mouth 2 (two) times a day with meals, Disp: , Rfl:     nitroglycerin (NITROSTAT) 0 4 mg SL tablet, Place 0 4 mg under the tongue every 5 (five) minutes as needed for chest pain, Disp: , Rfl:     potassium chloride (K-DUR,KLOR-CON) 20 mEq tablet, Take 1 tablet (20 mEq total) by mouth daily, Disp: 30 tablet, Rfl: 3    simvastatin (ZOCOR) 20 mg tablet, Take 20 mg by mouth daily at bedtime, Disp: , Rfl:     sitaGLIPtin (JANUVIA) 100 mg tablet, Take 100 mg by mouth daily, Disp: , Rfl:     tamsulosin (FLOMAX) 0 4 mg, Take 2 capsules (0 8 mg total) by mouth daily with dinner, Disp: 180 capsule, Rfl: 3    Social History     Socioeconomic History    Marital status: /Civil Union     Spouse name: Not on file    Number of children: Not on file    Years of education: Not on file    Highest education level: Not on file   Occupational History    Not on file   Social Needs    Financial resource strain: Not on file    Food insecurity     Worry: Not on file     Inability: Not on file   [employer] needs     Medical: Not on file     Non-medical: Not on file   Tobacco Use    Smoking status: Never Smoker    Smokeless tobacco: Never Used   Substance and Sexual Activity    Alcohol use: No     Alcohol/week: 0 0 standard drinks     Frequency: Never     Binge frequency: Never    Drug use: No    Sexual activity: Not Currently   Lifestyle    Physical activity     Days per week: Not on file     Minutes per session: Not on file    Stress: Not on file   Relationships    Social connections     Talks on phone: Not on file     Gets together: Not on file     Attends Jewish service: Not on file     Active member of club or organization: Not on file     Attends meetings of clubs or organizations: Not on file     Relationship status: Not on file    Intimate partner violence     Fear of current or ex partner: Not on file     Emotionally abused: Not on file     Physically abused: Not on file     Forced sexual activity: Not on file   Other Topics Concern    Not on file   Social History Narrative    Not on file       Family History   Problem Relation Age of Onset    Diabetes Mother     Hypertension Mother     Cancer Father     Hypertension Father     Cancer Sister     Hypertension Sister        Physical Exam   Constitutional: He is oriented to person, place, and time  No distress  HENT:   Head: Normocephalic and atraumatic  Eyes: Conjunctivae and EOM are normal    Neck: Normal range of motion  Neck supple  Cardiovascular: Normal rate, regular rhythm, normal heart sounds and intact distal pulses  Pulmonary/Chest: Effort normal and breath sounds normal    Abdominal: Soft  Bowel sounds are normal    Genitourinary:    Genitourinary Comments: Catheter with cherry colored dark urine in the bag     Musculoskeletal: Normal range of motion  Neurological: He is alert and oriented to person, place, and time  Skin: Skin is warm and dry  Psychiatric: He has a normal mood and affect  Nursing note and vitals reviewed  Vitals: There were no vitals taken for this visit  Wt Readings from Last 3 Encounters:   20 96 8 kg (213 lb 4 8 oz)   20 108 kg (238 lb 15 7 oz)   20 98 kg (216 lb)         Labs & Results:  Lab Results   Component Value Date    WBC 12 05 (H) 2020    HGB 8 6 (L) 2020    HCT 25 9 (L) 2020    MCV 93 2020     2020     No results found for: BNP  No components found for: CHEM    Results for orders placed during the hospital encounter of 20   Echo complete with contrast if indicated    Tasha Marcus 50  Kyaw Reese 35  [facility], [address]  [phone]    Transthoracic Echocardiogram  2D, M-mode, Doppler, and Color Doppler    Study date:  08-Sep-2020    Patient: Rona Serna  MR number: [id number]  Account number: [de-identified]  : 1935  Age: 82 years  Gender: Male  Status: Inpatient  Location: Bedside  Height: 67 in  Weight: 213 6 lb  BP: 102/ 55 mmHg    Indications: Dyspnea      Diagnoses: R06 00 - Dyspnea, unspecified    Sonographer: Olayinka Elliott RDCS  Referring Physician:  Ke Mendoza MD  Group:  [facility]  Interpreting Physician:  Mayte Lopez DO    SUMMARY    SUMMARY:  1  This is a technically difficult but adequate study with the use of echo contrast  2  Left ventricle is mildly dilated with severely reduced systolic function  Left ventricular ejection fraction is estimated at 30%  3  Mild concentric left ventricular hypertrophy  4  Grade 1 diastolic dysfunction  5  Regional wall motion abnormalities consistent with coronary artery disease  6  Mild right ventricular dilatation with normal systolic function  7  Left atrium is mildly dilated  8  Aortic valve sclerotic with adequate separation  9  Fibrocalcific changes of the mitral valve with trace mitral regurgitation  10  Pulmonary artery systolic pressures cannot be estimated due to lack of tricuspid regurgitation jet  11  Aortic root is mildly dilated at 3 5 cm  12  There is no study for comparison    SUMMARY MEASUREMENTS  2D measurements:  Unspecified Anatomy:   RWT was 0 4   MM measurements:  Unspecified Anatomy:   TAPSE was 2 3 cm  PW measurements:  Unspecified Anatomy:   E' Sept was 0 1 m/s  E/E' Sept was 8 8   MV A [place] was 0 7 m/s  MV Dec [place] was 2 3 m/s2  MV DecT was 213 5 ms   MV E [place] was 0 5 m/s  MV E/A Ratio was 0 7   MV PHT was 61 9 ms  MVA By PHT was 3 6 cm2  HISTORY: PRIOR HISTORY: CHF  GERD  Hypertension  Hyperlipidemia  Diabetes  CAD  MI  Sepsis  PROCEDURE: The procedure was performed at the bedside  This was a routine study  The transthoracic approach was used  The study included complete 2D imaging, M-mode, complete spectral Doppler, and color Doppler  The heart rate was 87 bpm,  at the start of the study  Images were obtained from the parasternal, apical, subcostal, and suprasternal notch acoustic windows  Intravenous contrast ( 0 6 ml Definity in NSS) was administered to opacify the left ventricle    Echocardiographic views were limited due to restricted patient mobility, poor acoustic window availability, and decreased penetration  This was a technically difficult study  LEFT VENTRICLE: Left ventricle is mildly dilated with severely reduced systolic function  Left ventricular ejection fraction is estimated at 30%  Mild concentric left ventricular hypertrophy  Grade 1 diastolic dysfunction  There is severe  hypokinesis of the anterior and anteroseptal wall from base to mid  The remaining walls are mildly hypokinetic  RIGHT VENTRICLE: Right ventricle is mildly dilated with normal systolic function  LEFT ATRIUM: Left atrium is mildly dilated  ATRIAL SEPTUM: The interatrial septum appears to be grossly normal without evidence of shunting by color-flow Doppler  RIGHT ATRIUM: Right atrium is top normal in size    MITRAL VALVE: Mitral valve opens well  There are fibrocalcific changes of the mitral valve and chordae  No evidence of mitral stenosis  Trace mitral regurgitation  AORTIC VALVE: Aortic valve is sclerotic with adequate separation  No evidence aortic stenosis or aortic regurgitation  TRICUSPID VALVE: Tricuspid valve opens well  No evidence tricuspid regurgitation  Pulmonary artery systolic pressures cannot be estimated due to lack of tricuspid regurgitation jet  PULMONIC VALVE: Pulmonic valve is not well visualized  No evidence of pulmonic stenosis or pulmonic regurgitation  PERICARDIUM: There is no evidence of significant pericardial effusion  AORTA: The aortic root is mildly dilated at 3 5 cm  SYSTEMIC VEINS: IVC: The IVC is normal size with collapse      SYSTEM MEASUREMENT TABLES    2D  %FS: 24 5 %  Ao Diam: 3 5 cm  EDV(Teich): 198 9 ml  EF(Teich): 47 7 %  ESV(Teich): 104 1 ml  IVSd: 1 1 cm  LA Area: 21 4 cm2  LA Diam: 4 1 cm  LVIDd: 6 3 cm  LVIDs: 4 7 cm  LVPWd: 1 2 cm  RA Area: 18 4 cm2  RVIDd: 4 4 cm  RWT: 0 4  SV(Teich): 94 8 ml    MM  TAPSE: 2 3 cm    PW  E' Sept: 0 1 m/s  E/E' Sept: 8 8  MV A Scott: 0 7 m/s  MV Dec [place]: 2 3 m/s2  MV DecT: 213 5 ms  MV E Scott: 0 5 m/s  MV E/A Ratio: 0 7  MV PHT: 61 9 ms  MVA By PHT: 3 6 cm2    [employer] Accredited [facility]    Prepared and electronically signed by    Maria Elena Ramos DO  Signed 09-Sep-2020 11:02:18       No results found for this or any previous visit  This note was completed in part utilizing m-modal fluency direct voice recognition software  Grammatical errors, random word insertion, spelling mistakes, and incomplete sentences may be an occasional consequence of the system secondary to software limitations, ambient noise and hardware issues  At the time of dictation, efforts were made to edit, clarify and /or correct errors  Please read the chart carefully and recognize, using context, where substitutions have occurred    If you have any questions or concerns about the context, text or information contained within the body of this dictation, please contact myself, the provider, for further clarification

## 2020-09-28 ENCOUNTER — APPOINTMENT (OUTPATIENT)
Dept: LAB | Facility: HOSPITAL | Age: 85
End: 2020-09-28
Payer: MEDICARE

## 2020-09-28 DIAGNOSIS — I50.43 ACUTE ON CHRONIC COMBINED SYSTOLIC AND DIASTOLIC CONGESTIVE HEART FAILURE (HCC): ICD-10-CM

## 2020-09-28 LAB
ANION GAP SERPL CALCULATED.3IONS-SCNC: 3 MMOL/L (ref 4–13)
BUN SERPL-MCNC: 12 MG/DL (ref 5–25)
CALCIUM SERPL-MCNC: 9.8 MG/DL (ref 8.3–10.1)
CHLORIDE SERPL-SCNC: 106 MMOL/L (ref 100–108)
CO2 SERPL-SCNC: 29 MMOL/L (ref 21–32)
CREAT SERPL-MCNC: 1.03 MG/DL (ref 0.6–1.3)
GFR SERPL CREATININE-BSD FRML MDRD: 67 ML/MIN/1.73SQ M
GLUCOSE SERPL-MCNC: 145 MG/DL (ref 65–140)
POTASSIUM SERPL-SCNC: 4.1 MMOL/L (ref 3.5–5.3)
SODIUM SERPL-SCNC: 138 MMOL/L (ref 136–145)

## 2020-09-28 PROCEDURE — 36415 COLL VENOUS BLD VENIPUNCTURE: CPT

## 2020-09-28 PROCEDURE — 80048 BASIC METABOLIC PNL TOTAL CA: CPT

## 2020-09-29 ENCOUNTER — HOSPITAL ENCOUNTER (EMERGENCY)
Facility: HOSPITAL | Age: 85
Discharge: HOME | End: 2020-09-29
Attending: EMERGENCY MEDICINE | Admitting: EMERGENCY MEDICINE
Payer: MEDICARE

## 2020-09-29 ENCOUNTER — OFFICE VISIT (OUTPATIENT)
Dept: CARDIOLOGY CLINIC | Facility: CLINIC | Age: 85
End: 2020-09-29
Payer: MEDICARE

## 2020-09-29 ENCOUNTER — APPOINTMENT (EMERGENCY)
Dept: CT IMAGING | Facility: HOSPITAL | Age: 85
End: 2020-09-29
Payer: MEDICARE

## 2020-09-29 VITALS
SYSTOLIC BLOOD PRESSURE: 115 MMHG | HEART RATE: 84 BPM | OXYGEN SATURATION: 98 % | DIASTOLIC BLOOD PRESSURE: 56 MMHG | RESPIRATION RATE: 16 BRPM | TEMPERATURE: 97.6 F

## 2020-09-29 VITALS
HEIGHT: 67 IN | HEART RATE: 89 BPM | SYSTOLIC BLOOD PRESSURE: 130 MMHG | BODY MASS INDEX: 32.8 KG/M2 | WEIGHT: 209 LBS | DIASTOLIC BLOOD PRESSURE: 60 MMHG

## 2020-09-29 DIAGNOSIS — I10 ESSENTIAL HYPERTENSION: ICD-10-CM

## 2020-09-29 DIAGNOSIS — R31.9 HEMATURIA: Primary | ICD-10-CM

## 2020-09-29 DIAGNOSIS — Z09 HOSPITAL DISCHARGE FOLLOW-UP: Primary | ICD-10-CM

## 2020-09-29 DIAGNOSIS — I25.10 CORONARY ARTERY DISEASE INVOLVING NATIVE HEART WITHOUT ANGINA PECTORIS, UNSPECIFIED VESSEL OR LESION TYPE: ICD-10-CM

## 2020-09-29 DIAGNOSIS — I50.42 CHRONIC COMBINED SYSTOLIC (CONGESTIVE) AND DIASTOLIC (CONGESTIVE) HEART FAILURE (HCC): ICD-10-CM

## 2020-09-29 DIAGNOSIS — E78.5 HYPERLIPIDEMIA, UNSPECIFIED HYPERLIPIDEMIA TYPE: ICD-10-CM

## 2020-09-29 DIAGNOSIS — E11.65 POORLY CONTROLLED TYPE 2 DIABETES MELLITUS (HCC): ICD-10-CM

## 2020-09-29 LAB
ANION GAP SERPL CALCULATED.3IONS-SCNC: 6 MMOL/L (ref 4–13)
APTT PPP: 28 SECONDS (ref 23–37)
BASOPHILS # BLD AUTO: 0.03 THOUSANDS/ΜL (ref 0–0.1)
BASOPHILS NFR BLD AUTO: 0 % (ref 0–1)
BUN SERPL-MCNC: 14 MG/DL (ref 5–25)
CALCIUM SERPL-MCNC: 9.1 MG/DL (ref 8.3–10.1)
CHLORIDE SERPL-SCNC: 99 MMOL/L (ref 100–108)
CO2 SERPL-SCNC: 30 MMOL/L (ref 21–32)
CREAT SERPL-MCNC: 1.03 MG/DL (ref 0.6–1.3)
EOSINOPHIL # BLD AUTO: 0.53 THOUSAND/ΜL (ref 0–0.61)
EOSINOPHIL NFR BLD AUTO: 7 % (ref 0–6)
ERYTHROCYTE [DISTWIDTH] IN BLOOD BY AUTOMATED COUNT: 13.5 % (ref 11.6–15.1)
GFR SERPL CREATININE-BSD FRML MDRD: 67 ML/MIN/1.73SQ M
GLUCOSE SERPL-MCNC: 181 MG/DL (ref 65–140)
HCT VFR BLD AUTO: 27.3 % (ref 36.5–49.3)
HGB BLD-MCNC: 8.9 G/DL (ref 12–17)
IMM GRANULOCYTES # BLD AUTO: 0.05 THOUSAND/UL (ref 0–0.2)
IMM GRANULOCYTES NFR BLD AUTO: 1 % (ref 0–2)
INR PPP: 1.05 (ref 0.84–1.19)
LYMPHOCYTES # BLD AUTO: 1.45 THOUSANDS/ΜL (ref 0.6–4.47)
LYMPHOCYTES NFR BLD AUTO: 18 % (ref 14–44)
MCH RBC QN AUTO: 30.2 PG (ref 26.8–34.3)
MCHC RBC AUTO-ENTMCNC: 32.6 G/DL (ref 31.4–37.4)
MCV RBC AUTO: 93 FL (ref 82–98)
MONOCYTES # BLD AUTO: 0.93 THOUSAND/ΜL (ref 0.17–1.22)
MONOCYTES NFR BLD AUTO: 12 % (ref 4–12)
NEUTROPHILS # BLD AUTO: 5.09 THOUSANDS/ΜL (ref 1.85–7.62)
NEUTS SEG NFR BLD AUTO: 62 % (ref 43–75)
NRBC BLD AUTO-RTO: 0 /100 WBCS
PLATELET # BLD AUTO: 491 THOUSANDS/UL (ref 149–390)
PMV BLD AUTO: 9.9 FL (ref 8.9–12.7)
POTASSIUM SERPL-SCNC: 4.2 MMOL/L (ref 3.5–5.3)
PROTHROMBIN TIME: 13.8 SECONDS (ref 11.6–14.5)
RBC # BLD AUTO: 2.95 MILLION/UL (ref 3.88–5.62)
SODIUM SERPL-SCNC: 135 MMOL/L (ref 136–145)
WBC # BLD AUTO: 8.08 THOUSAND/UL (ref 4.31–10.16)

## 2020-09-29 PROCEDURE — 85025 COMPLETE CBC W/AUTO DIFF WBC: CPT | Performed by: EMERGENCY MEDICINE

## 2020-09-29 PROCEDURE — 36415 COLL VENOUS BLD VENIPUNCTURE: CPT | Performed by: EMERGENCY MEDICINE

## 2020-09-29 PROCEDURE — G1004 CDSM NDSC: HCPCS

## 2020-09-29 PROCEDURE — 85730 THROMBOPLASTIN TIME PARTIAL: CPT | Performed by: EMERGENCY MEDICINE

## 2020-09-29 PROCEDURE — 99284 EMERGENCY DEPT VISIT MOD MDM: CPT

## 2020-09-29 PROCEDURE — 99214 OFFICE O/P EST MOD 30 MIN: CPT | Performed by: NURSE PRACTITIONER

## 2020-09-29 PROCEDURE — 74177 CT ABD & PELVIS W/CONTRAST: CPT

## 2020-09-29 PROCEDURE — 99284 EMERGENCY DEPT VISIT MOD MDM: CPT | Performed by: EMERGENCY MEDICINE

## 2020-09-29 PROCEDURE — 80048 BASIC METABOLIC PNL TOTAL CA: CPT | Performed by: EMERGENCY MEDICINE

## 2020-09-29 PROCEDURE — 85610 PROTHROMBIN TIME: CPT | Performed by: EMERGENCY MEDICINE

## 2020-09-29 RX ORDER — ASPIRIN 81 MG/1
81 TABLET ORAL DAILY
Qty: 30 TABLET | Refills: 1 | Status: SHIPPED | OUTPATIENT
Start: 2020-09-29 | End: 2020-11-09 | Stop reason: SDUPTHER

## 2020-09-29 RX ADMIN — IOHEXOL 100 ML: 350 INJECTION, SOLUTION INTRAVENOUS at 18:23

## 2020-09-29 NOTE — PATIENT INSTRUCTIONS
I recommend stopping Mobic, meloxicam-can cause bleeding    It is not clear while the or still on Plavix, clopidogrel  Does not appear to be on from a heart standpoint    Can cause bleeding

## 2020-09-29 NOTE — ED PROVIDER NOTES
History  Chief Complaint   Patient presents with    Blood in Urine     Patient with blood in winslow bag since yesterday  Recent prostate surgery  LLQ abdominal pain  74-year-old male presents for evaluation of grossly bloody output from Winslow catheter  Patient recently discharged after exploratory laparotomy and TURP procedure  Patient states he had clear urine output when he was discharged from the hospital   Reports intermittent episodes of hematuria which would clear spontaneously  Patient states since last night, he has been having bright red /dark bloody  Output from Winslow which has not cleared  Patient has appointment with urologist tomorrow for Winslow removal   Patient also reports continued left lower quadrant abdominal pain which has been present since the surgery  Denies fevers, chills  Prior to Admission Medications   Prescriptions Last Dose Informant Patient Reported? Taking?    Cholecalciferol (VITAMIN D3) 5000 units CAPS  Child Yes No   Sig: Take 1 capsule by mouth   Cyanocobalamin (VITAMIN B12 PO)  Child Yes No   Sig: Take 5,000 mcg by mouth   albuterol (PROVENTIL HFA,VENTOLIN HFA) 90 mcg/act inhaler  Child No No   Sig: Inhale 2 puffs every 4 (four) hours as needed for wheezing   Patient not taking: Reported on 9/29/2020   aspirin (ECOTRIN LOW STRENGTH) 81 mg EC tablet   No No   Sig: Take 1 tablet (81 mg total) by mouth daily   carvedilol (COREG) 25 mg tablet   No No   Sig: Take 1 tablet (25 mg total) by mouth 2 (two) times a day with meals   clopidogrel (PLAVIX) 75 mg tablet  Child Yes No   Sig: Take 75 mg by mouth daily   docusate sodium (COLACE) 100 mg capsule  Child No No   Sig: Take 1 capsule (100 mg total) by mouth every 12 (twelve) hours   famotidine (PEPCID) 40 MG tablet  Child Yes No   Sig: Take 40 mg by mouth daily   finasteride (PROSCAR) 5 mg tablet  Child No No   Sig: Take 1 tablet (5 mg total) by mouth daily   furosemide (LASIX) 40 mg tablet   No No   Sig: Take 1 tablet (40 mg total) by mouth 2 (two) times a day   gabapentin (NEURONTIN) 300 mg capsule  Child Yes No   Sig: Take 300 mg by mouth 2 (two) times a day   insulin glargine (LANTUS) 100 units/mL subcutaneous injection  Child Yes No   Sig: Inject 20 Units under the skin daily at bedtime   irbesartan (AVAPRO) 150 mg tablet  Child Yes No   Sig: Take 150 mg by mouth daily at bedtime   meloxicam (MOBIC) 15 mg tablet  Child Yes No   Sig: Take 15 mg by mouth daily   metFORMIN (GLUCOPHAGE) 1000 MG tablet  Child Yes No   Sig: Take 1,000 mg by mouth 2 (two) times a day with meals   nitroglycerin (NITROSTAT) 0 4 mg SL tablet  Child Yes No   Sig: Place 0 4 mg under the tongue every 5 (five) minutes as needed for chest pain   potassium chloride (K-DUR,KLOR-CON) 20 mEq tablet   No No   Sig: Take 1 tablet (20 mEq total) by mouth daily   simvastatin (ZOCOR) 20 mg tablet  Child Yes No   Sig: Take 20 mg by mouth daily at bedtime   sitaGLIPtin (JANUVIA) 100 mg tablet  Child Yes No   Sig: Take 100 mg by mouth daily   tamsulosin (FLOMAX) 0 4 mg  Child No No   Sig: Take 2 capsules (0 8 mg total) by mouth daily with dinner      Facility-Administered Medications: None       Past Medical History:   Diagnosis Date    Cardiac disease     CHF (congestive heart failure) (HCC)     Diabetes mellitus (HCC)     GERD (gastroesophageal reflux disease)     Hyperlipidemia     Hypertension     MI (myocardial infarction) ([facility] [address] )        Past Surgical History:   Procedure Laterality Date    CT CYSTOGRAM  9/15/2020    EYE SURGERY      PA CYSTOURETHROSCOPY W/IRRIG & EVAC CLOTS N/A 9/14/2020    Procedure: CYSTOSCOPY EVACUATION OF CLOTS; TURP; SUPRAPUBIC CATHETER INSERTION;  Surgeon: Jose Felix MD;  Location: [facility];  Service: Urology    PA LAP,DIAGNOSTIC ABDOMEN N/A 9/15/2020    Procedure: EXPLORATORY LAPAROTOMY, closure of cystotomy;  Surgeon: Julianna Barlow MD;  Location: [facility];  Service: General       Family History   Problem Relation Age of Onset    Diabetes Mother     Hypertension Mother     Cancer Father     Hypertension Father     Cancer Sister     Hypertension Sister      I have reviewed and agree with the history as documented  E-Cigarette/Vaping    E-Cigarette Use Never User      E-Cigarette/Vaping Substances    Nicotine No     THC No     CBD No     Flavoring No     Other No     Unknown No      Social History     Tobacco Use    Smoking status: Never Smoker    Smokeless tobacco: Never Used   Substance Use Topics    Alcohol use: No     Alcohol/week: 0 0 standard drinks     Frequency: Never     Binge frequency: Never    Drug use: No       Review of Systems   Constitutional: Negative for chills, diaphoresis and fever  HENT: Negative for congestion and rhinorrhea  Eyes: Negative for pain and visual disturbance  Respiratory: Negative for cough, shortness of breath and wheezing  Cardiovascular: Negative for chest pain and leg swelling  Gastrointestinal: Positive for abdominal pain  Negative for diarrhea, nausea and vomiting  Genitourinary: Positive for hematuria  Negative for difficulty urinating, dysuria, frequency and urgency  Musculoskeletal: Negative for back pain and neck pain  Skin: Negative for color change and rash  Neurological: Negative for syncope, numbness and headaches  All other systems reviewed and are negative  Physical Exam  Physical Exam  Vitals signs and nursing note reviewed  Constitutional:       Appearance: He is well-developed  HENT:      Head: Normocephalic and atraumatic  Eyes:      Conjunctiva/sclera: Conjunctivae normal    Neck:      Musculoskeletal: Normal range of motion and neck supple  Cardiovascular:      Rate and Rhythm: Normal rate and regular rhythm  Pulmonary:      Effort: Pulmonary effort is normal  No respiratory distress  Abdominal:      Palpations: Abdomen is soft  Tenderness: There is abdominal tenderness        Comments: Mildly tender in left lower quadrant   Genitourinary:     Comments: Uncircumcised penis Estrella catheter in place  Maroon /bloody output noted in Estrella bag  Musculoskeletal: Normal range of motion  General: No tenderness  Skin:     General: Skin is warm  Findings: No erythema  Comments: Ex lap scar is clean dry and intact  Neurological:      Mental Status: He is alert and oriented to person, place, and time     Psychiatric:         Behavior: Behavior normal          Vital Signs  ED Triage Vitals   Temperature Pulse Respirations Blood Pressure SpO2   09/29/20 1735 09/29/20 1735 09/29/20 1735 09/29/20 1736 09/29/20 1735   97 6 °F (36 4 °C) 74 16 141/63 98 %      Temp Source Heart Rate Source Patient Position - Orthostatic VS BP Location FiO2 (%)   09/29/20 1735 09/29/20 1735 09/29/20 1735 09/29/20 1735 --   Temporal Monitor Lying Right arm       Pain Score       09/29/20 1735       7           Vitals:    09/29/20 1735 09/29/20 1736 09/29/20 1930   BP:  141/63 115/56   Pulse: 74  84   Patient Position - Orthostatic VS: Lying           Visual Acuity      ED Medications  Medications   iohexol (OMNIPAQUE) 350 MG/ML injection (MULTI-DOSE) 100 mL (100 mL Intravenous Given 9/29/20 1823)       Diagnostic Studies  Results Reviewed     Procedure Component Value Units Date/Time    Basic metabolic panel [[id number]]  (Abnormal) Collected:  09/29/20 1751    Lab Status:  Final result Specimen:  Blood from Arm, Left Updated:  09/29/20 1857     Sodium 135 mmol/L      Potassium 4 2 mmol/L      Chloride 99 mmol/L      CO2 30 mmol/L      ANION GAP 6 mmol/L      BUN 14 mg/dL      Creatinine 1 03 mg/dL      Glucose 181 mg/dL      Calcium 9 1 mg/dL      eGFR 67 ml/min/1 73sq m     Narrative:       [place] guidelines for Chronic Kidney Disease (CKD):     Stage 1 with normal or high GFR (GFR > 90 mL/min/1 73 square meters)    Stage 2 Mild CKD (GFR = 60-89 mL/min/1 73 square meters)    Stage 3A Moderate CKD (GFR = 45-59 mL/min/1 73 square meters)    Stage 3B Moderate CKD (GFR = 30-44 mL/min/1 73 square meters)    Stage 4 Severe CKD (GFR = 15-29 mL/min/1 73 square meters)    Stage 5 End Stage CKD (GFR <15 mL/min/1 73 square meters)  Note: GFR calculation is accurate only with a steady state creatinine    Protime-INR [[id number]]  (Normal) Collected:  09/29/20 1751    Lab Status:  Final result Specimen:  Blood from Arm, Left Updated:  09/29/20 1855     Protime 13 8 seconds      INR 1 05    APTT [[id number]]  (Normal) Collected:  09/29/20 1751    Lab Status:  Final result Specimen:  Blood from Arm, Left Updated:  09/29/20 1855     PTT 28 seconds     CBC and differential [[id number]]  (Abnormal) Collected:  09/29/20 1751    Lab Status:  Final result Specimen:  Blood from Arm, Left Updated:  09/29/20 1836     WBC 8 08 Thousand/uL      RBC 2 95 Million/uL      Hemoglobin 8 9 g/dL      Hematocrit 27 3 %      MCV 93 fL      MCH 30 2 pg      MCHC 32 6 g/dL      RDW 13 5 %      MPV 9 9 fL      Platelets 352 Thousands/uL      nRBC 0 /100 WBCs      Neutrophils Relative 62 %      Immat GRANS % 1 %      Lymphocytes Relative 18 %      Monocytes Relative 12 %      Eosinophils Relative 7 %      Basophils Relative 0 %      Neutrophils Absolute 5 09 Thousands/µL      Immature Grans Absolute 0 05 Thousand/uL      Lymphocytes Absolute 1 45 Thousands/µL      Monocytes Absolute 0 93 Thousand/µL      Eosinophils Absolute 0 53 Thousand/µL      Basophils Absolute 0 03 Thousands/µL                  CT abdomen pelvis with contrast   Final Result by Evonne Lanier DO (09/29 1905)      Postoperative change within the pelvis status post TURP procedure  Estrella catheter within the bladder  Bladder continues to demonstrate slightly irregular wall thickening  Slight improvement in the mild edema noted in the anterior pelvis  No pneumoperitoneum or discrete collection              Workstation performed: [id number]                    Procedures  Procedures ED Course                                       MDM  Number of Diagnoses or Management Options  Hematuria:   Diagnosis management comments:   72-year-old male presenting with gross hematuria from Estrella catheter  Obtain labs, CT abdomen pelvis and continuous bladder irrigation  Urine has cleared with a mild pink tinge  Patient has urology appt tomorrow  RTED discussed  Hgb stable  Patient stable for DC  Disposition  Final diagnoses:   Hematuria     Time reflects when diagnosis was documented in both MDM as applicable and the Disposition within this note     Time User Action Codes Description Comment    9/29/2020  8:05 PM Selma Kent [R31 [id number]] Hematuria       ED Disposition     ED Disposition Condition Date/Time Comment    Discharge Stable Tue Sep 29, 2020  8:05 PM Merly Hall discharge to home/self care              Follow-up Information     Follow up With Specialties Details Why Contact Info Additional [address] For Urology [facility] Urology In 1 day As scheduled [address] [address] [address] [facility] [address]  704  [facility] [address], [place], [place], [address]     [address] Emergency Department Emergency Medicine  If symptoms worsen [address] [place], [address]  [phone]  ED, [address] [place], [facility], [address]          Discharge Medication List as of 9/29/2020  8:06 PM      CONTINUE these medications which have NOT CHANGED    Details   albuterol (PROVENTIL HFA,VENTOLIN HFA) 90 mcg/act inhaler Inhale 2 puffs every 4 (four) hours as needed for wheezing, Starting Mon 12/11/2017, Print      aspirin (ECOTRIN LOW STRENGTH) 81 mg EC tablet Take 1 tablet (81 mg total) by mouth daily, Starting Tue 9/29/2020, Normal      carvedilol (COREG) 25 mg tablet Take 1 tablet (25 mg total) by mouth 2 (two) times a day with meals, Starting Tue 9/22/2020, Normal      Cholecalciferol (VITAMIN D3) 5000 units CAPS Take 1 capsule by mouth, Historical Med      clopidogrel (PLAVIX) 75 mg tablet Take 75 mg by mouth daily, Historical Med      Cyanocobalamin (VITAMIN B12 PO) Take 5,000 mcg by mouth, Historical Med      docusate sodium (COLACE) 100 mg capsule Take 1 capsule (100 mg total) by mouth every 12 (twelve) hours, Starting Tue 9/1/2020, Normal      famotidine (PEPCID) 40 MG tablet Take 40 mg by mouth daily, Historical Med      finasteride (PROSCAR) 5 mg tablet Take 1 tablet (5 mg total) by mouth daily, Starting Fri 7/24/2020, Normal      furosemide (LASIX) 40 mg tablet Take 1 tablet (40 mg total) by mouth 2 (two) times a day, Starting Mon 9/21/2020, Normal      gabapentin (NEURONTIN) 300 mg capsule Take 300 mg by mouth 2 (two) times a day, Historical Med      insulin glargine (LANTUS) 100 units/mL subcutaneous injection Inject 20 Units under the skin daily at bedtime, Historical Med      irbesartan (AVAPRO) 150 mg tablet Take 150 mg by mouth daily at bedtime, Historical Med      meloxicam (MOBIC) 15 mg tablet Take 15 mg by mouth daily, Historical Med      metFORMIN (GLUCOPHAGE) 1000 MG tablet Take 1,000 mg by mouth 2 (two) times a day with meals, Historical Med      nitroglycerin (NITROSTAT) 0 4 mg SL tablet Place 0 4 mg under the tongue every 5 (five) minutes as needed for chest pain, Historical Med      potassium chloride (K-DUR,KLOR-CON) 20 mEq tablet Take 1 tablet (20 mEq total) by mouth daily, Starting Sun 9/20/2020, Normal      simvastatin (ZOCOR) 20 mg tablet Take 20 mg by mouth daily at bedtime, Historical Med      sitaGLIPtin (JANUVIA) 100 mg tablet Take 100 mg by mouth daily, Historical Med      tamsulosin (FLOMAX) 0 4 mg Take 2 capsules (0 8 mg total) by mouth daily with dinner, Starting Fri 7/24/2020, Until Thu 10/22/2020, Normal           No discharge procedures on file      PDMP Review       Value Time User    PDMP Reviewed  Yes 9/21/2020  4:40 PM Lakshmi Choudhury PA-C          ED Provider  Electronically Signed by           Juan Lopez DO  09/29/20 2042

## 2020-09-29 NOTE — LETTER
September 29, 2020     Cara Gamez, [address] [facility]  [address]  [facility]  [address] [address] [address]    Patient: Clarissa Mckeon   YOB: 1935   Date of Visit: 9/29/2020       Dear Dr Rosalind Crook: Thank you for referring Clarissa Mckeon to me for evaluation  Below are my notes for this consultation  If you have questions, please do not hesitate to call me  I look forward to following your patient along with you  Sincerely,        JAYCE Landeros        CC: No Recipients  JAYCE Landeros  9/29/2020  5:10 PM  Sign when Signing Visit  Heart Failure   Follow Up Office Visit Note -     Merly Goss   82 y o    male   MRN: [id number]  [address]  [phone]    PCP: No primary care provider on file  Cardiologist : Will be Dr Rosalind Crook            Summary of Recommendations  Low-sodium diet Heart failure education as below  Provided in [place]  DC nitrate  Decrease aspirin to 81 mg daily  Unclear why he is on Plavix  Advised to bring in papers from [place] about it  He is also on Mobic- recommend he stop given hematuria  Consider referral to electrophysiology regarding ICD candidacy, at the next OV  Follow up will be scheduled with Dr Benita Platt pt  CBC,BMP prior to        Impression/plan  Dilated nonischemic cardiomyopathy, LVIDd: 6 3 cm, EF 30%; catheterization on 9/11/20:  Minor luminal regularities  Chronic combined heart failure, recent admission for decompensation 9/7-9/19/20 + ED visit 9/21  Labs after DC: Cr 1 03, BUN 12 K 4 1  Wt Readings from Last 3 Encounters:   09/29/20 94 8 kg (209 lb)   09/21/20 96 8 kg (213 lb 4 8 oz)   09/19/20 108 kg (238 lb 15 7 oz)       Wt Readings from Last 3 Encounters:   09/21/20 96 8 kg (213 lb 4 8 oz)   09/19/20 108 kg (238 lb 15 7 oz)   09/07/20 98 kg (216 lb)      Last labs: 9/21    Cr 0 9  K 4 4   Beta-Blocker:  Carvedilol 25 b i d , recently increased from 12 5 b i d    ACEi, ARB or ARNi:  Irbesartan 150 mg daily   Aldosterone Receptor Blocker:   Diuretic:  Furosemide 40 b i d , recently increased from 40 daily   ICD:  Prior refusal; reconsidering  Refused life karen  His son reports: "his heart has been weak since he was 62 yo"   Educated regarding adherence to a 1 5g -2 gram sodium diet/ 2000 ml fluid restriction, daily weights and to call us if  he gains  2-3 lbs in 1 day or 5 lbs/ 1 week  NSVT- on beta blocker  Hypertension, essential  /60  On beta-blocker, ARB, loop diuretic, nitrate  Will DC nitrate  Hyperlipidemia  On simvastatin 20 mg daily  Will need assessment in the future  Type 2 diabetes mellitus  Hemoglobin A1c 7 7, September 2020  On metformin, Januvia, insulin  Anemia secondary to acute blood loss secondary to bleeding prostate  In ICU, received 4 units of packed cells pre op and 2 unit  Post op  Hemoglobin now 8 6 ( 9/21), lowest 7 1  Refractory hematuria; 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; status post TURP/insertion suprapubic tube 9/14/20; followed by exploratory laparotomy for retroperitoneal fluid with removal of suprapubic tube 9/15/20  Cardiac testing  · TTE 9/8/20, with contrast  EF 30% mild LVH  Grade 1 DD + wall motion abnormalities  Mild LAD  Aortic valve sclerosis  Trace MR  Aortic root mildly dilated 3 5 cm  There is severe hypokinesis of the anterior and anteroseptal wall from base to mid  The remaining walls are mildly hypokinetic   cardiac catheterization 9/11/20  LAD: The vessel was normal sized  Angiography showed minor luminal irregularities  Circumflex: The vessel was normal sized  Angiography showed minor luminal irregularities  The two OM branches were also free of significant disease  RCA: The vessel was normal sized and dominant, giving rise to the PDA and a posterolateral branch  There was minimal plaque  There were no significant lesions  Rght femoral access   Hemostasis was achieved with an Angioseal device  HPI:   Cayla Ramos is an 81 yo Danish-speaking gentleman who resides in [place] and is visiting his son locally  Tea Matos He was   recently was hospitalized at [facility], twice  Firstly, admitted September 8th at [facility], with severe hematuria and obstructive BPH  He was followed by Cardiology for preoperative evaluation  The patient reported [facility]   His history was not entirely clear  He reported an "enlarged heart" and prior refusal of  an ICD as well  He admitted to exertional angina  An echocardiogram disclosed a severe cardiomyopathy, ejection fraction 30% with LAD wall motion territory abnormalities  He did undergo cardiac catheterization September 11th, to risk stratify prior to urologic surgery  This demonstrated minor luminal irregularities  He was diagnosed with a severe, dilated nonischemic cardiomyopathy; etiology unclear  He underwent his urological procedure September 14, a TURP and insertion of suprapubic catheter  He later complained of some abdominal discomfort and was tachycardic  He returned to the operating room for exploratory laparotomy for retroperitoneal fluid with removal of suprapubic tube 9/15/20 the following day, treated by urology and general surgery   During his hospital course he was treated for UTI, sepsis and anemia  He did receive 5 units of packed cells, required intubation was treated in ICU  Once recovered, from a cardiac standpoint, he was placed on guideline directed medical therapy for his cardiomyopathy  He refused LifeVest or ICD  He was discharged on Lasix 20 b i d , irbesartan 150, carvedilol 12 5 b i d  he was discharged September 19  Notably discharge weight was 238, adm weight 9/7 was recorded as 216  Dry weight unclear    He was read[facility] to the emergency room at [facility] September 21st with chest heaviness and shortness of breath  He was found to be volume overloaded and followed by Cardiology  His proBNP was elevated at 1800  His EKG showed sinus rhythm, narrow QRS  He was given 1 dose of IV Lasix  Cardiology recommended up titrating his beta-blocker and diuretic  He was discharged on Coreg 25 b i d  and Lasix 40 b i d       9/29/20  Here for follow-up, accompanied by son who functions as   He tells me he feels bad  He is short of breath  He denies chest pain  He is dizzy  No chest pain  He appears comfortable  /60  HR 81 and reg by exam  Will stop isosorbide  Will decrease aspirin to 81  Is unclear why he is on Plavix  He may not need it  I made no change today  His wife has papers at home about it  Asked to bring to the next OV  Would recommend he stop Mobic-son will also check into this  Has a suprapubic catheter- in bag- dark cherry urine  "its been that way a long time"  CBC 9/21: Hgb 8 6  BMP from September 28:  Creatinine 1 0 BUN 12 potassium 4 1   Reviewed the importance of adhering to a low-sodium diet  Provided education and [place]  Recommended daily weights, record and call us if his weight increases 2-3 lb in 1 day or 5 lb in 5 days  His son tells me his father's heart has been weak since 63years old    Consider ICD in the future; he will return to speak to [address] [place] [address] cardiologist in a short interval        Assessment  Diagnoses and all orders for this visit:    Hospital discharge follow-up    Chronic combined systolic (congestive) and diastolic (congestive) heart failure (HCC)    Coronary artery disease involving native heart without angina pectoris, unspecified vessel or lesion type    Hyperlipidemia, unspecified hyperlipidemia type    Essential hypertension    Poorly controlled type 2 diabetes mellitus ([facility] )        Past Medical History:   Diagnosis Date    Cardiac disease     CHF (congestive heart failure) ([facility] )     Diabetes mellitus ([facility] )     GERD (gastroesophageal reflux disease)     Hyperlipidemia     Hypertension     MI (myocardial infarction) ([facility] )        Review of Systems   Constitution: Negative for chills  Cardiovascular: Negative for chest pain, claudication, cyanosis, dyspnea on exertion, irregular heartbeat, leg swelling, near-syncope, orthopnea, palpitations, paroxysmal nocturnal dyspnea and syncope  Respiratory: Positive for shortness of breath  Negative for cough  Gastrointestinal: Negative for heartburn and nausea  Genitourinary:        + urinary catheter in place   Neurological: Negative for dizziness, focal weakness, headaches, light-headedness and weakness  All other systems reviewed and are negative  Allergies   Allergen Reactions    Demerol [Meperidine]            Current Outpatient Medications:     albuterol (PROVENTIL HFA,VENTOLIN HFA) 90 mcg/act inhaler, Inhale 2 puffs every 4 (four) hours as needed for wheezing, Disp: 1 Inhaler, Rfl: 0    aspirin 325 mg tablet, Take 325 mg by mouth daily, Disp: , Rfl:     carvedilol (COREG) 25 mg tablet, Take 1 tablet (25 mg total) by mouth 2 (two) times a day with meals, Disp: 60 tablet, Rfl: 0    Cholecalciferol (VITAMIN D3) 5000 units CAPS, Take 1 capsule by mouth, Disp: , Rfl:     clopidogrel (PLAVIX) 75 mg tablet, Take 75 mg by mouth daily, Disp: , Rfl:     Cyanocobalamin (VITAMIN B12 PO), Take 5,000 mcg by mouth, Disp: , Rfl:     docusate sodium (COLACE) 100 mg capsule, Take 1 capsule (100 mg total) by mouth every 12 (twelve) hours, Disp: 60 capsule, Rfl: 0    famotidine (PEPCID) 40 MG tablet, Take 40 mg by mouth daily, Disp: , Rfl:     finasteride (PROSCAR) 5 mg tablet, Take 1 tablet (5 mg total) by mouth daily, Disp: 90 tablet, Rfl: 3    furosemide (LASIX) 40 mg tablet, Take 1 tablet (40 mg total) by mouth 2 (two) times a day, Disp: 60 tablet, Rfl: 0    gabapentin (NEURONTIN) 300 mg capsule, Take 300 mg by mouth 2 (two) times a day, Disp: , Rfl:     insulin glargine (LANTUS) 100 units/mL subcutaneous injection, Inject 20 Units under the skin daily at bedtime, Disp: , Rfl:     irbesartan (AVAPRO) 150 mg tablet, Take 150 mg by mouth daily at bedtime, Disp: , Rfl:     isosorbide mononitrate (IMDUR) 30 mg 24 hr tablet, Take 30 mg by mouth daily, Disp: , Rfl:     meloxicam (MOBIC) 15 mg tablet, Take 15 mg by mouth daily, Disp: , Rfl:     metFORMIN (GLUCOPHAGE) 1000 MG tablet, Take 1,000 mg by mouth 2 (two) times a day with meals, Disp: , Rfl:     nitroglycerin (NITROSTAT) 0 4 mg SL tablet, Place 0 4 mg under the tongue every 5 (five) minutes as needed for chest pain, Disp: , Rfl:     potassium chloride (K-DUR,KLOR-CON) 20 mEq tablet, Take 1 tablet (20 mEq total) by mouth daily, Disp: 30 tablet, Rfl: 3    simvastatin (ZOCOR) 20 mg tablet, Take 20 mg by mouth daily at bedtime, Disp: , Rfl:     sitaGLIPtin (JANUVIA) 100 mg tablet, Take 100 mg by mouth daily, Disp: , Rfl:     tamsulosin (FLOMAX) 0 4 mg, Take 2 capsules (0 8 mg total) by mouth daily with dinner, Disp: 180 capsule, Rfl: 3    Social History     Socioeconomic History    Marital status: /Civil Union     Spouse name: Not on file    Number of children: Not on file    Years of education: Not on file    Highest education level: Not on file   Occupational History    Not on file   Social Needs    Financial resource strain: Not on file    Food insecurity     Worry: Not on file     Inability: Not on file   [employer] needs     Medical: Not on file     Non-medical: Not on file   Tobacco Use    Smoking status: Never Smoker    Smokeless tobacco: Never Used   Substance and Sexual Activity    Alcohol use: No     Alcohol/week: 0 0 standard drinks     Frequency: Never     Binge frequency: Never    Drug use: No    Sexual activity: Not Currently   Lifestyle    Physical activity     Days per week: Not on file     Minutes per session: Not on file    Stress: Not on file   Relationships    Social connections     Talks on phone: Not on file     Gets together: Not on file     Attends Nondenominational service: Not on file     Active member of club or organization: Not on file     Attends meetings of clubs or organizations: Not on file     Relationship status: Not on file    Intimate partner violence     Fear of current or ex partner: Not on file     Emotionally abused: Not on file     Physically abused: Not on file     Forced sexual activity: Not on file   Other Topics Concern    Not on file   Social History Narrative    Not on file       Family History   Problem Relation Age of Onset    Diabetes Mother     Hypertension Mother     Cancer Father     Hypertension Father     Cancer Sister     Hypertension Sister        Physical Exam   Constitutional: He is oriented to person, place, and time  No distress  HENT:   Head: Normocephalic and atraumatic  Eyes: Conjunctivae and EOM are normal    Neck: Normal range of motion  Neck supple  Cardiovascular: Normal rate, regular rhythm, normal heart sounds and intact distal pulses  Pulmonary/Chest: Effort normal and breath sounds normal    Abdominal: Soft  Bowel sounds are normal    Genitourinary:    Genitourinary Comments: Catheter with cherry colored dark urine in the bag     Musculoskeletal: Normal range of motion  Neurological: He is alert and oriented to person, place, and time  Skin: Skin is warm and dry  Psychiatric: He has a normal mood and affect  Nursing note and vitals reviewed  Vitals: There were no vitals taken for this visit     Wt Readings from Last 3 Encounters:   09/21/20 96 8 kg (213 lb 4 8 oz)   09/19/20 108 kg (238 lb 15 7 oz)   09/07/20 98 kg (216 lb)         Labs & Results:  Lab Results   Component Value Date    WBC 12 05 (H) 09/21/2020    HGB 8 6 (L) 09/21/2020    HCT 25 9 (L) 09/21/2020    MCV 93 09/21/2020     09/21/2020     No results found for: BNP  No components found for: CHEM    Results for orders placed during the hospital encounter of 09/07/20 Echo complete with contrast if indicated    Narrative [address]  [address]  [place], [address]  [phone]    Transthoracic Echocardiogram  2D, M-mode, Doppler, and Color Doppler    Study date:  08-Sep-2020    Patient: Jeff Cochran  MR number: [id number]  Account number: [de-identified]  : 1935  Age: 82 years  Gender: Male  Status: Inpatient  Location: Bedside  Height: 67 in  Weight: 213 6 lb  BP: 102/ 55 mmHg    Indications: Dyspnea  Diagnoses: R06 00 - Dyspnea, unspecified    Sonographer:  Shira Hu RDCS  Referring Physician:  Donna Hurtado MD  Group:  [facility]  Interpreting Physician:  Elise Mackenzie DO    SUMMARY    SUMMARY:  1  This is a technically difficult but adequate study with the use of echo contrast  2  Left ventricle is mildly dilated with severely reduced systolic function  Left ventricular ejection fraction is estimated at 30%  3  Mild concentric left ventricular hypertrophy  4  Grade 1 diastolic dysfunction  5  Regional wall motion abnormalities consistent with coronary artery disease  6  Mild right ventricular dilatation with normal systolic function  7  Left atrium is mildly dilated  8  Aortic valve sclerotic with adequate separation  9  Fibrocalcific changes of the mitral valve with trace mitral regurgitation  10  Pulmonary artery systolic pressures cannot be estimated due to lack of tricuspid regurgitation jet  11  Aortic root is mildly dilated at 3 5 cm  12  There is no study for comparison    SUMMARY MEASUREMENTS  2D measurements:  Unspecified Anatomy:   RWT was 0 4   MM measurements:  Unspecified Anatomy:   TAPSE was 2 3 cm  PW measurements:  Unspecified Anatomy:   E' Sept was 0 1 m/s  E/E' Sept was 8 8   MV A Scott was 0 7 m/s  MV Dec [place] was 2 3 m/s2  MV DecT was 213 5 ms   MV E Scott was 0 5 m/s  MV E/A Ratio was 0 7   MV PHT was 61 9 ms  MVA By PHT was 3 6 cm2      HISTORY: PRIOR HISTORY: CHF  GERD  Hypertension  Hyperlipidemia  Diabetes  CAD  MI  Sepsis  PROCEDURE: The procedure was performed at the bedside  This was a routine study  The transthoracic approach was used  The study included complete 2D imaging, M-mode, complete spectral Doppler, and color Doppler  The heart rate was 87 bpm,  at the start of the study  Images were obtained from the parasternal, apical, subcostal, and suprasternal notch acoustic windows  Intravenous contrast ( 0 6 ml Definity in NSS) was administered to opacify the left ventricle  Echocardiographic views were limited due to restricted patient mobility, poor acoustic window availability, and decreased penetration  This was a technically difficult study  LEFT VENTRICLE: Left ventricle is mildly dilated with severely reduced systolic function  Left ventricular ejection fraction is estimated at 30%  Mild concentric left ventricular hypertrophy  Grade 1 diastolic dysfunction  There is severe  hypokinesis of the anterior and anteroseptal wall from base to mid  The remaining walls are mildly hypokinetic  RIGHT VENTRICLE: Right ventricle is mildly dilated with normal systolic function  LEFT ATRIUM: Left atrium is mildly dilated  ATRIAL SEPTUM: The interatrial septum appears to be grossly normal without evidence of shunting by color-flow Doppler  RIGHT ATRIUM: Right atrium is top normal in size    MITRAL VALVE: Mitral valve opens well  There are fibrocalcific changes of the mitral valve and chordae  No evidence of mitral stenosis  Trace mitral regurgitation  AORTIC VALVE: Aortic valve is sclerotic with adequate separation  No evidence aortic stenosis or aortic regurgitation  TRICUSPID VALVE: Tricuspid valve opens well  No evidence tricuspid regurgitation  Pulmonary artery systolic pressures cannot be estimated due to lack of tricuspid regurgitation jet  PULMONIC VALVE: Pulmonic valve is not well visualized   No evidence of pulmonic stenosis or pulmonic regurgitation  PERICARDIUM: There is no evidence of significant pericardial effusion  AORTA: The aortic root is mildly dilated at 3 5 cm  SYSTEMIC VEINS: IVC: The IVC is normal size with collapse  SYSTEM MEASUREMENT TABLES    2D  %FS: 24 5 %  Ao Diam: 3 5 cm  EDV(Teich): 198 9 ml  EF(Teich): 47 7 %  ESV(Teich): 104 1 ml  IVSd: 1 1 cm  LA Area: 21 4 cm2  LA Diam: 4 1 cm  LVIDd: 6 3 cm  LVIDs: 4 7 cm  LVPWd: 1 2 cm  RA Area: 18 4 cm2  RVIDd: 4 4 cm  RWT: 0 4  SV(Teich): 94 8 ml    MM  TAPSE: 2 3 cm    PW  E' Sept: 0 1 m/s  E/E' Sept: 8 8  MV A Scott: 0 7 m/s  MV Dec [place]: 2 3 m/s2  MV DecT: 213 5 ms  MV E Scott: 0 5 m/s  MV E/A Ratio: 0 7  MV PHT: 61 9 ms  MVA By PHT: 3 6 cm2    [facility]    Prepared and electronically signed by    Jaden Ramon DO  Signed 09-Sep-2020 11:02:18       No results found for this or any previous visit  This note was completed in part utilizing m-modal fluency direct voice recognition software  Grammatical errors, random word insertion, spelling mistakes, and incomplete sentences may be an occasional consequence of the system secondary to software limitations, ambient noise and hardware issues  At the time of dictation, efforts were made to edit, clarify and /or correct errors  Please read the chart carefully and recognize, using context, where substitutions have occurred    If you have any questions or concerns about the context, text or information contained within the body of this dictation, please contact myself, the provider, for further clarification

## 2020-09-30 ENCOUNTER — PROCEDURE VISIT (OUTPATIENT)
Dept: UROLOGY | Facility: HOSPITAL | Age: 85
End: 2020-09-30
Attending: FAMILY MEDICINE

## 2020-09-30 VITALS
TEMPERATURE: 97.9 F | HEIGHT: 66 IN | SYSTOLIC BLOOD PRESSURE: 140 MMHG | DIASTOLIC BLOOD PRESSURE: 70 MMHG | HEART RATE: 88 BPM | WEIGHT: 211.4 LBS | BODY MASS INDEX: 33.97 KG/M2

## 2020-09-30 DIAGNOSIS — N40.1 BENIGN PROSTATIC HYPERPLASIA WITH LOWER URINARY TRACT SYMPTOMS, SYMPTOM DETAILS UNSPECIFIED: ICD-10-CM

## 2020-09-30 PROCEDURE — 99024 POSTOP FOLLOW-UP VISIT: CPT

## 2020-09-30 NOTE — ED NOTES
18g IV removed from [address] [place] [address]  Cath intact bleeding controlled        Mason Villarreal RN  09/29/20 2021

## 2020-09-30 NOTE — ED NOTES
Pt is resting quietly receiving 2nd bag for CBI  Urine in bag is very light pink  Pt has no complaints at present time  Pt's son is at bedside   Will continue to monitor     Kateryna Brooks RN  09/29/20 2009

## 2020-09-30 NOTE — PROGRESS NOTES
EXPLORATORY LAPAROTOMY, closure of cystotomy (N/A) CYSTOSCOPY EVACUATION OF CLOTS; TURP done by Dr Mercedez Nino 9/14  patient of Dr Nicolle Quispe seen at [facility]  Unable to remove 3 way catheter today as urine was still a wine color with some clots in bag  He had been to ER yesterday for clots  Flushed catheter with 240  ML of sterile water and urine ran clear  Removed overnight bag and replaced with leg bag per son  Neosporin applied to tip of penis  Patient Pakistani speaking  Then removed 25 staples on abdomen from exploratory Laparotomy   Keep open to air - No problems with removal  Patient will return next week for winslow removal

## 2020-10-03 ENCOUNTER — HOSPITAL ENCOUNTER (EMERGENCY)
Facility: HOSPITAL | Age: 85
Discharge: HOME | End: 2020-10-03
Attending: EMERGENCY MEDICINE | Admitting: EMERGENCY MEDICINE
Payer: MEDICARE

## 2020-10-03 VITALS
BODY MASS INDEX: 34.58 KG/M2 | RESPIRATION RATE: 16 BRPM | DIASTOLIC BLOOD PRESSURE: 65 MMHG | TEMPERATURE: 97.3 F | WEIGHT: 211 LBS | OXYGEN SATURATION: 98 % | HEART RATE: 91 BPM | SYSTOLIC BLOOD PRESSURE: 141 MMHG

## 2020-10-03 DIAGNOSIS — N40.0 BPH (BENIGN PROSTATIC HYPERPLASIA): ICD-10-CM

## 2020-10-03 DIAGNOSIS — Z97.8 FOLEY CATHETER IN PLACE: ICD-10-CM

## 2020-10-03 DIAGNOSIS — R31.9 HEMATURIA: Primary | ICD-10-CM

## 2020-10-03 DIAGNOSIS — D64.9 ANEMIA: ICD-10-CM

## 2020-10-03 LAB
ANION GAP SERPL CALCULATED.3IONS-SCNC: 7 MMOL/L (ref 4–13)
BASOPHILS # BLD AUTO: 0.05 THOUSANDS/ΜL (ref 0–0.1)
BASOPHILS NFR BLD AUTO: 1 % (ref 0–1)
BUN SERPL-MCNC: 15 MG/DL (ref 5–25)
CALCIUM SERPL-MCNC: 8.6 MG/DL (ref 8.3–10.1)
CHLORIDE SERPL-SCNC: 101 MMOL/L (ref 100–108)
CO2 SERPL-SCNC: 29 MMOL/L (ref 21–32)
CREAT SERPL-MCNC: 1.23 MG/DL (ref 0.6–1.3)
EOSINOPHIL # BLD AUTO: 0.49 THOUSAND/ΜL (ref 0–0.61)
EOSINOPHIL NFR BLD AUTO: 5 % (ref 0–6)
ERYTHROCYTE [DISTWIDTH] IN BLOOD BY AUTOMATED COUNT: 13.5 % (ref 11.6–15.1)
GFR SERPL CREATININE-BSD FRML MDRD: 54 ML/MIN/1.73SQ M
GLUCOSE SERPL-MCNC: 119 MG/DL (ref 65–140)
HCT VFR BLD AUTO: 27.5 % (ref 36.5–49.3)
HGB BLD-MCNC: 8.9 G/DL (ref 12–17)
IMM GRANULOCYTES # BLD AUTO: 0.05 THOUSAND/UL (ref 0–0.2)
IMM GRANULOCYTES NFR BLD AUTO: 1 % (ref 0–2)
LYMPHOCYTES # BLD AUTO: 0.93 THOUSANDS/ΜL (ref 0.6–4.47)
LYMPHOCYTES NFR BLD AUTO: 9 % (ref 14–44)
MCH RBC QN AUTO: 30.1 PG (ref 26.8–34.3)
MCHC RBC AUTO-ENTMCNC: 32.4 G/DL (ref 31.4–37.4)
MCV RBC AUTO: 93 FL (ref 82–98)
MONOCYTES # BLD AUTO: 0.91 THOUSAND/ΜL (ref 0.17–1.22)
MONOCYTES NFR BLD AUTO: 9 % (ref 4–12)
NEUTROPHILS # BLD AUTO: 7.97 THOUSANDS/ΜL (ref 1.85–7.62)
NEUTS SEG NFR BLD AUTO: 75 % (ref 43–75)
NRBC BLD AUTO-RTO: 0 /100 WBCS
PLATELET # BLD AUTO: 455 THOUSANDS/UL (ref 149–390)
PMV BLD AUTO: 9.7 FL (ref 8.9–12.7)
POTASSIUM SERPL-SCNC: 4.5 MMOL/L (ref 3.5–5.3)
RBC # BLD AUTO: 2.96 MILLION/UL (ref 3.88–5.62)
SODIUM SERPL-SCNC: 137 MMOL/L (ref 136–145)
WBC # BLD AUTO: 10.4 THOUSAND/UL (ref 4.31–10.16)

## 2020-10-03 PROCEDURE — 80048 BASIC METABOLIC PNL TOTAL CA: CPT | Performed by: EMERGENCY MEDICINE

## 2020-10-03 PROCEDURE — 85025 COMPLETE CBC W/AUTO DIFF WBC: CPT | Performed by: EMERGENCY MEDICINE

## 2020-10-03 PROCEDURE — 99283 EMERGENCY DEPT VISIT LOW MDM: CPT

## 2020-10-03 PROCEDURE — 36415 COLL VENOUS BLD VENIPUNCTURE: CPT | Performed by: EMERGENCY MEDICINE

## 2020-10-03 PROCEDURE — 99284 EMERGENCY DEPT VISIT MOD MDM: CPT | Performed by: EMERGENCY MEDICINE

## 2020-10-04 ENCOUNTER — HOSPITAL ENCOUNTER (EMERGENCY)
Facility: HOSPITAL | Age: 85
End: 2020-10-05
Attending: EMERGENCY MEDICINE
Payer: MEDICARE

## 2020-10-04 DIAGNOSIS — R31.9 HEMATURIA: Primary | ICD-10-CM

## 2020-10-04 PROBLEM — I25.119 CORONARY ARTERY DISEASE INVOLVING NATIVE CORONARY ARTERY OF NATIVE HEART WITH ANGINA PECTORIS (HCC): Status: ACTIVE | Noted: 2017-12-18

## 2020-10-04 PROBLEM — Z86.79 HISTORY OF SUPRAVENTRICULAR TACHYCARDIA: Chronic | Status: ACTIVE | Noted: 2020-10-04

## 2020-10-04 LAB
ANION GAP SERPL CALCULATED.3IONS-SCNC: 7 MMOL/L (ref 4–13)
BASOPHILS # BLD AUTO: 0.04 THOUSANDS/ΜL (ref 0–0.1)
BASOPHILS NFR BLD AUTO: 0 % (ref 0–1)
BUN SERPL-MCNC: 14 MG/DL (ref 5–25)
CALCIUM SERPL-MCNC: 8.9 MG/DL (ref 8.3–10.1)
CHLORIDE SERPL-SCNC: 100 MMOL/L (ref 100–108)
CO2 SERPL-SCNC: 29 MMOL/L (ref 21–32)
CREAT SERPL-MCNC: 1.14 MG/DL (ref 0.6–1.3)
EOSINOPHIL # BLD AUTO: 0.74 THOUSAND/ΜL (ref 0–0.61)
EOSINOPHIL NFR BLD AUTO: 8 % (ref 0–6)
ERYTHROCYTE [DISTWIDTH] IN BLOOD BY AUTOMATED COUNT: 13.6 % (ref 11.6–15.1)
GFR SERPL CREATININE-BSD FRML MDRD: 59 ML/MIN/1.73SQ M
GLUCOSE SERPL-MCNC: 253 MG/DL (ref 65–140)
HCT VFR BLD AUTO: 27.3 % (ref 36.5–49.3)
HGB BLD-MCNC: 8.6 G/DL (ref 12–17)
IMM GRANULOCYTES # BLD AUTO: 0.03 THOUSAND/UL (ref 0–0.2)
IMM GRANULOCYTES NFR BLD AUTO: 0 % (ref 0–2)
LYMPHOCYTES # BLD AUTO: 1.39 THOUSANDS/ΜL (ref 0.6–4.47)
LYMPHOCYTES NFR BLD AUTO: 15 % (ref 14–44)
MCH RBC QN AUTO: 29.5 PG (ref 26.8–34.3)
MCHC RBC AUTO-ENTMCNC: 31.5 G/DL (ref 31.4–37.4)
MCV RBC AUTO: 94 FL (ref 82–98)
MONOCYTES # BLD AUTO: 0.92 THOUSAND/ΜL (ref 0.17–1.22)
MONOCYTES NFR BLD AUTO: 10 % (ref 4–12)
NEUTROPHILS # BLD AUTO: 6.03 THOUSANDS/ΜL (ref 1.85–7.62)
NEUTS SEG NFR BLD AUTO: 67 % (ref 43–75)
NRBC BLD AUTO-RTO: 0 /100 WBCS
PLATELET # BLD AUTO: 405 THOUSANDS/UL (ref 149–390)
PMV BLD AUTO: 9.4 FL (ref 8.9–12.7)
POTASSIUM SERPL-SCNC: 4.9 MMOL/L (ref 3.5–5.3)
RBC # BLD AUTO: 2.92 MILLION/UL (ref 3.88–5.62)
SODIUM SERPL-SCNC: 136 MMOL/L (ref 136–145)
WBC # BLD AUTO: 9.15 THOUSAND/UL (ref 4.31–10.16)

## 2020-10-04 PROCEDURE — 36415 COLL VENOUS BLD VENIPUNCTURE: CPT | Performed by: EMERGENCY MEDICINE

## 2020-10-04 PROCEDURE — 99284 EMERGENCY DEPT VISIT MOD MDM: CPT

## 2020-10-04 PROCEDURE — 1124F ACP DISCUSS-NO DSCNMKR DOCD: CPT | Performed by: EMERGENCY MEDICINE

## 2020-10-04 PROCEDURE — 85025 COMPLETE CBC W/AUTO DIFF WBC: CPT | Performed by: EMERGENCY MEDICINE

## 2020-10-04 PROCEDURE — 80048 BASIC METABOLIC PNL TOTAL CA: CPT | Performed by: EMERGENCY MEDICINE

## 2020-10-04 PROCEDURE — 99284 EMERGENCY DEPT VISIT MOD MDM: CPT | Performed by: EMERGENCY MEDICINE

## 2020-10-05 ENCOUNTER — HOSPITAL ENCOUNTER (INPATIENT)
Facility: HOSPITAL | Age: 85
LOS: 3 days | Discharge: HOME | DRG: 696 | End: 2020-10-08
Attending: INTERNAL MEDICINE | Admitting: INTERNAL MEDICINE
Payer: MEDICARE

## 2020-10-05 VITALS
SYSTOLIC BLOOD PRESSURE: 122 MMHG | TEMPERATURE: 98 F | DIASTOLIC BLOOD PRESSURE: 59 MMHG | WEIGHT: 209 LBS | OXYGEN SATURATION: 95 % | BODY MASS INDEX: 34.25 KG/M2 | HEART RATE: 93 BPM | RESPIRATION RATE: 16 BRPM

## 2020-10-05 DIAGNOSIS — R52 PAIN: ICD-10-CM

## 2020-10-05 DIAGNOSIS — R31.0 GROSS HEMATURIA: Primary | ICD-10-CM

## 2020-10-05 LAB
ABO GROUP BLD: NORMAL
ALBUMIN SERPL BCP-MCNC: 2.9 G/DL (ref 3.5–5)
ALP SERPL-CCNC: 62 U/L (ref 46–116)
ALT SERPL W P-5'-P-CCNC: 15 U/L (ref 12–78)
ANION GAP SERPL CALCULATED.3IONS-SCNC: 6 MMOL/L (ref 4–13)
AST SERPL W P-5'-P-CCNC: 8 U/L (ref 5–45)
BASOPHILS # BLD AUTO: 0.05 THOUSANDS/ΜL (ref 0–0.1)
BASOPHILS NFR BLD AUTO: 1 % (ref 0–1)
BILIRUB SERPL-MCNC: 0.19 MG/DL (ref 0.2–1)
BLD GP AB SCN SERPL QL: NEGATIVE
BUN SERPL-MCNC: 11 MG/DL (ref 5–25)
CALCIUM ALBUM COR SERPL-MCNC: 9.7 MG/DL (ref 8.3–10.1)
CALCIUM SERPL-MCNC: 8.8 MG/DL (ref 8.3–10.1)
CHLORIDE SERPL-SCNC: 101 MMOL/L (ref 100–108)
CO2 SERPL-SCNC: 30 MMOL/L (ref 21–32)
CREAT SERPL-MCNC: 1.01 MG/DL (ref 0.6–1.3)
EOSINOPHIL # BLD AUTO: 0.76 THOUSAND/ΜL (ref 0–0.61)
EOSINOPHIL NFR BLD AUTO: 9 % (ref 0–6)
ERYTHROCYTE [DISTWIDTH] IN BLOOD BY AUTOMATED COUNT: 13.4 % (ref 11.6–15.1)
GFR SERPL CREATININE-BSD FRML MDRD: 68 ML/MIN/1.73SQ M
GLUCOSE SERPL-MCNC: 102 MG/DL (ref 65–140)
GLUCOSE SERPL-MCNC: 139 MG/DL (ref 65–140)
GLUCOSE SERPL-MCNC: 145 MG/DL (ref 65–140)
GLUCOSE SERPL-MCNC: 183 MG/DL (ref 65–140)
GLUCOSE SERPL-MCNC: 259 MG/DL (ref 65–140)
HCT VFR BLD AUTO: 28.1 % (ref 36.5–49.3)
HGB BLD-MCNC: 8.7 G/DL (ref 12–17)
IMM GRANULOCYTES # BLD AUTO: 0.05 THOUSAND/UL (ref 0–0.2)
IMM GRANULOCYTES NFR BLD AUTO: 1 % (ref 0–2)
LYMPHOCYTES # BLD AUTO: 1.77 THOUSANDS/ΜL (ref 0.6–4.47)
LYMPHOCYTES NFR BLD AUTO: 22 % (ref 14–44)
MAGNESIUM SERPL-MCNC: 1.9 MG/DL (ref 1.6–2.6)
MCH RBC QN AUTO: 29.4 PG (ref 26.8–34.3)
MCHC RBC AUTO-ENTMCNC: 31 G/DL (ref 31.4–37.4)
MCV RBC AUTO: 95 FL (ref 82–98)
MONOCYTES # BLD AUTO: 0.88 THOUSAND/ΜL (ref 0.17–1.22)
MONOCYTES NFR BLD AUTO: 11 % (ref 4–12)
NEUTROPHILS # BLD AUTO: 4.66 THOUSANDS/ΜL (ref 1.85–7.62)
NEUTS SEG NFR BLD AUTO: 56 % (ref 43–75)
NRBC BLD AUTO-RTO: 0 /100 WBCS
PLATELET # BLD AUTO: 375 THOUSANDS/UL (ref 149–390)
PMV BLD AUTO: 9.1 FL (ref 8.9–12.7)
POTASSIUM SERPL-SCNC: 3.9 MMOL/L (ref 3.5–5.3)
PROT SERPL-MCNC: 7.2 G/DL (ref 6.4–8.2)
RBC # BLD AUTO: 2.96 MILLION/UL (ref 3.88–5.62)
RH BLD: POSITIVE
SODIUM SERPL-SCNC: 137 MMOL/L (ref 136–145)
SPECIMEN EXPIRATION DATE: NORMAL
WBC # BLD AUTO: 8.17 THOUSAND/UL (ref 4.31–10.16)

## 2020-10-05 PROCEDURE — 83735 ASSAY OF MAGNESIUM: CPT | Performed by: NURSE PRACTITIONER

## 2020-10-05 PROCEDURE — 85025 COMPLETE CBC W/AUTO DIFF WBC: CPT | Performed by: NURSE PRACTITIONER

## 2020-10-05 PROCEDURE — 82948 REAGENT STRIP/BLOOD GLUCOSE: CPT

## 2020-10-05 PROCEDURE — 3E1K78Z IRRIGATION OF GENITOURINARY TRACT USING IRRIGATING SUBSTANCE, VIA NATURAL OR ARTIFICIAL OPENING: ICD-10-PCS | Performed by: UROLOGY

## 2020-10-05 PROCEDURE — 86900 BLOOD TYPING SEROLOGIC ABO: CPT | Performed by: NURSE PRACTITIONER

## 2020-10-05 PROCEDURE — 99223 1ST HOSP IP/OBS HIGH 75: CPT | Performed by: NURSE PRACTITIONER

## 2020-10-05 PROCEDURE — 80053 COMPREHEN METABOLIC PANEL: CPT | Performed by: NURSE PRACTITIONER

## 2020-10-05 PROCEDURE — 99024 POSTOP FOLLOW-UP VISIT: CPT | Performed by: PHYSICIAN ASSISTANT

## 2020-10-05 PROCEDURE — 86850 RBC ANTIBODY SCREEN: CPT | Performed by: NURSE PRACTITIONER

## 2020-10-05 PROCEDURE — 86901 BLOOD TYPING SEROLOGIC RH(D): CPT | Performed by: NURSE PRACTITIONER

## 2020-10-05 RX ORDER — INSULIN GLARGINE 100 [IU]/ML
10 INJECTION, SOLUTION SUBCUTANEOUS
Status: DISCONTINUED | OUTPATIENT
Start: 2020-10-05 | End: 2020-10-05

## 2020-10-05 RX ORDER — ACETAMINOPHEN 325 MG/1
650 TABLET ORAL EVERY 6 HOURS PRN
Status: DISCONTINUED | OUTPATIENT
Start: 2020-10-05 | End: 2020-10-08 | Stop reason: HOSPADM

## 2020-10-05 RX ORDER — CARVEDILOL 12.5 MG/1
25 TABLET ORAL 2 TIMES DAILY WITH MEALS
Status: DISCONTINUED | OUTPATIENT
Start: 2020-10-05 | End: 2020-10-08 | Stop reason: HOSPADM

## 2020-10-05 RX ORDER — ALBUTEROL SULFATE 90 UG/1
2 AEROSOL, METERED RESPIRATORY (INHALATION) EVERY 4 HOURS PRN
Status: DISCONTINUED | OUTPATIENT
Start: 2020-10-05 | End: 2020-10-08 | Stop reason: HOSPADM

## 2020-10-05 RX ORDER — FINASTERIDE 5 MG/1
5 TABLET, FILM COATED ORAL DAILY
Status: DISCONTINUED | OUTPATIENT
Start: 2020-10-05 | End: 2020-10-08 | Stop reason: HOSPADM

## 2020-10-05 RX ORDER — HYDROMORPHONE HCL/PF 1 MG/ML
0.2 SYRINGE (ML) INJECTION EVERY 6 HOURS PRN
Status: DISCONTINUED | OUTPATIENT
Start: 2020-10-05 | End: 2020-10-08 | Stop reason: HOSPADM

## 2020-10-05 RX ORDER — SENNOSIDES 8.6 MG
1 TABLET ORAL 2 TIMES DAILY
Status: DISCONTINUED | OUTPATIENT
Start: 2020-10-05 | End: 2020-10-08 | Stop reason: HOSPADM

## 2020-10-05 RX ORDER — GABAPENTIN 300 MG/1
300 CAPSULE ORAL 2 TIMES DAILY
Status: DISCONTINUED | OUTPATIENT
Start: 2020-10-05 | End: 2020-10-08 | Stop reason: HOSPADM

## 2020-10-05 RX ORDER — DOCUSATE SODIUM 100 MG/1
100 CAPSULE, LIQUID FILLED ORAL EVERY 12 HOURS
Status: DISCONTINUED | OUTPATIENT
Start: 2020-10-05 | End: 2020-10-08 | Stop reason: HOSPADM

## 2020-10-05 RX ORDER — OXYCODONE HYDROCHLORIDE 5 MG/1
2.5 TABLET ORAL EVERY 4 HOURS PRN
Status: DISCONTINUED | OUTPATIENT
Start: 2020-10-05 | End: 2020-10-08 | Stop reason: HOSPADM

## 2020-10-05 RX ORDER — INSULIN GLARGINE 100 [IU]/ML
20 INJECTION, SOLUTION SUBCUTANEOUS
Status: DISCONTINUED | OUTPATIENT
Start: 2020-10-05 | End: 2020-10-08 | Stop reason: HOSPADM

## 2020-10-05 RX ORDER — MAGNESIUM HYDROXIDE/ALUMINUM HYDROXICE/SIMETHICONE 120; 1200; 1200 MG/30ML; MG/30ML; MG/30ML
30 SUSPENSION ORAL EVERY 6 HOURS PRN
Status: DISCONTINUED | OUTPATIENT
Start: 2020-10-05 | End: 2020-10-08 | Stop reason: HOSPADM

## 2020-10-05 RX ORDER — ATROPA BELLADONNA AND OPIUM 16.2; 3 MG/1; MG/1
30 SUPPOSITORY RECTAL EVERY 8 HOURS PRN
Status: DISCONTINUED | OUTPATIENT
Start: 2020-10-05 | End: 2020-10-08 | Stop reason: HOSPADM

## 2020-10-05 RX ORDER — TAMSULOSIN HYDROCHLORIDE 0.4 MG/1
0.8 CAPSULE ORAL
Status: DISCONTINUED | OUTPATIENT
Start: 2020-10-05 | End: 2020-10-08 | Stop reason: HOSPADM

## 2020-10-05 RX ORDER — PRAVASTATIN SODIUM 40 MG
40 TABLET ORAL
Status: DISCONTINUED | OUTPATIENT
Start: 2020-10-05 | End: 2020-10-08 | Stop reason: HOSPADM

## 2020-10-05 RX ORDER — ONDANSETRON 2 MG/ML
4 INJECTION INTRAMUSCULAR; INTRAVENOUS EVERY 6 HOURS PRN
Status: DISCONTINUED | OUTPATIENT
Start: 2020-10-05 | End: 2020-10-08 | Stop reason: HOSPADM

## 2020-10-05 RX ORDER — OXYCODONE HYDROCHLORIDE 5 MG/1
5 TABLET ORAL EVERY 6 HOURS PRN
Status: DISCONTINUED | OUTPATIENT
Start: 2020-10-05 | End: 2020-10-08 | Stop reason: HOSPADM

## 2020-10-05 RX ORDER — LOSARTAN POTASSIUM 50 MG/1
50 TABLET ORAL DAILY
Status: DISCONTINUED | OUTPATIENT
Start: 2020-10-05 | End: 2020-10-08 | Stop reason: HOSPADM

## 2020-10-05 RX ORDER — NITROGLYCERIN 0.4 MG/1
0.4 TABLET SUBLINGUAL
Status: DISCONTINUED | OUTPATIENT
Start: 2020-10-05 | End: 2020-10-08 | Stop reason: HOSPADM

## 2020-10-05 RX ORDER — FAMOTIDINE 20 MG/1
20 TABLET, FILM COATED ORAL DAILY
Status: DISCONTINUED | OUTPATIENT
Start: 2020-10-05 | End: 2020-10-08 | Stop reason: HOSPADM

## 2020-10-05 RX ADMIN — FINASTERIDE 5 MG: 5 TABLET, FILM COATED ORAL at 11:13

## 2020-10-05 RX ADMIN — INSULIN GLARGINE 20 UNITS: 100 INJECTION, SOLUTION SUBCUTANEOUS at 22:09

## 2020-10-05 RX ADMIN — FAMOTIDINE 20 MG: 20 TABLET, FILM COATED ORAL at 11:12

## 2020-10-05 RX ADMIN — OXYCODONE HYDROCHLORIDE 5 MG: 5 TABLET ORAL at 11:21

## 2020-10-05 RX ADMIN — SENNOSIDES 8.6 MG: 8.6 TABLET, FILM COATED ORAL at 11:13

## 2020-10-05 RX ADMIN — INSULIN LISPRO 1 UNITS: 100 INJECTION, SOLUTION INTRAVENOUS; SUBCUTANEOUS at 22:08

## 2020-10-05 RX ADMIN — LOSARTAN POTASSIUM 50 MG: 50 TABLET, FILM COATED ORAL at 11:13

## 2020-10-05 RX ADMIN — ATROPA BELLADONNA AND OPIUM 1 SUPPOSITORY: 16.2; 3 SUPPOSITORY RECTAL at 19:23

## 2020-10-05 RX ADMIN — TAMSULOSIN HYDROCHLORIDE 0.8 MG: 0.4 CAPSULE ORAL at 17:06

## 2020-10-05 RX ADMIN — PRAVASTATIN SODIUM 40 MG: 40 TABLET ORAL at 17:06

## 2020-10-05 RX ADMIN — SENNOSIDES 8.6 MG: 8.6 TABLET, FILM COATED ORAL at 17:06

## 2020-10-05 RX ADMIN — OXYCODONE HYDROCHLORIDE 5 MG: 5 TABLET ORAL at 02:43

## 2020-10-05 RX ADMIN — GABAPENTIN 300 MG: 300 CAPSULE ORAL at 17:06

## 2020-10-05 RX ADMIN — CARVEDILOL 25 MG: 12.5 TABLET, FILM COATED ORAL at 11:12

## 2020-10-05 RX ADMIN — CARVEDILOL 25 MG: 12.5 TABLET, FILM COATED ORAL at 17:06

## 2020-10-05 RX ADMIN — DOCUSATE SODIUM 100 MG: 100 CAPSULE, LIQUID FILLED ORAL at 15:17

## 2020-10-05 RX ADMIN — INSULIN LISPRO 3 UNITS: 100 INJECTION, SOLUTION INTRAVENOUS; SUBCUTANEOUS at 17:08

## 2020-10-05 RX ADMIN — GABAPENTIN 300 MG: 300 CAPSULE ORAL at 11:12

## 2020-10-06 PROBLEM — R10.32 LEFT LOWER QUADRANT ABDOMINAL PAIN: Status: ACTIVE | Noted: 2020-10-06

## 2020-10-06 LAB
ANION GAP SERPL CALCULATED.3IONS-SCNC: 6 MMOL/L (ref 4–13)
BASOPHILS # BLD AUTO: 0.06 THOUSANDS/ΜL (ref 0–0.1)
BASOPHILS NFR BLD AUTO: 1 % (ref 0–1)
BUN SERPL-MCNC: 12 MG/DL (ref 5–25)
CALCIUM SERPL-MCNC: 8.9 MG/DL (ref 8.3–10.1)
CHLORIDE SERPL-SCNC: 102 MMOL/L (ref 100–108)
CO2 SERPL-SCNC: 28 MMOL/L (ref 21–32)
CREAT SERPL-MCNC: 1.05 MG/DL (ref 0.6–1.3)
EOSINOPHIL # BLD AUTO: 0.74 THOUSAND/ΜL (ref 0–0.61)
EOSINOPHIL NFR BLD AUTO: 10 % (ref 0–6)
ERYTHROCYTE [DISTWIDTH] IN BLOOD BY AUTOMATED COUNT: 13.2 % (ref 11.6–15.1)
GFR SERPL CREATININE-BSD FRML MDRD: 65 ML/MIN/1.73SQ M
GLUCOSE SERPL-MCNC: 105 MG/DL (ref 65–140)
GLUCOSE SERPL-MCNC: 110 MG/DL (ref 65–140)
GLUCOSE SERPL-MCNC: 185 MG/DL (ref 65–140)
GLUCOSE SERPL-MCNC: 297 MG/DL (ref 65–140)
HCT VFR BLD AUTO: 26.6 % (ref 36.5–49.3)
HGB BLD-MCNC: 8.2 G/DL (ref 12–17)
IMM GRANULOCYTES # BLD AUTO: 0.04 THOUSAND/UL (ref 0–0.2)
IMM GRANULOCYTES NFR BLD AUTO: 1 % (ref 0–2)
LYMPHOCYTES # BLD AUTO: 1.59 THOUSANDS/ΜL (ref 0.6–4.47)
LYMPHOCYTES NFR BLD AUTO: 22 % (ref 14–44)
MCH RBC QN AUTO: 29.5 PG (ref 26.8–34.3)
MCHC RBC AUTO-ENTMCNC: 30.8 G/DL (ref 31.4–37.4)
MCV RBC AUTO: 96 FL (ref 82–98)
MONOCYTES # BLD AUTO: 0.65 THOUSAND/ΜL (ref 0.17–1.22)
MONOCYTES NFR BLD AUTO: 9 % (ref 4–12)
NEUTROPHILS # BLD AUTO: 4.04 THOUSANDS/ΜL (ref 1.85–7.62)
NEUTS SEG NFR BLD AUTO: 57 % (ref 43–75)
NRBC BLD AUTO-RTO: 0 /100 WBCS
PLATELET # BLD AUTO: 371 THOUSANDS/UL (ref 149–390)
PMV BLD AUTO: 9.6 FL (ref 8.9–12.7)
POTASSIUM SERPL-SCNC: 4.2 MMOL/L (ref 3.5–5.3)
RBC # BLD AUTO: 2.78 MILLION/UL (ref 3.88–5.62)
SODIUM SERPL-SCNC: 136 MMOL/L (ref 136–145)
WBC # BLD AUTO: 7.12 THOUSAND/UL (ref 4.31–10.16)

## 2020-10-06 PROCEDURE — 99232 SBSQ HOSP IP/OBS MODERATE 35: CPT | Performed by: INTERNAL MEDICINE

## 2020-10-06 PROCEDURE — 99024 POSTOP FOLLOW-UP VISIT: CPT | Performed by: PHYSICIAN ASSISTANT

## 2020-10-06 PROCEDURE — 82948 REAGENT STRIP/BLOOD GLUCOSE: CPT

## 2020-10-06 PROCEDURE — 97167 OT EVAL HIGH COMPLEX 60 MIN: CPT

## 2020-10-06 PROCEDURE — 97163 PT EVAL HIGH COMPLEX 45 MIN: CPT

## 2020-10-06 PROCEDURE — 85025 COMPLETE CBC W/AUTO DIFF WBC: CPT | Performed by: INTERNAL MEDICINE

## 2020-10-06 PROCEDURE — 80048 BASIC METABOLIC PNL TOTAL CA: CPT | Performed by: INTERNAL MEDICINE

## 2020-10-06 RX ADMIN — DOCUSATE SODIUM 100 MG: 100 CAPSULE, LIQUID FILLED ORAL at 14:40

## 2020-10-06 RX ADMIN — SENNOSIDES 8.6 MG: 8.6 TABLET, FILM COATED ORAL at 17:08

## 2020-10-06 RX ADMIN — ACETAMINOPHEN 650 MG: 325 TABLET ORAL at 11:19

## 2020-10-06 RX ADMIN — OXYCODONE HYDROCHLORIDE 5 MG: 5 TABLET ORAL at 12:11

## 2020-10-06 RX ADMIN — CARVEDILOL 25 MG: 12.5 TABLET, FILM COATED ORAL at 07:36

## 2020-10-06 RX ADMIN — CARVEDILOL 25 MG: 12.5 TABLET, FILM COATED ORAL at 17:12

## 2020-10-06 RX ADMIN — GABAPENTIN 300 MG: 300 CAPSULE ORAL at 08:46

## 2020-10-06 RX ADMIN — FINASTERIDE 5 MG: 5 TABLET, FILM COATED ORAL at 08:46

## 2020-10-06 RX ADMIN — PRAVASTATIN SODIUM 40 MG: 40 TABLET ORAL at 17:08

## 2020-10-06 RX ADMIN — INSULIN LISPRO 1 UNITS: 100 INJECTION, SOLUTION INTRAVENOUS; SUBCUTANEOUS at 12:08

## 2020-10-06 RX ADMIN — GABAPENTIN 300 MG: 300 CAPSULE ORAL at 17:08

## 2020-10-06 RX ADMIN — SENNOSIDES 8.6 MG: 8.6 TABLET, FILM COATED ORAL at 08:46

## 2020-10-06 RX ADMIN — LOSARTAN POTASSIUM 50 MG: 50 TABLET, FILM COATED ORAL at 08:46

## 2020-10-06 RX ADMIN — FAMOTIDINE 20 MG: 20 TABLET, FILM COATED ORAL at 08:46

## 2020-10-06 RX ADMIN — INSULIN GLARGINE 20 UNITS: 100 INJECTION, SOLUTION SUBCUTANEOUS at 22:20

## 2020-10-06 RX ADMIN — TAMSULOSIN HYDROCHLORIDE 0.8 MG: 0.4 CAPSULE ORAL at 17:08

## 2020-10-07 LAB
ABO GROUP BLD: NORMAL
ANION GAP SERPL CALCULATED.3IONS-SCNC: 7 MMOL/L (ref 4–13)
BASOPHILS # BLD AUTO: 0.04 THOUSANDS/ΜL (ref 0–0.1)
BASOPHILS NFR BLD AUTO: 1 % (ref 0–1)
BLD GP AB SCN SERPL QL: NEGATIVE
BUN SERPL-MCNC: 16 MG/DL (ref 5–25)
CALCIUM SERPL-MCNC: 8.8 MG/DL (ref 8.3–10.1)
CHLORIDE SERPL-SCNC: 102 MMOL/L (ref 100–108)
CO2 SERPL-SCNC: 27 MMOL/L (ref 21–32)
CREAT SERPL-MCNC: 1.08 MG/DL (ref 0.6–1.3)
EOSINOPHIL # BLD AUTO: 0.74 THOUSAND/ΜL (ref 0–0.61)
EOSINOPHIL NFR BLD AUTO: 12 % (ref 0–6)
ERYTHROCYTE [DISTWIDTH] IN BLOOD BY AUTOMATED COUNT: 13.3 % (ref 11.6–15.1)
GFR SERPL CREATININE-BSD FRML MDRD: 63 ML/MIN/1.73SQ M
GLUCOSE SERPL-MCNC: 114 MG/DL (ref 65–140)
GLUCOSE SERPL-MCNC: 142 MG/DL (ref 65–140)
GLUCOSE SERPL-MCNC: 162 MG/DL (ref 65–140)
GLUCOSE SERPL-MCNC: 168 MG/DL (ref 65–140)
GLUCOSE SERPL-MCNC: 204 MG/DL (ref 65–140)
GLUCOSE SERPL-MCNC: 216 MG/DL (ref 65–140)
HCT VFR BLD AUTO: 25.3 % (ref 36.5–49.3)
HCT VFR BLD AUTO: 26.7 % (ref 36.5–49.3)
HGB BLD-MCNC: 7.8 G/DL (ref 12–17)
HGB BLD-MCNC: 8.4 G/DL (ref 12–17)
IMM GRANULOCYTES # BLD AUTO: 0.04 THOUSAND/UL (ref 0–0.2)
IMM GRANULOCYTES NFR BLD AUTO: 1 % (ref 0–2)
LYMPHOCYTES # BLD AUTO: 1.38 THOUSANDS/ΜL (ref 0.6–4.47)
LYMPHOCYTES NFR BLD AUTO: 22 % (ref 14–44)
MCH RBC QN AUTO: 29.3 PG (ref 26.8–34.3)
MCHC RBC AUTO-ENTMCNC: 30.8 G/DL (ref 31.4–37.4)
MCV RBC AUTO: 95 FL (ref 82–98)
MONOCYTES # BLD AUTO: 0.71 THOUSAND/ΜL (ref 0.17–1.22)
MONOCYTES NFR BLD AUTO: 11 % (ref 4–12)
NEUTROPHILS # BLD AUTO: 3.38 THOUSANDS/ΜL (ref 1.85–7.62)
NEUTS SEG NFR BLD AUTO: 53 % (ref 43–75)
NRBC BLD AUTO-RTO: 0 /100 WBCS
PLATELET # BLD AUTO: 352 THOUSANDS/UL (ref 149–390)
PMV BLD AUTO: 9.9 FL (ref 8.9–12.7)
POTASSIUM SERPL-SCNC: 4 MMOL/L (ref 3.5–5.3)
RBC # BLD AUTO: 2.66 MILLION/UL (ref 3.88–5.62)
RH BLD: POSITIVE
SODIUM SERPL-SCNC: 136 MMOL/L (ref 136–145)
SPECIMEN EXPIRATION DATE: NORMAL
WBC # BLD AUTO: 6.29 THOUSAND/UL (ref 4.31–10.16)

## 2020-10-07 PROCEDURE — 86900 BLOOD TYPING SEROLOGIC ABO: CPT | Performed by: INTERNAL MEDICINE

## 2020-10-07 PROCEDURE — 99232 SBSQ HOSP IP/OBS MODERATE 35: CPT | Performed by: INTERNAL MEDICINE

## 2020-10-07 PROCEDURE — 85025 COMPLETE CBC W/AUTO DIFF WBC: CPT | Performed by: INTERNAL MEDICINE

## 2020-10-07 PROCEDURE — 85018 HEMOGLOBIN: CPT | Performed by: INTERNAL MEDICINE

## 2020-10-07 PROCEDURE — 82948 REAGENT STRIP/BLOOD GLUCOSE: CPT

## 2020-10-07 PROCEDURE — 80048 BASIC METABOLIC PNL TOTAL CA: CPT | Performed by: INTERNAL MEDICINE

## 2020-10-07 PROCEDURE — 86901 BLOOD TYPING SEROLOGIC RH(D): CPT | Performed by: INTERNAL MEDICINE

## 2020-10-07 PROCEDURE — 86850 RBC ANTIBODY SCREEN: CPT | Performed by: INTERNAL MEDICINE

## 2020-10-07 PROCEDURE — 85014 HEMATOCRIT: CPT | Performed by: INTERNAL MEDICINE

## 2020-10-07 PROCEDURE — 99024 POSTOP FOLLOW-UP VISIT: CPT | Performed by: PHYSICIAN ASSISTANT

## 2020-10-07 RX ADMIN — DOCUSATE SODIUM 100 MG: 100 CAPSULE, LIQUID FILLED ORAL at 16:27

## 2020-10-07 RX ADMIN — PRAVASTATIN SODIUM 40 MG: 40 TABLET ORAL at 16:28

## 2020-10-07 RX ADMIN — GABAPENTIN 300 MG: 300 CAPSULE ORAL at 18:44

## 2020-10-07 RX ADMIN — GABAPENTIN 300 MG: 300 CAPSULE ORAL at 08:35

## 2020-10-07 RX ADMIN — SENNOSIDES 8.6 MG: 8.6 TABLET, FILM COATED ORAL at 18:44

## 2020-10-07 RX ADMIN — TAMSULOSIN HYDROCHLORIDE 0.8 MG: 0.4 CAPSULE ORAL at 16:27

## 2020-10-07 RX ADMIN — OXYCODONE HYDROCHLORIDE 5 MG: 5 TABLET ORAL at 22:36

## 2020-10-07 RX ADMIN — FAMOTIDINE 20 MG: 20 TABLET, FILM COATED ORAL at 08:34

## 2020-10-07 RX ADMIN — INSULIN GLARGINE 20 UNITS: 100 INJECTION, SOLUTION SUBCUTANEOUS at 22:36

## 2020-10-07 RX ADMIN — FINASTERIDE 5 MG: 5 TABLET, FILM COATED ORAL at 08:35

## 2020-10-08 VITALS
WEIGHT: 208.34 LBS | RESPIRATION RATE: 20 BRPM | SYSTOLIC BLOOD PRESSURE: 109 MMHG | DIASTOLIC BLOOD PRESSURE: 56 MMHG | HEART RATE: 88 BPM | TEMPERATURE: 98.2 F | BODY MASS INDEX: 34.14 KG/M2 | OXYGEN SATURATION: 96 %

## 2020-10-08 LAB
ANION GAP SERPL CALCULATED.3IONS-SCNC: 8 MMOL/L (ref 4–13)
BASOPHILS # BLD AUTO: 0.05 THOUSANDS/ΜL (ref 0–0.1)
BASOPHILS NFR BLD AUTO: 1 % (ref 0–1)
BUN SERPL-MCNC: 12 MG/DL (ref 5–25)
CALCIUM SERPL-MCNC: 8.7 MG/DL (ref 8.3–10.1)
CHLORIDE SERPL-SCNC: 101 MMOL/L (ref 100–108)
CO2 SERPL-SCNC: 27 MMOL/L (ref 21–32)
CREAT SERPL-MCNC: 1.11 MG/DL (ref 0.6–1.3)
EOSINOPHIL # BLD AUTO: 0.61 THOUSAND/ΜL (ref 0–0.61)
EOSINOPHIL NFR BLD AUTO: 8 % (ref 0–6)
ERYTHROCYTE [DISTWIDTH] IN BLOOD BY AUTOMATED COUNT: 13.4 % (ref 11.6–15.1)
GFR SERPL CREATININE-BSD FRML MDRD: 61 ML/MIN/1.73SQ M
GLUCOSE SERPL-MCNC: 132 MG/DL (ref 65–140)
GLUCOSE SERPL-MCNC: 181 MG/DL (ref 65–140)
GLUCOSE SERPL-MCNC: 193 MG/DL (ref 65–140)
GLUCOSE SERPL-MCNC: 272 MG/DL (ref 65–140)
HCT VFR BLD AUTO: 26.5 % (ref 36.5–49.3)
HGB BLD-MCNC: 8.1 G/DL (ref 12–17)
IMM GRANULOCYTES # BLD AUTO: 0.03 THOUSAND/UL (ref 0–0.2)
IMM GRANULOCYTES NFR BLD AUTO: 0 % (ref 0–2)
LYMPHOCYTES # BLD AUTO: 1.38 THOUSANDS/ΜL (ref 0.6–4.47)
LYMPHOCYTES NFR BLD AUTO: 18 % (ref 14–44)
MCH RBC QN AUTO: 29.2 PG (ref 26.8–34.3)
MCHC RBC AUTO-ENTMCNC: 30.6 G/DL (ref 31.4–37.4)
MCV RBC AUTO: 96 FL (ref 82–98)
MONOCYTES # BLD AUTO: 0.85 THOUSAND/ΜL (ref 0.17–1.22)
MONOCYTES NFR BLD AUTO: 11 % (ref 4–12)
NEUTROPHILS # BLD AUTO: 4.86 THOUSANDS/ΜL (ref 1.85–7.62)
NEUTS SEG NFR BLD AUTO: 62 % (ref 43–75)
NRBC BLD AUTO-RTO: 0 /100 WBCS
PLATELET # BLD AUTO: 350 THOUSANDS/UL (ref 149–390)
PMV BLD AUTO: 9.9 FL (ref 8.9–12.7)
POTASSIUM SERPL-SCNC: 4 MMOL/L (ref 3.5–5.3)
RBC # BLD AUTO: 2.77 MILLION/UL (ref 3.88–5.62)
SODIUM SERPL-SCNC: 136 MMOL/L (ref 136–145)
WBC # BLD AUTO: 7.78 THOUSAND/UL (ref 4.31–10.16)

## 2020-10-08 PROCEDURE — 99024 POSTOP FOLLOW-UP VISIT: CPT | Performed by: NURSE PRACTITIONER

## 2020-10-08 PROCEDURE — 99239 HOSP IP/OBS DSCHRG MGMT >30: CPT | Performed by: INTERNAL MEDICINE

## 2020-10-08 PROCEDURE — 80048 BASIC METABOLIC PNL TOTAL CA: CPT | Performed by: INTERNAL MEDICINE

## 2020-10-08 PROCEDURE — 85025 COMPLETE CBC W/AUTO DIFF WBC: CPT | Performed by: INTERNAL MEDICINE

## 2020-10-08 PROCEDURE — 82948 REAGENT STRIP/BLOOD GLUCOSE: CPT

## 2020-10-08 RX ORDER — SENNOSIDES 8.6 MG
8.6 TABLET ORAL 2 TIMES DAILY
Qty: 20 EACH | Refills: 0 | Status: SHIPPED | OUTPATIENT
Start: 2020-10-08

## 2020-10-08 RX ORDER — TRAMADOL HYDROCHLORIDE 50 MG/1
50 TABLET ORAL EVERY 6 HOURS PRN
Qty: 6 TABLET | Refills: 0 | Status: SHIPPED | OUTPATIENT
Start: 2020-10-08 | End: 2020-10-18

## 2020-10-08 RX ADMIN — SENNOSIDES 8.6 MG: 8.6 TABLET, FILM COATED ORAL at 08:51

## 2020-10-08 RX ADMIN — GABAPENTIN 300 MG: 300 CAPSULE ORAL at 17:58

## 2020-10-08 RX ADMIN — DOCUSATE SODIUM 100 MG: 100 CAPSULE, LIQUID FILLED ORAL at 16:03

## 2020-10-08 RX ADMIN — GABAPENTIN 300 MG: 300 CAPSULE ORAL at 08:51

## 2020-10-08 RX ADMIN — FAMOTIDINE 20 MG: 20 TABLET, FILM COATED ORAL at 08:51

## 2020-10-08 RX ADMIN — LOSARTAN POTASSIUM 50 MG: 50 TABLET, FILM COATED ORAL at 08:51

## 2020-10-08 RX ADMIN — ACETAMINOPHEN 650 MG: 325 TABLET ORAL at 12:20

## 2020-10-08 RX ADMIN — PRAVASTATIN SODIUM 40 MG: 40 TABLET ORAL at 16:02

## 2020-10-08 RX ADMIN — SENNOSIDES 8.6 MG: 8.6 TABLET, FILM COATED ORAL at 17:58

## 2020-10-08 RX ADMIN — FINASTERIDE 5 MG: 5 TABLET, FILM COATED ORAL at 08:51

## 2020-10-08 RX ADMIN — DOCUSATE SODIUM 100 MG: 100 CAPSULE, LIQUID FILLED ORAL at 08:51

## 2020-10-08 RX ADMIN — CARVEDILOL 25 MG: 12.5 TABLET, FILM COATED ORAL at 08:51

## 2020-10-08 RX ADMIN — TAMSULOSIN HYDROCHLORIDE 0.8 MG: 0.4 CAPSULE ORAL at 16:02

## 2020-10-09 ENCOUNTER — TELEPHONE (OUTPATIENT)
Dept: UROLOGY | Facility: MEDICAL CENTER | Age: 85
End: 2020-10-09

## 2020-10-09 ENCOUNTER — TRANSITIONAL CARE MANAGEMENT (OUTPATIENT)
Dept: FAMILY MEDICINE CLINIC | Facility: HOSPITAL | Age: 85
End: 2020-10-09

## 2020-10-13 ENCOUNTER — LAB (OUTPATIENT)
Dept: LAB | Facility: HOSPITAL | Age: 85
End: 2020-10-13
Payer: MEDICARE

## 2020-10-13 DIAGNOSIS — I10 ESSENTIAL HYPERTENSION: ICD-10-CM

## 2020-10-13 DIAGNOSIS — I50.42 CHRONIC COMBINED SYSTOLIC (CONGESTIVE) AND DIASTOLIC (CONGESTIVE) HEART FAILURE (HCC): ICD-10-CM

## 2020-10-13 LAB
ANION GAP SERPL CALCULATED.3IONS-SCNC: 6 MMOL/L (ref 4–13)
BUN SERPL-MCNC: 13 MG/DL (ref 5–25)
CALCIUM SERPL-MCNC: 9 MG/DL (ref 8.3–10.1)
CHLORIDE SERPL-SCNC: 107 MMOL/L (ref 100–108)
CO2 SERPL-SCNC: 26 MMOL/L (ref 21–32)
CREAT SERPL-MCNC: 0.96 MG/DL (ref 0.6–1.3)
ERYTHROCYTE [DISTWIDTH] IN BLOOD BY AUTOMATED COUNT: 13.4 % (ref 11.6–15.1)
GFR SERPL CREATININE-BSD FRML MDRD: 73 ML/MIN/1.73SQ M
GLUCOSE P FAST SERPL-MCNC: 106 MG/DL (ref 65–99)
HCT VFR BLD AUTO: 27.3 % (ref 36.5–49.3)
HGB BLD-MCNC: 8.4 G/DL (ref 12–17)
MCH RBC QN AUTO: 28.9 PG (ref 26.8–34.3)
MCHC RBC AUTO-ENTMCNC: 30.8 G/DL (ref 31.4–37.4)
MCV RBC AUTO: 94 FL (ref 82–98)
PLATELET # BLD AUTO: 346 THOUSANDS/UL (ref 149–390)
PMV BLD AUTO: 10.1 FL (ref 8.9–12.7)
POTASSIUM SERPL-SCNC: 4.2 MMOL/L (ref 3.5–5.3)
RBC # BLD AUTO: 2.91 MILLION/UL (ref 3.88–5.62)
SODIUM SERPL-SCNC: 139 MMOL/L (ref 136–145)
WBC # BLD AUTO: 9.65 THOUSAND/UL (ref 4.31–10.16)

## 2020-10-13 PROCEDURE — 85027 COMPLETE CBC AUTOMATED: CPT

## 2020-10-13 PROCEDURE — 36415 COLL VENOUS BLD VENIPUNCTURE: CPT

## 2020-10-13 PROCEDURE — 80048 BASIC METABOLIC PNL TOTAL CA: CPT

## 2020-10-15 ENCOUNTER — OFFICE VISIT (OUTPATIENT)
Dept: FAMILY MEDICINE CLINIC | Facility: HOSPITAL | Age: 85
End: 2020-10-15
Payer: MEDICARE

## 2020-10-15 ENCOUNTER — OFFICE VISIT (OUTPATIENT)
Dept: UROLOGY | Facility: AMBULATORY SURGERY CENTER | Age: 85
End: 2020-10-15
Payer: MEDICARE

## 2020-10-15 VITALS
RESPIRATION RATE: 14 BRPM | SYSTOLIC BLOOD PRESSURE: 90 MMHG | HEART RATE: 104 BPM | WEIGHT: 209.2 LBS | DIASTOLIC BLOOD PRESSURE: 50 MMHG | TEMPERATURE: 96.6 F | BODY MASS INDEX: 34.28 KG/M2

## 2020-10-15 VITALS
WEIGHT: 209 LBS | BODY MASS INDEX: 34.25 KG/M2 | HEART RATE: 111 BPM | DIASTOLIC BLOOD PRESSURE: 84 MMHG | SYSTOLIC BLOOD PRESSURE: 118 MMHG | TEMPERATURE: 98 F

## 2020-10-15 DIAGNOSIS — E11.40 TYPE 2 DIABETES MELLITUS WITH DIABETIC NEUROPATHY, WITH LONG-TERM CURRENT USE OF INSULIN (HCC): ICD-10-CM

## 2020-10-15 DIAGNOSIS — I50.42 CHRONIC COMBINED SYSTOLIC AND DIASTOLIC HEART FAILURE (HCC): Primary | ICD-10-CM

## 2020-10-15 DIAGNOSIS — D62 ACUTE BLOOD LOSS ANEMIA: ICD-10-CM

## 2020-10-15 DIAGNOSIS — R39.12 BENIGN PROSTATIC HYPERPLASIA WITH WEAK URINARY STREAM: ICD-10-CM

## 2020-10-15 DIAGNOSIS — I10 ESSENTIAL HYPERTENSION: ICD-10-CM

## 2020-10-15 DIAGNOSIS — R33.9 URINARY RETENTION: ICD-10-CM

## 2020-10-15 DIAGNOSIS — I25.119 CORONARY ARTERY DISEASE INVOLVING NATIVE CORONARY ARTERY OF NATIVE HEART WITH ANGINA PECTORIS (HCC): ICD-10-CM

## 2020-10-15 DIAGNOSIS — I95.2 HYPOTENSION DUE TO DRUGS: ICD-10-CM

## 2020-10-15 DIAGNOSIS — R35.0 URINARY FREQUENCY: Primary | ICD-10-CM

## 2020-10-15 DIAGNOSIS — N40.1 BENIGN PROSTATIC HYPERPLASIA WITH LOWER URINARY TRACT SYMPTOMS, SYMPTOM DETAILS UNSPECIFIED: ICD-10-CM

## 2020-10-15 DIAGNOSIS — Z79.4 TYPE 2 DIABETES MELLITUS WITH DIABETIC NEUROPATHY, WITH LONG-TERM CURRENT USE OF INSULIN (HCC): ICD-10-CM

## 2020-10-15 DIAGNOSIS — N40.1 BENIGN PROSTATIC HYPERPLASIA WITH WEAK URINARY STREAM: ICD-10-CM

## 2020-10-15 PROBLEM — J96.01 ACUTE RESPIRATORY FAILURE WITH HYPOXIA (HCC): Status: RESOLVED | Noted: 2020-09-16 | Resolved: 2020-10-15

## 2020-10-15 LAB
BACTERIA UR QL AUTO: ABNORMAL /HPF
BILIRUB UR QL STRIP: NEGATIVE
CLARITY UR: ABNORMAL
COLOR UR: YELLOW
GLUCOSE UR STRIP-MCNC: ABNORMAL MG/DL
HGB UR QL STRIP.AUTO: ABNORMAL
KETONES UR STRIP-MCNC: NEGATIVE MG/DL
LEUKOCYTE ESTERASE UR QL STRIP: ABNORMAL
NITRITE UR QL STRIP: NEGATIVE
NON-SQ EPI CELLS URNS QL MICRO: ABNORMAL /HPF
PH UR STRIP.AUTO: 6 [PH]
POST-VOID RESIDUAL VOLUME, ML POC: 44 ML
PROT UR STRIP-MCNC: ABNORMAL MG/DL
RBC #/AREA URNS AUTO: ABNORMAL /HPF
SL AMB  POCT GLUCOSE, UA: ABNORMAL
SL AMB LEUKOCYTE ESTERASE,UA: POSITIVE
SL AMB POCT BILIRUBIN,UA: ABNORMAL
SL AMB POCT BLOOD,UA: + 3
SL AMB POCT CLARITY,UA: ABNORMAL
SL AMB POCT COLOR,UA: YELLOW
SL AMB POCT KETONES,UA: ABNORMAL
SL AMB POCT NITRITE,UA: POSITIVE
SL AMB POCT PH,UA: 8
SL AMB POCT SPECIFIC GRAVITY,UA: 1.02
SL AMB POCT URINE PROTEIN: + 30
SL AMB POCT UROBILINOGEN: ABNORMAL
SP GR UR STRIP.AUTO: 1.02 (ref 1–1.03)
UROBILINOGEN UR QL STRIP.AUTO: 0.2 E.U./DL
WBC #/AREA URNS AUTO: ABNORMAL /HPF

## 2020-10-15 PROCEDURE — 81001 URINALYSIS AUTO W/SCOPE: CPT | Performed by: UROLOGY

## 2020-10-15 PROCEDURE — 87077 CULTURE AEROBIC IDENTIFY: CPT | Performed by: UROLOGY

## 2020-10-15 PROCEDURE — 51798 US URINE CAPACITY MEASURE: CPT | Performed by: UROLOGY

## 2020-10-15 PROCEDURE — 99024 POSTOP FOLLOW-UP VISIT: CPT | Performed by: UROLOGY

## 2020-10-15 PROCEDURE — 87186 SC STD MICRODIL/AGAR DIL: CPT | Performed by: UROLOGY

## 2020-10-15 PROCEDURE — 87086 URINE CULTURE/COLONY COUNT: CPT | Performed by: UROLOGY

## 2020-10-15 PROCEDURE — 99204 OFFICE O/P NEW MOD 45 MIN: CPT | Performed by: INTERNAL MEDICINE

## 2020-10-15 PROCEDURE — 81002 URINALYSIS NONAUTO W/O SCOPE: CPT | Performed by: UROLOGY

## 2020-10-15 RX ORDER — TAMSULOSIN HYDROCHLORIDE 0.4 MG/1
0.4 CAPSULE ORAL
Qty: 180 CAPSULE | Refills: 3
Start: 2020-10-15 | End: 2020-11-03 | Stop reason: SDUPTHER

## 2020-10-19 LAB
BACTERIA UR CULT: ABNORMAL
BACTERIA UR CULT: ABNORMAL

## 2020-10-21 ENCOUNTER — LAB (OUTPATIENT)
Dept: LAB | Facility: HOSPITAL | Age: 85
End: 2020-10-21
Attending: INTERNAL MEDICINE
Payer: MEDICARE

## 2020-10-21 DIAGNOSIS — I10 ESSENTIAL HYPERTENSION: ICD-10-CM

## 2020-10-21 DIAGNOSIS — E11.40 TYPE 2 DIABETES MELLITUS WITH DIABETIC NEUROPATHY, WITH LONG-TERM CURRENT USE OF INSULIN (HCC): ICD-10-CM

## 2020-10-21 DIAGNOSIS — Z79.4 TYPE 2 DIABETES MELLITUS WITH DIABETIC NEUROPATHY, WITH LONG-TERM CURRENT USE OF INSULIN (HCC): ICD-10-CM

## 2020-10-21 DIAGNOSIS — D62 ACUTE BLOOD LOSS ANEMIA: ICD-10-CM

## 2020-10-21 LAB
ANION GAP SERPL CALCULATED.3IONS-SCNC: 4 MMOL/L (ref 4–13)
BUN SERPL-MCNC: 10 MG/DL (ref 5–25)
CALCIUM SERPL-MCNC: 8.9 MG/DL (ref 8.3–10.1)
CHLORIDE SERPL-SCNC: 108 MMOL/L (ref 100–108)
CO2 SERPL-SCNC: 26 MMOL/L (ref 21–32)
CREAT SERPL-MCNC: 0.88 MG/DL (ref 0.6–1.3)
CREAT UR-MCNC: 137 MG/DL
ERYTHROCYTE [DISTWIDTH] IN BLOOD BY AUTOMATED COUNT: 13.6 % (ref 11.6–15.1)
FERRITIN SERPL-MCNC: 9 NG/ML (ref 8–388)
GFR SERPL CREATININE-BSD FRML MDRD: 79 ML/MIN/1.73SQ M
GLUCOSE SERPL-MCNC: 123 MG/DL (ref 65–140)
HCT VFR BLD AUTO: 26.5 % (ref 36.5–49.3)
HGB BLD-MCNC: 8.2 G/DL (ref 12–17)
IRON SATN MFR SERPL: 7 %
IRON SERPL-MCNC: 25 UG/DL (ref 65–175)
MCH RBC QN AUTO: 28 PG (ref 26.8–34.3)
MCHC RBC AUTO-ENTMCNC: 30.9 G/DL (ref 31.4–37.4)
MCV RBC AUTO: 90 FL (ref 82–98)
MICROALBUMIN UR-MCNC: 973 MG/L (ref 0–20)
MICROALBUMIN/CREAT 24H UR: 710 MG/G CREATININE (ref 0–30)
PLATELET # BLD AUTO: 359 THOUSANDS/UL (ref 149–390)
PMV BLD AUTO: 9.7 FL (ref 8.9–12.7)
POTASSIUM SERPL-SCNC: 4.2 MMOL/L (ref 3.5–5.3)
RBC # BLD AUTO: 2.93 MILLION/UL (ref 3.88–5.62)
SODIUM SERPL-SCNC: 138 MMOL/L (ref 136–145)
TIBC SERPL-MCNC: 339 UG/DL (ref 250–450)
TSH SERPL DL<=0.05 MIU/L-ACNC: 3.05 UIU/ML (ref 0.36–3.74)
WBC # BLD AUTO: 7.06 THOUSAND/UL (ref 4.31–10.16)

## 2020-10-21 PROCEDURE — 82728 ASSAY OF FERRITIN: CPT

## 2020-10-21 PROCEDURE — 83540 ASSAY OF IRON: CPT

## 2020-10-21 PROCEDURE — 82043 UR ALBUMIN QUANTITATIVE: CPT

## 2020-10-21 PROCEDURE — 84443 ASSAY THYROID STIM HORMONE: CPT

## 2020-10-21 PROCEDURE — 83550 IRON BINDING TEST: CPT

## 2020-10-21 PROCEDURE — 36415 COLL VENOUS BLD VENIPUNCTURE: CPT

## 2020-10-21 PROCEDURE — 82570 ASSAY OF URINE CREATININE: CPT

## 2020-10-21 PROCEDURE — 85027 COMPLETE CBC AUTOMATED: CPT

## 2020-10-21 PROCEDURE — 80048 BASIC METABOLIC PNL TOTAL CA: CPT

## 2020-10-22 ENCOUNTER — OFFICE VISIT (OUTPATIENT)
Dept: FAMILY MEDICINE CLINIC | Facility: HOSPITAL | Age: 85
End: 2020-10-22
Payer: MEDICARE

## 2020-10-22 VITALS
RESPIRATION RATE: 16 BRPM | TEMPERATURE: 97 F | SYSTOLIC BLOOD PRESSURE: 120 MMHG | WEIGHT: 213 LBS | BODY MASS INDEX: 33.43 KG/M2 | HEIGHT: 67 IN | HEART RATE: 82 BPM | DIASTOLIC BLOOD PRESSURE: 62 MMHG

## 2020-10-22 DIAGNOSIS — N39.0 COMPLICATED UTI (URINARY TRACT INFECTION): ICD-10-CM

## 2020-10-22 DIAGNOSIS — I50.42 CHRONIC COMBINED SYSTOLIC AND DIASTOLIC HEART FAILURE (HCC): ICD-10-CM

## 2020-10-22 DIAGNOSIS — I50.43 ACUTE ON CHRONIC COMBINED SYSTOLIC AND DIASTOLIC CONGESTIVE HEART FAILURE (HCC): ICD-10-CM

## 2020-10-22 DIAGNOSIS — D50.0 IRON DEFICIENCY ANEMIA DUE TO CHRONIC BLOOD LOSS: Primary | ICD-10-CM

## 2020-10-22 DIAGNOSIS — H91.93 BILATERAL HEARING LOSS, UNSPECIFIED HEARING LOSS TYPE: ICD-10-CM

## 2020-10-22 PROBLEM — I95.2 HYPOTENSION DUE TO DRUGS: Status: RESOLVED | Noted: 2020-10-15 | Resolved: 2020-10-22

## 2020-10-22 PROBLEM — D62 ACUTE BLOOD LOSS ANEMIA: Status: RESOLVED | Noted: 2020-09-09 | Resolved: 2020-10-22

## 2020-10-22 PROCEDURE — 99214 OFFICE O/P EST MOD 30 MIN: CPT | Performed by: INTERNAL MEDICINE

## 2020-10-22 RX ORDER — FERROUS SULFATE TAB EC 324 MG (65 MG FE EQUIVALENT) 324 (65 FE) MG
324 TABLET DELAYED RESPONSE ORAL EVERY OTHER DAY
Qty: 30 TABLET | Refills: 2 | Status: SHIPPED | OUTPATIENT
Start: 2020-10-22 | End: 2020-11-03 | Stop reason: SDUPTHER

## 2020-10-22 RX ORDER — SULFAMETHOXAZOLE AND TRIMETHOPRIM 800; 160 MG/1; MG/1
1 TABLET ORAL EVERY 12 HOURS SCHEDULED
Qty: 14 TABLET | Refills: 0 | Status: SHIPPED | OUTPATIENT
Start: 2020-10-22 | End: 2020-10-29

## 2020-10-22 RX ORDER — CARVEDILOL 12.5 MG/1
12.5 TABLET ORAL 2 TIMES DAILY WITH MEALS
Qty: 180 TABLET | Refills: 1 | Status: SHIPPED | OUTPATIENT
Start: 2020-10-22 | End: 2020-11-03 | Stop reason: SDUPTHER

## 2020-11-03 ENCOUNTER — IMMUNIZATIONS (OUTPATIENT)
Dept: FAMILY MEDICINE CLINIC | Facility: HOSPITAL | Age: 85
End: 2020-11-03
Payer: COMMERCIAL

## 2020-11-03 DIAGNOSIS — I50.43 ACUTE ON CHRONIC COMBINED SYSTOLIC AND DIASTOLIC CONGESTIVE HEART FAILURE (HCC): ICD-10-CM

## 2020-11-03 DIAGNOSIS — K21.9 GASTROESOPHAGEAL REFLUX DISEASE, UNSPECIFIED WHETHER ESOPHAGITIS PRESENT: ICD-10-CM

## 2020-11-03 DIAGNOSIS — I25.119 CORONARY ARTERY DISEASE INVOLVING NATIVE CORONARY ARTERY OF NATIVE HEART WITH ANGINA PECTORIS (HCC): ICD-10-CM

## 2020-11-03 DIAGNOSIS — N40.1 BENIGN PROSTATIC HYPERPLASIA WITH LOWER URINARY TRACT SYMPTOMS, SYMPTOM DETAILS UNSPECIFIED: ICD-10-CM

## 2020-11-03 DIAGNOSIS — D50.0 IRON DEFICIENCY ANEMIA DUE TO CHRONIC BLOOD LOSS: ICD-10-CM

## 2020-11-03 DIAGNOSIS — E11.40 TYPE 2 DIABETES MELLITUS WITH DIABETIC NEUROPATHY, WITH LONG-TERM CURRENT USE OF INSULIN (HCC): Primary | ICD-10-CM

## 2020-11-03 DIAGNOSIS — Z79.4 TYPE 2 DIABETES MELLITUS WITH DIABETIC NEUROPATHY, WITH LONG-TERM CURRENT USE OF INSULIN (HCC): Primary | ICD-10-CM

## 2020-11-03 DIAGNOSIS — E87.6 HYPOKALEMIA: ICD-10-CM

## 2020-11-03 DIAGNOSIS — E78.5 HYPERLIPIDEMIA, UNSPECIFIED HYPERLIPIDEMIA TYPE: ICD-10-CM

## 2020-11-03 DIAGNOSIS — Z23 ENCOUNTER FOR IMMUNIZATION: ICD-10-CM

## 2020-11-03 PROCEDURE — 90662 IIV NO PRSV INCREASED AG IM: CPT

## 2020-11-03 PROCEDURE — G0008 ADMIN INFLUENZA VIRUS VAC: HCPCS

## 2020-11-03 RX ORDER — TAMSULOSIN HYDROCHLORIDE 0.4 MG/1
0.4 CAPSULE ORAL
Qty: 180 CAPSULE | Refills: 3 | Status: SHIPPED | OUTPATIENT
Start: 2020-11-03 | End: 2021-04-27 | Stop reason: SDUPTHER

## 2020-11-03 RX ORDER — FAMOTIDINE 40 MG/1
20 TABLET, FILM COATED ORAL DAILY
Qty: 30 TABLET | Refills: 5 | Status: SHIPPED | OUTPATIENT
Start: 2020-11-03 | End: 2021-04-27 | Stop reason: SDUPTHER

## 2020-11-03 RX ORDER — INSULIN GLARGINE 100 [IU]/ML
35 INJECTION, SOLUTION SUBCUTANEOUS
Qty: 10 ML | Refills: 5 | Status: SHIPPED | OUTPATIENT
Start: 2020-11-03 | End: 2021-04-27 | Stop reason: SDUPTHER

## 2020-11-03 RX ORDER — CARVEDILOL 12.5 MG/1
12.5 TABLET ORAL 2 TIMES DAILY WITH MEALS
Qty: 180 TABLET | Refills: 1 | Status: SHIPPED | OUTPATIENT
Start: 2020-11-03 | End: 2020-11-18 | Stop reason: SDUPTHER

## 2020-11-03 RX ORDER — POTASSIUM CHLORIDE 20 MEQ/1
20 TABLET, EXTENDED RELEASE ORAL DAILY
Qty: 30 TABLET | Refills: 3 | Status: SHIPPED | OUTPATIENT
Start: 2020-11-03 | End: 2021-04-29 | Stop reason: SDUPTHER

## 2020-11-03 RX ORDER — FUROSEMIDE 40 MG/1
40 TABLET ORAL 2 TIMES DAILY
Qty: 60 TABLET | Refills: 3 | Status: SHIPPED | OUTPATIENT
Start: 2020-11-03 | End: 2021-03-11 | Stop reason: SDUPTHER

## 2020-11-03 RX ORDER — FERROUS SULFATE TAB EC 324 MG (65 MG FE EQUIVALENT) 324 (65 FE) MG
324 TABLET DELAYED RESPONSE ORAL EVERY OTHER DAY
Qty: 30 TABLET | Refills: 3 | Status: SHIPPED | OUTPATIENT
Start: 2020-11-03

## 2020-11-03 RX ORDER — NITROGLYCERIN 0.4 MG/1
0.4 TABLET SUBLINGUAL
Qty: 25 TABLET | Refills: 3 | Status: SHIPPED | OUTPATIENT
Start: 2020-11-03 | End: 2021-04-29 | Stop reason: SDUPTHER

## 2020-11-03 RX ORDER — IRBESARTAN 150 MG/1
150 TABLET ORAL
Qty: 30 TABLET | Refills: 5 | Status: SHIPPED | OUTPATIENT
Start: 2020-11-03 | End: 2021-04-27 | Stop reason: SDUPTHER

## 2020-11-03 RX ORDER — FINASTERIDE 5 MG/1
5 TABLET, FILM COATED ORAL DAILY
Qty: 90 TABLET | Refills: 3 | Status: SHIPPED | OUTPATIENT
Start: 2020-11-03 | End: 2021-04-27 | Stop reason: SDUPTHER

## 2020-11-03 RX ORDER — SIMVASTATIN 20 MG
20 TABLET ORAL
Qty: 30 TABLET | Refills: 5 | Status: SHIPPED | OUTPATIENT
Start: 2020-11-03 | End: 2021-04-27 | Stop reason: ALTCHOICE

## 2020-11-03 RX ORDER — CLOPIDOGREL BISULFATE 75 MG/1
75 TABLET ORAL DAILY
Qty: 30 TABLET | Refills: 5 | Status: ON HOLD | OUTPATIENT
Start: 2020-11-03 | End: 2020-12-16

## 2020-11-09 ENCOUNTER — TELEPHONE (OUTPATIENT)
Dept: HEMATOLOGY ONCOLOGY | Facility: HOSPITAL | Age: 85
End: 2020-11-09

## 2020-11-09 DIAGNOSIS — Z79.4 TYPE 2 DIABETES MELLITUS WITH DIABETIC NEUROPATHY, WITH LONG-TERM CURRENT USE OF INSULIN (HCC): ICD-10-CM

## 2020-11-09 DIAGNOSIS — I25.10 CORONARY ARTERY DISEASE INVOLVING NATIVE HEART WITHOUT ANGINA PECTORIS, UNSPECIFIED VESSEL OR LESION TYPE: ICD-10-CM

## 2020-11-09 DIAGNOSIS — E87.6 HYPOKALEMIA: ICD-10-CM

## 2020-11-09 DIAGNOSIS — N40.1 BENIGN PROSTATIC HYPERPLASIA WITH LOWER URINARY TRACT SYMPTOMS, SYMPTOM DETAILS UNSPECIFIED: ICD-10-CM

## 2020-11-09 DIAGNOSIS — E78.5 HYPERLIPIDEMIA, UNSPECIFIED HYPERLIPIDEMIA TYPE: ICD-10-CM

## 2020-11-09 DIAGNOSIS — K21.9 GASTROESOPHAGEAL REFLUX DISEASE, UNSPECIFIED WHETHER ESOPHAGITIS PRESENT: ICD-10-CM

## 2020-11-09 DIAGNOSIS — E11.40 TYPE 2 DIABETES MELLITUS WITH DIABETIC NEUROPATHY, WITH LONG-TERM CURRENT USE OF INSULIN (HCC): ICD-10-CM

## 2020-11-09 DIAGNOSIS — I25.119 CORONARY ARTERY DISEASE INVOLVING NATIVE CORONARY ARTERY OF NATIVE HEART WITH ANGINA PECTORIS (HCC): ICD-10-CM

## 2020-11-09 DIAGNOSIS — I50.43 ACUTE ON CHRONIC COMBINED SYSTOLIC AND DIASTOLIC CONGESTIVE HEART FAILURE (HCC): ICD-10-CM

## 2020-11-09 DIAGNOSIS — D50.0 IRON DEFICIENCY ANEMIA DUE TO CHRONIC BLOOD LOSS: ICD-10-CM

## 2020-11-09 RX ORDER — IRBESARTAN 150 MG/1
150 TABLET ORAL
Qty: 30 TABLET | Refills: 0 | Status: CANCELLED | OUTPATIENT
Start: 2020-11-09

## 2020-11-09 RX ORDER — POTASSIUM CHLORIDE 20 MEQ/1
20 TABLET, EXTENDED RELEASE ORAL DAILY
Qty: 30 TABLET | Refills: 0 | Status: CANCELLED | OUTPATIENT
Start: 2020-11-09

## 2020-11-09 RX ORDER — FUROSEMIDE 40 MG/1
40 TABLET ORAL 2 TIMES DAILY
Qty: 60 TABLET | Refills: 0 | Status: CANCELLED | OUTPATIENT
Start: 2020-11-09

## 2020-11-09 RX ORDER — ASPIRIN 81 MG/1
81 TABLET ORAL DAILY
Qty: 30 TABLET | Refills: 5 | Status: SHIPPED | OUTPATIENT
Start: 2020-11-09 | End: 2021-01-13 | Stop reason: ALTCHOICE

## 2020-11-09 RX ORDER — NITROGLYCERIN 0.4 MG/1
0.4 TABLET SUBLINGUAL
Qty: 25 TABLET | Refills: 0 | Status: CANCELLED | OUTPATIENT
Start: 2020-11-09

## 2020-11-09 RX ORDER — TAMSULOSIN HYDROCHLORIDE 0.4 MG/1
0.4 CAPSULE ORAL
Qty: 180 CAPSULE | Refills: 0 | Status: CANCELLED | OUTPATIENT
Start: 2020-11-09 | End: 2021-02-07

## 2020-11-09 RX ORDER — FAMOTIDINE 40 MG/1
20 TABLET, FILM COATED ORAL DAILY
Qty: 30 TABLET | Refills: 0 | Status: CANCELLED | OUTPATIENT
Start: 2020-11-09

## 2020-11-09 RX ORDER — FERROUS SULFATE TAB EC 324 MG (65 MG FE EQUIVALENT) 324 (65 FE) MG
324 TABLET DELAYED RESPONSE ORAL EVERY OTHER DAY
Qty: 30 TABLET | Refills: 0 | Status: CANCELLED | OUTPATIENT
Start: 2020-11-09

## 2020-11-09 RX ORDER — SIMVASTATIN 20 MG
20 TABLET ORAL
Qty: 30 TABLET | Refills: 0 | Status: CANCELLED | OUTPATIENT
Start: 2020-11-09

## 2020-11-09 RX ORDER — CARVEDILOL 12.5 MG/1
12.5 TABLET ORAL 2 TIMES DAILY WITH MEALS
Qty: 180 TABLET | Refills: 0 | Status: CANCELLED | OUTPATIENT
Start: 2020-11-09 | End: 2021-02-07

## 2020-11-09 RX ORDER — CLOPIDOGREL BISULFATE 75 MG/1
75 TABLET ORAL DAILY
Qty: 30 TABLET | Refills: 0 | Status: CANCELLED | OUTPATIENT
Start: 2020-11-09

## 2020-11-09 RX ORDER — INSULIN GLARGINE 100 [IU]/ML
35 INJECTION, SOLUTION SUBCUTANEOUS
Qty: 10 ML | Refills: 0 | Status: CANCELLED | OUTPATIENT
Start: 2020-11-09

## 2020-11-09 RX ORDER — FINASTERIDE 5 MG/1
5 TABLET, FILM COATED ORAL DAILY
Qty: 90 TABLET | Refills: 0 | Status: CANCELLED | OUTPATIENT
Start: 2020-11-09

## 2020-11-10 DIAGNOSIS — I10 ESSENTIAL HYPERTENSION: Primary | ICD-10-CM

## 2020-11-10 RX ORDER — ISOSORBIDE MONONITRATE 30 MG/1
30 TABLET, EXTENDED RELEASE ORAL DAILY
Qty: 30 TABLET | Refills: 5 | Status: SHIPPED | OUTPATIENT
Start: 2020-11-10 | End: 2020-11-18 | Stop reason: ALTCHOICE

## 2020-11-11 ENCOUNTER — CONSULT (OUTPATIENT)
Dept: HEMATOLOGY ONCOLOGY | Facility: HOSPITAL | Age: 85
End: 2020-11-11
Payer: COMMERCIAL

## 2020-11-11 VITALS
HEIGHT: 66 IN | OXYGEN SATURATION: 97 % | DIASTOLIC BLOOD PRESSURE: 48 MMHG | TEMPERATURE: 98.1 F | BODY MASS INDEX: 34.72 KG/M2 | HEART RATE: 96 BPM | RESPIRATION RATE: 16 BRPM | WEIGHT: 216 LBS | SYSTOLIC BLOOD PRESSURE: 104 MMHG

## 2020-11-11 DIAGNOSIS — E53.8 B12 DEFICIENCY: ICD-10-CM

## 2020-11-11 DIAGNOSIS — D50.0 IRON DEFICIENCY ANEMIA DUE TO CHRONIC BLOOD LOSS: Primary | ICD-10-CM

## 2020-11-11 PROCEDURE — 99205 OFFICE O/P NEW HI 60 MIN: CPT | Performed by: INTERNAL MEDICINE

## 2020-11-11 RX ORDER — CYANOCOBALAMIN 1000 UG/ML
1000 INJECTION INTRAMUSCULAR; SUBCUTANEOUS ONCE
Status: CANCELLED | OUTPATIENT
Start: 2020-11-18

## 2020-11-11 RX ORDER — SODIUM CHLORIDE 9 MG/ML
20 INJECTION, SOLUTION INTRAVENOUS ONCE
Status: CANCELLED | OUTPATIENT
Start: 2020-11-18

## 2020-11-18 ENCOUNTER — HOSPITAL ENCOUNTER (OUTPATIENT)
Dept: INFUSION CENTER | Facility: HOSPITAL | Age: 85
Discharge: HOME | End: 2020-11-18
Attending: INTERNAL MEDICINE
Payer: COMMERCIAL

## 2020-11-18 ENCOUNTER — OFFICE VISIT (OUTPATIENT)
Dept: PULMONOLOGY | Facility: HOSPITAL | Age: 85
End: 2020-11-18
Payer: COMMERCIAL

## 2020-11-18 ENCOUNTER — OFFICE VISIT (OUTPATIENT)
Dept: CARDIOLOGY CLINIC | Facility: CLINIC | Age: 85
End: 2020-11-18
Payer: COMMERCIAL

## 2020-11-18 VITALS
DIASTOLIC BLOOD PRESSURE: 60 MMHG | SYSTOLIC BLOOD PRESSURE: 100 MMHG | HEIGHT: 66 IN | OXYGEN SATURATION: 98 % | TEMPERATURE: 97.7 F | HEART RATE: 77 BPM | WEIGHT: 219 LBS | BODY MASS INDEX: 35.2 KG/M2

## 2020-11-18 VITALS
DIASTOLIC BLOOD PRESSURE: 59 MMHG | OXYGEN SATURATION: 98 % | RESPIRATION RATE: 18 BRPM | HEART RATE: 72 BPM | SYSTOLIC BLOOD PRESSURE: 125 MMHG | TEMPERATURE: 96.8 F

## 2020-11-18 VITALS
DIASTOLIC BLOOD PRESSURE: 64 MMHG | HEART RATE: 68 BPM | WEIGHT: 218 LBS | BODY MASS INDEX: 35.03 KG/M2 | HEIGHT: 66 IN | SYSTOLIC BLOOD PRESSURE: 128 MMHG | TEMPERATURE: 97.4 F

## 2020-11-18 DIAGNOSIS — D50.0 IRON DEFICIENCY ANEMIA DUE TO CHRONIC BLOOD LOSS: ICD-10-CM

## 2020-11-18 DIAGNOSIS — I42.8 OTHER CARDIOMYOPATHY (HCC): ICD-10-CM

## 2020-11-18 DIAGNOSIS — I10 ESSENTIAL HYPERTENSION: ICD-10-CM

## 2020-11-18 DIAGNOSIS — J30.9 ALLERGIC RHINITIS, UNSPECIFIED SEASONALITY, UNSPECIFIED TRIGGER: ICD-10-CM

## 2020-11-18 DIAGNOSIS — I50.42 CHRONIC COMBINED SYSTOLIC AND DIASTOLIC HEART FAILURE (HCC): Primary | ICD-10-CM

## 2020-11-18 DIAGNOSIS — R05.9 COUGH: ICD-10-CM

## 2020-11-18 DIAGNOSIS — I50.43 ACUTE ON CHRONIC COMBINED SYSTOLIC AND DIASTOLIC CONGESTIVE HEART FAILURE (HCC): ICD-10-CM

## 2020-11-18 DIAGNOSIS — E78.5 HYPERLIPIDEMIA, UNSPECIFIED HYPERLIPIDEMIA TYPE: ICD-10-CM

## 2020-11-18 DIAGNOSIS — G47.33 OBSTRUCTIVE SLEEP APNEA: ICD-10-CM

## 2020-11-18 DIAGNOSIS — R91.1 PULMONARY NODULE: Primary | ICD-10-CM

## 2020-11-18 DIAGNOSIS — E53.8 B12 DEFICIENCY: Primary | ICD-10-CM

## 2020-11-18 PROBLEM — I42.9 MYOCARDIOPATHY (HCC): Status: ACTIVE | Noted: 2020-11-18

## 2020-11-18 PROCEDURE — 1036F TOBACCO NON-USER: CPT | Performed by: INTERNAL MEDICINE

## 2020-11-18 PROCEDURE — 3074F SYST BP LT 130 MM HG: CPT | Performed by: INTERNAL MEDICINE

## 2020-11-18 PROCEDURE — 96372 THER/PROPH/DIAG INJ SC/IM: CPT

## 2020-11-18 PROCEDURE — 99215 OFFICE O/P EST HI 40 MIN: CPT | Performed by: INTERNAL MEDICINE

## 2020-11-18 PROCEDURE — 96365 THER/PROPH/DIAG IV INF INIT: CPT

## 2020-11-18 PROCEDURE — 3078F DIAST BP <80 MM HG: CPT | Performed by: INTERNAL MEDICINE

## 2020-11-18 PROCEDURE — 99214 OFFICE O/P EST MOD 30 MIN: CPT | Performed by: INTERNAL MEDICINE

## 2020-11-18 PROCEDURE — 1160F RVW MEDS BY RX/DR IN RCRD: CPT | Performed by: INTERNAL MEDICINE

## 2020-11-18 RX ORDER — FLUTICASONE PROPIONATE 50 MCG
1 SPRAY, SUSPENSION (ML) NASAL 2 TIMES DAILY
Qty: 1 BOTTLE | Refills: 3 | Status: SHIPPED | OUTPATIENT
Start: 2020-11-18

## 2020-11-18 RX ORDER — CARVEDILOL 12.5 MG/1
25 TABLET ORAL 2 TIMES DAILY WITH MEALS
Qty: 180 TABLET | Refills: 1 | Status: SHIPPED | OUTPATIENT
Start: 2020-11-18 | End: 2021-04-27 | Stop reason: SDUPTHER

## 2020-11-18 RX ORDER — CYANOCOBALAMIN 1000 UG/ML
1000 INJECTION INTRAMUSCULAR; SUBCUTANEOUS ONCE
Status: COMPLETED | OUTPATIENT
Start: 2020-11-18 | End: 2020-11-18

## 2020-11-18 RX ORDER — CYANOCOBALAMIN 1000 UG/ML
1000 INJECTION INTRAMUSCULAR; SUBCUTANEOUS ONCE
Status: CANCELLED | OUTPATIENT
Start: 2020-11-23

## 2020-11-18 RX ORDER — SODIUM CHLORIDE 9 MG/ML
20 INJECTION, SOLUTION INTRAVENOUS ONCE
Status: COMPLETED | OUTPATIENT
Start: 2020-11-18 | End: 2020-11-18

## 2020-11-18 RX ORDER — SODIUM CHLORIDE 9 MG/ML
20 INJECTION, SOLUTION INTRAVENOUS ONCE
Status: CANCELLED | OUTPATIENT
Start: 2020-11-20

## 2020-11-18 RX ADMIN — CYANOCOBALAMIN 1000 MCG: 1000 INJECTION, SOLUTION INTRAMUSCULAR at 15:01

## 2020-11-18 RX ADMIN — SODIUM CHLORIDE 20 ML/HR: 9 INJECTION, SOLUTION INTRAVENOUS at 15:00

## 2020-11-18 RX ADMIN — IRON SUCROSE 200 MG: 20 INJECTION, SOLUTION INTRAVENOUS at 15:00

## 2020-11-20 ENCOUNTER — HOSPITAL ENCOUNTER (OUTPATIENT)
Dept: INFUSION CENTER | Facility: HOSPITAL | Age: 85
Discharge: HOME | End: 2020-11-20
Attending: INTERNAL MEDICINE
Payer: COMMERCIAL

## 2020-11-20 VITALS
SYSTOLIC BLOOD PRESSURE: 121 MMHG | DIASTOLIC BLOOD PRESSURE: 54 MMHG | OXYGEN SATURATION: 98 % | TEMPERATURE: 97.8 F | HEART RATE: 84 BPM | RESPIRATION RATE: 18 BRPM

## 2020-11-20 DIAGNOSIS — D50.0 IRON DEFICIENCY ANEMIA DUE TO CHRONIC BLOOD LOSS: Primary | ICD-10-CM

## 2020-11-20 DIAGNOSIS — E53.8 B12 DEFICIENCY: ICD-10-CM

## 2020-11-20 PROCEDURE — 96365 THER/PROPH/DIAG IV INF INIT: CPT

## 2020-11-20 RX ORDER — SODIUM CHLORIDE 9 MG/ML
20 INJECTION, SOLUTION INTRAVENOUS ONCE
Status: COMPLETED | OUTPATIENT
Start: 2020-11-20 | End: 2020-11-20

## 2020-11-20 RX ORDER — SODIUM CHLORIDE 9 MG/ML
20 INJECTION, SOLUTION INTRAVENOUS ONCE
Status: CANCELLED | OUTPATIENT
Start: 2020-11-23

## 2020-11-20 RX ADMIN — SODIUM CHLORIDE 20 ML/HR: 9 INJECTION, SOLUTION INTRAVENOUS at 14:59

## 2020-11-20 RX ADMIN — IRON SUCROSE 200 MG: 20 INJECTION, SOLUTION INTRAVENOUS at 15:03

## 2020-11-23 ENCOUNTER — HOSPITAL ENCOUNTER (OUTPATIENT)
Dept: INFUSION CENTER | Facility: HOSPITAL | Age: 85
Discharge: HOME | End: 2020-11-23
Attending: INTERNAL MEDICINE
Payer: COMMERCIAL

## 2020-11-23 VITALS
OXYGEN SATURATION: 98 % | SYSTOLIC BLOOD PRESSURE: 135 MMHG | DIASTOLIC BLOOD PRESSURE: 59 MMHG | RESPIRATION RATE: 18 BRPM | TEMPERATURE: 97.1 F | HEART RATE: 88 BPM

## 2020-11-23 DIAGNOSIS — E53.8 B12 DEFICIENCY: Primary | ICD-10-CM

## 2020-11-23 DIAGNOSIS — D50.0 IRON DEFICIENCY ANEMIA DUE TO CHRONIC BLOOD LOSS: ICD-10-CM

## 2020-11-23 PROCEDURE — 96365 THER/PROPH/DIAG IV INF INIT: CPT

## 2020-11-23 PROCEDURE — 96372 THER/PROPH/DIAG INJ SC/IM: CPT

## 2020-11-23 RX ORDER — CYANOCOBALAMIN 1000 UG/ML
1000 INJECTION INTRAMUSCULAR; SUBCUTANEOUS ONCE
Status: CANCELLED | OUTPATIENT
Start: 2020-11-30

## 2020-11-23 RX ORDER — SODIUM CHLORIDE 9 MG/ML
20 INJECTION, SOLUTION INTRAVENOUS ONCE
Status: COMPLETED | OUTPATIENT
Start: 2020-11-23 | End: 2020-11-23

## 2020-11-23 RX ORDER — SODIUM CHLORIDE 9 MG/ML
20 INJECTION, SOLUTION INTRAVENOUS ONCE
Status: CANCELLED | OUTPATIENT
Start: 2020-11-25

## 2020-11-23 RX ORDER — CYANOCOBALAMIN 1000 UG/ML
1000 INJECTION INTRAMUSCULAR; SUBCUTANEOUS ONCE
Status: COMPLETED | OUTPATIENT
Start: 2020-11-23 | End: 2020-11-23

## 2020-11-23 RX ADMIN — SODIUM CHLORIDE 20 ML/HR: 9 INJECTION, SOLUTION INTRAVENOUS at 11:33

## 2020-11-23 RX ADMIN — CYANOCOBALAMIN 1000 MCG: 1000 INJECTION, SOLUTION INTRAMUSCULAR at 11:39

## 2020-11-23 RX ADMIN — IRON SUCROSE 200 MG: 20 INJECTION, SOLUTION INTRAVENOUS at 11:33

## 2020-11-24 ENCOUNTER — TELEPHONE (OUTPATIENT)
Dept: CARDIOLOGY CLINIC | Facility: CLINIC | Age: 85
End: 2020-11-24

## 2020-11-24 DIAGNOSIS — I50.42 CHRONIC COMBINED SYSTOLIC AND DIASTOLIC HEART FAILURE (HCC): Primary | ICD-10-CM

## 2020-11-25 ENCOUNTER — HOSPITAL ENCOUNTER (OUTPATIENT)
Dept: INFUSION CENTER | Facility: HOSPITAL | Age: 85
Discharge: HOME | End: 2020-11-25
Attending: INTERNAL MEDICINE
Payer: COMMERCIAL

## 2020-11-25 VITALS
HEART RATE: 88 BPM | TEMPERATURE: 97.4 F | DIASTOLIC BLOOD PRESSURE: 53 MMHG | RESPIRATION RATE: 18 BRPM | SYSTOLIC BLOOD PRESSURE: 120 MMHG | OXYGEN SATURATION: 96 %

## 2020-11-25 DIAGNOSIS — D50.0 IRON DEFICIENCY ANEMIA DUE TO CHRONIC BLOOD LOSS: Primary | ICD-10-CM

## 2020-11-25 DIAGNOSIS — E53.8 B12 DEFICIENCY: ICD-10-CM

## 2020-11-25 PROCEDURE — 96365 THER/PROPH/DIAG IV INF INIT: CPT

## 2020-11-25 RX ORDER — SODIUM CHLORIDE 9 MG/ML
20 INJECTION, SOLUTION INTRAVENOUS ONCE
Status: COMPLETED | OUTPATIENT
Start: 2020-11-25 | End: 2020-11-25

## 2020-11-25 RX ORDER — SODIUM CHLORIDE 9 MG/ML
20 INJECTION, SOLUTION INTRAVENOUS ONCE
Status: CANCELLED | OUTPATIENT
Start: 2020-11-30

## 2020-11-25 RX ADMIN — SODIUM CHLORIDE 20 ML/HR: 9 INJECTION, SOLUTION INTRAVENOUS at 14:58

## 2020-11-25 RX ADMIN — IRON SUCROSE 200 MG: 20 INJECTION, SOLUTION INTRAVENOUS at 14:57

## 2020-11-30 ENCOUNTER — HOSPITAL ENCOUNTER (OUTPATIENT)
Dept: INFUSION CENTER | Facility: HOSPITAL | Age: 85
Discharge: HOME | End: 2020-11-30
Attending: INTERNAL MEDICINE
Payer: COMMERCIAL

## 2020-11-30 VITALS
SYSTOLIC BLOOD PRESSURE: 128 MMHG | DIASTOLIC BLOOD PRESSURE: 58 MMHG | HEART RATE: 83 BPM | TEMPERATURE: 97 F | OXYGEN SATURATION: 98 % | RESPIRATION RATE: 18 BRPM

## 2020-11-30 DIAGNOSIS — D50.0 IRON DEFICIENCY ANEMIA DUE TO CHRONIC BLOOD LOSS: ICD-10-CM

## 2020-11-30 DIAGNOSIS — E53.8 B12 DEFICIENCY: Primary | ICD-10-CM

## 2020-11-30 PROCEDURE — 96372 THER/PROPH/DIAG INJ SC/IM: CPT

## 2020-11-30 PROCEDURE — 96365 THER/PROPH/DIAG IV INF INIT: CPT

## 2020-11-30 RX ORDER — SODIUM CHLORIDE 9 MG/ML
20 INJECTION, SOLUTION INTRAVENOUS ONCE
Status: CANCELLED | OUTPATIENT
Start: 2020-12-03

## 2020-11-30 RX ORDER — SODIUM CHLORIDE 9 MG/ML
20 INJECTION, SOLUTION INTRAVENOUS ONCE
Status: COMPLETED | OUTPATIENT
Start: 2020-11-30 | End: 2020-11-30

## 2020-11-30 RX ORDER — CYANOCOBALAMIN 1000 UG/ML
1000 INJECTION INTRAMUSCULAR; SUBCUTANEOUS ONCE
Status: COMPLETED | OUTPATIENT
Start: 2020-11-30 | End: 2020-11-30

## 2020-11-30 RX ORDER — CYANOCOBALAMIN 1000 UG/ML
1000 INJECTION INTRAMUSCULAR; SUBCUTANEOUS ONCE
Status: CANCELLED | OUTPATIENT
Start: 2020-12-07

## 2020-11-30 RX ADMIN — IRON SUCROSE 200 MG: 20 INJECTION, SOLUTION INTRAVENOUS at 09:02

## 2020-11-30 RX ADMIN — CYANOCOBALAMIN 1000 MCG: 1000 INJECTION, SOLUTION INTRAMUSCULAR at 09:02

## 2020-11-30 RX ADMIN — SODIUM CHLORIDE 20 ML/HR: 9 INJECTION, SOLUTION INTRAVENOUS at 09:02

## 2020-12-01 DIAGNOSIS — Z79.4 TYPE 2 DIABETES MELLITUS WITH DIABETIC NEUROPATHY, WITH LONG-TERM CURRENT USE OF INSULIN (HCC): Primary | ICD-10-CM

## 2020-12-01 DIAGNOSIS — E11.40 TYPE 2 DIABETES MELLITUS WITH DIABETIC NEUROPATHY, WITH LONG-TERM CURRENT USE OF INSULIN (HCC): Primary | ICD-10-CM

## 2020-12-02 ENCOUNTER — TELEPHONE (OUTPATIENT)
Dept: FAMILY MEDICINE CLINIC | Facility: HOSPITAL | Age: 85
End: 2020-12-02

## 2020-12-03 ENCOUNTER — HOSPITAL ENCOUNTER (OUTPATIENT)
Dept: INFUSION CENTER | Facility: HOSPITAL | Age: 85
Discharge: HOME | End: 2020-12-03
Attending: INTERNAL MEDICINE
Payer: COMMERCIAL

## 2020-12-03 VITALS
SYSTOLIC BLOOD PRESSURE: 127 MMHG | RESPIRATION RATE: 18 BRPM | HEART RATE: 87 BPM | OXYGEN SATURATION: 98 % | TEMPERATURE: 96.7 F | DIASTOLIC BLOOD PRESSURE: 81 MMHG

## 2020-12-03 DIAGNOSIS — Z79.4 TYPE 2 DIABETES MELLITUS WITH DIABETIC NEUROPATHY, WITH LONG-TERM CURRENT USE OF INSULIN (HCC): Primary | ICD-10-CM

## 2020-12-03 DIAGNOSIS — E11.40 TYPE 2 DIABETES MELLITUS WITH DIABETIC NEUROPATHY, WITH LONG-TERM CURRENT USE OF INSULIN (HCC): Primary | ICD-10-CM

## 2020-12-03 DIAGNOSIS — E53.8 B12 DEFICIENCY: ICD-10-CM

## 2020-12-03 DIAGNOSIS — D50.0 IRON DEFICIENCY ANEMIA DUE TO CHRONIC BLOOD LOSS: Primary | ICD-10-CM

## 2020-12-03 PROCEDURE — 96365 THER/PROPH/DIAG IV INF INIT: CPT

## 2020-12-03 RX ORDER — SODIUM CHLORIDE 9 MG/ML
20 INJECTION, SOLUTION INTRAVENOUS ONCE
Status: CANCELLED | OUTPATIENT
Start: 2020-12-07

## 2020-12-03 RX ORDER — CALCIUM CITRATE/VITAMIN D3 200MG-6.25
TABLET ORAL
Qty: 300 EACH | Refills: 3 | Status: SHIPPED | OUTPATIENT
Start: 2020-12-03

## 2020-12-03 RX ORDER — LANCETS
EACH MISCELLANEOUS
Qty: 300 EACH | Refills: 3 | Status: SHIPPED | OUTPATIENT
Start: 2020-12-03

## 2020-12-03 RX ORDER — SODIUM CHLORIDE 9 MG/ML
20 INJECTION, SOLUTION INTRAVENOUS ONCE
Status: COMPLETED | OUTPATIENT
Start: 2020-12-03 | End: 2020-12-03

## 2020-12-03 RX ADMIN — SODIUM CHLORIDE 20 ML/HR: 9 INJECTION, SOLUTION INTRAVENOUS at 13:41

## 2020-12-03 RX ADMIN — IRON SUCROSE 200 MG: 20 INJECTION, SOLUTION INTRAVENOUS at 13:44

## 2020-12-07 ENCOUNTER — HOSPITAL ENCOUNTER (OUTPATIENT)
Dept: INFUSION CENTER | Facility: HOSPITAL | Age: 85
Discharge: HOME | End: 2020-12-07
Attending: INTERNAL MEDICINE
Payer: COMMERCIAL

## 2020-12-07 VITALS
OXYGEN SATURATION: 99 % | TEMPERATURE: 97.2 F | SYSTOLIC BLOOD PRESSURE: 152 MMHG | HEART RATE: 79 BPM | DIASTOLIC BLOOD PRESSURE: 70 MMHG | RESPIRATION RATE: 18 BRPM

## 2020-12-07 DIAGNOSIS — D50.0 IRON DEFICIENCY ANEMIA DUE TO CHRONIC BLOOD LOSS: ICD-10-CM

## 2020-12-07 DIAGNOSIS — E53.8 B12 DEFICIENCY: Primary | ICD-10-CM

## 2020-12-07 PROCEDURE — 96365 THER/PROPH/DIAG IV INF INIT: CPT

## 2020-12-07 PROCEDURE — 96372 THER/PROPH/DIAG INJ SC/IM: CPT

## 2020-12-07 RX ORDER — SODIUM CHLORIDE 9 MG/ML
20 INJECTION, SOLUTION INTRAVENOUS ONCE
Status: CANCELLED | OUTPATIENT
Start: 2020-12-09

## 2020-12-07 RX ORDER — SODIUM CHLORIDE 9 MG/ML
20 INJECTION, SOLUTION INTRAVENOUS ONCE
Status: COMPLETED | OUTPATIENT
Start: 2020-12-07 | End: 2020-12-07

## 2020-12-07 RX ORDER — CYANOCOBALAMIN 1000 UG/ML
1000 INJECTION INTRAMUSCULAR; SUBCUTANEOUS ONCE
Status: CANCELLED | OUTPATIENT
Start: 2020-12-07

## 2020-12-07 RX ORDER — CYANOCOBALAMIN 1000 UG/ML
1000 INJECTION INTRAMUSCULAR; SUBCUTANEOUS ONCE
Status: COMPLETED | OUTPATIENT
Start: 2020-12-07 | End: 2020-12-07

## 2020-12-07 RX ADMIN — IRON SUCROSE 200 MG: 20 INJECTION, SOLUTION INTRAVENOUS at 12:44

## 2020-12-07 RX ADMIN — CYANOCOBALAMIN 1000 MCG: 1000 INJECTION, SOLUTION INTRAMUSCULAR at 12:57

## 2020-12-07 RX ADMIN — SODIUM CHLORIDE 20 ML/HR: 9 INJECTION, SOLUTION INTRAVENOUS at 12:42

## 2020-12-09 ENCOUNTER — HOSPITAL ENCOUNTER (OUTPATIENT)
Dept: INFUSION CENTER | Facility: HOSPITAL | Age: 85
Discharge: HOME | End: 2020-12-09
Attending: INTERNAL MEDICINE
Payer: COMMERCIAL

## 2020-12-09 VITALS
RESPIRATION RATE: 18 BRPM | OXYGEN SATURATION: 98 % | TEMPERATURE: 97.6 F | DIASTOLIC BLOOD PRESSURE: 67 MMHG | HEART RATE: 78 BPM | SYSTOLIC BLOOD PRESSURE: 145 MMHG

## 2020-12-09 DIAGNOSIS — D50.0 IRON DEFICIENCY ANEMIA DUE TO CHRONIC BLOOD LOSS: Primary | ICD-10-CM

## 2020-12-09 DIAGNOSIS — E53.8 B12 DEFICIENCY: ICD-10-CM

## 2020-12-09 PROCEDURE — 96365 THER/PROPH/DIAG IV INF INIT: CPT

## 2020-12-09 RX ORDER — SODIUM CHLORIDE 9 MG/ML
20 INJECTION, SOLUTION INTRAVENOUS ONCE
Status: COMPLETED | OUTPATIENT
Start: 2020-12-09 | End: 2020-12-09

## 2020-12-09 RX ORDER — SODIUM CHLORIDE 9 MG/ML
20 INJECTION, SOLUTION INTRAVENOUS ONCE
Status: CANCELLED | OUTPATIENT
Start: 2020-12-09

## 2020-12-09 RX ADMIN — IRON SUCROSE 200 MG: 20 INJECTION, SOLUTION INTRAVENOUS at 08:43

## 2020-12-09 RX ADMIN — SODIUM CHLORIDE 20 ML/HR: 9 INJECTION, SOLUTION INTRAVENOUS at 08:43

## 2020-12-11 ENCOUNTER — LAB (OUTPATIENT)
Dept: LAB | Facility: HOSPITAL | Age: 85
End: 2020-12-11
Attending: INTERNAL MEDICINE
Payer: COMMERCIAL

## 2020-12-11 LAB
ALBUMIN SERPL BCP-MCNC: 3.6 G/DL (ref 3.5–5)
ALP SERPL-CCNC: 63 U/L (ref 46–116)
ALT SERPL W P-5'-P-CCNC: 24 U/L (ref 12–78)
ANION GAP SERPL CALCULATED.3IONS-SCNC: 2 MMOL/L (ref 4–13)
AST SERPL W P-5'-P-CCNC: 11 U/L (ref 5–45)
BASOPHILS # BLD AUTO: 0.06 THOUSANDS/ΜL (ref 0–0.1)
BASOPHILS NFR BLD AUTO: 1 % (ref 0–1)
BILIRUB SERPL-MCNC: 0.19 MG/DL (ref 0.2–1)
BUN SERPL-MCNC: 16 MG/DL (ref 5–25)
CALCIUM SERPL-MCNC: 9.3 MG/DL (ref 8.3–10.1)
CHLORIDE SERPL-SCNC: 107 MMOL/L (ref 100–108)
CO2 SERPL-SCNC: 30 MMOL/L (ref 21–32)
CREAT SERPL-MCNC: 0.8 MG/DL (ref 0.6–1.3)
EOSINOPHIL # BLD AUTO: 1.57 THOUSAND/ΜL (ref 0–0.61)
EOSINOPHIL NFR BLD AUTO: 18 % (ref 0–6)
ERYTHROCYTE [DISTWIDTH] IN BLOOD BY AUTOMATED COUNT: 17.9 % (ref 11.6–15.1)
GFR SERPL CREATININE-BSD FRML MDRD: 83 ML/MIN/1.73SQ M
GLUCOSE P FAST SERPL-MCNC: 205 MG/DL (ref 65–99)
HCT VFR BLD AUTO: 37.4 % (ref 36.5–49.3)
HGB BLD-MCNC: 11.7 G/DL (ref 12–17)
IMM GRANULOCYTES # BLD AUTO: 0.03 THOUSAND/UL (ref 0–0.2)
IMM GRANULOCYTES NFR BLD AUTO: 0 % (ref 0–2)
LYMPHOCYTES # BLD AUTO: 1.33 THOUSANDS/ΜL (ref 0.6–4.47)
LYMPHOCYTES NFR BLD AUTO: 16 % (ref 14–44)
MCH RBC QN AUTO: 28.5 PG (ref 26.8–34.3)
MCHC RBC AUTO-ENTMCNC: 31.3 G/DL (ref 31.4–37.4)
MCV RBC AUTO: 91 FL (ref 82–98)
MONOCYTES # BLD AUTO: 0.7 THOUSAND/ΜL (ref 0.17–1.22)
MONOCYTES NFR BLD AUTO: 8 % (ref 4–12)
NEUTROPHILS # BLD AUTO: 4.86 THOUSANDS/ΜL (ref 1.85–7.62)
NEUTS SEG NFR BLD AUTO: 57 % (ref 43–75)
NRBC BLD AUTO-RTO: 0 /100 WBCS
PLATELET # BLD AUTO: 246 THOUSANDS/UL (ref 149–390)
PMV BLD AUTO: 10.7 FL (ref 8.9–12.7)
POTASSIUM SERPL-SCNC: 4.3 MMOL/L (ref 3.5–5.3)
PROT SERPL-MCNC: 7.1 G/DL (ref 6.4–8.2)
RBC # BLD AUTO: 4.11 MILLION/UL (ref 3.88–5.62)
SODIUM SERPL-SCNC: 139 MMOL/L (ref 136–145)
WBC # BLD AUTO: 8.55 THOUSAND/UL (ref 4.31–10.16)

## 2020-12-11 PROCEDURE — 36415 COLL VENOUS BLD VENIPUNCTURE: CPT

## 2020-12-11 PROCEDURE — 85025 COMPLETE CBC W/AUTO DIFF WBC: CPT

## 2020-12-11 PROCEDURE — 80053 COMPREHEN METABOLIC PANEL: CPT

## 2020-12-15 ENCOUNTER — TELEPHONE (OUTPATIENT)
Dept: INPATIENT UNIT | Facility: HOSPITAL | Age: 85
End: 2020-12-15

## 2020-12-15 RX ORDER — CEFAZOLIN SODIUM 2 G/50ML
2000 SOLUTION INTRAVENOUS ONCE
Status: CANCELLED | OUTPATIENT
Start: 2020-12-15 | End: 2020-12-15

## 2020-12-16 ENCOUNTER — ANESTHESIA (OUTPATIENT)
Dept: NON INVASIVE DIAGNOSTICS | Facility: HOSPITAL | Age: 85
End: 2020-12-16
Payer: COMMERCIAL

## 2020-12-16 ENCOUNTER — HOSPITAL ENCOUNTER (OUTPATIENT)
Dept: NON INVASIVE DIAGNOSTICS | Facility: HOSPITAL | Age: 85
Discharge: HOME | End: 2020-12-16
Attending: INTERNAL MEDICINE | Admitting: INTERNAL MEDICINE
Payer: COMMERCIAL

## 2020-12-16 ENCOUNTER — ANESTHESIA EVENT (OUTPATIENT)
Dept: NON INVASIVE DIAGNOSTICS | Facility: HOSPITAL | Age: 85
End: 2020-12-16
Payer: COMMERCIAL

## 2020-12-16 ENCOUNTER — APPOINTMENT (OUTPATIENT)
Dept: RADIOLOGY | Facility: HOSPITAL | Age: 85
End: 2020-12-16
Payer: COMMERCIAL

## 2020-12-16 VITALS
BODY MASS INDEX: 34.53 KG/M2 | WEIGHT: 220 LBS | DIASTOLIC BLOOD PRESSURE: 73 MMHG | OXYGEN SATURATION: 94 % | HEIGHT: 67 IN | RESPIRATION RATE: 16 BRPM | TEMPERATURE: 98.1 F | SYSTOLIC BLOOD PRESSURE: 124 MMHG | HEART RATE: 87 BPM

## 2020-12-16 VITALS — HEART RATE: 89 BPM

## 2020-12-16 DIAGNOSIS — I50.42 CHRONIC COMBINED SYSTOLIC AND DIASTOLIC HEART FAILURE (HCC): ICD-10-CM

## 2020-12-16 DIAGNOSIS — I42.8 NONISCHEMIC CARDIOMYOPATHY (HCC): Primary | ICD-10-CM

## 2020-12-16 LAB
ANION GAP SERPL CALCULATED.3IONS-SCNC: 7 MMOL/L (ref 4–13)
BASOPHILS # BLD AUTO: 0.06 THOUSANDS/ΜL (ref 0–0.1)
BASOPHILS NFR BLD AUTO: 1 % (ref 0–1)
BUN SERPL-MCNC: 16 MG/DL (ref 5–25)
CALCIUM SERPL-MCNC: 9.4 MG/DL (ref 8.3–10.1)
CHLORIDE SERPL-SCNC: 107 MMOL/L (ref 100–108)
CO2 SERPL-SCNC: 26 MMOL/L (ref 21–32)
CREAT SERPL-MCNC: 0.85 MG/DL (ref 0.6–1.3)
EOSINOPHIL # BLD AUTO: 1.42 THOUSAND/ΜL (ref 0–0.61)
EOSINOPHIL NFR BLD AUTO: 13 % (ref 0–6)
ERYTHROCYTE [DISTWIDTH] IN BLOOD BY AUTOMATED COUNT: 18.2 % (ref 11.6–15.1)
GFR SERPL CREATININE-BSD FRML MDRD: 81 ML/MIN/1.73SQ M
GLUCOSE P FAST SERPL-MCNC: 124 MG/DL (ref 65–99)
GLUCOSE SERPL-MCNC: 124 MG/DL (ref 65–140)
HCT VFR BLD AUTO: 39 % (ref 36.5–49.3)
HGB BLD-MCNC: 12.3 G/DL (ref 12–17)
IMM GRANULOCYTES # BLD AUTO: 0.04 THOUSAND/UL (ref 0–0.2)
IMM GRANULOCYTES NFR BLD AUTO: 0 % (ref 0–2)
INR PPP: 1 (ref 0.84–1.19)
LYMPHOCYTES # BLD AUTO: 1.17 THOUSANDS/ΜL (ref 0.6–4.47)
LYMPHOCYTES NFR BLD AUTO: 10 % (ref 14–44)
MCH RBC QN AUTO: 28.5 PG (ref 26.8–34.3)
MCHC RBC AUTO-ENTMCNC: 31.5 G/DL (ref 31.4–37.4)
MCV RBC AUTO: 91 FL (ref 82–98)
MONOCYTES # BLD AUTO: 0.76 THOUSAND/ΜL (ref 0.17–1.22)
MONOCYTES NFR BLD AUTO: 7 % (ref 4–12)
NEUTROPHILS # BLD AUTO: 7.8 THOUSANDS/ΜL (ref 1.85–7.62)
NEUTS SEG NFR BLD AUTO: 69 % (ref 43–75)
NRBC BLD AUTO-RTO: 0 /100 WBCS
PLATELET # BLD AUTO: 224 THOUSANDS/UL (ref 149–390)
PMV BLD AUTO: 10.3 FL (ref 8.9–12.7)
POTASSIUM SERPL-SCNC: 4.2 MMOL/L (ref 3.5–5.3)
PROTHROMBIN TIME: 13.2 SECONDS (ref 11.6–14.5)
RBC # BLD AUTO: 4.31 MILLION/UL (ref 3.88–5.62)
SODIUM SERPL-SCNC: 140 MMOL/L (ref 136–145)
WBC # BLD AUTO: 11.25 THOUSAND/UL (ref 4.31–10.16)

## 2020-12-16 PROCEDURE — 85610 PROTHROMBIN TIME: CPT | Performed by: PHYSICIAN ASSISTANT

## 2020-12-16 PROCEDURE — C1898 LEAD, PMKR, OTHER THAN TRANS: HCPCS

## 2020-12-16 PROCEDURE — C1892 INTRO/SHEATH,FIXED,PEEL-AWAY: HCPCS | Performed by: INTERNAL MEDICINE

## 2020-12-16 PROCEDURE — 80048 BASIC METABOLIC PNL TOTAL CA: CPT | Performed by: PHYSICIAN ASSISTANT

## 2020-12-16 PROCEDURE — 93005 ELECTROCARDIOGRAM TRACING: CPT

## 2020-12-16 PROCEDURE — 71045 X-RAY EXAM CHEST 1 VIEW: CPT

## 2020-12-16 PROCEDURE — 33208 INSRT HEART PM ATRIAL & VENT: CPT | Performed by: INTERNAL MEDICINE

## 2020-12-16 PROCEDURE — 33249 INSJ/RPLCMT DEFIB W/LEAD(S): CPT | Performed by: INTERNAL MEDICINE

## 2020-12-16 PROCEDURE — C1777 LEAD, AICD, ENDO SINGLE COIL: HCPCS

## 2020-12-16 PROCEDURE — 85025 COMPLETE CBC W/AUTO DIFF WBC: CPT | Performed by: PHYSICIAN ASSISTANT

## 2020-12-16 PROCEDURE — C1721 AICD, DUAL CHAMBER: HCPCS

## 2020-12-16 RX ORDER — CEFAZOLIN SODIUM 2 G/50ML
2000 SOLUTION INTRAVENOUS ONCE
Status: COMPLETED | OUTPATIENT
Start: 2020-12-16 | End: 2020-12-16

## 2020-12-16 RX ORDER — OXYCODONE HYDROCHLORIDE 5 MG/1
5 TABLET ORAL EVERY 4 HOURS PRN
Status: DISCONTINUED | OUTPATIENT
Start: 2020-12-16 | End: 2020-12-16 | Stop reason: HOSPADM

## 2020-12-16 RX ORDER — OXYCODONE HYDROCHLORIDE AND ACETAMINOPHEN 5; 325 MG/1; MG/1
1 TABLET ORAL EVERY 6 HOURS PRN
Qty: 10 TABLET | Refills: 0 | Status: SHIPPED | OUTPATIENT
Start: 2020-12-16 | End: 2020-12-26

## 2020-12-16 RX ORDER — ACETAMINOPHEN 325 MG/1
650 TABLET ORAL EVERY 4 HOURS PRN
Status: DISCONTINUED | OUTPATIENT
Start: 2020-12-16 | End: 2020-12-16 | Stop reason: HOSPADM

## 2020-12-16 RX ORDER — PROPOFOL 10 MG/ML
INJECTION, EMULSION INTRAVENOUS AS NEEDED
Status: DISCONTINUED | OUTPATIENT
Start: 2020-12-16 | End: 2020-12-16

## 2020-12-16 RX ORDER — KETAMINE HYDROCHLORIDE 50 MG/ML
INJECTION, SOLUTION, CONCENTRATE INTRAMUSCULAR; INTRAVENOUS AS NEEDED
Status: DISCONTINUED | OUTPATIENT
Start: 2020-12-16 | End: 2020-12-16

## 2020-12-16 RX ORDER — PROPOFOL 10 MG/ML
INJECTION, EMULSION INTRAVENOUS CONTINUOUS PRN
Status: DISCONTINUED | OUTPATIENT
Start: 2020-12-16 | End: 2020-12-16

## 2020-12-16 RX ORDER — LIDOCAINE HYDROCHLORIDE 10 MG/ML
INJECTION, SOLUTION EPIDURAL; INFILTRATION; INTRACAUDAL; PERINEURAL CODE/TRAUMA/SEDATION MEDICATION
Status: COMPLETED | OUTPATIENT
Start: 2020-12-16 | End: 2020-12-16

## 2020-12-16 RX ORDER — SODIUM CHLORIDE 9 MG/ML
INJECTION, SOLUTION INTRAVENOUS CONTINUOUS PRN
Status: DISCONTINUED | OUTPATIENT
Start: 2020-12-16 | End: 2020-12-16

## 2020-12-16 RX ORDER — GENTAMICIN SULFATE 40 MG/ML
INJECTION, SOLUTION INTRAMUSCULAR; INTRAVENOUS CODE/TRAUMA/SEDATION MEDICATION
Status: COMPLETED | OUTPATIENT
Start: 2020-12-16 | End: 2020-12-16

## 2020-12-16 RX ADMIN — KETAMINE HYDROCHLORIDE 10 MG: 50 INJECTION, SOLUTION INTRAMUSCULAR; INTRAVENOUS at 11:18

## 2020-12-16 RX ADMIN — ACETAMINOPHEN 650 MG: 325 TABLET, FILM COATED ORAL at 14:23

## 2020-12-16 RX ADMIN — KETAMINE HYDROCHLORIDE 10 MG: 50 INJECTION, SOLUTION INTRAMUSCULAR; INTRAVENOUS at 11:39

## 2020-12-16 RX ADMIN — CEFAZOLIN SODIUM 2000 MG: 2 SOLUTION INTRAVENOUS at 11:00

## 2020-12-16 RX ADMIN — IOHEXOL 10 ML: 350 INJECTION, SOLUTION INTRAVENOUS at 11:00

## 2020-12-16 RX ADMIN — LIDOCAINE HYDROCHLORIDE 20 ML: 10 INJECTION, SOLUTION EPIDURAL; INFILTRATION; INTRACAUDAL; PERINEURAL at 11:19

## 2020-12-16 RX ADMIN — KETAMINE HYDROCHLORIDE 30 MG: 50 INJECTION, SOLUTION INTRAMUSCULAR; INTRAVENOUS at 10:58

## 2020-12-16 RX ADMIN — PROPOFOL 40 MG: 10 INJECTION, EMULSION INTRAVENOUS at 10:58

## 2020-12-16 RX ADMIN — OXYCODONE HYDROCHLORIDE 5 MG: 5 TABLET ORAL at 14:23

## 2020-12-16 RX ADMIN — PROPOFOL 60 MCG/KG/MIN: 10 INJECTION, EMULSION INTRAVENOUS at 10:59

## 2020-12-16 RX ADMIN — SODIUM CHLORIDE: 0.9 INJECTION, SOLUTION INTRAVENOUS at 10:40

## 2020-12-16 RX ADMIN — GENTAMICIN SULFATE 80 MG: 40 INJECTION, SOLUTION INTRAMUSCULAR; INTRAVENOUS at 11:42

## 2020-12-17 LAB
ATRIAL RATE: 79 BPM
ATRIAL RATE: 86 BPM
P AXIS: 47 DEGREES
P AXIS: 59 DEGREES
PR INTERVAL: 168 MS
PR INTERVAL: 176 MS
QRS AXIS: -8 DEGREES
QRS AXIS: 1 DEGREES
QRSD INTERVAL: 100 MS
QRSD INTERVAL: 102 MS
QT INTERVAL: 400 MS
QT INTERVAL: 416 MS
QTC INTERVAL: 477 MS
QTC INTERVAL: 478 MS
T WAVE AXIS: 18 DEGREES
T WAVE AXIS: 44 DEGREES
VENTRICULAR RATE: 79 BPM
VENTRICULAR RATE: 86 BPM

## 2020-12-17 PROCEDURE — 93010 ELECTROCARDIOGRAM REPORT: CPT | Performed by: INTERNAL MEDICINE

## 2020-12-19 NOTE — ASSESSMENT & PLAN NOTE
Patient blood cultures growing gram-negative rods likely source is UTI  Continue on IV antibiotics  See above Discussed risks/benefits/alternatives of Prozac including black box warning for possible worsening SI under age 26, mother consented

## 2020-12-30 ENCOUNTER — TELEPHONE (OUTPATIENT)
Dept: CARDIOLOGY CLINIC | Facility: CLINIC | Age: 85
End: 2020-12-30

## 2021-01-06 ENCOUNTER — IN-CLINIC DEVICE VISIT (OUTPATIENT)
Dept: CARDIOLOGY CLINIC | Facility: CLINIC | Age: 86
End: 2021-01-06

## 2021-01-06 DIAGNOSIS — Z95.810 DUAL ICD (IMPLANTABLE CARDIOVERTER-DEFIBRILLATOR) IN PLACE: Primary | ICD-10-CM

## 2021-01-06 PROCEDURE — 99024 POSTOP FOLLOW-UP VISIT: CPT | Performed by: INTERNAL MEDICINE

## 2021-01-06 NOTE — PROGRESS NOTES
Results for orders placed or performed in visit on 01/06/21   Cardiac EP device report    Narrative    MDT DUAL ICD/ ACTIVE SYSTEM IS MRI CONDITIONAL  DEVICE INTERROGATED IN THE [place] OFFICE: WOUND CHECK: INCISION CLEAN AND DRY WITH EDGES APPROXIMATED; WOUND CARE AND RESTRICTIONS REVIEWED WITH PATIENT/SON  BATTERY VOLTAGE ADEQUATE (13 YRS)  AP 2 7%  0 2%  ALL LEAD PARAMETERS WITHIN NORMAL LIMITS  ALL OTHER TESTING WITHIN NORMAL LIMITS  12 NEW AT/AF EPISODES FOR PAF/PAFL W/MAX EPISODE DURATION @ 2:40 HRS (BURDEN 2 6%)  20 SVT-AF EPISODES FOR RVR W/MAX V RATE ~ 162 BPM  DR FERRIS [address] [facility]  TO SEE PT IN OFFICE 1/13/21 TO DISCUSS INITITATING AC  PT TAKES ASA 81, CARVEDILOL  OPTI-VOL FLUID THRESHOLD INITITALIZING  NO PROGRAMMING CHANGES MADE TO DEVICE PARAMETERS  APPROPRIATELY FUNCTIONING ICD     EBS

## 2021-01-13 ENCOUNTER — OFFICE VISIT (OUTPATIENT)
Dept: CARDIOLOGY CLINIC | Facility: CLINIC | Age: 86
End: 2021-01-13
Payer: COMMERCIAL

## 2021-01-13 VITALS
HEIGHT: 67 IN | SYSTOLIC BLOOD PRESSURE: 140 MMHG | BODY MASS INDEX: 35.16 KG/M2 | WEIGHT: 224 LBS | DIASTOLIC BLOOD PRESSURE: 60 MMHG | HEART RATE: 68 BPM

## 2021-01-13 DIAGNOSIS — I42.8 NONISCHEMIC CARDIOMYOPATHY (HCC): ICD-10-CM

## 2021-01-13 DIAGNOSIS — E78.5 HYPERLIPIDEMIA, UNSPECIFIED HYPERLIPIDEMIA TYPE: ICD-10-CM

## 2021-01-13 DIAGNOSIS — I42.8 OTHER CARDIOMYOPATHY (HCC): ICD-10-CM

## 2021-01-13 DIAGNOSIS — I50.42 CHRONIC COMBINED SYSTOLIC AND DIASTOLIC HEART FAILURE (HCC): Primary | ICD-10-CM

## 2021-01-13 DIAGNOSIS — I48.0 PAROXYSMAL ATRIAL FIBRILLATION (HCC): ICD-10-CM

## 2021-01-13 DIAGNOSIS — I10 ESSENTIAL HYPERTENSION: ICD-10-CM

## 2021-01-13 PROCEDURE — 99213 OFFICE O/P EST LOW 20 MIN: CPT | Performed by: INTERNAL MEDICINE

## 2021-01-13 NOTE — PROGRESS NOTES
Cardiology Follow Up    [facility]  1935  [id number]  [facility] Korin De Oliveira [id number] [phone]    1  Chronic combined systolic and diastolic heart failure ([facility] )     2  Essential hypertension     3  Other cardiomyopathy ([facility] )     4  Nonischemic cardiomyopathy (HCC)     5  Paroxysmal atrial fibrillation ([facility] )     6  Hyperlipidemia, unspecified hyperlipidemia type         Interval History:  Cardiology follow-up after recent ICD placement  Patient doing overall well from a cardiac point of view, no orthopnea no PND, no syncope or presyncope  Denies any focal neurological deficits denies any amaurosis fugax, no palpitations  He does have history of severe nonischemic cardiomyopathy for almost 2 decades  Catheterization last year revealed no obstructive coronary artery disease  Underwent dual chamber ICD, recent interrogation revealed adequate function, minimal atrial pacing or and no ventricular pacing  He has had several runs of paroxysmal atrial fibrillation with a 2 6% burden recent blood working revealed normal creatinine and normal potassium and calcium  He states been compliant with medications, [facility] [place] common the past he tends to adjust his medication on his own  He is currently on medium dose beta-blocker plus ACE-inhibitor      Patient Active Problem List   Diagnosis    Coronary artery disease involving native coronary artery of native heart with angina pectoris (HCC)    [facility]    GERD (gastroesophageal reflux disease)    Essential hypertension    Hyperlipidemia    Complicated UTI (urinary tract infection)    Hypokalemia    History of supraventricular tachycardia    Type 2 diabetes mellitus with diabetic neuropathy, with long-term current use of insulin (HCC)    Benign prostatic hyperplasia with weak urinary stream    Benign prostatic hyperplasia with lower urinary tract symptoms    Iron deficiency anemia due to chronic blood loss    B12 deficiency    Myocardiopathy (HCC)    Pulmonary nodule    Obstructive sleep apnea    Cough    Allergic rhinitis    Nonischemic cardiomyopathy (HCC)    Paroxysmal atrial fibrillation (HCC)     Past Medical History:   Diagnosis Date    Cardiac disease     CHF (congestive heart failure) ([place] [address] )     Diabetes mellitus (HCC)     GERD (gastroesophageal reflux disease)     Hyperlipidemia     Hypertension     MI (myocardial infarction) ([place] [address] )      Social History     Socioeconomic History    Marital status: /Civil Union     Spouse name: Not on file    Number of children: Not on file    Years of education: Not on file    Highest education level: Not on file   Occupational History    Not on file   Social Needs    Financial resource strain: Not on file    Food insecurity     Worry: Not on file     Inability: Not on file    Transportation needs     Medical: Not on file     Non-medical: Not on file   Tobacco Use    Smoking status: Never Smoker    Smokeless tobacco: Never Used   Substance and Sexual Activity    Alcohol use: No     Alcohol/week: 0 0 standard drinks     Frequency: Never     Binge frequency: Never    Drug use: No    Sexual activity: Not Currently   Lifestyle    Physical activity     Days per week: Not on file     Minutes per session: Not on file    Stress: Not on file   Relationships    Social connections     Talks on phone: Not on file     Gets together: Not on file     Attends Oriental orthodox service: Not on file     Active member of club or organization: Not on file     Attends meetings of clubs or organizations: Not on file     Relationship status: Not on file    Intimate partner violence     Fear of current or ex partner: Not on file     Emotionally abused: Not on file     Physically abused: Not on file     Forced sexual activity: Not on file   Other Topics Concern    Not on file   Social History Narrative    Not on file      Family History   Problem Relation Age of Onset    Diabetes Mother     Hypertension Mother     Heart disease Mother     Cancer Father     Hypertension Father     Cancer Sister     Hypertension Sister      Past Surgical History:   Procedure Laterality Date    CT CYSTOGRAM  9/15/2020    EYE SURGERY      OH CYSTOURETHROSCOPY W/IRRIG & EVAC CLOTS N/A 9/14/2020    Procedure: CYSTOSCOPY EVACUATION OF CLOTS; TURP; SUPRAPUBIC CATHETER INSERTION;  Surgeon: Barron Lange MD;  Location: [facility];  Service: Urology    OH LAP,DIAGNOSTIC ABDOMEN N/A 9/15/2020    Procedure: EXPLORATORY LAPAROTOMY, closure of cystotomy;  Surgeon: Min Godfrey MD;  Location: [facility];  Service: General       Current Outpatient Medications:     aspirin (ECOTRIN LOW STRENGTH) 81 mg EC tablet, Take 1 tablet (81 mg total) by mouth daily, Disp: 30 tablet, Rfl: 5    Blood Glucose Monitoring Suppl (True Metrix Meter) w/Device KIT, Use 3 (three) times a day, Disp: 1 kit, Rfl: 0    carvedilol (COREG) 12 5 mg tablet, Take 2 tablets (25 mg total) by mouth 2 (two) times a day with meals, Disp: 180 tablet, Rfl: 1    Cholecalciferol (VITAMIN D3) 5000 units CAPS, Take 1 capsule by mouth, Disp: , Rfl:     Cyanocobalamin (VITAMIN B12 PO), Take 5,000 mcg by mouth, Disp: , Rfl:     ferrous sulfate 324 (65 Fe) mg, Take 1 tablet (324 mg total) by mouth every other day, Disp: 30 tablet, Rfl: 3    finasteride (PROSCAR) 5 mg tablet, Take 1 tablet (5 mg total) by mouth daily, Disp: 90 tablet, Rfl: 3    fluticasone (FLONASE) 50 mcg/act nasal spray, 1 spray into each nostril 2 (two) times a day, Disp: 1 Bottle, Rfl: 3    furosemide (LASIX) 40 mg tablet, Take 1 tablet (40 mg total) by mouth 2 (two) times a day, Disp: 60 tablet, Rfl: 3    gabapentin (NEURONTIN) 300 mg capsule, Take 300 mg by mouth 2 (two) times a day, Disp: , Rfl:    glucose blood (True Metrix Blood Glucose Test) test strip, Testing three times daily, Disp: 300 each, Rfl: 3    insulin glargine (LANTUS) 100 units/mL subcutaneous injection, Inject 35 Units under the skin daily at bedtime, Disp: 10 mL, Rfl: 5    irbesartan (AVAPRO) 150 mg tablet, Take 1 tablet (150 mg total) by mouth daily at bedtime, Disp: 30 tablet, Rfl: 5    Lancets (onetouch ultrasoft) lancets, Testing 3 times daily, Disp: 300 each, Rfl: 3    meloxicam (MOBIC) 15 mg tablet, Take 15 mg by mouth daily, Disp: , Rfl:     metFORMIN (GLUCOPHAGE) 1000 MG tablet, Take 1 tablet (1,000 mg total) by mouth 2 (two) times a day with meals, Disp: 60 tablet, Rfl: 5    potassium chloride (K-DUR,KLOR-CON) 20 mEq tablet, Take 1 tablet (20 mEq total) by mouth daily, Disp: 30 tablet, Rfl: 3    simvastatin (ZOCOR) 20 mg tablet, Take 1 tablet (20 mg total) by mouth daily at bedtime, Disp: 30 tablet, Rfl: 5    sitaGLIPtin (JANUVIA) 100 mg tablet, Take 1 tablet (100 mg total) by mouth daily, Disp: 30 tablet, Rfl: 5    tamsulosin (FLOMAX) 0 4 mg, Take 1 capsule (0 4 mg total) by mouth daily with dinner, Disp: 180 capsule, Rfl: 3    albuterol (PROVENTIL HFA,VENTOLIN HFA) 90 mcg/act inhaler, Inhale 2 puffs every 4 (four) hours as needed for wheezing, Disp: 1 Inhaler, Rfl: 0    docusate sodium (COLACE) 100 mg capsule, Take 1 capsule (100 mg total) by mouth every 12 (twelve) hours (Patient not taking: Reported on 1/13/2021), Disp: 60 capsule, Rfl: 0    famotidine (PEPCID) 40 MG tablet, Take 0 5 tablets (20 mg total) by mouth daily (Patient not taking: Reported on 1/13/2021), Disp: 30 tablet, Rfl: 5    nitroglycerin (NITROSTAT) 0 4 mg SL tablet, Place 1 tablet (0 4 mg total) under the tongue every 5 (five) minutes as needed for chest pain (Patient not taking: Reported on 11/18/2020), Disp: 25 tablet, Rfl: 3    senna (SENOKOT) 8 6 mg, Take 1 tablet (8 6 mg total) by mouth 2 (two) times a day (Patient not taking: Reported on 1/13/2021), Disp: 20 each, Rfl: 0  Allergies   Allergen Reactions    Demerol [Meperidine]        Labs:  Admission on 12/16/2020, Discharged on 12/16/2020   Component Date Value    Sodium 12/16/2020 140     Potassium 12/16/2020 4 2     Chloride 12/16/2020 107     CO2 12/16/2020 26     ANION GAP 12/16/2020 7     BUN 12/16/2020 16     Creatinine 12/16/2020 0 85     Glucose 12/16/2020 124     Glucose, Fasting 12/16/2020 124*    Calcium 12/16/2020 9 4     eGFR 12/16/2020 81     WBC 12/16/2020 11 25*    RBC 12/16/2020 4 31     Hemoglobin 12/16/2020 12 3     Hematocrit 12/16/2020 39 0     MCV 12/16/2020 91     MCH 12/16/2020 28 5     MCHC 12/16/2020 31 5     RDW 12/16/2020 18 2*    MPV 12/16/2020 10 3     Platelets 55/21/7059 224     nRBC 12/16/2020 0     Neutrophils Relative 12/16/2020 69     Immat GRANS % 12/16/2020 0     Lymphocytes Relative 12/16/2020 10*    Monocytes Relative 12/16/2020 7     Eosinophils Relative 12/16/2020 13*    Basophils Relative 12/16/2020 1     Neutrophils Absolute 12/16/2020 7 80*    Immature Grans Absolute 12/16/2020 0 04     Lymphocytes Absolute 12/16/2020 1 17     Monocytes Absolute 12/16/2020 0 76     Eosinophils Absolute 12/16/2020 1 42*    Basophils Absolute 12/16/2020 0 06     Protime 12/16/2020 13 2     INR 12/16/2020 1 00     Ventricular Rate 12/16/2020 79     Atrial Rate 12/16/2020 79     SD Interval 12/16/2020 168     QRSD Interval 12/16/2020 100     QT Interval 12/16/2020 416     QTC Interval 12/16/2020 477     P Axis 12/16/2020 59     QRS Axis 12/16/2020 1     T Wave Axis 12/16/2020 44     Ventricular Rate 12/16/2020 86     Atrial Rate 12/16/2020 86     SD Interval 12/16/2020 176     QRSD Interval 12/16/2020 102     QT Interval 12/16/2020 400     QTC Interval 12/16/2020 478     P Axis 12/16/2020 47     QRS Axis 12/16/2020 -8     T Wave Axis 12/16/2020 18    Telephone on 11/24/2020   Component Date Value    WBC 12/11/2020 8 55  RBC 12/11/2020 4 11     Hemoglobin 12/11/2020 11 7*    Hematocrit 12/11/2020 37 4     MCV 12/11/2020 91     MCH 12/11/2020 28 5     MCHC 12/11/2020 31 3*    RDW 12/11/2020 17 9*    MPV 12/11/2020 10 7     Platelets 68/58/6543 246     nRBC 12/11/2020 0     Neutrophils Relative 12/11/2020 57     Immat GRANS % 12/11/2020 0     Lymphocytes Relative 12/11/2020 16     Monocytes Relative 12/11/2020 8     Eosinophils Relative 12/11/2020 18*    Basophils Relative 12/11/2020 1     Neutrophils Absolute 12/11/2020 4 86     Immature Grans Absolute 12/11/2020 0 03     Lymphocytes Absolute 12/11/2020 1 33     Monocytes Absolute 12/11/2020 0 70     Eosinophils Absolute 12/11/2020 1 57*    Basophils Absolute 12/11/2020 0 06     Sodium 12/11/2020 139     Potassium 12/11/2020 4 3     Chloride 12/11/2020 107     CO2 12/11/2020 30     ANION GAP 12/11/2020 2*    BUN 12/11/2020 16     Creatinine 12/11/2020 0 80     Glucose, Fasting 12/11/2020 205*    Calcium 12/11/2020 9 3     AST 12/11/2020 11     ALT 12/11/2020 24     Alkaline Phosphatase 12/11/2020 63     Total Protein 12/11/2020 7 1     Albumin 12/11/2020 3 6     Total Bilirubin 12/11/2020 0 19*    eGFR 12/11/2020 83    Lab on 10/21/2020   Component Date Value    WBC 10/21/2020 7 06     RBC 10/21/2020 2 93*    Hemoglobin 10/21/2020 8 2*    Hematocrit 10/21/2020 26 5*    MCV 10/21/2020 90     MCH 10/21/2020 28 0     MCHC 10/21/2020 30 9*    RDW 10/21/2020 13 6     Platelets 75/20/4449 359     MPV 10/21/2020 9 7     Sodium 10/21/2020 138     Potassium 10/21/2020 4 2     Chloride 10/21/2020 108     CO2 10/21/2020 26     ANION GAP 10/21/2020 4     BUN 10/21/2020 10     Creatinine 10/21/2020 0 88     Glucose 10/21/2020 123     Calcium 10/21/2020 8 9     eGFR 10/21/2020 79     Creatinine, Ur 10/21/2020 137 0     Microalbum  ,U,Random 10/21/2020 973 0*    Microalb Creat Ratio 10/21/2020 710*    TSH 3RD GENERATON 10/21/2020 3 050  Iron Saturation 10/21/2020 7     TIBC 10/21/2020 339     Iron 10/21/2020 25*    Ferritin 10/21/2020 9    Office Visit on 10/15/2020   Component Date Value    POST-VOID RESIDUAL VOLUM* 10/15/2020 44     LEUKOCYTE ESTERASE,UA 10/15/2020 positive     NITRITE,UA 10/15/2020 positive     SL AMB POCT UROBILINOGEN 10/15/2020 -     POCT URINE PROTEIN 10/15/2020 + 30      PH,UA 10/15/2020 8 0     BLOOD,UA 10/15/2020 + 3     SPECIFIC GRAVITY,UA 10/15/2020 1 025     KETONES,UA 10/15/2020 -     BILIRUBIN,UA 10/15/2020 -     GLUCOSE, UA 10/15/2020 -      COLOR,UA 10/15/2020 yellow     CLARITY,UA 10/15/2020 turbid     Urine Culture 10/15/2020 >100,000 cfu/ml Escherichia coli*    Urine Culture 10/15/2020 20,000-29,000 cfu/ml      Clarity, UA 10/15/2020 Turbid     Color, UA 10/15/2020 Yellow     Specific Gravity, UA 10/15/2020 1 024     pH, UA 10/15/2020 6 0     Glucose, UA 10/15/2020 >=1000 (1%)*    Ketones, UA 10/15/2020 Negative     Blood, UA 10/15/2020 Large*    Protein, UA 10/15/2020 100 (2+)*    Nitrite, UA 10/15/2020 Negative     Bilirubin, UA 10/15/2020 Negative     Urobilinogen, UA 10/15/2020 0 2     Leukocytes, UA 10/15/2020 Large*    WBC, UA 10/15/2020 Innumerable*    RBC, UA 10/15/2020 Field obscured, unable to enumerate*    Bacteria, UA 10/15/2020 Field obscured, unable to enumerate*    Epithelial Cells 10/15/2020 Field obscured, unable to enumerate*   Lab on 10/13/2020   Component Date Value    WBC 10/13/2020 9 65     RBC 10/13/2020 2 91*    Hemoglobin 10/13/2020 8 4*    Hematocrit 10/13/2020 27 3*    MCV 10/13/2020 94     MCH 10/13/2020 28 9     MCHC 10/13/2020 30 8*    RDW 10/13/2020 13 4     Platelets 78/06/3307 346     MPV 10/13/2020 10 1     Sodium 10/13/2020 139     Potassium 10/13/2020 4 2     Chloride 10/13/2020 107     CO2 10/13/2020 26     ANION GAP 10/13/2020 6     BUN 10/13/2020 13     Creatinine 10/13/2020 0 96     Glucose, Fasting 10/13/2020 106*  Calcium 10/13/2020 9 0     eGFR 10/13/2020 73    Admission on 10/05/2020, Discharged on 10/08/2020   Component Date Value    ABO Grouping 10/05/2020 A     Rh Factor 10/05/2020 Positive     Antibody Screen 10/05/2020 Negative     Specimen Expiration Date 10/05/2020 [id number]     WBC 10/05/2020 8 17     RBC 10/05/2020 2 96*    Hemoglobin 10/05/2020 8 7*    Hematocrit 10/05/2020 28 1*    MCV 10/05/2020 95     MCH 10/05/2020 29 4     MCHC 10/05/2020 31 0*    RDW 10/05/2020 13 4     MPV 10/05/2020 9 1     Platelets 43/91/0093 375     nRBC 10/05/2020 0     Neutrophils Relative 10/05/2020 56     Immat GRANS % 10/05/2020 1     Lymphocytes Relative 10/05/2020 22     Monocytes Relative 10/05/2020 11     Eosinophils Relative 10/05/2020 9*    Basophils Relative 10/05/2020 1     Neutrophils Absolute 10/05/2020 4 66     Immature Grans Absolute 10/05/2020 0 05     Lymphocytes Absolute 10/05/2020 1 77     Monocytes Absolute 10/05/2020 0 88     Eosinophils Absolute 10/05/2020 0 76*    Basophils Absolute 10/05/2020 0 05     Sodium 10/05/2020 137     Potassium 10/05/2020 3 9     Chloride 10/05/2020 101     CO2 10/05/2020 30     ANION GAP 10/05/2020 6     BUN 10/05/2020 11     Creatinine 10/05/2020 1 01     Glucose 10/05/2020 139     Calcium 10/05/2020 8 8     Corrected Calcium 10/05/2020 9 7     AST 10/05/2020 8     ALT 10/05/2020 15     Alkaline Phosphatase 10/05/2020 62     Total Protein 10/05/2020 7 2     Albumin 10/05/2020 2 9*    Total Bilirubin 10/05/2020 0 19*    eGFR 10/05/2020 68     Magnesium 10/05/2020 1 9     POC Glucose 10/05/2020 145*    POC Glucose 10/05/2020 102     POC Glucose 10/05/2020 259*    POC Glucose 10/05/2020 183*    WBC 10/06/2020 7 12     RBC 10/06/2020 2 78*    Hemoglobin 10/06/2020 8 2*    Hematocrit 10/06/2020 26 6*    MCV 10/06/2020 96     MCH 10/06/2020 29 5     MCHC 10/06/2020 30 8*    RDW 10/06/2020 13 2     MPV 10/06/2020 9 6     Platelets 76/69/5071 371     nRBC 10/06/2020 0     Neutrophils Relative 10/06/2020 57     Immat GRANS % 10/06/2020 1     Lymphocytes Relative 10/06/2020 22     Monocytes Relative 10/06/2020 9     Eosinophils Relative 10/06/2020 10*    Basophils Relative 10/06/2020 1     Neutrophils Absolute 10/06/2020 4 04     Immature Grans Absolute 10/06/2020 0 04     Lymphocytes Absolute 10/06/2020 1 59     Monocytes Absolute 10/06/2020 0 65     Eosinophils Absolute 10/06/2020 0 74*    Basophils Absolute 10/06/2020 0 06     Sodium 10/06/2020 136     Potassium 10/06/2020 4 2     Chloride 10/06/2020 102     CO2 10/06/2020 28     ANION GAP 10/06/2020 6     BUN 10/06/2020 12     Creatinine 10/06/2020 1 05     Glucose 10/06/2020 110     Calcium 10/06/2020 8 9     eGFR 10/06/2020 65     POC Glucose 10/06/2020 105     POC Glucose 10/06/2020 185*    POC Glucose 10/06/2020 297*    POC Glucose 10/07/2020 168*    WBC 10/07/2020 6 29     RBC 10/07/2020 2 66*    Hemoglobin 10/07/2020 7 8*    Hematocrit 10/07/2020 25 3*    MCV 10/07/2020 95     MCH 10/07/2020 29 3     MCHC 10/07/2020 30 8*    RDW 10/07/2020 13 3     MPV 10/07/2020 9 9     Platelets 68/53/5083 352     nRBC 10/07/2020 0     Neutrophils Relative 10/07/2020 53     Immat GRANS % 10/07/2020 1     Lymphocytes Relative 10/07/2020 22     Monocytes Relative 10/07/2020 11     Eosinophils Relative 10/07/2020 12*    Basophils Relative 10/07/2020 1     Neutrophils Absolute 10/07/2020 3 38     Immature Grans Absolute 10/07/2020 0 04     Lymphocytes Absolute 10/07/2020 1 38     Monocytes Absolute 10/07/2020 0 71     Eosinophils Absolute 10/07/2020 0 74*    Basophils Absolute 10/07/2020 0 04     Sodium 10/07/2020 136     Potassium 10/07/2020 4 0     Chloride 10/07/2020 102     CO2 10/07/2020 27     ANION GAP 10/07/2020 7     BUN 10/07/2020 16     Creatinine 10/07/2020 1 08     Glucose 10/07/2020 142*    Calcium 10/07/2020 8 8     eGFR 10/07/2020 63     POC Glucose 10/07/2020 114     POC Glucose 10/07/2020 204*    Hemoglobin 10/07/2020 8 4*    Hematocrit 10/07/2020 26 7*    ABO Grouping 10/07/2020 A     Rh Factor 10/07/2020 Positive     Antibody Screen 10/07/2020 Negative     Specimen Expiration Date 10/07/2020 [id number]     POC Glucose 10/07/2020 162*    POC Glucose 10/07/2020 216*    WBC 10/08/2020 7 78     RBC 10/08/2020 2 77*    Hemoglobin 10/08/2020 8 1*    Hematocrit 10/08/2020 26 5*    MCV 10/08/2020 96     MCH 10/08/2020 29 2     MCHC 10/08/2020 30 6*    RDW 10/08/2020 13 4     MPV 10/08/2020 9 9     Platelets 00/90/5527 350     nRBC 10/08/2020 0     Neutrophils Relative 10/08/2020 62     Immat GRANS % 10/08/2020 0     Lymphocytes Relative 10/08/2020 18     Monocytes Relative 10/08/2020 11     Eosinophils Relative 10/08/2020 8*    Basophils Relative 10/08/2020 1     Neutrophils Absolute 10/08/2020 4 86     Immature Grans Absolute 10/08/2020 0 03     Lymphocytes Absolute 10/08/2020 1 38     Monocytes Absolute 10/08/2020 0 85     Eosinophils Absolute 10/08/2020 0 61     Basophils Absolute 10/08/2020 0 05     Sodium 10/08/2020 136     Potassium 10/08/2020 4 0     Chloride 10/08/2020 101     CO2 10/08/2020 27     ANION GAP 10/08/2020 8     BUN 10/08/2020 12     Creatinine 10/08/2020 1 11     Glucose 10/08/2020 181*    Calcium 10/08/2020 8 7     eGFR 10/08/2020 61     POC Glucose 10/08/2020 132     POC Glucose 10/08/2020 193*    POC Glucose 10/08/2020 272*   Admission on 10/04/2020, Discharged on 10/05/2020   Component Date Value    WBC 10/04/2020 9 15     RBC 10/04/2020 2 92*    Hemoglobin 10/04/2020 8 6*    Hematocrit 10/04/2020 27 3*    MCV 10/04/2020 94     MCH 10/04/2020 29 5     MCHC 10/04/2020 31 5     RDW 10/04/2020 13 6     MPV 10/04/2020 9 4     Platelets 07/30/1775 405*    nRBC 10/04/2020 0     Neutrophils Relative 10/04/2020 67     Immat GRANS % 10/04/2020 0     Lymphocytes Relative 10/04/2020 15     Monocytes Relative 10/04/2020 10     Eosinophils Relative 10/04/2020 8*    Basophils Relative 10/04/2020 0     Neutrophils Absolute 10/04/2020 6 03     Immature Grans Absolute 10/04/2020 0 03     Lymphocytes Absolute 10/04/2020 1 39     Monocytes Absolute 10/04/2020 0 92     Eosinophils Absolute 10/04/2020 0 74*    Basophils Absolute 10/04/2020 0 04     Sodium 10/04/2020 136     Potassium 10/04/2020 4 9     Chloride 10/04/2020 100     CO2 10/04/2020 29     ANION GAP 10/04/2020 7     BUN 10/04/2020 14     Creatinine 10/04/2020 1 14     Glucose 10/04/2020 253*    Calcium 10/04/2020 8 9     eGFR 10/04/2020 59    Admission on 10/03/2020, Discharged on 10/03/2020   Component Date Value    WBC 10/03/2020 10 40*    RBC 10/03/2020 2 96*    Hemoglobin 10/03/2020 8 9*    Hematocrit 10/03/2020 27 5*    MCV 10/03/2020 93     MCH 10/03/2020 30 1     MCHC 10/03/2020 32 4     RDW 10/03/2020 13 5     MPV 10/03/2020 9 7     Platelets 12/52/5495 455*    nRBC 10/03/2020 0     Neutrophils Relative 10/03/2020 75     Immat GRANS % 10/03/2020 1     Lymphocytes Relative 10/03/2020 9*    Monocytes Relative 10/03/2020 9     Eosinophils Relative 10/03/2020 5     Basophils Relative 10/03/2020 1     Neutrophils Absolute 10/03/2020 7 97*    Immature Grans Absolute 10/03/2020 0 05     Lymphocytes Absolute 10/03/2020 0 93     Monocytes Absolute 10/03/2020 0 91     Eosinophils Absolute 10/03/2020 0 49     Basophils Absolute 10/03/2020 0 05     Sodium 10/03/2020 137     Potassium 10/03/2020 4 5     Chloride 10/03/2020 101     CO2 10/03/2020 29     ANION GAP 10/03/2020 7     BUN 10/03/2020 15     Creatinine 10/03/2020 1 23     Glucose 10/03/2020 119     Calcium 10/03/2020 8 6     eGFR 10/03/2020 54    Admission on 09/29/2020, Discharged on 09/29/2020   Component Date Value    WBC 09/29/2020 8 08     RBC 09/29/2020 2 95*    Hemoglobin 09/29/2020 8 9*    Hematocrit 09/29/2020 27 3*    MCV 09/29/2020 93     MCH 09/29/2020 30 2     MCHC 09/29/2020 32 6     RDW 09/29/2020 13 5     MPV 09/29/2020 9 9     Platelets 70/52/6193 491*    nRBC 09/29/2020 0     Neutrophils Relative 09/29/2020 62     Immat GRANS % 09/29/2020 1     Lymphocytes Relative 09/29/2020 18     Monocytes Relative 09/29/2020 12     Eosinophils Relative 09/29/2020 7*    Basophils Relative 09/29/2020 0     Neutrophils Absolute 09/29/2020 5 09     Immature Grans Absolute 09/29/2020 0 05     Lymphocytes Absolute 09/29/2020 1 45     Monocytes Absolute 09/29/2020 0 93     Eosinophils Absolute 09/29/2020 0 53     Basophils Absolute 09/29/2020 0 03     Sodium 09/29/2020 135*    Potassium 09/29/2020 4 2     Chloride 09/29/2020 99*    CO2 09/29/2020 30     ANION GAP 09/29/2020 6     BUN 09/29/2020 14     Creatinine 09/29/2020 1 03     Glucose 09/29/2020 181*    Calcium 09/29/2020 9 1     eGFR 09/29/2020 67     Protime 09/29/2020 13 8     INR 09/29/2020 1 05     PTT 09/29/2020 28    Appointment on 09/28/2020   Component Date Value    Sodium 09/28/2020 138     Potassium 09/28/2020 4 1     Chloride 09/28/2020 106     CO2 09/28/2020 29     ANION GAP 09/28/2020 3*    BUN 09/28/2020 12     Creatinine 09/28/2020 1 03     Glucose 09/28/2020 145*    Calcium 09/28/2020 9 8     eGFR 09/28/2020 67    There may be more visits with results that are not included  Imaging: Xr Chest Portable    Result Date: 12/16/2020  Narrative: CHEST INDICATION:   Patient s/p Pacemaker/ICD Insertion  COMPARISON:  Chest radiograph and chest CT from 9/21/2020  EXAM PERFORMED/VIEWS:  XR CHEST PORTABLE FINDINGS: Mild cardiomegaly  Left subclavian ICD leads in right atrium and right ventricle  The lungs are clear  No pneumothorax or pleural effusion  Osseous structures appear within normal limits for patient age  Impression: No acute cardiopulmonary disease  ICD well-positioned with no pneumothorax  Workstation performed: [id number]       Review of Systems:  Review of Systems   Constitutional: Negative for activity change  Respiratory: Negative for shortness of breath, wheezing and stridor  Cardiovascular: Negative for chest pain, palpitations and leg swelling  Genitourinary: Negative for hematuria  Neurological: Negative for dizziness, syncope, facial asymmetry, speech difficulty and weakness  Hematological: Does not bruise/bleed easily  Physical Exam:  Physical Exam  Vitals signs reviewed  Constitutional:       Appearance: Normal appearance  He is obese  Cardiovascular:      Rate and Rhythm: Normal rate and regular rhythm  Heart sounds: Normal heart sounds  No murmur  No friction rub  No gallop  Pulmonary:      Effort: Pulmonary effort is normal  No respiratory distress  Breath sounds: Normal breath sounds  No stridor  No wheezing, rhonchi or rales  Chest:      Chest wall: Tenderness (Mild at ICD site, healing well) present  Skin:     General: Skin is warm and dry  Capillary Refill: Capillary refill takes less than 2 seconds  Neurological:      Mental Status: He is alert  Discussion/Summary:  Nonischemic cardiomyopathy, status post DDD ICD, recent evidence of atrial fibrillation  Moderate cardioembolic risk, will start full anticoagulation with factor Xa inhibitor  Will discontinue aspirin  He is currently class 1-2 at the present time, favor no antiarrhythmic therapy  We might consider increasing the beta-blocker pending the follow-up of the ICD  I explained the rational for full anticoagulation in order to prevent cardioembolism in [place] to the patient, he understands and feels comfortable with that  This note was completed in part utilizing [employer] fluency direct voice recognition software     Grammatical errors, random word insertion, spelling mistakes, and incomplete sentences may be an occasional consequence of the system secondary to software limitations, ambient noise and hardware issues  At the time of dictation, efforts were made to edit, clarify and /or correct errors  Please read the chart carefully and recognize, using context, where substitutions have occurred  If you have any questions or concerns about the context, text or information contained within the body of this dictation, please contact myself, the provider, for further clarification

## 2021-01-18 ENCOUNTER — HOSPITAL ENCOUNTER (OUTPATIENT)
Dept: RADIOLOGY | Facility: HOSPITAL | Age: 86
Discharge: HOME | End: 2021-01-18
Attending: INTERNAL MEDICINE
Payer: COMMERCIAL

## 2021-01-18 ENCOUNTER — OFFICE VISIT (OUTPATIENT)
Dept: FAMILY MEDICINE CLINIC | Facility: HOSPITAL | Age: 86
End: 2021-01-18
Payer: COMMERCIAL

## 2021-01-18 ENCOUNTER — LAB (OUTPATIENT)
Dept: LAB | Facility: HOSPITAL | Age: 86
End: 2021-01-18
Attending: INTERNAL MEDICINE
Payer: COMMERCIAL

## 2021-01-18 VITALS
HEART RATE: 72 BPM | BODY MASS INDEX: 34.53 KG/M2 | DIASTOLIC BLOOD PRESSURE: 60 MMHG | HEIGHT: 67 IN | WEIGHT: 220 LBS | SYSTOLIC BLOOD PRESSURE: 120 MMHG | RESPIRATION RATE: 16 BRPM

## 2021-01-18 DIAGNOSIS — Z79.4 TYPE 2 DIABETES MELLITUS WITH DIABETIC NEUROPATHY, WITH LONG-TERM CURRENT USE OF INSULIN (HCC): ICD-10-CM

## 2021-01-18 DIAGNOSIS — H91.93 BILATERAL HEARING LOSS, UNSPECIFIED HEARING LOSS TYPE: ICD-10-CM

## 2021-01-18 DIAGNOSIS — E53.8 B12 DEFICIENCY: ICD-10-CM

## 2021-01-18 DIAGNOSIS — E11.40 TYPE 2 DIABETES MELLITUS WITH DIABETIC NEUROPATHY, WITH LONG-TERM CURRENT USE OF INSULIN (HCC): Primary | ICD-10-CM

## 2021-01-18 DIAGNOSIS — I50.42 CHRONIC COMBINED SYSTOLIC AND DIASTOLIC HEART FAILURE (HCC): ICD-10-CM

## 2021-01-18 DIAGNOSIS — M17.11 PRIMARY OSTEOARTHRITIS OF RIGHT KNEE: ICD-10-CM

## 2021-01-18 DIAGNOSIS — E11.40 TYPE 2 DIABETES MELLITUS WITH DIABETIC NEUROPATHY, WITH LONG-TERM CURRENT USE OF INSULIN (HCC): ICD-10-CM

## 2021-01-18 DIAGNOSIS — B35.3 TINEA PEDIS OF RIGHT FOOT: ICD-10-CM

## 2021-01-18 DIAGNOSIS — I10 ESSENTIAL HYPERTENSION: ICD-10-CM

## 2021-01-18 DIAGNOSIS — Z79.4 TYPE 2 DIABETES MELLITUS WITH DIABETIC NEUROPATHY, WITH LONG-TERM CURRENT USE OF INSULIN (HCC): Primary | ICD-10-CM

## 2021-01-18 DIAGNOSIS — I48.0 PAROXYSMAL ATRIAL FIBRILLATION (HCC): ICD-10-CM

## 2021-01-18 DIAGNOSIS — I25.119 CORONARY ARTERY DISEASE INVOLVING NATIVE CORONARY ARTERY OF NATIVE HEART WITH ANGINA PECTORIS (HCC): ICD-10-CM

## 2021-01-18 DIAGNOSIS — Z00.00 MEDICARE ANNUAL WELLNESS VISIT, SUBSEQUENT: ICD-10-CM

## 2021-01-18 PROBLEM — E78.49 OTHER HYPERLIPIDEMIA: Status: ACTIVE | Noted: 2017-12-18

## 2021-01-18 LAB
ALBUMIN SERPL BCP-MCNC: 3.8 G/DL (ref 3.5–5)
ALP SERPL-CCNC: 61 U/L (ref 46–116)
ALT SERPL W P-5'-P-CCNC: 17 U/L (ref 12–78)
ANION GAP SERPL CALCULATED.3IONS-SCNC: 4 MMOL/L (ref 4–13)
AST SERPL W P-5'-P-CCNC: 10 U/L (ref 5–45)
BASOPHILS # BLD AUTO: 0.04 THOUSANDS/ΜL (ref 0–0.1)
BASOPHILS NFR BLD AUTO: 1 % (ref 0–1)
BILIRUB SERPL-MCNC: 0.31 MG/DL (ref 0.2–1)
BUN SERPL-MCNC: 14 MG/DL (ref 5–25)
CALCIUM SERPL-MCNC: 9.3 MG/DL (ref 8.3–10.1)
CHLORIDE SERPL-SCNC: 105 MMOL/L (ref 100–108)
CHOLEST SERPL-MCNC: 121 MG/DL (ref 50–200)
CO2 SERPL-SCNC: 27 MMOL/L (ref 21–32)
CREAT SERPL-MCNC: 0.96 MG/DL (ref 0.6–1.3)
CREAT UR-MCNC: 65 MG/DL
EOSINOPHIL # BLD AUTO: 0.54 THOUSAND/ΜL (ref 0–0.61)
EOSINOPHIL NFR BLD AUTO: 8 % (ref 0–6)
ERYTHROCYTE [DISTWIDTH] IN BLOOD BY AUTOMATED COUNT: 17.6 % (ref 11.6–15.1)
EST. AVERAGE GLUCOSE BLD GHB EST-MCNC: 166 MG/DL
GFR SERPL CREATININE-BSD FRML MDRD: 73 ML/MIN/1.73SQ M
GLUCOSE SERPL-MCNC: 266 MG/DL (ref 65–140)
HBA1C MFR BLD: 7.4 %
HCT VFR BLD AUTO: 39.3 % (ref 36.5–49.3)
HDLC SERPL-MCNC: 38 MG/DL
HGB BLD-MCNC: 12.5 G/DL (ref 12–17)
IMM GRANULOCYTES # BLD AUTO: 0.03 THOUSAND/UL (ref 0–0.2)
IMM GRANULOCYTES NFR BLD AUTO: 0 % (ref 0–2)
LDLC SERPL CALC-MCNC: 65 MG/DL (ref 0–100)
LYMPHOCYTES # BLD AUTO: 1.29 THOUSANDS/ΜL (ref 0.6–4.47)
LYMPHOCYTES NFR BLD AUTO: 19 % (ref 14–44)
MCH RBC QN AUTO: 29 PG (ref 26.8–34.3)
MCHC RBC AUTO-ENTMCNC: 31.8 G/DL (ref 31.4–37.4)
MCV RBC AUTO: 91 FL (ref 82–98)
MICROALBUMIN UR-MCNC: 51.7 MG/L (ref 0–20)
MICROALBUMIN/CREAT 24H UR: 80 MG/G CREATININE (ref 0–30)
MONOCYTES # BLD AUTO: 0.67 THOUSAND/ΜL (ref 0.17–1.22)
MONOCYTES NFR BLD AUTO: 10 % (ref 4–12)
NEUTROPHILS # BLD AUTO: 4.31 THOUSANDS/ΜL (ref 1.85–7.62)
NEUTS SEG NFR BLD AUTO: 62 % (ref 43–75)
NONHDLC SERPL-MCNC: 83 MG/DL
NRBC BLD AUTO-RTO: 0 /100 WBCS
PLATELET # BLD AUTO: 213 THOUSANDS/UL (ref 149–390)
PMV BLD AUTO: 10.8 FL (ref 8.9–12.7)
POTASSIUM SERPL-SCNC: 4.6 MMOL/L (ref 3.5–5.3)
PROT SERPL-MCNC: 7.3 G/DL (ref 6.4–8.2)
RBC # BLD AUTO: 4.31 MILLION/UL (ref 3.88–5.62)
SODIUM SERPL-SCNC: 136 MMOL/L (ref 136–145)
TRIGL SERPL-MCNC: 92 MG/DL
TSH SERPL DL<=0.05 MIU/L-ACNC: 1.82 UIU/ML (ref 0.36–3.74)
VIT B12 SERPL-MCNC: 1185 PG/ML (ref 100–900)
WBC # BLD AUTO: 6.88 THOUSAND/UL (ref 4.31–10.16)

## 2021-01-18 PROCEDURE — 80053 COMPREHEN METABOLIC PANEL: CPT

## 2021-01-18 PROCEDURE — 1160F RVW MEDS BY RX/DR IN RCRD: CPT | Performed by: INTERNAL MEDICINE

## 2021-01-18 PROCEDURE — 82043 UR ALBUMIN QUANTITATIVE: CPT

## 2021-01-18 PROCEDURE — 73562 X-RAY EXAM OF KNEE 3: CPT

## 2021-01-18 PROCEDURE — 1125F AMNT PAIN NOTED PAIN PRSNT: CPT | Performed by: INTERNAL MEDICINE

## 2021-01-18 PROCEDURE — 99214 OFFICE O/P EST MOD 30 MIN: CPT | Performed by: INTERNAL MEDICINE

## 2021-01-18 PROCEDURE — 80061 LIPID PANEL: CPT

## 2021-01-18 PROCEDURE — 82607 VITAMIN B-12: CPT

## 2021-01-18 PROCEDURE — 83036 HEMOGLOBIN GLYCOSYLATED A1C: CPT

## 2021-01-18 PROCEDURE — 85025 COMPLETE CBC W/AUTO DIFF WBC: CPT

## 2021-01-18 PROCEDURE — 82570 ASSAY OF URINE CREATININE: CPT

## 2021-01-18 PROCEDURE — 36415 COLL VENOUS BLD VENIPUNCTURE: CPT

## 2021-01-18 PROCEDURE — 84443 ASSAY THYROID STIM HORMONE: CPT

## 2021-01-18 PROCEDURE — G0439 PPPS, SUBSEQ VISIT: HCPCS | Performed by: INTERNAL MEDICINE

## 2021-01-18 PROCEDURE — 1170F FXNL STATUS ASSESSED: CPT | Performed by: INTERNAL MEDICINE

## 2021-01-18 PROCEDURE — 3725F SCREEN DEPRESSION PERFORMED: CPT | Performed by: INTERNAL MEDICINE

## 2021-01-18 RX ORDER — CLOTRIMAZOLE 1 %
CREAM (GRAM) TOPICAL 2 TIMES DAILY
Qty: 30 G | Refills: 0 | Status: SHIPPED | OUTPATIENT
Start: 2021-01-18

## 2021-01-18 NOTE — ASSESSMENT & PLAN NOTE
Patient has coronary disease  Denies any chest pain or shortness of breath at rest or on exertion  Continue simvastatin, Coreg      Patient is on Eliquis, aspirin was discontinued by Cardiology

## 2021-01-18 NOTE — PROGRESS NOTES
Assessment and Plan:     Problem List Items Addressed This Visit     None      Visit Diagnoses     Medicare annual wellness visit, subsequent    -  Primary           Preventive health issues were discussed with patient, and age appropriate screening tests were ordered as noted in patient's After Visit Summary  Personalized health advice and appropriate referrals for health education or preventive services given if needed, as noted in patient's After Visit Summary  History of Present Illness:     Patient presents for Welcome to Medicare visit  Patient Care Team:  Lata Reynoso MD as PCP - General (Internal Medicine)     Review of Systems:     Review of Systems   Respiratory: Negative for cough and shortness of breath  Cardiovascular: Negative for chest pain  Gastrointestinal: Negative for abdominal pain  Genitourinary: Negative for difficulty urinating and hematuria  Psychiatric/Behavioral: Negative for dysphoric mood        Problem List:     Patient Active Problem List   Diagnosis    Coronary artery disease involving native coronary artery of native heart with angina pectoris (HCC)    Chronic combined systolic and diastolic heart failure ([address] )    GERD (gastroesophageal reflux disease)    Essential hypertension    Hyperlipidemia    Complicated UTI (urinary tract infection)    Hypokalemia    History of supraventricular tachycardia    Type 2 diabetes mellitus with diabetic neuropathy, with long-term current use of insulin (HCC)    Benign prostatic hyperplasia with weak urinary stream    Benign prostatic hyperplasia with lower urinary tract symptoms    Iron deficiency anemia due to chronic blood loss    B12 deficiency    Myocardiopathy [address] [address] )    Pulmonary nodule    Obstructive sleep apnea    Cough    Allergic rhinitis    Nonischemic cardiomyopathy (HCC)    Paroxysmal atrial fibrillation (HCC)      Past Medical and Surgical History:     Past Medical History:   Diagnosis Date    Cardiac disease     CHF (congestive heart failure) (HCC)     Diabetes mellitus (HCC)     GERD (gastroesophageal reflux disease)     Hyperlipidemia     Hypertension     MI (myocardial infarction) ([facility] [address] [facility]      Past Surgical History:   Procedure Laterality Date    CT CYSTOGRAM  9/15/2020    EYE SURGERY      TX CYSTOURETHROSCOPY W/IRRIG & EVAC CLOTS N/A 9/14/2020    Procedure: CYSTOSCOPY EVACUATION OF CLOTS; TURP; SUPRAPUBIC CATHETER INSERTION;  Surgeon: Masha Sidhu MD;  Location: [facility];  Service: Urology    TX LAP,DIAGNOSTIC ABDOMEN N/A 9/15/2020    Procedure: EXPLORATORY LAPAROTOMY, closure of cystotomy;  Surgeon: Emil Randall MD;  Location: [facility];  Service: General      Family History:     Family History   Problem Relation Age of Onset    Diabetes Mother     Hypertension Mother     Heart disease Mother     Cancer Father     Hypertension Father     Cancer Sister     Hypertension Sister       Social History:     E-Cigarette/Vaping    E-Cigarette Use Never User      E-Cigarette/Vaping Substances    Nicotine No     THC No     CBD No     Flavoring No     Other No     Unknown No      Social History     Socioeconomic History    Marital status: /Civil Union     Spouse name: None    Number of children: None    Years of education: None    Highest education level: None   Occupational History    None   Social Needs    Financial resource strain: None    Food insecurity     Worry: None     Inability: None    Transportation needs     Medical: None     Non-medical: None   Tobacco Use    Smoking status: Never Smoker    Smokeless tobacco: Never Used   Substance and Sexual Activity    Alcohol use: No     Alcohol/week: 0 0 standard drinks     Frequency: Never     Binge frequency: Never    Drug use: No    Sexual activity: Not Currently   Lifestyle    Physical activity     Days per week: None     Minutes per session: None    Stress: None   Relationships    Social connections Talks on phone: None     Gets together: None     Attends Bahai service: None     Active member of club or organization: None     Attends meetings of clubs or organizations: None     Relationship status: None    Intimate partner violence     Fear of current or ex partner: None     Emotionally abused: None     Physically abused: None     Forced sexual activity: None   Other Topics Concern    None   Social History Narrative    None      Medications and Allergies:     Current Outpatient Medications   Medication Sig Dispense Refill    albuterol (PROVENTIL HFA,VENTOLIN HFA) 90 mcg/act inhaler Inhale 2 puffs every 4 (four) hours as needed for wheezing 1 Inhaler 0    apixaban (ELIQUIS) 5 mg Take 1 tablet (5 mg total) by mouth 2 (two) times a day 180 tablet 4    Blood Glucose Monitoring Suppl (True Metrix Meter) w/Device KIT Use 3 (three) times a day 1 kit 0    carvedilol (COREG) 12 5 mg tablet Take 2 tablets (25 mg total) by mouth 2 (two) times a day with meals 180 tablet 1    Cholecalciferol (VITAMIN D3) 5000 units CAPS Take 1 capsule by mouth      Cyanocobalamin (VITAMIN B12 PO) Take 5,000 mcg by mouth      docusate sodium (COLACE) 100 mg capsule Take 1 capsule (100 mg total) by mouth every 12 (twelve) hours (Patient not taking: Reported on 1/13/2021) 60 capsule 0    famotidine (PEPCID) 40 MG tablet Take 0 5 tablets (20 mg total) by mouth daily (Patient not taking: Reported on 1/13/2021) 30 tablet 5    ferrous sulfate 324 (65 Fe) mg Take 1 tablet (324 mg total) by mouth every other day 30 tablet 3    finasteride (PROSCAR) 5 mg tablet Take 1 tablet (5 mg total) by mouth daily 90 tablet 3    fluticasone (FLONASE) 50 mcg/act nasal spray 1 spray into each nostril 2 (two) times a day 1 Bottle 3    furosemide (LASIX) 40 mg tablet Take 1 tablet (40 mg total) by mouth 2 (two) times a day 60 tablet 3    gabapentin (NEURONTIN) 300 mg capsule Take 300 mg by mouth 2 (two) times a day      glucose blood (True Metrix Blood Glucose Test) test strip Testing three times daily 300 each 3    insulin glargine (LANTUS) 100 units/mL subcutaneous injection Inject 35 Units under the skin daily at bedtime 10 mL 5    irbesartan (AVAPRO) 150 mg tablet Take 1 tablet (150 mg total) by mouth daily at bedtime 30 tablet 5    Lancets (onetouch ultrasoft) lancets Testing 3 times daily 300 each 3    meloxicam (MOBIC) 15 mg tablet Take 15 mg by mouth daily      metFORMIN (GLUCOPHAGE) 1000 MG tablet Take 1 tablet (1,000 mg total) by mouth 2 (two) times a day with meals 60 tablet 5    nitroglycerin (NITROSTAT) 0 4 mg SL tablet Place 1 tablet (0 4 mg total) under the tongue every 5 (five) minutes as needed for chest pain (Patient not taking: Reported on 11/18/2020) 25 tablet 3    potassium chloride (K-DUR,KLOR-CON) 20 mEq tablet Take 1 tablet (20 mEq total) by mouth daily 30 tablet 3    senna (SENOKOT) 8 6 mg Take 1 tablet (8 6 mg total) by mouth 2 (two) times a day (Patient not taking: Reported on 1/13/2021) 20 each 0    simvastatin (ZOCOR) 20 mg tablet Take 1 tablet (20 mg total) by mouth daily at bedtime 30 tablet 5    sitaGLIPtin (JANUVIA) 100 mg tablet Take 1 tablet (100 mg total) by mouth daily 30 tablet 5    tamsulosin (FLOMAX) 0 4 mg Take 1 capsule (0 4 mg total) by mouth daily with dinner 180 capsule 3     No current facility-administered medications for this visit  Allergies   Allergen Reactions    Demerol [Meperidine]       Immunizations:     Immunization History   Administered Date(s) Administered    Influenza, high dose seasonal 0 7 mL 11/03/2020      Health Maintenance: There are no preventive care reminders to display for this patient  Topic Date Due    DTaP,Tdap,and Td Vaccines (1 - Tdap) 06/20/1956    Pneumococcal Vaccine: 65+ Years (1 of 1 - PPSV23) 06/20/2002      Medicare Screening Tests and Risk Assessments:     Merly is here for his Subsequent Wellness visit       Health Risk Assessment: Patient rates overall health as good  Patient feels that their physical health rating is same  Eyesight was rated as slightly worse  Hearing was rated as much worse  Patient feels that their emotional and mental health rating is same  Pain experienced in the last 7 days has been none  Patient states that he has experienced no weight loss or gain in last 6 months  Fall Risk Screening: In the past year, patient has experienced: history of falling in past year    Number of falls: 1  Injured during fall?: Yes    Feels unsteady when standing or walking?: No    Worried about falling?: No      Home Safety:  Patient has trouble with stairs inside or outside of their home  Patient has no working smoke alarms and has no working carbon monoxide detector  Home safety hazards include: none  Nutrition:   Current diet is Diabetic  Pt eats what he wants  Medications:   Patient is currently taking over-the-counter supplements  OTC medications include: see medication list  Patient is able to manage medications  Activities of Daily Living (ADLs)/Instrumental Activities of Daily Living (IADLs):   Walk and transfer into and out of bed and chair?: Yes  Dress and groom yourself?: Yes    Bathe or shower yourself?: Yes    Feed yourself?  Yes  Do your laundry/housekeeping?: No  Manage your money, pay your bills and track your expenses?: Yes  Make your own meals?: No    Do your own shopping?: No    Previous Hospitalizations:   Any hospitalizations or ED visits within the last 12 months?: Yes    How many hospitalizations have you had in the last year?: more than 4    Advance Care Planning:     Advanced directive counseling given: Yes    Five wishes given: Yes    End of Life Decisions reviewed with patient: Yes      Comments: Pt will discuss with family    Cognitive Screening:   Provider or family/friend/caregiver concerned regarding cognition?: Yes  Mini-Cog Score: 5  Interpretation: Mini-Cog Score 3-5: Negative screen for dementia     PREVENTIVE SCREENINGS      Cardiovascular Screening:    General: Screening Not Indicated, History Lipid Disorder and Risks and Benefits Discussed    Due for: Lipid Panel      Diabetes Screening:     General: Screening Not Indicated, History Diabetes and Risks and Benefits Discussed    Due for: Blood Glucose      Colorectal Cancer Screening:     General: Screening Not Indicated      Prostate Cancer Screening:    General: Screening Not Indicated      Lung Cancer Screening:     General: Screening Not Indicated    Other Counseling Topics:   Regular weightbearing exercise  No exam data present     Physical Exam:     /60   Pulse 72   Resp 16   Ht 5' 7" (1 702 m)   Wt 99 8 kg (220 lb)   BMI 34 46 kg/m²     Physical Exam  Cardiovascular:      Rate and Rhythm: Normal rate and regular rhythm  Heart sounds: No murmur  Pulmonary:      Effort: No respiratory distress  Breath sounds: No wheezing  Abdominal:      General: Bowel sounds are normal       Palpations: Abdomen is soft  Tenderness: There is no abdominal tenderness  Neurological:      Mental Status: He is alert and oriented to person, place, and time        Gait: Gait normal    Psychiatric:         Mood and Affect: Mood normal           Hunter Dos Santos MD

## 2021-01-18 NOTE — RESULT ENCOUNTER NOTE
Call patient: Labs Show improving blood sugar control:  Hemoglobin A1c decreased from 7 7-7 4, normal red blood cell counts,

## 2021-01-18 NOTE — ASSESSMENT & PLAN NOTE
Wt Readings from Last 3 Encounters:   01/18/21 99 8 kg (220 lb)   01/13/21 102 kg (224 lb)   12/16/20 99 8 kg (220 lb)       Pt has no evidence of volume overload  Systolic chf is compensated  Continue lasix and avapro

## 2021-01-18 NOTE — PROGRESS NOTES
Assessment/Plan:    Type 2 diabetes mellitus with diabetic neuropathy, with long-term current use of insulin (HCC)    Lab Results   Component Value Date    HGBA1C 7 7 (H) 08/17/2020     Check s BSs bid  Morning #s are good but he doesn't recall readings  PM #s are higher  Will check a1c and microalbumin  Will refer to Ophthalmology for retinal examination    Paroxysmal atrial fibrillation ([facility] [address] )  S/p icd insertion  Rhythm is regular today  Denies signs of bleeding  Continue eliquis and coreg    Chronic combined systolic and diastolic heart failure ([facility] [address] )  Wt Readings from Last 3 Encounters:   01/18/21 99 8 kg (220 lb)   01/13/21 102 kg (224 lb)   12/16/20 99 8 kg (220 lb)       Pt has no evidence of volume overload  Systolic chf is compensated  Continue lasix and avapro      Essential hypertension  BP is controlled  Continue same meds  Will check labs    Primary osteoarthritis of right knee  Will check xr  Refer to ortho'  Suspect pt has DJD    Coronary artery disease involving native coronary artery of native heart with angina pectoris [facility])  Patient has coronary disease  Denies any chest pain or shortness of breath at rest or on exertion  Continue simvastatin, Coreg  Patient is on Eliquis, aspirin was discontinued by Cardiology    B12 deficiency  Patient has history of B12 deficiency, will check B12 level and CBC       Diagnoses and all orders for this visit:    Type 2 diabetes mellitus with diabetic neuropathy, with long-term current use of insulin (HCC)  -     Hemoglobin A1C; Future  -     Microalbumin / creatinine urine ratio; Future  -     Lipid panel; Future  -     Ambulatory Referral to Ophthalmology;  Future    Medicare annual wellness visit, subsequent    Paroxysmal atrial fibrillation (HCC)    Chronic combined systolic and diastolic heart failure (HCC)    B12 deficiency  -     Vitamin B12; Future    Essential hypertension  -     CBC and differential; Future  -     Comprehensive metabolic panel; Future  -     TSH, 3rd generation with Free T4 reflex; Future    Primary osteoarthritis of right knee  -     XR knee 3 vw right non injury; Future    Tinea pedis of right foot  -     clotrimazole (LOTRIMIN) 1 % cream; Apply topically 2 (two) times a day    Bilateral hearing loss, unspecified hearing loss type  -     Ambulatory Referral to Otolaryngology; Future    Coronary artery disease involving native coronary artery of native heart with angina pectoris (HCC)          Subjective:      Patient ID: Chyna Frye is a 82 y o  male  Congestive Heart Failure  Presents for follow-up visit  Pertinent negatives include no abdominal pain, chest pain, edema, orthopnea, palpitations or shortness of breath  The symptoms have been stable  Hypertension  This is a chronic problem  The current episode started more than 1 year ago  The problem has been waxing and waning since onset  Pertinent negatives include no chest pain, headaches, palpitations or shortness of breath  The following portions of the patient's history were reviewed and updated as appropriate: current medications, past family history, past medical history, past social history, past surgical history and problem list     Review of Systems   Constitutional: Negative for fever  HENT: Positive for hearing loss  Negative for congestion  Eyes: Positive for visual disturbance  Respiratory: Negative for cough and shortness of breath  Cardiovascular: Negative for chest pain, palpitations and leg swelling  Gastrointestinal: Negative for abdominal pain, blood in stool and diarrhea  Endocrine: Negative for polydipsia and polyphagia  Genitourinary: Negative for difficulty urinating and hematuria  Musculoskeletal: Positive for joint swelling  Intermittent right knee pain   Skin: Positive for rash (Rash between his toes)  Neurological: Negative for weakness and headaches  Psychiatric/Behavioral: Negative for dysphoric mood     All other systems reviewed and are negative  Objective:    /60   Pulse 72   Resp 16   Ht 5' 7" (1 702 m)   Wt 99 8 kg (220 lb)   BMI 34 46 kg/m²      Physical Exam  HENT:      Head: Normocephalic  Right Ear: There is no impacted cerumen  Left Ear: There is no impacted cerumen  Eyes:      Conjunctiva/sclera: Conjunctivae normal    Neck:      Musculoskeletal: Neck supple  Cardiovascular:      Rate and Rhythm: Normal rate and regular rhythm  Pulses: no weak pulses          Posterior tibial pulses are 2+ on the right side and 2+ on the left side  Heart sounds: No murmur  Pulmonary:      Effort: No respiratory distress  Breath sounds: No wheezing or rales  Abdominal:      General: Bowel sounds are normal       Tenderness: There is no abdominal tenderness  Musculoskeletal:         General: Deformity (Bony hypertrophy is present of the right knee with crepitations on range of motion) present  No tenderness  Feet:      Right foot:      Skin integrity: Skin breakdown present  No ulcer  Left foot:      Skin integrity: No ulcer  Skin:     General: Skin is warm and dry  Comments: Patient has no evidence of cellulitis at the left infraclavicular area where the ICD implantation occurred   Neurological:      Mental Status: He is alert and oriented to person, place, and time  Cranial Nerves: No cranial nerve deficit  Psychiatric:         Mood and Affect: Mood normal            Diabetic Foot Exam    Patient's shoes and socks removed  Right Foot/Ankle   Right Foot Inspection  Skin Exam: maceration no ulcer                            Sensory       Monofilament testing: intact  Vascular    The right PT pulse is 2+  Left Foot/Ankle  Left Foot Inspection  Skin Exam: no ulcer                                         Sensory       Monofilament: intact  Vascular    The left PT pulse is 2+  Assign Risk Category:  No deformity present;  No loss of protective sensation; No weak pulses       Risk: 0      BMI Counseling: Body mass index is 34 46 kg/m²  The BMI is above normal  Nutrition recommendations include decreasing portion sizes  Depression Screening and Follow-up Plan: Patient's depression screening was positive with a PHQ-2 score of 0  Clincally patient does not have depression  No treatment is required  Falls Plan of Care: balance, strength, and gait training instructions were provided  Medications that increase falls were reviewed  Cognitive screening performed  Referral to ophthalmology was provided         Anni Li MD

## 2021-01-18 NOTE — RESULT ENCOUNTER NOTE
Call patient: Arnold Boss normal electrolytes, stable kidney function, blood sugar was very high at 266, liver function tests are normal   I am asking patient to make sure that he takes his insulins

## 2021-01-18 NOTE — ASSESSMENT & PLAN NOTE
Lab Results   Component Value Date    HGBA1C 7 7 (H) 08/17/2020     Check s BSs bid  Morning #s are good but he doesn't recall readings  PM #s are higher  Will check a1c and microalbumin  Will refer to Ophthalmology for retinal examination

## 2021-01-18 NOTE — PATIENT INSTRUCTIONS
Medicare Preventive Visit Patient Instructions  Thank you for completing your Welcome to Medicare Visit or Medicare Annual Wellness Visit today  Your next wellness visit will be due in one year (1/18/2022)  The screening/preventive services that you may require over the next 5-10 years are detailed below  Some tests may not apply to you based off risk factors and/or age  Screening tests ordered at today's visit but not completed yet may show as past due  Also, please note that scanned in results may not display below  Preventive Screenings:  Service Recommendations Previous Testing/Comments   Colorectal Cancer Screening  · Colonoscopy    · Fecal Occult Blood Test (FOBT)/Fecal Immunochemical Test (FIT)  · Fecal DNA/Cologuard Test  · Flexible Sigmoidoscopy Age: 54-65 years old   Colonoscopy: every 10 years (May be performed more frequently if at higher risk)  OR  FOBT/FIT: every 1 year  OR  Cologuard: every 3 years  OR  Sigmoidoscopy: every 5 years  Screening may be recommended earlier than age 48 if at higher risk for colorectal cancer  Also, an individualized decision between you and your healthcare provider will decide whether screening between the ages of 74-80 would be appropriate   Colonoscopy: Not on file  FOBT/FIT: Not on file  Cologuard: Not on file  Sigmoidoscopy: Not on file         Prostate Cancer Screening Individualized decision between patient and health care provider in men between ages of 53-78   Medicare will cover every 12 months beginning on the day after your 50th birthday PSA: No results in last 5 years     Screening Not Indicated     Hepatitis C Screening Once for adults born between [facility]  More frequently in patients at high risk for Hepatitis C Hep C Antibody: Not on file       Diabetes Screening 1-2 times per year if you're at risk for diabetes or have pre-diabetes Fasting glucose: 124 mg/dL   A1C: 7 7 %    Screening Not Indicated  History Diabetes   Cholesterol Screening Once every 5 years if you don't have a lipid disorder  May order more often based on risk factors  Lipid panel: Not on file    Screening Not Indicated  History Lipid Disorder      Other Preventive Screenings Covered by Medicare:  1  Abdominal Aortic Aneurysm (AAA) Screening: covered once if your at risk  You're considered to be at risk if you have a family history of AAA or a male between the age of 73-68 who smoking at least 100 cigarettes in your lifetime  2  Lung Cancer Screening: covers low dose CT scan once per year if you meet all of the following conditions: (1) Age 50-69; (2) No signs or symptoms of lung cancer; (3) Current smoker or have quit smoking within the last 15 years; (4) You have a tobacco smoking history of at least 30 pack years (packs per day x number of years you smoked); (5) You get a written order from a healthcare provider  3  Glaucoma Screening: covered annually if you're considered high risk: (1) You have diabetes OR (2) Family history of glaucoma OR (3)  aged 48 and older OR (3)  American aged 72 and older  3  Osteoporosis Screening: covered every 2 years if you meet one of the following conditions: (1) Have a vertebral abnormality; (2) On glucocorticoid therapy for more than 3 months; (3) Have primary hyperparathyroidism; (4) On osteoporosis medications and need to assess response to drug therapy  5  HIV Screening: covered annually if you're between the age of 12-76  Also covered annually if you are younger than 13 and older than 72 with risk factors for HIV infection  For pregnant patients, it is covered up to 3 times per pregnancy      Immunizations:  Immunization Recommendations   Influenza Vaccine Annual influenza vaccination during flu season is recommended for all persons aged >= 6 months who do not have contraindications   Pneumococcal Vaccine (Prevnar and Pneumovax)  * Prevnar = PCV13  * Pneumovax = PPSV23 Adults 25-60 years old: 1-3 doses may be recommended based on certain risk factors  Adults 72 years old: Prevnar (PCV13) vaccine recommended followed by Pneumovax (PPSV23) vaccine  If already received PPSV23 since turning 65, then PCV13 recommended at least one year after PPSV23 dose  Hepatitis B Vaccine 3 dose series if at intermediate or high risk (ex: diabetes, end stage renal disease, liver disease)   Tetanus (Td) Vaccine - COST NOT COVERED BY MEDICARE PART B Following completion of primary series, a booster dose should be given every 10 years to maintain immunity against tetanus  Td may also be given as tetanus wound prophylaxis  Tdap Vaccine - COST NOT COVERED BY MEDICARE PART B Recommended at least once for all adults  For pregnant patients, recommended with each pregnancy  Shingles Vaccine (Shingrix) - COST NOT COVERED BY MEDICARE PART B  2 shot series recommended in those aged 48 and above     Health Maintenance Due:  There are no preventive care reminders to display for this patient  Immunizations Due:      Topic Date Due    DTaP,Tdap,and Td Vaccines (1 - Tdap) 06/20/1956    Pneumococcal Vaccine: 65+ Years (1 of 1 - PPSV23) 06/20/2002     Advance Directives   What are advance directives? Advance directives are legal documents that state your wishes and plans for medical care  These plans are made ahead of time in case you lose your ability to make decisions for yourself  Advance directives can apply to any medical decision, such as the treatments you want, and if you want to donate organs  What are the types of advance directives? There are many types of advance directives, and each state has rules about how to use them  You may choose a combination of any of the following:  · Living will: This is a written record of the treatment you want  You can also choose which treatments you do not want, which to limit, and which to stop at a certain time  This includes surgery, medicine, IV fluid, and tube feedings     · Durable power of  for [facility]): This is a written record that states who you want to make healthcare choices for you when you are unable to make them for yourself  This person, called a proxy, is usually a family member or a friend  You may choose more than 1 proxy  · Do not resuscitate (DNR) order:  A DNR order is used in case your heart stops beating or you stop breathing  It is a request not to have certain forms of treatment, such as CPR  A DNR order may be included in other types of advance directives  · Medical directive: This covers the care that you want if you are in a coma, near death, or unable to make decisions for yourself  You can list the treatments you want for each condition  Treatment may include pain medicine, surgery, blood transfusions, dialysis, IV or tube feedings, and a ventilator (breathing machine)  · Values history: This document has questions about your views, beliefs, and how you feel and think about life  This information can help others choose the care that you would choose  Why are advance directives important? An advance directive helps you control your care  Although spoken wishes may be used, it is better to have your wishes written down  Spoken wishes can be misunderstood, or not followed  Treatments may be given even if you do not want them  An advance directive may make it easier for your family to make difficult choices about your care  Fall Prevention    Fall prevention  includes ways to make your home and other areas safer  It also includes ways you can move more carefully to prevent a fall  Health conditions that cause changes in your blood pressure, vision, or muscle strength and coordination may increase your risk for falls  Medicines may also increase your risk for falls if they make you dizzy, weak, or sleepy  Fall prevention tips:   · Stand or sit up slowly  · Use assistive devices as directed  · Wear shoes that fit well and have soles that       · Wear a personal alarm  · Stay active  · Manage your medical conditions  Home Safety Tips:  · Add items to prevent falls in the bathroom  · Keep paths clear  · Install bright lights in your home  · Keep items you use often on shelves within reach  · Paint or place reflective tape on the edges of your stairs  Weight Management   Why it is important to manage your weight:  Being overweight increases your risk of health conditions such as heart disease, high blood pressure, type 2 diabetes, and certain types of cancer  It can also increase your risk for osteoarthritis, sleep apnea, and other respiratory problems  Aim for a slow, steady weight loss  Even a small amount of weight loss can lower your risk of health problems  How to lose weight safely:  A safe and healthy way to lose weight is to eat fewer calories and get regular exercise  You can lose up about 1 pound a week by decreasing the number of calories you eat by 500 calories each day  Healthy meal plan for weight management:  A healthy meal plan includes a variety of foods, contains fewer calories, and helps you stay healthy  A healthy meal plan includes the following:  · Eat whole-grain foods more often  A healthy meal plan should contain fiber  Fiber is the part of grains, fruits, and vegetables that is not broken down by your body  Whole-grain foods are healthy and provide extra fiber in your diet  Some examples of whole-grain foods are whole-wheat breads and pastas, oatmeal, brown rice, and bulgur  · Eat a variety of vegetables every day  Include dark, leafy greens such as spinach, kale, gretchen greens, and mustard greens  Eat yellow and orange vegetables such as carrots, sweet potatoes, and winter squash  · Eat a variety of fruits every day  Choose fresh or canned fruit (canned in its own juice or light syrup) instead of juice  Fruit juice has very little or no fiber  · Eat low-fat dairy foods    Drink fat-free (skim) milk or 1% milk  Eat fat-free yogurt and low-fat cottage cheese  Try low-fat cheeses such as mozzarella and other reduced-fat cheeses  · Choose meat and other protein foods that are low in fat  Choose beans or other legumes such as split peas or lentils  Choose fish, skinless poultry (chicken or turkey), or lean cuts of red meat (beef or pork)  Before you cook meat or poultry, cut off any visible fat  · Use less fat and oil  Try baking foods instead of frying them  Add less fat, such as margarine, sour cream, regular salad dressing and mayonnaise to foods  Eat fewer high-fat foods  Some examples of high-fat foods include french fries, doughnuts, ice cream, and cakes  · Eat fewer sweets  Limit foods and drinks that are high in sugar  This includes candy, cookies, regular soda, and sweetened drinks  Exercise:  Exercise at least 30 minutes per day on most days of the week  Some examples of exercise include walking, biking, dancing, and swimming  You can also fit in more physical activity by taking the stairs instead of the elevator or parking farther away from stores  Ask your healthcare provider about the best exercise plan for you  © Copyright [employer] 2018 Information is for End User's use only and may not be sold, redistributed or otherwise used for commercial purposes  All illustrations and images included in CareNotes® are the copyrighted property of [employer]  or [facility] Preventive Visit Patient Instructions  Thank you for completing your Welcome to Medicare Visit or Medicare Annual Wellness Visit today  Your next wellness visit will be due in one year (1/18/2022)  The screening/preventive services that you may require over the next 5-10 years are detailed below  Some tests may not apply to you based off risk factors and/or age  Screening tests ordered at today's visit but not completed yet may show as past due   Also, please note that scanned in results may not display below   Preventive Screenings:  Service Recommendations Previous Testing/Comments   Colorectal Cancer Screening  · Colonoscopy    · Fecal Occult Blood Test (FOBT)/Fecal Immunochemical Test (FIT)  · Fecal DNA/Cologuard Test  · Flexible Sigmoidoscopy Age: 54-65 years old   Colonoscopy: every 10 years (May be performed more frequently if at higher risk)  OR  FOBT/FIT: every 1 year  OR  Cologuard: every 3 years  OR  Sigmoidoscopy: every 5 years  Screening may be recommended earlier than age 48 if at higher risk for colorectal cancer  Also, an individualized decision between you and your healthcare provider will decide whether screening between the ages of 74-80 would be appropriate  Colonoscopy: Not on file  FOBT/FIT: Not on file  Cologuard: Not on file  Sigmoidoscopy: Not on file         Prostate Cancer Screening Individualized decision between patient and health care provider in men between ages of 53-78   Medicare will cover every 12 months beginning on the day after your 50th birthday PSA: No results in last 5 years     Screening Not Indicated     Hepatitis C Screening Once for adults born between [facility]  More frequently in patients at high risk for Hepatitis C Hep C Antibody: Not on file       Diabetes Screening 1-2 times per year if you're at risk for diabetes or have pre-diabetes Fasting glucose: 124 mg/dL   A1C: 7 7 %    Screening Not Indicated  History Diabetes   Cholesterol Screening Once every 5 years if you don't have a lipid disorder  May order more often based on risk factors  Lipid panel: Not on file    Screening Not Indicated  History Lipid Disorder      Other Preventive Screenings Covered by Medicare:  6  Abdominal Aortic Aneurysm (AAA) Screening: covered once if your at risk  You're considered to be at risk if you have a family history of AAA or a male between the age of 73-68 who smoking at least 100 cigarettes in your lifetime    7  Lung Cancer Screening: covers low dose CT scan once per year if you meet all of the following conditions: (1) Age 50-69; (2) No signs or symptoms of lung cancer; (3) Current smoker or have quit smoking within the last 15 years; (4) You have a tobacco smoking history of at least 30 pack years (packs per day x number of years you smoked); (5) You get a written order from a healthcare provider  8  Glaucoma Screening: covered annually if you're considered high risk: (1) You have diabetes OR (2) Family history of glaucoma OR (3)  aged 48 and older OR (3)  American aged 72 and older  5  Osteoporosis Screening: covered every 2 years if you meet one of the following conditions: (1) Have a vertebral abnormality; (2) On glucocorticoid therapy for more than 3 months; (3) Have primary hyperparathyroidism; (4) On osteoporosis medications and need to assess response to drug therapy  10  HIV Screening: covered annually if you're between the age of 12-76  Also covered annually if you are younger than 13 and older than 72 with risk factors for HIV infection  For pregnant patients, it is covered up to 3 times per pregnancy  Immunizations:  Immunization Recommendations   Influenza Vaccine Annual influenza vaccination during flu season is recommended for all persons aged >= 6 months who do not have contraindications   Pneumococcal Vaccine (Prevnar and Pneumovax)  * Prevnar = PCV13  * Pneumovax = PPSV23 Adults 25-60 years old: 1-3 doses may be recommended based on certain risk factors  Adults 72 years old: Prevnar (PCV13) vaccine recommended followed by Pneumovax (PPSV23) vaccine  If already received PPSV23 since turning 65, then PCV13 recommended at least one year after PPSV23 dose     Hepatitis B Vaccine 3 dose series if at intermediate or high risk (ex: diabetes, end stage renal disease, liver disease)   Tetanus (Td) Vaccine - COST NOT COVERED BY MEDICARE PART B Following completion of primary series, a booster dose should be given every 10 years to maintain immunity against tetanus  Td may also be given as tetanus wound prophylaxis  Tdap Vaccine - COST NOT COVERED BY MEDICARE PART B Recommended at least once for all adults  For pregnant patients, recommended with each pregnancy  Shingles Vaccine (Shingrix) - COST NOT COVERED BY MEDICARE PART B  2 shot series recommended in those aged 48 and above     Health Maintenance Due:  There are no preventive care reminders to display for this patient  Immunizations Due:      Topic Date Due    DTaP,Tdap,and Td Vaccines (1 - Tdap) 06[birth year]20/1958    Pneumococcal Vaccine: 65+ Years (1 of 1 - PPSV23) 06/20/2002     Advance Directives   What are advance directives? Advance directives are legal documents that state your wishes and plans for medical care  These plans are made ahead of time in case you lose your ability to make decisions for yourself  Advance directives can apply to any medical decision, such as the treatments you want, and if you want to donate organs  What are the types of advance directives? There are many types of advance directives, and each state has rules about how to use them  You may choose a combination of any of the following:  · Living will: This is a written record of the treatment you want  You can also choose which treatments you do not want, which to limit, and which to stop at a certain time  This includes surgery, medicine, IV fluid, and tube feedings  · Durable power of  for healthcare [facility]): This is a written record that states who you want to make healthcare choices for you when you are unable to make them for yourself  This person, called a proxy, is usually a family member or a friend  You may choose more than 1 proxy  · Do not resuscitate (DNR) order:  A DNR order is used in case your heart stops beating or you stop breathing  It is a request not to have certain forms of treatment, such as CPR  A DNR order may be included in other types of advance directives    · Medical directive: This covers the care that you want if you are in a coma, near death, or unable to make decisions for yourself  You can list the treatments you want for each condition  Treatment may include pain medicine, surgery, blood transfusions, dialysis, IV or tube feedings, and a ventilator (breathing machine)  · Values history: This document has questions about your views, beliefs, and how you feel and think about life  This information can help others choose the care that you would choose  Why are advance directives important? An advance directive helps you control your care  Although spoken wishes may be used, it is better to have your wishes written down  Spoken wishes can be misunderstood, or not followed  Treatments may be given even if you do not want them  An advance directive may make it easier for your family to make difficult choices about your care  Fall Prevention    Fall prevention  includes ways to make your home and other areas safer  It also includes ways you can move more carefully to prevent a fall  Health conditions that cause changes in your blood pressure, vision, or muscle strength and coordination may increase your risk for falls  Medicines may also increase your risk for falls if they make you dizzy, weak, or sleepy  Fall prevention tips:   · Stand or sit up slowly  · Use assistive devices as directed  · Wear shoes that fit well and have soles that   · Wear a personal alarm  · Stay active  · Manage your medical conditions  Home Safety Tips:  · Add items to prevent falls in the bathroom  · Keep paths clear  · Install bright lights in your home  · Keep items you use often on shelves within reach  · Paint or place reflective tape on the edges of your stairs      Weight Management   Why it is important to manage your weight:  Being overweight increases your risk of health conditions such as heart disease, high blood pressure, type 2 diabetes, and certain types of cancer  It can also increase your risk for osteoarthritis, sleep apnea, and other respiratory problems  Aim for a slow, steady weight loss  Even a small amount of weight loss can lower your risk of health problems  How to lose weight safely:  A safe and healthy way to lose weight is to eat fewer calories and get regular exercise  You can lose up about 1 pound a week by decreasing the number of calories you eat by 500 calories each day  Healthy meal plan for weight management:  A healthy meal plan includes a variety of foods, contains fewer calories, and helps you stay healthy  A healthy meal plan includes the following:  · Eat whole-grain foods more often  A healthy meal plan should contain fiber  Fiber is the part of grains, fruits, and vegetables that is not broken down by your body  Whole-grain foods are healthy and provide extra fiber in your diet  Some examples of whole-grain foods are whole-wheat breads and pastas, oatmeal, brown rice, and bulgur  · Eat a variety of vegetables every day  Include dark, leafy greens such as spinach, kale, gretchen greens, and mustard greens  Eat yellow and orange vegetables such as carrots, sweet potatoes, and winter squash  · Eat a variety of fruits every day  Choose fresh or canned fruit (canned in its own juice or light syrup) instead of juice  Fruit juice has very little or no fiber  · Eat low-fat dairy foods  Drink fat-free (skim) milk or 1% milk  Eat fat-free yogurt and low-fat cottage cheese  Try low-fat cheeses such as mozzarella and other reduced-fat cheeses  · Choose meat and other protein foods that are low in fat  Choose beans or other legumes such as split peas or lentils  Choose fish, skinless poultry (chicken or turkey), or lean cuts of red meat (beef or pork)  Before you cook meat or poultry, cut off any visible fat  · Use less fat and oil  Try baking foods instead of frying them   Add less fat, such as margarine, sour cream, regular salad dressing and mayonnaise to foods  Eat fewer high-fat foods  Some examples of high-fat foods include french fries, doughnuts, ice cream, and cakes  · Eat fewer sweets  Limit foods and drinks that are high in sugar  This includes candy, cookies, regular soda, and sweetened drinks  Exercise:  Exercise at least 30 minutes per day on most days of the week  Some examples of exercise include walking, biking, dancing, and swimming  You can also fit in more physical activity by taking the stairs instead of the elevator or parking farther away from stores  Ask your healthcare provider about the best exercise plan for you  © Copyright [employer] 2018 Information is for End User's use only and may not be sold, redistributed or otherwise used for commercial purposes   All illustrations and images included in CareNotes® are the copyrighted property of A D A M [employer]  or [address] [place] [address]

## 2021-01-22 DIAGNOSIS — M17.11 OSTEOARTHRITIS OF RIGHT KNEE, UNSPECIFIED OSTEOARTHRITIS TYPE: Primary | ICD-10-CM

## 2021-01-28 ENCOUNTER — CONSULT (OUTPATIENT)
Dept: OBGYN CLINIC | Facility: CLINIC | Age: 86
End: 2021-01-28
Payer: COMMERCIAL

## 2021-01-28 ENCOUNTER — APPOINTMENT (OUTPATIENT)
Dept: RADIOLOGY | Facility: CLINIC | Age: 86
End: 2021-01-28
Payer: COMMERCIAL

## 2021-01-28 VITALS
HEIGHT: 65 IN | SYSTOLIC BLOOD PRESSURE: 118 MMHG | WEIGHT: 220 LBS | BODY MASS INDEX: 36.65 KG/M2 | TEMPERATURE: 96.8 F | DIASTOLIC BLOOD PRESSURE: 78 MMHG

## 2021-01-28 DIAGNOSIS — M17.12 ARTHRITIS OF LEFT KNEE: ICD-10-CM

## 2021-01-28 DIAGNOSIS — M17.11 OSTEOARTHRITIS OF RIGHT KNEE, UNSPECIFIED OSTEOARTHRITIS TYPE: Primary | ICD-10-CM

## 2021-01-28 DIAGNOSIS — M25.562 LEFT KNEE PAIN, UNSPECIFIED CHRONICITY: ICD-10-CM

## 2021-01-28 PROCEDURE — 20610 DRAIN/INJ JOINT/BURSA W/O US: CPT | Performed by: ORTHOPAEDIC SURGERY

## 2021-01-28 PROCEDURE — 73564 X-RAY EXAM KNEE 4 OR MORE: CPT

## 2021-01-28 PROCEDURE — 3078F DIAST BP <80 MM HG: CPT | Performed by: ORTHOPAEDIC SURGERY

## 2021-01-28 PROCEDURE — 99203 OFFICE O/P NEW LOW 30 MIN: CPT | Performed by: ORTHOPAEDIC SURGERY

## 2021-01-28 PROCEDURE — 3074F SYST BP LT 130 MM HG: CPT | Performed by: ORTHOPAEDIC SURGERY

## 2021-01-28 PROCEDURE — 1160F RVW MEDS BY RX/DR IN RCRD: CPT | Performed by: ORTHOPAEDIC SURGERY

## 2021-01-28 PROCEDURE — 1036F TOBACCO NON-USER: CPT | Performed by: ORTHOPAEDIC SURGERY

## 2021-01-28 RX ORDER — BUPIVACAINE HYDROCHLORIDE 2.5 MG/ML
4 INJECTION, SOLUTION INFILTRATION; PERINEURAL
Status: COMPLETED | OUTPATIENT
Start: 2021-01-28 | End: 2021-01-28

## 2021-01-28 RX ORDER — METHYLPREDNISOLONE ACETATE 40 MG/ML
1 INJECTION, SUSPENSION INTRA-ARTICULAR; INTRALESIONAL; INTRAMUSCULAR; SOFT TISSUE
Status: COMPLETED | OUTPATIENT
Start: 2021-01-28 | End: 2021-01-28

## 2021-01-28 RX ADMIN — METHYLPREDNISOLONE ACETATE 1 ML: 40 INJECTION, SUSPENSION INTRA-ARTICULAR; INTRALESIONAL; INTRAMUSCULAR; SOFT TISSUE at 12:55

## 2021-01-28 RX ADMIN — BUPIVACAINE HYDROCHLORIDE 4 ML: 2.5 INJECTION, SOLUTION INFILTRATION; PERINEURAL at 12:55

## 2021-01-28 NOTE — PROGRESS NOTES
Orthopaedic Surgery Note    CC: Right/Left Knee Pain      HPI:  Mr Merly Inteirano is a 82 y o male with a history of hypertension, diabetes, presents to the office with his son today, complains of right knee pain for 6 weeks, and left knee clicking  He has history of a fall and a cut to his right knee years ago per his son's report  He speaks Senegalese only  His son serves as an   The patient has aching pain, 7-8/10 in right knee, increased with walking  Patient has not tried any Tylenol or NSAIDs, heat or ice pad  No Hx of steroids injection to the knee  Pain associated with stiffness in the morning  Pain occasionally wakes him up at night  Denies any fever, chills, swelling of knees           ALLERGIES:  Allergies   Allergen Reactions    Demerol [Meperidine]        CURRENT MEDICATIONS:  Current Outpatient Medications   Medication Sig Dispense Refill    albuterol (PROVENTIL HFA,VENTOLIN HFA) 90 mcg/act inhaler Inhale 2 puffs every 4 (four) hours as needed for wheezing 1 Inhaler 0    apixaban (ELIQUIS) 5 mg Take 1 tablet (5 mg total) by mouth 2 (two) times a day 180 tablet 4    Blood Glucose Monitoring Suppl (True Metrix Meter) w/Device KIT Use 3 (three) times a day 1 kit 0    carvedilol (COREG) 12 5 mg tablet Take 2 tablets (25 mg total) by mouth 2 (two) times a day with meals 180 tablet 1    Cholecalciferol (VITAMIN D3) 5000 units CAPS Take 1 capsule by mouth      clotrimazole (LOTRIMIN) 1 % cream Apply topically 2 (two) times a day 30 g 0    Cyanocobalamin (VITAMIN B12 PO) Take 5,000 mcg by mouth      docusate sodium (COLACE) 100 mg capsule Take 1 capsule (100 mg total) by mouth every 12 (twelve) hours 60 capsule 0    famotidine (PEPCID) 40 MG tablet Take 0 5 tablets (20 mg total) by mouth daily 30 tablet 5    ferrous sulfate 324 (65 Fe) mg Take 1 tablet (324 mg total) by mouth every other day 30 tablet 3    finasteride (PROSCAR) 5 mg tablet Take 1 tablet (5 mg total) by mouth daily 90 tablet 3    fluticasone (FLONASE) 50 mcg/act nasal spray 1 spray into each nostril 2 (two) times a day 1 Bottle 3    furosemide (LASIX) 40 mg tablet Take 1 tablet (40 mg total) by mouth 2 (two) times a day 60 tablet 3    gabapentin (NEURONTIN) 300 mg capsule Take 300 mg by mouth 2 (two) times a day      glucose blood (True Metrix Blood Glucose Test) test strip Testing three times daily 300 each 3    insulin glargine (LANTUS) 100 units/mL subcutaneous injection Inject 35 Units under the skin daily at bedtime 10 mL 5    irbesartan (AVAPRO) 150 mg tablet Take 1 tablet (150 mg total) by mouth daily at bedtime 30 tablet 5    Lancets (onetouch ultrasoft) lancets Testing 3 times daily 300 each 3    meloxicam (MOBIC) 15 mg tablet Take 15 mg by mouth daily      metFORMIN (GLUCOPHAGE) 1000 MG tablet Take 1 tablet (1,000 mg total) by mouth 2 (two) times a day with meals 60 tablet 5    nitroglycerin (NITROSTAT) 0 4 mg SL tablet Place 1 tablet (0 4 mg total) under the tongue every 5 (five) minutes as needed for chest pain 25 tablet 3    potassium chloride (K-DUR,KLOR-CON) 20 mEq tablet Take 1 tablet (20 mEq total) by mouth daily 30 tablet 3    senna (SENOKOT) 8 6 mg Take 1 tablet (8 6 mg total) by mouth 2 (two) times a day 20 each 0    simvastatin (ZOCOR) 20 mg tablet Take 1 tablet (20 mg total) by mouth daily at bedtime 30 tablet 5    sitaGLIPtin (JANUVIA) 100 mg tablet Take 1 tablet (100 mg total) by mouth daily 30 tablet 5    tamsulosin (FLOMAX) 0 4 mg Take 1 capsule (0 4 mg total) by mouth daily with dinner 180 capsule 3     No current facility-administered medications for this visit          PAST MEDICAL HISTORY  Past Medical History:   Diagnosis Date    Cardiac disease     [facility]     Diabetes mellitus (HCC)     GERD (gastroesophageal reflux disease)     Hyperlipidemia     Hypertension     [facility] ([facility] )        SURGICAL HISTORY  Past Surgical History: Procedure Laterality Date    CT CYSTOGRAM  9/15/2020    EYE SURGERY      [place] CYSTOURETHROSCOPY W/IRRIG & EVAC CLOTS N/A 9/14/2020    Procedure: CYSTOSCOPY EVACUATION OF CLOTS; TURP; SUPRAPUBIC CATHETER INSERTION;  Surgeon: Del Olvera MD;  [facility];  Service: Urology    [place] LAP,DIAGNOSTIC ABDOMEN N/A 9/15/2020    Procedure: EXPLORATORY LAPAROTOMY, closure of cystotomy;  Surgeon: Deana Hampton MD;  [facility];  Service: General       FAMILY HISTORY  Family History   Problem Relation Age of Onset    Diabetes Mother     Hypertension Mother     Heart disease Mother     Cancer Father     Hypertension Father     Cancer Sister     Hypertension Sister        SOCIAL HISTORY  Social History     Socioeconomic History    Marital status: /Civil Union     Spouse name: Not on file    Number of children: Not on file    Years of education: Not on file    Highest education level: Not on file   Occupational History    Not on file   Social Needs    Financial resource strain: Not on file    Food insecurity     Worry: Not on file     Inability: Not on file    Transportation needs     Medical: Not on file     Non-medical: Not on file   Tobacco Use    Smoking status: Never Smoker    Smokeless tobacco: Never Used   Substance and Sexual Activity    Alcohol use: No     Alcohol/week: 0 0 standard drinks     Frequency: Never     Binge frequency: Never    Drug use: No    Sexual activity: Not Currently   Lifestyle    Physical activity     Days per week: Not on file     Minutes per session: Not on file    Stress: Not on file   Relationships    Social connections     Talks on phone: Not on file     Gets together: Not on file     Attends [employer] service: Not on file     Active member of club or organization: Not on file     Attends meetings of clubs or organizations: Not on file     Relationship status: Not on file    Intimate partner violence     Fear of current or ex partner: Not on file     Emotionally abused: Not on file     Physically abused: Not on file     Forced sexual activity: Not on file   Other Topics Concern    Not on file   Social History Narrative    Not on file         Review of Systems   Metal Allergy: no  History of MRSA: no  History of DVT or PE: no  Active dental issues: no  Anesthesia complications: no    Patient indicated positive for joint pain  Otherwise negative except per above and HPI  Physical Exam    Vitals  Vitals:    01/28/21 1004   BP: 118/78   Temp: (!) 96 8 °F (36 °C)       BMI  Body mass index is 36 61 kg/m²  GENERAL: No acute distress  Alert and oriented  Well nourished and well hydrated  Appears stated age  HEENT : Normocephalic, atraumatic  Extraocular movements intact  Mask in place  NECK: Supple, trachea midline  LUNGS: Adequate and symmetric respiratory effort  No intercostal retractions or accessory muscle use  HEART: Extremities warm and perfused  ABDOMEN: Nondistended  SKIN: Warm and dry, no rash  Bilateral Knee   Inspection/Appearance:       Swelling: No      Patella is midline  Alignment:  Knee is in mild varus  Palpation - Soft Tissue: normal without effusion  ROM:       Extension - 0          Flexion - 110      extensor lag: no    Stability:  demonstrates no varus, valgus, anterior drawer or posterior drawer  Patella: stable, tracks normally  Sensation Intact to Light Touch in Sural, Saphenous, Tibial, Superficial Peroneal, and Deep Peroneal Nerve Distribution  Motor function 5 out of 5 strength in Tibialis Anterior, Gastrocnemius, Soleus, Extensor Hallucis Longus, and Flexor Hallucis Longus Muscles  Extremity Warm and Well Perfused  Brisk Capillary Refill in Toes           Imaging  A) Imaging modality available  Radiographs: yes  MRI scan: no  CT scan: no    B) Imaging findings  Subchondral cysts: no  Subchondral sclerosis: yes  Periarticular osteophytes: yes  Joint subluxation: no  Joint space narrowing: yes  Bone-on-bone articulations: yes  Avascular necrosis: no      Assessment and Plan  Bilateral Knee Arthritis      Reviewed nonop mgmt of knee OA as below  Recommend PT and CSI  Knee Pain / Arthritis Treatment Recommendations    Strengthening Exercises  10 repetitions each leg Monday, Wednesday, Friday OR Tuesday, Thursday, Saturday  Increase 10 repetitions per week  1  Straight leg raises (SLR)  2  VMO Modification SLR - (Rotate hip out externally) Do if SLR becomes easy    Exercise - 5 minutes per day Monday, Wednesday, Friday OR Tuesday, Thursday, Saturday  Increase 5 minutes per day per week  May use bike (non-impact) or walk (impact) or swim or alternate  Goal is to get up to at least 30 minutes per day  1  Bicycle  2  Walking    Weight loss   Goal to lose 1 pound per week  Portion control, limit sweets, limit carbs    Medications  Anti-inflammatories (NSAIDs)  1  Ibuprofen (Motrin or Advil) 600 mg (3 pills) with food 3 times a day for 3 - 7 days  OR  2  Naprosyn (Aleve) 1 - 2 pills twice a day with food for 3 - 7 days  Stop if you develop upset stomach or bleeding occurs  We discussed that scheduled NSAIDs for greater than 1 week should be discussed with PCP to monitor for potential side effects  Pain reliever  1  Acetaminophen (Tylenol) 2 pills (regular or extra-strength) 3 times a day for 3 - 7 days    Taken together (Acetaminophen with one of the NSAIDs (ibuprofen OR naprosyn)), the combination may work better than either one alone for more acute pain    Other  Braces, wraps, ice, heat, topical ointments may all be used/tried if they help pain/symptoms      Large joint arthrocentesis: bilateral knee  Universal Protocol:  Consent given by: patient  Time out: Immediately prior to procedure a "time out" was called to verify the correct patient, procedure, equipment, support staff and site/side marked as required    Site marked: the operative site was marked  Supporting Documentation  Indications: pain Procedure Details  Location: knee - bilateral knee  Needle size: 20 G  Approach: anterior    Medications (Right): 4 mL bupivacaine 0 25 %; 1 mL methylPREDNISolone acetate 40 mg/mLMedications (Left): 4 mL bupivacaine 0 25 %; 1 mL methylPREDNISolone acetate 40 mg/mL   Patient tolerance: patient tolerated the procedure well with no immediate complications  Dressing:  Sterile dressing applied                  Giuseppe Muro MD  Adult Reconstruction Surgery  Department [place] [id number]  11:02 AM

## 2021-01-28 NOTE — PATIENT INSTRUCTIONS
Osteoartritis, cuidados ambulatorios   INFORMACIÓN GENERAL:   La osteoartritis  ocurre cuando el cartílago (tejido que amortigua can articulación) se desgasta lentamente y causa que los huesos rocen entre ellos  La osteoartritis es can condición que por lo general afecta las jess, araceli, lumbago, rodillas y caderas  La osteoartritis también se conoce jaswant artrosis o enfermedad degenerativa de las articulaciones  Los siguientes son los síntomas más comunes:   · Dolor en la articulación que empeora cuando usted mueve la articulación    · Rigidez en la articulación que disminuye después que usted mueve la articulación    · Disminución del rango de movimiento     · Un crecimiento mikael y huesudo de los dedos de las jess y pies    · Un laura de raspado o crujido cuando usted mueve major articulación  Busque atención médica inmediatamente al presentar los siguientes síntomas:   · Dolor intenso    · Incapaz de  major articulación  El tratamiento para la osteoartritis  puede incluir cualquiera de los siguientes:  · El acetaminofén  se [place] para disminuir el dolor  Está disponible sin receta médica  Pregunte cuál es la cantidad que debe marlin, así jaswant la frecuencia  Siga indicaciones  El acetaminofén puede provocar daño al hígado al no tomarlo correctamente  · Los antiiflamatorios no esteroideos (AINEs) ayudan a reducir inflamación y dolor o [place]  [place]ae medicamento esta disponible con o sin can receta médica  Los AINEs podrían causar sangrado estomacal o problemas en los riñones en [employer]  Si usted esta tomando un anticoágulante, siempre  pregunte a major proveedor de eklsy si los AINEs son seguros para usted  Antes de [employer], oscar siempre la etiqueta de información y siga myron indicaciones  · La crema de capsaicina  puede disminuir el dolor de myron articulaciones      · El medicamento para el dolor con receta  puede ser administrado para disminuir el dolor intenso si otros medicamentos no funcionan  [place] indicaciones  No espere hasta que el dolor sea intenso para entonces marlin el medicamento  · Can inyección de esteroides  puede ser administrada si myron síntomas empeoran  · La fisioterapia  puede ser [place] por major proveedor de [place]  Un fisioterapeuta le enseña los ejercicios para mejorar el rango de movimiento y la fortaleza con el fin de disminuir el dolor en las articulaciones  · Brandie Diones cirugía  puede ser necesaria si otros tratamientos no funcionan  Controle major osteoartritis   · Manténgase activo  La actividad física puede reducir el dolor y mejorar major habilidad de hacer myron actividades diarias  Evite actividades que causan dolor  Pregúntele a major proveedor de kelsy qué tipos de ejercicios son los adecuados para usted  · Mantenga un peso saludable  [place] ayuda a disminuir la presión en las articulaciones en major espalda, caderas, rodillas, tobillos y pies  Pregúntele a major proveedor de kelsy cuál debería ser [employer]  [place] que le ayude a crear un plan para perder peso si usted tiene sobrepeso  · Aplique calor o hielo  en las articulaciones según indicaciones  El calor o el hielo pueden ayudar a disminuir el dolor, la inflamación y los espasmos musculares  Use can almohadilla eléctrica en temperatura baja o tome can ducha tibia  Use can compresa de hielo o coloque hielo lavon en can bolsa plástica y cúbrala con can toalla  · Dé un masaje  a los músculos alrededor de la articulación para aliviar el dolor y la rigidez  · Use un bastón, muletas o un caminador  para proteger y aliviar la presión en major Gaylan Blamer y articulaciones de la cadera  También le pueden ordenar plantillas ortopédicas para myron zapatos para disminuir la presión de myron articulaciones  · Use zapatos sin tacones o de tacones bajos  [place] ayudará a disminuir el dolor y reducirá la presión en major Gaylan Blamer y articulaciones de myron [address] [facility]    Programe can margaret con major proveedor de kelsy jaswant se le haya indicado: Anote myron preguntas para que se acuerde de hacerlas carlos myron visitas  ACUERDOS SOBRE PALMA CUIDADO:   Usted tiene el derecho de participar en la planificación de palma cuidado  Aprenda todo lo que pueda sobre palma condición y jaswant darle tratamiento  Discuta con myron médicos myron opciones de tratamiento para juntos decidir el cuidado que usted quiere recibir  Usted siempre tiene el derecho a rechazar palma tratamiento  Esta información es sólo para uso en educación  Palma intención no es darle un consejo médico sobre enfermedades o tratamientos  Colsulte con palma Daiana Grapes farmacéutico antes de seguir cualquier régimen médico para saber si es seguro y efectivo para usted  © 2014 [address] is for End User's use only and may not be sold, redistributed or otherwise used for commercial purposes  All illustrations and images included in CareNotes® are the copyrighted property of A D A M , Inc  or Bruce Guerrero

## 2021-02-03 ENCOUNTER — EVALUATION (OUTPATIENT)
Dept: PHYSICAL THERAPY | Facility: CLINIC | Age: 86
End: 2021-02-03
Payer: COMMERCIAL

## 2021-02-03 DIAGNOSIS — M17.11 OSTEOARTHRITIS OF RIGHT KNEE, UNSPECIFIED OSTEOARTHRITIS TYPE: Primary | ICD-10-CM

## 2021-02-03 PROCEDURE — 97162 PT EVAL MOD COMPLEX 30 MIN: CPT | Performed by: PHYSICAL THERAPIST

## 2021-02-03 NOTE — PROGRESS NOTES
PT Evaluation     Today's date: 2/3/2021  Patient name: Jenna Orellana  : 1935  MRN: [id number]  Referring provider: Jez Faulkner MD  Dx:   Encounter Diagnosis     ICD-10-CM    1  Osteoarthritis of right knee, unspecified osteoarthritis type  M17 11 Ambulatory referral to Physical Therapy                  Assessment  Assessment details: Pt is a 82y o  year old male coming to outpatient PT with R knee OA  Pt presents with increased pain and TTP, mild edema, decreased R knee flexion ROM, mild strength deficits, and overall decreased functional mobility  Pt would benefit from skilled PT services in order to address these deficits and reach maximum level of function  Thank you kindly for the referral!  Impairments: abnormal or restricted ROM, activity intolerance, impaired physical strength, lacks appropriate home exercise program and pain with function  Understanding of Dx/Px/POC: good   Prognosis: fair    Goals  STG's ( 3-4 weeks)  1  Pt will be independent in HEP  2  Decrease pain to 4-5/10 with activity  LTG's ( 6- 8 weeks)  1  Improve FOTO score by 8-10 points  2  Pt will have improved tolerance for walking activities  3  Improve R knee extension strength to 5/5  4  Pt will have less knee locking with sleeping activities    Plan  Patient would benefit from: PT eval and skilled physical therapy  Planned modality interventions: cryotherapy  Planned therapy interventions: joint mobilization, manual therapy, neuromuscular re-education, home exercise program, functional ROM exercises, flexibility, strengthening, stretching, therapeutic activities, therapeutic exercise and therapeutic training  Frequency: 2x week  Duration in weeks: 6  Plan of Care beginning date: 2/3/2021  Plan of Care expiration date: 3/17/2021  Treatment plan discussed with: family and patient        Subjective Evaluation    History of Present Illness  Mechanism of injury: Pt reports R knee pain and clicking onset 6 weeks ago   Pt fell down in [place]  About 5-6 years ago, pt his knee up on a shower chair and he twisted his knee, causing increased pain  Pt has increased pain with walking, standing to get out of a chair, going up and down the steps and bending his knee to get in and out of the car  Pt's knee locks at night and he has difficulty sleeping at night  Pt some relief with an injection at ortho      Work: retired  Hobbies: recently lived in [place] 6 months ago  Gait: no abnormalities  Pain  At best pain ratin  At worst pain ratin  Location: R knee  Quality: dull ache and tight    Social Support  Steps to enter house: yes  3  Stairs in house: no   Lives in: [facility]  Lives with: spouse and adult children    Employment status: not working    Diagnostic Tests  X-ray: abnormal  Treatments  Previous treatment: injection treatment  Patient Goals  Patient goal: to be able to walk without pain        Objective     Neurological Testing     Sensation     Knee   Left Knee   Intact: light touch    Right Knee   Intact: light touch     Reflexes   Left   Patellar (L4): normal (2+)  Achilles (S1): normal (2+)    Right   Patellar (L4): normal (2+)  Achilles (S1): normal (2+)    Active Range of Motion   Left Knee   Flexion: 135 degrees WFL    Right Knee   Flexion: 120 degrees with pain  Extension: 0 degrees with pain    Additional Active Range of Motion Details  Mild edema upon visual inspection R medial knee  (+) TTP R distal quad, medial knee, lateral knee  HS flexibility: R; 70*  L: 75* with opposite knee flexed      Passive Range of Motion   Left Knee   Flexion: 135 degrees   Extension: 0 degrees     Right Knee   Flexion: 130 degrees with pain  Extension: 0 degrees with pain    Strength/Myotome Testing     Left Hip   Planes of Motion   Flexion: 4+  Abduction: 5  External rotation: 4+  Internal rotation: 4+    Right Hip   Planes of Motion   Flexion: 4+  Abduction: 4+  External rotation: 4+  Internal rotation: 4+    Left Knee   Normal strength    Right Knee   Flexion: 4+  Extension: 4+    Additional Strength Details  Ankle df MMT: 5/5            Dx: R knee mod-severe OA  EPOC: 3/17/21  CO-MORBIDITIES: diabetes,cardiac  PERSONAL FACTORS: Austrian speaking only- son translates  Precautions: pacemaker    Manuals             R knee manual stretching             R knee joint mobs                                       Neuro Re-Ed             Bridges w/ ball squeeze             Sidestepping w/ TB                                                                              Ther Ex             bike             HR              R gastroc stretch             Leg press             LAQ             Quad sets             SLR: toe straight             SLR: toe out             S/l hip abduction             Heel slides                          Gait Training                                       Modalities             Cp post tx

## 2021-02-11 ENCOUNTER — OFFICE VISIT (OUTPATIENT)
Dept: PHYSICAL THERAPY | Facility: CLINIC | Age: 86
End: 2021-02-11
Payer: COMMERCIAL

## 2021-02-11 DIAGNOSIS — M17.11 OSTEOARTHRITIS OF RIGHT KNEE, UNSPECIFIED OSTEOARTHRITIS TYPE: Primary | ICD-10-CM

## 2021-02-11 PROCEDURE — 97140 MANUAL THERAPY 1/> REGIONS: CPT

## 2021-02-11 PROCEDURE — 97112 NEUROMUSCULAR REEDUCATION: CPT

## 2021-02-11 PROCEDURE — 97110 THERAPEUTIC EXERCISES: CPT

## 2021-02-11 NOTE — PROGRESS NOTES
Daily Note     Today's date: 2021  Patient name: Tasia Cruzt  : 1935  MRN: [id number]  Referring provider: Laci Warren MD  Dx:   Encounter Diagnosis     ICD-10-CM    1  Osteoarthritis of right knee, unspecified osteoarthritis type  M17 11                   Subjective: Pt's son interprets that his father is feeling better since injections  Objective: See treatment diary below      Assessment: Tolerated treatment well  Patient exhibited good technique with therapeutic exercises and would benefit from continued PT for cont'd strengthening for pain control  Initiated TE's per flow sheet w/ min reps, pt may colt increase in reps NV  Plan: Continue per plan of care  Progress treatment as tolerated         Dx: R knee mod-severe OA  EPOC: 3/17/21  CO-MORBIDITIES: diabetes,cardiac  PERSONAL FACTORS: Slovenian speaking only- son translates  Precautions: pacemaker    Manuals             R knee manual stretching JK            R knee joint mobs                           10'            Neuro Re-Ed             Bridges w/ ball squeeze 10"x10            Sidestepping w/ TB PTB 2 laps                                                                             Ther Ex             bike 5'            HR  20            R gastroc stretch 20"x3            Leg press nv            LAQ 10"x10            Quad sets 10"x10            SLR: toe straight 10            SLR: toe out 10            S/l hip abduction 10            Heel slides 10                         Gait Training                                       Modalities             Cp post tx

## 2021-02-12 DIAGNOSIS — Z23 ENCOUNTER FOR IMMUNIZATION: ICD-10-CM

## 2021-02-16 ENCOUNTER — OFFICE VISIT (OUTPATIENT)
Dept: PHYSICAL THERAPY | Facility: CLINIC | Age: 86
End: 2021-02-16
Payer: COMMERCIAL

## 2021-02-16 DIAGNOSIS — M17.11 OSTEOARTHRITIS OF RIGHT KNEE, UNSPECIFIED OSTEOARTHRITIS TYPE: Primary | ICD-10-CM

## 2021-02-16 PROCEDURE — 97140 MANUAL THERAPY 1/> REGIONS: CPT | Performed by: PHYSICAL THERAPIST

## 2021-02-16 PROCEDURE — 97112 NEUROMUSCULAR REEDUCATION: CPT | Performed by: PHYSICAL THERAPIST

## 2021-02-16 PROCEDURE — 97110 THERAPEUTIC EXERCISES: CPT | Performed by: PHYSICAL THERAPIST

## 2021-02-16 NOTE — PROGRESS NOTES
Daily Note     Today's date: 2021  Patient name: Elma Freire  : 1935  MRN: [id number]  Referring provider: Angelica Phalen, MD  Dx:   Encounter Diagnosis     ICD-10-CM    1  Osteoarthritis of right knee, unspecified osteoarthritis type  M17 11                   Subjective: Pt had a little pain in his knee after last visit  Objective: See treatment diary below      Assessment: Tolerated treatment well  Patient exhibited good technique with therapeutic exercises  Pt does not count repetitions often with exercise      Plan: Continue per plan of care        Dx: R knee mod-severe OA  EPOC: 3/17/21  CO-MORBIDITIES: diabetes,cardiac  PERSONAL FACTORS: Greek speaking only- son translates  Precautions: pacemaker    Manuals            R knee manual stretching JK th           R knee joint mobs  th           Ktape for support  th            10' 10'           Neuro Re-Ed             [employer] w/ ball squeeze 10"x10 10x10"           Sidestepping w/ TB PTB 2 laps OTB x2 laps                                                                            Ther Ex             bike 5' 6'           HR  20 20           R gastroc stretch 20"x3 20"x3           Leg press nv 35#x15           LAQ 10"x10 10x10"1 5#           Quad sets 10"x10 10x10"           SLR: toe straight 10 10           SLR: toe out 10            S/l hip abduction 10 10           Heel slides 10 10                        Gait Training                                       Modalities             Cp post tx

## 2021-02-18 ENCOUNTER — APPOINTMENT (OUTPATIENT)
Dept: PHYSICAL THERAPY | Facility: CLINIC | Age: 86
End: 2021-02-18
Payer: COMMERCIAL

## 2021-02-18 ENCOUNTER — OFFICE VISIT (OUTPATIENT)
Dept: PHYSICAL THERAPY | Facility: CLINIC | Age: 86
End: 2021-02-18
Payer: COMMERCIAL

## 2021-02-18 DIAGNOSIS — M17.11 OSTEOARTHRITIS OF RIGHT KNEE, UNSPECIFIED OSTEOARTHRITIS TYPE: Primary | ICD-10-CM

## 2021-02-18 PROCEDURE — 97110 THERAPEUTIC EXERCISES: CPT | Performed by: PHYSICAL THERAPIST

## 2021-02-18 PROCEDURE — 97140 MANUAL THERAPY 1/> REGIONS: CPT | Performed by: PHYSICAL THERAPIST

## 2021-02-18 PROCEDURE — 97112 NEUROMUSCULAR REEDUCATION: CPT | Performed by: PHYSICAL THERAPIST

## 2021-02-18 NOTE — PROGRESS NOTES
Daily Note     Today's date: 2021  Patient name: Nickie Dubois  : 1935  MRN: [id number]  Referring provider: Patrizia Montemayor MD  Dx:   Encounter Diagnosis     ICD-10-CM    1  Osteoarthritis of right knee, unspecified osteoarthritis type  M17 11                   Subjective: Pt denies pain after last visit  Pt is exercising on the bike at home  Objective: See treatment diary below      Assessment: Tolerated treatment well  Patient exhibited good technique with therapeutic exercises      Plan: Continue per plan of care        Dx: R knee mod-severe OA  EPOC: 3/17/21  CO-MORBIDITIES: diabetes,cardiac  PERSONAL FACTORS: Brazilian speaking only- son translates  Precautions: pacemaker    Manuals           R knee manual stretching JK           R knee joint mobs            Ardella Gibraltarian for support             10' 10' 10'          Neuro Re-Ed             Bridges w/ ball squeeze 10"x10 10x10" 10x10"          Sidestepping w/ TB PTB 2 laps OTB x2 laps OTB x2 laps          Step ups    4"x10 B                                                              Ther Ex             bike 5' 6' 6'          HR  20 20 20          R gastroc stretch 20"x3 20"x3 20"x3 B          Leg press nv 35#x15 35#x20          LAQ 10"x10 10x10"1 5# 10x10" 2#          Quad sets 10"x10 10x10"           SLR: toe straight 10 10 15          SLR: toe out 10  15          S/l hip abduction 10 10 15          Heel slides 10 10 10 x5"          Seated HS stretch   3x20"          Sit to stand high low table   10                                    Modalities             Cp post tx

## 2021-02-23 ENCOUNTER — OFFICE VISIT (OUTPATIENT)
Dept: PHYSICAL THERAPY | Facility: CLINIC | Age: 86
End: 2021-02-23
Payer: COMMERCIAL

## 2021-02-23 DIAGNOSIS — M17.11 OSTEOARTHRITIS OF RIGHT KNEE, UNSPECIFIED OSTEOARTHRITIS TYPE: Primary | ICD-10-CM

## 2021-02-23 PROCEDURE — 97140 MANUAL THERAPY 1/> REGIONS: CPT

## 2021-02-23 PROCEDURE — 97110 THERAPEUTIC EXERCISES: CPT

## 2021-02-23 NOTE — PROGRESS NOTES
Daily Note     Today's date: 2021  Patient name: Juan Marie  : 1935  MRN: [id number]  Referring provider: Dejah Villarreal MD  Dx:   Encounter Diagnosis     ICD-10-CM    1  Osteoarthritis of right knee, unspecified osteoarthritis type  M17 11                   Subjective: Pt arrives 25 min late to therapy  Pt's son reports pt had an incident of increased pain but states it is improving to the level it had been previously  Pt notes benefiting from K tape LV  Objective: See treatment diary below  Supervised by PT TH  Assessment: Tolerated treatment well  K tape was applied to R knee  Communicated w/ pt to best of ability to have pt understand instructions  Patient would benefit from continued PT to increase overall strength and ROM  Plan: Continue per plan of care        Dx: R knee mod-severe OA  EPOC: 3/17/21  CO-MORBIDITIES: diabetes,cardiac  PERSONAL FACTORS: Kyrgyz speaking only- son translates  Precautions: pacemaker    Manuals          R knee manual stretching JK  nk         R knee joint mobs            Ktape for support   nk          10' 10' 10' 8'         Neuro Re-Ed             Bridges w/ ball squeeze 10"x10 10x10" 10x10" 10x10"         Sidestepping w/ TB PTB 2 laps OTB x2 laps OTB x2 laps OTB x2 laps         Step ups    4"x10 B 4"x10 B                                                             Ther Ex             bike 5' 6' 6' 3'         HR  20 20 20 20         R gastroc stretch 20"x3 20"x3 20"x3 B np         Leg press nv 35#x15 35#x20 35#x20         LAQ 10"x10 10x10"1 5# 10x10" 2# 10x10" 2#         Quad sets 10"x10 10x10"  10x10"         SLR: toe straight 10 10 15 15         SLR: toe out 10  15 15         S/l hip abduction 10 10 15 15         Heel slides 10 10 10 x5" 10x5"         Seated HS stretch   3x20" 3x20" supine         Sit to stand high low table   10 10                                   Modalities             Cp post tx

## 2021-02-25 ENCOUNTER — OFFICE VISIT (OUTPATIENT)
Dept: PHYSICAL THERAPY | Facility: CLINIC | Age: 86
End: 2021-02-25
Payer: COMMERCIAL

## 2021-02-25 DIAGNOSIS — M17.11 OSTEOARTHRITIS OF RIGHT KNEE, UNSPECIFIED OSTEOARTHRITIS TYPE: Primary | ICD-10-CM

## 2021-02-25 PROCEDURE — 97110 THERAPEUTIC EXERCISES: CPT

## 2021-02-25 NOTE — PROGRESS NOTES
Daily Note     Today's date: 2021  Patient name: Nas Hernandez  : 1935  MRN: [id number]  Referring provider: Aminta More MD  Dx: No diagnosis found  Subjective: Pt arrives 25 min late  Offers little c/o's due to language barrier  Son reports pt is still coming back to point pthad been at before he had a flare up  Objective: See treatment diary below      Assessment: Tolerated treatment well  Patient would benefit from continued PT to increase overall strength and ROM  Plan: Continue per plan of care        Dx: R knee mod-severe OA  EPOC: 3/17/21  CO-MORBIDITIES: diabetes,cardiac  PERSONAL FACTORS: Romanian speaking only- son translates  Precautions: pacemaker    Manuals         R knee manual stretching JK  nk         R knee joint mobs            Ktape for support   nk          10' 10' 10' 8'         Neuro Re-Ed             [employer] w/ ball squeeze 10"x10 10x10" 10x10" 10x10" 10x10"        Sidestepping w/ TB PTB 2 laps OTB x2 laps OTB x2 laps OTB x2 laps OTB x2 laps        Step ups    4"x10 B 4"x10 B 4"x10 B                                                            Ther Ex             bike 5' 6' 6' 3' 5'        HR  20 20 20 20 20        R gastroc stretch 20"x3 20"x3 20"x3 B np 20"x3 B        Leg press nv 35#x15 35#x20 35#x20 35#x20        LAQ 10"x10 10x10"1 5# 10x10" 2# 10x10" 2#         Quad sets 10"x10 10x10"  10x10"         SLR: toe straight 10 10 15 15         SLR: toe out 10  15 15         S/l hip abduction 10 10 15 15         Heel slides 10 10 10 x5" 10x5"         Seated HS stretch   3x20" 3x20" supine         Sit to stand high low table   10 10                                   Modalities             Cp post tx

## 2021-03-02 ENCOUNTER — OFFICE VISIT (OUTPATIENT)
Dept: PHYSICAL THERAPY | Facility: CLINIC | Age: 86
End: 2021-03-02
Payer: COMMERCIAL

## 2021-03-02 DIAGNOSIS — M17.11 OSTEOARTHRITIS OF RIGHT KNEE, UNSPECIFIED OSTEOARTHRITIS TYPE: Primary | ICD-10-CM

## 2021-03-02 PROCEDURE — 97110 THERAPEUTIC EXERCISES: CPT

## 2021-03-02 PROCEDURE — 97112 NEUROMUSCULAR REEDUCATION: CPT

## 2021-03-02 PROCEDURE — 97140 MANUAL THERAPY 1/> REGIONS: CPT

## 2021-03-02 NOTE — PROGRESS NOTES
Daily Note     Today's date: 3/2/2021  Patient name: Tayler Musa  : 1935  MRN: [id number]  Referring provider: Jean Warner MD  Dx:   Encounter Diagnosis     ICD-10-CM    1  Osteoarthritis of right knee, unspecified osteoarthritis type  M17 11                   Subjective: Pt reports feeling good w/ no pain in R knee  Objective: See treatment diary below      Assessment: Tolerated treatment well  Pt progressed very well w/ increasing wt and adding new exercises  Verbal and tactile cues provided to correct pt to proper positioning  Patient would benefit from continued PT to increase overall strength to return to ADLs w/o pain  Plan: Continue per plan of care        Dx: R knee mod-severe OA  EPOC: 3/17/21  CO-MORBIDITIES: diabetes,cardiac  PERSONAL FACTORS: Hungarian speaking only- son translates  Precautions: pacemaker    Manuals 2/11 2/16 2/18 2/23 2/25 3/2       R knee manual stretching JK  nk  nk       R knee joint mobs            Ktape for support   nk          10' 10' 10' 8'  8'       Neuro Re-Ed             [facility] w/ ball squeeze 10"x10 10x10" 10x10" 10x10" 10x10" 10x10"+ bridge       Sidestepping w/ TB PTB 2 laps OTB x2 laps OTB x2 laps OTB x2 laps OTB x2 laps OTB x3 laps       Step ups    4"x10 B 4"x10 B 4"x10 B 6"x10 B       Mini squats airex      10x5"       SLS       3x8"                                 Ther Ex             bike 5' 6' 6' 3' 5' 5'       HR  20 20 20 20 20 20 UL      R gastroc stretch 20"x3 20"x3 20"x3 B np 20"x3 B 20"x3 B active       Leg press nv 35#x15 35#x20 35#x20 35#x20 B 45#x20  SL 25#x20       LAQ 10"x10 10x10"1 5# 10x10" 2# 10x10" 2#  10x10" 2#       Quad sets 10"x10 10x10"  10x10"  hep       SLR: toe straight 10 10 15 15  1 5# 15       SLR: toe out 10  15 15  1 5# 15       S/l hip abduction 10 10 15 15  1 5# 15       Heel slides 10 10 10 x5" 10x5"  np       Seated HS stretch   3x20" 3x20" supine  np       Sit to stand high low table   10 10 10 chair                                 Modalities             Cp post tx

## 2021-03-04 ENCOUNTER — EVALUATION (OUTPATIENT)
Dept: PHYSICAL THERAPY | Facility: CLINIC | Age: 86
End: 2021-03-04
Payer: COMMERCIAL

## 2021-03-04 DIAGNOSIS — M17.11 OSTEOARTHRITIS OF RIGHT KNEE, UNSPECIFIED OSTEOARTHRITIS TYPE: Primary | ICD-10-CM

## 2021-03-04 PROCEDURE — 97140 MANUAL THERAPY 1/> REGIONS: CPT | Performed by: PHYSICAL THERAPIST

## 2021-03-04 PROCEDURE — 97110 THERAPEUTIC EXERCISES: CPT | Performed by: PHYSICAL THERAPIST

## 2021-03-04 PROCEDURE — 97112 NEUROMUSCULAR REEDUCATION: CPT | Performed by: PHYSICAL THERAPIST

## 2021-03-04 NOTE — PROGRESS NOTES
PT Re-Evaluation     Today's date: 3/4/2021  Patient name: Javier Levine  : 1935  MRN: [id number]  Referring provider: Jason Patel MD  Dx:   Encounter Diagnosis     ICD-10-CM    1  Osteoarthritis of right knee, unspecified osteoarthritis type  M17 11                   Assessment  Assessment details: Since starting skilled PT, pain levels are unchanged, R LE strength has improved, R knee ROM has improved without significant functional progress  Recommend pt continue skilled PT for 2-4 more weeks with transition to HEP  Impairments: activity intolerance, lacks appropriate home exercise program and pain with function  Understanding of Dx/Px/POC: good   Prognosis: fair    Goals  STG's ( 3-4 weeks)  1  Pt will be independent in HEP-met  2  Decrease pain to 4-5/10 with activity-not met  LTG's ( 6- 8 weeks)  1  Improve FOTO score by 8-10 points-not met  2  Pt will have improved tolerance for walking activities-not met  3  Improve R knee extension strength to 5/5-met  4  Pt will have less knee locking with sleeping activities-NT    Plan  Patient would benefit from: skilled physical therapy  Planned modality interventions: cryotherapy  Planned therapy interventions: joint mobilization, manual therapy, neuromuscular re-education, home exercise program, functional ROM exercises, flexibility, strengthening, stretching, therapeutic activities, therapeutic exercise and therapeutic training  Frequency: 2x week  Duration in weeks: 4  Plan of Care beginning date: 3/4/2021  Plan of Care expiration date: 2021  Treatment plan discussed with: family and patient        Subjective Evaluation    History of Present Illness  Mechanism of injury: I E: Pt reports R knee pain and clicking onset 6 weeks ago  Pt fell down in [facility]  About 5-6 years ago, pt his knee up on a shower chair and he twisted his knee, causing increased pain   Pt has increased pain with walking, standing to get out of a chair, going up and down the steps and bending his knee to get in and out of the car  Pt's knee locks at night and he has difficulty sleeping at night  Pt some relief with an injection at ortho  3/4/21: Pt's son reports that intitially his knee pain was getting better with exercise and injection  Pt has increased pain when putting weight on R Le when getting a shower  Pt has increased pain when going up and down the steps  Pt continues to have increased pain in R knee with walking and standing      Work: retired  Hobbies: recently lived in [facility] 6 months ago  Gait: no abnormalities  Pain  At best pain ratin  At worst pain ratin  Location: R knee  Quality: dull ache and tight    Social Support  Steps to enter house: yes  3  Stairs in house: no   Lives in: [facility]  Lives with: spouse and adult children    Employment status: not working    Diagnostic Tests  X-ray: abnormal  Treatments  Previous treatment: injection treatment  Patient Goals  Patient goal: to be able to walk without pain        Objective     Neurological Testing     Sensation     Knee   Left Knee   Intact: light touch    Right Knee   Intact: light touch     Reflexes   Left   Patellar (L4): normal (2+)  Achilles (S1): normal (2+)    Right   Patellar (L4): normal (2+)  Achilles (S1): normal (2+)    Active Range of Motion   Left Knee   Flexion: 135 degrees WFL    Right Knee   Flexion: 135 degrees with pain  Extension: 0 degrees with pain    Additional Active Range of Motion Details  Mild edema upon visual inspection R medial knee  (+) TTP R distal quad, medial knee, lateral knee  HS flexibility: R; 70*  L: 75* with opposite knee flexed      Passive Range of Motion   Left Knee   Flexion: 135 degrees   Extension: 0 degrees     Right Knee   Flexion: 130 degrees with pain  Extension: 0 degrees with pain    Strength/Myotome Testing     Left Hip   Planes of Motion   Flexion: 4+  Abduction: 5  External rotation: 4+  Internal rotation: 4+    Right Hip   Planes of Motion Flexion: 4+  Abduction: 5  External rotation: 5  Internal rotation: 5    Left Knee   Normal strength    Right Knee   Flexion: 5  Extension: 5    Additional Strength Details  Ankle df MMT: 5/5         Dx: R knee mod-severe OA  EPOC: 3/17/21  CO-MORBIDITIES: diabetes,cardiac  PERSONAL FACTORS: Frisian speaking only- son translates  Precautions: pacemaker    Manuals 2/11 2/16 2/18 2/23 2/25 3/2 3/4      R knee manual stretching JK th th nk  nk th      R knee joint mobs  th th    th      Progress note       th      Payam Hopes for support  th th nk          10' 10' 10' 8'  8' 15'      Neuro Re-Ed             [facility] w/ ball squeeze 10"x10 10x10" 10x10" 10x10" 10x10" 10x10"+ bridge 10x10"      Sidestepping w/ TB PTB 2 laps OTB x2 laps OTB x2 laps OTB x2 laps OTB x2 laps OTB x3 laps       Step ups    4"x10 B 4"x10 B 4"x10 B 6"x10 B       Mini squats airex      10x5"       SLS       3x8"                                 Ther Ex             bike 5' 6' 6' 3' 5' 5' 7'      HR  20 20 20 20 20 20 UL      R gastroc stretch 20"x3 20"x3 20"x3 B np 20"x3 B 20"x3 B active       Leg press nv 35#x15 35#x20 35#x20 35#x20 B 45#x20  SL 25#x20 B45#x20; SL 25#x20      LAQ 10"x10 10x10"1 5# 10x10" 2# 10x10" 2#  10x10" 2# 10x10"2 5#      Quad sets 10"x10 10x10"  10x10"  hep       SLR: toe straight 10 10 15 15  1 5# 15 1 5#x10      SLR: toe out 10  15 15  1 5# 15 1 5#x10      S/l hip abduction 10 10 15 15  1 5# 15 1 5# 15      Heel slides 10 10 10 x5" 10x5"  np       Seated HS stretch   3x20" 3x20" supine  np 3x20"      Sit to stand high low table   10 10  10 chair                                 Modalities             Cp post tx

## 2021-03-09 ENCOUNTER — OFFICE VISIT (OUTPATIENT)
Dept: PHYSICAL THERAPY | Facility: CLINIC | Age: 86
End: 2021-03-09
Payer: COMMERCIAL

## 2021-03-09 DIAGNOSIS — M17.11 OSTEOARTHRITIS OF RIGHT KNEE, UNSPECIFIED OSTEOARTHRITIS TYPE: Primary | ICD-10-CM

## 2021-03-09 PROCEDURE — 97112 NEUROMUSCULAR REEDUCATION: CPT

## 2021-03-09 PROCEDURE — 97140 MANUAL THERAPY 1/> REGIONS: CPT

## 2021-03-09 PROCEDURE — 97110 THERAPEUTIC EXERCISES: CPT

## 2021-03-09 NOTE — PROGRESS NOTES
Daily Note     Today's date: 3/9/2021  Patient name: Sabrina Rivas  : 1935  MRN: [id number]  Referring provider: Rona Esparza MD  Dx:   Encounter Diagnosis     ICD-10-CM    1  Osteoarthritis of right knee, unspecified osteoarthritis type  M17 11                   Subjective: Pt c/o med knee jt pain w/ step ups  Attempted to express pain or giving out w/ gait but there was a language barrier  Objective: See treatment diary below  Pt given FOTO 3/9  Assessment: Tolerated treatment well  Patient w/ edema med knee jt this visit  Pt had c/o's pain w/ step ups and TKE  Deferred CP home  Plan: Continue per plan of care  Progress treatment as tolerated         Dx: R knee mod-severe OA  EPOC: 21  CO-MORBIDITIES: diabetes,cardiac  PERSONAL FACTORS: Moroccan speaking only- son translates  Precautions: pacemaker    Manuals 2/11 2/16 2/18 2/23 2/25 3/2 3/4 3/9     R knee manual stretching JK   nk  JK     R knee joint mobs        Progress note       th      Nasrin Felix for support   nk    JK      10' 10' 10' 8'  8' 15' 10'     Neuro Re-Ed             [employer] w/ ball squeeze 10"x10 10x10" 10x10" 10x10" 10x10" 10x10"+ bridge 10x10" nv     Sidestepping w/ TB PTB 2 laps OTB x2 laps OTB x2 laps OTB x2 laps OTB x2 laps OTB x3 laps  OTB x3 laps     Step ups    4"x10 B 4"x10 B 4"x10 B 6"x10 B  6" x15     Mini squats airex      10x5"  10x5"     SLS       3x8"                                 Ther Ex             bike 5' 6' 6' 3' 5' 5' 7' 5'     HR  20 20 20 20 20 20 UL UL 20     R gastroc stretch 20"x3 20"x3 20"x3 B np 20"x3 B 20"x3 B active  20"x3 bl     Leg press nv 35#x15 35#x20 35#x20 35#x20 B 45#x20  SL 25#x20 B45#x20; SL 25#x20 B45#x20; SL 25#x20     LAQ 10"x10 10x10"1 5# 10x10" 2# 10x10" 2#  10x10" 2# 10x10"2 5# 10x10"2 5#     Quad sets 10"x10 10x10"  10x10"  hep       SLR: toe straight 10 10 15 15  1 5# 15 1 5#x10 1 5# 10x2     SLR: toe out 10  15 15  1 5# 15 1 5#x10 1 5#x10 S/l hip abduction 10 10 15 15  1 5# 15 1 5# 15 1 5# 15     Heel slides 10 10 10 x5" 10x5"  np       Seated HS stretch   3x20" 3x20" supine  np 3x20" 3x20"     Sit to stand high low table   10 10  10 chair  nv                               Modalities             Cp post tx

## 2021-03-11 ENCOUNTER — OFFICE VISIT (OUTPATIENT)
Dept: PHYSICAL THERAPY | Facility: CLINIC | Age: 86
End: 2021-03-11
Payer: COMMERCIAL

## 2021-03-11 DIAGNOSIS — M17.11 OSTEOARTHRITIS OF RIGHT KNEE, UNSPECIFIED OSTEOARTHRITIS TYPE: Primary | ICD-10-CM

## 2021-03-11 DIAGNOSIS — I50.43 ACUTE ON CHRONIC COMBINED SYSTOLIC AND DIASTOLIC CONGESTIVE HEART FAILURE (HCC): ICD-10-CM

## 2021-03-11 PROCEDURE — 97110 THERAPEUTIC EXERCISES: CPT

## 2021-03-11 PROCEDURE — 97112 NEUROMUSCULAR REEDUCATION: CPT

## 2021-03-11 PROCEDURE — 97140 MANUAL THERAPY 1/> REGIONS: CPT

## 2021-03-11 RX ORDER — FUROSEMIDE 40 MG/1
40 TABLET ORAL 2 TIMES DAILY
Qty: 60 TABLET | Refills: 3 | Status: SHIPPED | OUTPATIENT
Start: 2021-03-11 | End: 2021-04-27 | Stop reason: SDUPTHER

## 2021-03-11 NOTE — PROGRESS NOTES
Daily Note     Today's date: 3/11/2021  Patient name: Chyna Frye  : 1935  MRN: [id number]  Referring provider: Keshia Nance MD  Dx:   Encounter Diagnosis     ICD-10-CM    1  Osteoarthritis of right knee, unspecified osteoarthritis type  M17 11                   Subjective: Upon entering PT pt had no c/o's knee pain  Objective: See treatment diary below  Pt given updated HEP  Assessment: Tolerated treatment well  Patient some knee discomfort reported w/ lunges and squats  Pt reports no pain post manuals  Plan: Continue per plan of care  Progress treatment as tolerated         Dx: R knee mod-severe OA  EPOC: 21  CO-MORBIDITIES: diabetes,cardiac  PERSONAL FACTORS: Telugu speaking only- son translates  Precautions: pacemaker    Manuals 2/11 2/16 2/18 2/23 2/25 3/2 3/4 3/9 3/11    R knee manual stretching JK  nk  nk th JK JK    R knee joint mobs        Progress note             [facility] for support   nk    JK      10' 10' 10' 8'  8' 15' 10' 10'    Neuro Re-Ed             [facility] w/ ball squeeze 10"x10 10x10" 10x10" 10x10" 10x10" 10x10"+ bridge 10x10" nv 5"x10    Sidestepping w/ TB PTB 2 laps OTB x2 laps OTB x2 laps OTB x2 laps OTB x2 laps OTB x3 laps  OTB x3 laps OTB x3 laps    Step ups    4"x10 B 4"x10 B 4"x10 B 6"x10 B  6" x15 np    Mini squats airex      10x5"  10x5" 10x5"    SLS       3x8"                                 Ther Ex             bike 5' 6' 6' 3' 5' 5' 7' 5' 6'    HR  20 20 20 20 20 20 UL UL 20 UL 20    R gastroc stretch 20"x3 20"x3 20"x3 B np 20"x3 B 20"x3 B active  20"x3 bl 20"x3 bl    Leg press nv 35#x15 35#x20 35#x20 35#x20 B 45#x20  SL 25#x20 B45#x20; SL 25#x20 B45#x20; SL 25#x20 B45#x20; SL 25#x20    LAQ 10"x10 10x10"1 5# 10x10" 2# 10x10" 2#  10x10" 2# 10x10"2 5# 10x10"2 5# 10x10"2 5#    Quad sets 10"x10 10x10"  10x10"  hep       SLR: toe straight 10 10 15 15  1 5# 15 1 5#x10 1 5# 10x2 1 5# 10x2    SLR: toe out 10  15 15  1 5# 15 1 5#x10 1 5#x10 1 5# 10x2    S/l hip abduction 10 10 15 15  1 5# 15 1 5# 15 1 5# 15 1 5# 10x2    Heel slides 10 10 10 x5" 10x5"  np       Seated HS stretch   3x20" 3x20" supine  np 3x20" 3x20" 3x20"    Sit to stand high low table   10 10  10 chair  nv     Lat lunges         10 p!                  Modalities             Cp post tx

## 2021-03-16 ENCOUNTER — OFFICE VISIT (OUTPATIENT)
Dept: PHYSICAL THERAPY | Facility: CLINIC | Age: 86
End: 2021-03-16
Payer: COMMERCIAL

## 2021-03-16 DIAGNOSIS — M17.11 OSTEOARTHRITIS OF RIGHT KNEE, UNSPECIFIED OSTEOARTHRITIS TYPE: Primary | ICD-10-CM

## 2021-03-16 PROCEDURE — 97112 NEUROMUSCULAR REEDUCATION: CPT

## 2021-03-16 PROCEDURE — 97140 MANUAL THERAPY 1/> REGIONS: CPT

## 2021-03-16 PROCEDURE — 97110 THERAPEUTIC EXERCISES: CPT

## 2021-03-16 NOTE — PROGRESS NOTES
Daily Note     Today's date: 3/16/2021  Patient name: Javier Levine  : 1935  MRN: [id number]  Referring provider: Jason Patel MD  Dx:   Encounter Diagnosis     ICD-10-CM    1  Osteoarthritis of right knee, unspecified osteoarthritis type  M17 11                   Subjective: Pt reports a little knee pain today  Objective: See treatment diary below  Pt given HEP in Slovak  Assessment: Tolerated treatment well  Patient exhibited good technique with therapeutic exercises and would benefit from continued PT for cont'd LE strengthening  Plan: Continue per plan of care  Pt w/ one more visit       Dx: R knee mod-severe OA  EPOC: 21  CO-MORBIDITIES: diabetes,cardiac  PERSONAL FACTORS: Slovak speaking only- son translates  Precautions: pacemaker    Manuals 2/11 2/16 2/18 2/23 2/25 3/2 3/4 3/9 3/11 3/16   R knee manual stretching JK  th nk  nk th JK JK JK   R knee joint mobs      th      Progress note       th      Ktape for support   nk    JK  JK    10' 10' 10' 8'  8' 15' 10' 10' 12'   Neuro Re-Ed             [facility] w/ ball squeeze 10"x10 10x10" 10x10" 10x10" 10x10" 10x10"+ bridge 10x10" nv 5"x10 5"x10   Sidestepping w/ TB PTB 2 laps OTB x2 laps OTB x2 laps OTB x2 laps OTB x2 laps OTB x3 laps  OTB x3 laps OTB x3 laps OTB x3 laps   Step ups    4"x10 B 4"x10 B 4"x10 B 6"x10 B  6" x15 np np   Mini squats airex      10x5"  10x5" 10x5" 10x5"   SLS       3x8"                                 Ther Ex             bike 5' 6' 6' 3' 5' 5' 7' 5' 6' 6'   HR  20 20 20 20 20 20 UL UL 20 UL 20 UL 20   R gastroc stretch 20"x3 20"x3 20"x3 B np 20"x3 B 20"x3 B active  20"x3 bl 20"x3 bl 20"x3 bl   Leg press nv 35#x15 35#x20 35#x20 35#x20 B 45#x20  SL 25#x20 B45#x20; SL 25#x20 B45#x20; SL 25#x20 B45#x20; SL 25#x20 B45#x20; SL 25#x20   LAQ 10"x10 10x10"1 5# 10x10" 2# 10x10" 2#  10x10" 2# 10x10"2 5# 10x10"2 5# 10x10"2 5# 10x10"5#   Quad sets 10"x10 10x10"  10x10"  hep       SLR: toe straight 10 10 15 15  1 5# 15 1 5#x10 1 5# 10x2 1 5# 10x2 1 5# 10x2   SLR: toe out 10  15 15  1 5# 15 1 5#x10 1 5#x10 1 5# 10x2 1 5# 10x2   S/l hip abduction 10 10 15 15  1 5# 15 1 5# 15 1 5# 15 1 5# 10x2 1 5# 10x2   Heel slides 10 10 10 x5" 10x5"  np       Seated HS stretch   3x20" 3x20" supine  np 3x20" 3x20" 3x20" 20"x3 bl   Sit to stand high low table   10 10  10 chair  nv  chair 10   Lat lunges         10 p!                  Modalities             Cp post tx

## 2021-03-18 ENCOUNTER — OFFICE VISIT (OUTPATIENT)
Dept: PHYSICAL THERAPY | Facility: CLINIC | Age: 86
End: 2021-03-18
Payer: COMMERCIAL

## 2021-03-18 DIAGNOSIS — M17.11 OSTEOARTHRITIS OF RIGHT KNEE, UNSPECIFIED OSTEOARTHRITIS TYPE: Primary | ICD-10-CM

## 2021-03-18 PROCEDURE — 97112 NEUROMUSCULAR REEDUCATION: CPT | Performed by: PHYSICAL THERAPIST

## 2021-03-18 PROCEDURE — 97140 MANUAL THERAPY 1/> REGIONS: CPT | Performed by: PHYSICAL THERAPIST

## 2021-03-18 PROCEDURE — 97110 THERAPEUTIC EXERCISES: CPT | Performed by: PHYSICAL THERAPIST

## 2021-03-18 NOTE — PROGRESS NOTES
Daily Note     Today's date: 3/18/2021  Patient name: Nas Hernandez  : 1935  MRN: [id number]  Referring provider: Aminta More MD  Dx:   Encounter Diagnosis     ICD-10-CM    1  Osteoarthritis of right knee, unspecified osteoarthritis type  M17 11                   Subjective: Pt reports his knee is "so so " today      Objective: See treatment diary below      Assessment: Tolerated treatment well  Patient exhibited good technique with therapeutic exercises      Plan: D/c pt from skilled PT at this time  Pt does not have any more visits authorized by insurance       Dx: R knee mod-severe OA  EPOC: 21  CO-MORBIDITIES: diabetes,cardiac  PERSONAL FACTORS: Nepalese speaking only- son translates  Precautions: pacemaker    Manuals 3/18    2/25 3/2 3/4 3/9 3/11 3/16   R knee manual stretching th      th JK JK JK   R knee joint mobs       Progress note       th      Ktape for support        JK  JK    10'     8' 15' 10' 10' 12'   Neuro Re-Ed             [employer] w/ ball squeeze 10"x10    10x10" 10x10"+ bridge 10x10" nv 5"x10 5"x10   Sidestepping w/ TB OTB 3 laps    OTB x2 laps OTB x3 laps  OTB x3 laps OTB x3 laps OTB x3 laps   Step ups  4"x10    4"x10 B 6"x10 B  6" x15 np np   Mini squats airex 10x5"     10x5"  10x5" 10x5" 10x5"   SLS       3x8"                                 Ther Ex             bike 6'    5' 5' 7' 5' 6' 6'   HR  20    20 20 UL UL 20 UL 20 UL 20   R gastroc stretch 20"x3    20"x3 B 20"x3 B active  20"x3 bl 20"x3 bl 20"x3 bl   Leg press B 45#x20' SL 25#x20    35#x20 B 45#x20  SL 25#x20 B45#x20; SL 25#x20 B45#x20; SL 25#x20 B45#x20; SL 25#x20 B45#x20; SL 25#x20   LAQ 10"x10 2#     10x10" 2# 10x10"2 5# 10x10"2 5# 10x10"2 5# 10x10"5#   Quad sets HEP     hep       SLR: toe straight 10     1 5# 15 1 5#x10 1 5# 10x2 1 5# 10x2 1 5# 10x2   SLR: toe out 10     1 5# 15 1 5#x10 1 5#x10 1 5# 10x2 1 5# 10x2   S/l hip abduction 10     1 5# 15 1 5# 15 1 5# 15 1 5# 10x2 1 5# 10x2   Heel slides 10 np       Seated HS stretch 20"x3     np 3x20" 3x20" 3x20" 20"x3 bl   Sit to stand high low table 10     10 chair  nv  chair 10   Lat lunges         10 p!                  Modalities             Cp post tx

## 2021-03-22 ENCOUNTER — IMMUNIZATIONS (OUTPATIENT)
Dept: FAMILY MEDICINE CLINIC | Facility: HOSPITAL | Age: 86
End: 2021-03-22

## 2021-03-22 DIAGNOSIS — Z23 ENCOUNTER FOR IMMUNIZATION: Primary | ICD-10-CM

## 2021-03-22 PROCEDURE — 91300 SARS-COV-2 / COVID-19 MRNA VACCINE (PFIZER-BIONTECH) 30 MCG: CPT

## 2021-03-22 PROCEDURE — 0001A SARS-COV-2 / COVID-19 MRNA VACCINE (PFIZER-BIONTECH) 30 MCG: CPT

## 2021-03-23 ENCOUNTER — APPOINTMENT (OUTPATIENT)
Dept: PHYSICAL THERAPY | Facility: CLINIC | Age: 86
End: 2021-03-23
Payer: COMMERCIAL

## 2021-03-25 ENCOUNTER — APPOINTMENT (OUTPATIENT)
Dept: PHYSICAL THERAPY | Facility: CLINIC | Age: 86
End: 2021-03-25
Payer: COMMERCIAL

## 2021-03-30 ENCOUNTER — APPOINTMENT (OUTPATIENT)
Dept: PHYSICAL THERAPY | Facility: CLINIC | Age: 86
End: 2021-03-30
Payer: COMMERCIAL

## 2021-04-12 ENCOUNTER — IMMUNIZATIONS (OUTPATIENT)
Dept: FAMILY MEDICINE CLINIC | Facility: HOSPITAL | Age: 86
End: 2021-04-12

## 2021-04-12 DIAGNOSIS — Z23 ENCOUNTER FOR IMMUNIZATION: Primary | ICD-10-CM

## 2021-04-12 PROCEDURE — 0002A SARS-COV-2 / COVID-19 MRNA VACCINE (PFIZER-BIONTECH) 30 MCG: CPT

## 2021-04-12 PROCEDURE — 91300 SARS-COV-2 / COVID-19 MRNA VACCINE (PFIZER-BIONTECH) 30 MCG: CPT

## 2021-04-13 ENCOUNTER — TELEPHONE (OUTPATIENT)
Dept: HEMATOLOGY ONCOLOGY | Facility: CLINIC | Age: 86
End: 2021-04-13

## 2021-04-13 NOTE — TELEPHONE ENCOUNTER
I called and left a voicemail for the patient to have labs drawn before his appointment with us tomorrow 4/14 or we will have to reschedule

## 2021-04-14 ENCOUNTER — TELEPHONE (OUTPATIENT)
Dept: HEMATOLOGY ONCOLOGY | Facility: HOSPITAL | Age: 86
End: 2021-04-14

## 2021-04-27 ENCOUNTER — OFFICE VISIT (OUTPATIENT)
Dept: FAMILY MEDICINE CLINIC | Facility: HOSPITAL | Age: 86
End: 2021-04-27
Payer: COMMERCIAL

## 2021-04-27 DIAGNOSIS — N40.1 BENIGN PROSTATIC HYPERPLASIA WITH WEAK URINARY STREAM: ICD-10-CM

## 2021-04-27 DIAGNOSIS — I50.42 CHRONIC COMBINED SYSTOLIC AND DIASTOLIC HEART FAILURE (HCC): ICD-10-CM

## 2021-04-27 DIAGNOSIS — R26.2 AMBULATORY DYSFUNCTION: ICD-10-CM

## 2021-04-27 DIAGNOSIS — I48.0 PAROXYSMAL ATRIAL FIBRILLATION (HCC): ICD-10-CM

## 2021-04-27 DIAGNOSIS — I10 ESSENTIAL HYPERTENSION: ICD-10-CM

## 2021-04-27 DIAGNOSIS — Z79.4 TYPE 2 DIABETES MELLITUS WITH DIABETIC NEUROPATHY, WITH LONG-TERM CURRENT USE OF INSULIN (HCC): ICD-10-CM

## 2021-04-27 DIAGNOSIS — E53.8 B12 DEFICIENCY: ICD-10-CM

## 2021-04-27 DIAGNOSIS — K21.9 GASTROESOPHAGEAL REFLUX DISEASE WITHOUT ESOPHAGITIS: ICD-10-CM

## 2021-04-27 DIAGNOSIS — E11.40 TYPE 2 DIABETES MELLITUS WITH DIABETIC NEUROPATHY, WITH LONG-TERM CURRENT USE OF INSULIN (HCC): ICD-10-CM

## 2021-04-27 DIAGNOSIS — M17.11 PRIMARY OSTEOARTHRITIS OF RIGHT KNEE: Primary | ICD-10-CM

## 2021-04-27 DIAGNOSIS — R39.12 BENIGN PROSTATIC HYPERPLASIA WITH WEAK URINARY STREAM: ICD-10-CM

## 2021-04-27 PROCEDURE — 1036F TOBACCO NON-USER: CPT | Performed by: INTERNAL MEDICINE

## 2021-04-27 PROCEDURE — 99214 OFFICE O/P EST MOD 30 MIN: CPT | Performed by: INTERNAL MEDICINE

## 2021-04-27 RX ORDER — TAMSULOSIN HYDROCHLORIDE 0.4 MG/1
0.4 CAPSULE ORAL
Qty: 90 CAPSULE | Refills: 1 | Status: SHIPPED | OUTPATIENT
Start: 2021-04-27 | End: 2021-07-26

## 2021-04-27 RX ORDER — INSULIN GLARGINE 100 [IU]/ML
35 INJECTION, SOLUTION SUBCUTANEOUS
Qty: 30 ML | Refills: 1 | Status: SHIPPED | OUTPATIENT
Start: 2021-04-27 | End: 2021-07-26

## 2021-04-27 RX ORDER — PRAVASTATIN SODIUM 40 MG
40 TABLET ORAL
Qty: 90 TABLET | Refills: 1 | Status: SHIPPED | OUTPATIENT
Start: 2021-04-27

## 2021-04-27 RX ORDER — FINASTERIDE 5 MG/1
5 TABLET, FILM COATED ORAL DAILY
Qty: 90 TABLET | Refills: 3 | Status: SHIPPED | OUTPATIENT
Start: 2021-04-27

## 2021-04-27 RX ORDER — CARVEDILOL 12.5 MG/1
25 TABLET ORAL 2 TIMES DAILY WITH MEALS
Qty: 180 TABLET | Refills: 1 | Status: SHIPPED | OUTPATIENT
Start: 2021-04-27 | End: 2021-07-26

## 2021-04-27 RX ORDER — GABAPENTIN 300 MG/1
300 CAPSULE ORAL 2 TIMES DAILY
Qty: 180 CAPSULE | Refills: 1 | Status: SHIPPED | OUTPATIENT
Start: 2021-04-27 | End: 2021-07-26

## 2021-04-27 RX ORDER — FAMOTIDINE 40 MG/1
20 TABLET, FILM COATED ORAL DAILY
Qty: 90 TABLET | Refills: 1 | Status: SHIPPED | OUTPATIENT
Start: 2021-04-27

## 2021-04-27 RX ORDER — FUROSEMIDE 40 MG/1
40 TABLET ORAL 2 TIMES DAILY
Qty: 180 TABLET | Refills: 1 | Status: SHIPPED | OUTPATIENT
Start: 2021-04-27

## 2021-04-27 RX ORDER — LANOLIN ALCOHOL/MO/W.PET/CERES
5000 CREAM (GRAM) TOPICAL DAILY
Qty: 90 TABLET | Refills: 1 | Status: SHIPPED | OUTPATIENT
Start: 2021-04-27

## 2021-04-27 RX ORDER — IRBESARTAN 150 MG/1
150 TABLET ORAL
Qty: 90 TABLET | Refills: 1 | Status: SHIPPED | OUTPATIENT
Start: 2021-04-27

## 2021-04-27 NOTE — ASSESSMENT & PLAN NOTE
Lab Results   Component Value Date    HGBA1C 7 4 (H) 01/18/2021       Checks BS in the afternoon only with variable values between 100-200  Hasn't had blood tests done  Will reorder them

## 2021-04-27 NOTE — ASSESSMENT & PLAN NOTE
Pt has gait dysfunction with falls due to osteoarthritis of the knees  He fell at least three times since I saw him  I offered referral to physical therapy for gait and balance training that pt declined today  I filled out papers for parking placard application and recommended rollator

## 2021-04-27 NOTE — PROGRESS NOTES
Assessment/Plan:    Ambulatory dysfunction  Pt has gait dysfunction with falls due to osteoarthritis of the knees  He fell at least three times since I saw him  I offered referral to physical therapy for gait and balance training that pt declined today  I filled out papers for parking placard application and recommended rollator  Primary osteoarthritis of right knee  Pt saw PT and follows with orthopedics  Osteoarthritis causes gait dysfunction with fall and pt would benefit from rollator use    Chronic combined systolic and diastolic heart failure (HCC)  Wt Readings from Last 3 Encounters:   01/28/21 99 8 kg (220 lb)   01/18/21 99 8 kg (220 lb)   01/13/21 102 kg (224 lb)          EF in 9/2020 was 30%  Pt has chronic systolic chf  He has no evidence of volume overload  Weight is stable  Continue lasix, avapro and coreg    Essential hypertension  BP is controlled  No change in meds  Will check renal function, electrolytes and TSH      Paroxysmal atrial fibrillation (HCC)  Heart rhythm is regular today   Denies signs of bleeding  Continue coreg and eliquis    Type 2 diabetes mellitus with diabetic neuropathy, with long-term current use of insulin (HCC)    Lab Results   Component Value Date    HGBA1C 7 4 (H) 01/18/2021       Checks BS in the afternoon only with variable values between 100-200  Hasn't had blood tests done  Will reorder them    Benign prostatic hyperplasia with weak urinary stream  Able to empty bladder but stream is weak  Continue flomax     Gastroesophageal reflux disease without esophagitis  gerd is stable on pepcid  Continue pepcid    B12 deficiency  Will check B12 level, patient has B12 deficiency       Diagnoses and all orders for this visit:    Primary osteoarthritis of right knee  -     Misc  Devices (Bariatric Rollator) MISC; Use once for 1 dose    Ambulatory dysfunction  -     Misc   Devices (Bariatric Rollator) MISC; Use once for 1 dose    Chronic combined systolic and diastolic heart failure (HCC)    Essential hypertension  -     carvedilol (COREG) 12 5 mg tablet; Take 2 tablets (25 mg total) by mouth 2 (two) times a day with meals  -     furosemide (LASIX) 40 mg tablet; Take 1 tablet (40 mg total) by mouth 2 (two) times a day  -     irbesartan (AVAPRO) 150 mg tablet; Take 1 tablet (150 mg total) by mouth daily at bedtime  -     Basic metabolic panel; Future  -     CBC; Future  -     TSH, 3rd generation with Free T4 reflex; Future    Paroxysmal atrial fibrillation (HCC)  -     apixaban (ELIQUIS) 5 mg; Take 1 tablet (5 mg total) by mouth 2 (two) times a day    Type 2 diabetes mellitus with diabetic neuropathy, with long-term current use of insulin ([facility])  -     gabapentin (NEURONTIN) 300 mg capsule; Take 1 capsule (300 mg total) by mouth 2 (two) times a day  -     insulin glargine (LANTUS) 100 units/mL subcutaneous injection; Inject 35 Units under the skin daily at bedtime  -     metFORMIN (GLUCOPHAGE) 1000 MG tablet; Take 1 tablet (1,000 mg total) by mouth 2 (two) times a day with meals  -     pravastatin (PRAVACHOL) 40 mg tablet; Take 1 tablet (40 mg total) by mouth daily at bedtime  -     sitaGLIPtin (JANUVIA) 100 mg tablet; Take 1 tablet (100 mg total) by mouth daily  -     Hemoglobin A1C; Future    Benign prostatic hyperplasia with weak urinary stream  -     finasteride (PROSCAR) 5 mg tablet; Take 1 tablet (5 mg total) by mouth daily  -     tamsulosin (FLOMAX) 0 4 mg; Take 1 capsule (0 4 mg total) by mouth daily with dinner    Gastroesophageal reflux disease without esophagitis  -     famotidine (PEPCID) 40 MG tablet; Take 0 5 tablets (20 mg total) by mouth daily    B12 deficiency  -     Cyanocobalamin (Vitamin B12) 500 MCG TABS; Take 5,000 mcg by mouth daily  -     Vitamin B12; Future          Subjective:      Patient ID: Mary Shahid is a 82 y o  male  Patient presents with follow-up of multiple medical problems please refer to the assessment and plan for details    For the chief complaints we discussed was gait dysfunction due to osteoarthritis of the knees with falls  Patient needs a Rollator to sit down when he tires out  History was obtained with the help of patient's son was present during the visit        The following portions of the patient's history were reviewed and updated as appropriate: current medications, past family history, past medical history, past social history, past surgical history and problem list     Review of Systems   Constitutional: Negative for fever  HENT: Negative for congestion  Eyes: Negative for visual disturbance  Respiratory: Negative for cough and shortness of breath  Cardiovascular: Negative for chest pain, palpitations and leg swelling  Gastrointestinal: Negative for abdominal pain, blood in stool and diarrhea  Endocrine: Negative for polydipsia and polyphagia  Genitourinary: Positive for difficulty urinating  Negative for dysuria and hematuria  Musculoskeletal: Positive for gait problem and joint swelling  Negative for neck stiffness  Skin: Negative for rash  Neurological: Positive for numbness  Negative for weakness  Psychiatric/Behavioral: Negative for dysphoric mood  All other systems reviewed and are negative  Objective: There were no vitals taken for this visit  Physical Exam  HENT:      Head: Normocephalic  Eyes:      Conjunctiva/sclera: Conjunctivae normal    Neck:      Musculoskeletal: Neck supple  Cardiovascular:      Rate and Rhythm: Normal rate and regular rhythm  Heart sounds: No murmur  Pulmonary:      Effort: No respiratory distress  Breath sounds: No wheezing or rales  Abdominal:      General: Bowel sounds are normal       Tenderness: There is no abdominal tenderness     Musculoskeletal:         General: Tenderness ( patient reports tenderness of the lumbar paraspinal muscles on palpation) and deformity (Bony hypertrophy of the right knee is present with crepitus on range of motion, left knee bony hypertrophy is less) present  Skin:     General: Skin is warm and dry  Neurological:      Mental Status: He is alert  Cranial Nerves: No cranial nerve deficit  Motor: No weakness ( upper and lower extremity strength is normal)        Gait: Gait normal    Psychiatric:         Mood and Affect: Mood normal              Isra Treadwell MD

## 2021-04-27 NOTE — ASSESSMENT & PLAN NOTE
Wt Readings from Last 3 Encounters:   01/28/21 99 8 kg (220 lb)   01/18/21 99 8 kg (220 lb)   01/13/21 102 kg (224 lb)          EF in 9/2020 was 30%  Pt has chronic systolic chf  He has no evidence of volume overload  Weight is stable  Continue lasix, avapro and coreg

## 2021-04-27 NOTE — ASSESSMENT & PLAN NOTE
Pt saw PT and follows with orthopedics  Osteoarthritis causes gait dysfunction with fall and pt would benefit from rollator use

## 2021-04-29 DIAGNOSIS — E87.6 HYPOKALEMIA: ICD-10-CM

## 2021-04-29 DIAGNOSIS — I25.119 CORONARY ARTERY DISEASE INVOLVING NATIVE CORONARY ARTERY OF NATIVE HEART WITH ANGINA PECTORIS (HCC): ICD-10-CM

## 2021-04-29 RX ORDER — POTASSIUM CHLORIDE 20 MEQ/1
20 TABLET, EXTENDED RELEASE ORAL DAILY
Qty: 30 TABLET | Refills: 3 | Status: SHIPPED | OUTPATIENT
Start: 2021-04-29

## 2021-04-29 RX ORDER — NITROGLYCERIN 0.4 MG/1
0.4 TABLET SUBLINGUAL
Qty: 25 TABLET | Refills: 3 | Status: SHIPPED | OUTPATIENT
Start: 2021-04-29

## 2021-04-30 ENCOUNTER — LAB (OUTPATIENT)
Dept: LAB | Facility: HOSPITAL | Age: 86
End: 2021-04-30
Attending: INTERNAL MEDICINE
Payer: COMMERCIAL

## 2021-04-30 DIAGNOSIS — E11.40 TYPE 2 DIABETES MELLITUS WITH DIABETIC NEUROPATHY, WITH LONG-TERM CURRENT USE OF INSULIN (HCC): ICD-10-CM

## 2021-04-30 DIAGNOSIS — E53.8 B12 DEFICIENCY: ICD-10-CM

## 2021-04-30 DIAGNOSIS — Z79.4 TYPE 2 DIABETES MELLITUS WITH DIABETIC NEUROPATHY, WITH LONG-TERM CURRENT USE OF INSULIN (HCC): ICD-10-CM

## 2021-04-30 DIAGNOSIS — I10 ESSENTIAL HYPERTENSION: ICD-10-CM

## 2021-04-30 LAB
ANION GAP SERPL CALCULATED.3IONS-SCNC: 5 MMOL/L (ref 4–13)
BUN SERPL-MCNC: 21 MG/DL (ref 5–25)
CALCIUM SERPL-MCNC: 9.4 MG/DL (ref 8.3–10.1)
CHLORIDE SERPL-SCNC: 109 MMOL/L (ref 100–108)
CO2 SERPL-SCNC: 26 MMOL/L (ref 21–32)
CREAT SERPL-MCNC: 0.9 MG/DL (ref 0.6–1.3)
ERYTHROCYTE [DISTWIDTH] IN BLOOD BY AUTOMATED COUNT: 13.5 % (ref 11.6–15.1)
EST. AVERAGE GLUCOSE BLD GHB EST-MCNC: 169 MG/DL
GFR SERPL CREATININE-BSD FRML MDRD: 79 ML/MIN/1.73SQ M
GLUCOSE P FAST SERPL-MCNC: 142 MG/DL (ref 65–99)
HBA1C MFR BLD: 7.5 %
HCT VFR BLD AUTO: 37.2 % (ref 36.5–49.3)
HGB BLD-MCNC: 12.2 G/DL (ref 12–17)
MCH RBC QN AUTO: 32.3 PG (ref 26.8–34.3)
MCHC RBC AUTO-ENTMCNC: 32.8 G/DL (ref 31.4–37.4)
MCV RBC AUTO: 98 FL (ref 82–98)
PLATELET # BLD AUTO: 225 THOUSANDS/UL (ref 149–390)
PMV BLD AUTO: 11 FL (ref 8.9–12.7)
POTASSIUM SERPL-SCNC: 4.2 MMOL/L (ref 3.5–5.3)
RBC # BLD AUTO: 3.78 MILLION/UL (ref 3.88–5.62)
SODIUM SERPL-SCNC: 140 MMOL/L (ref 136–145)
TSH SERPL DL<=0.05 MIU/L-ACNC: 2.43 UIU/ML (ref 0.36–3.74)
VIT B12 SERPL-MCNC: 1204 PG/ML (ref 100–900)
WBC # BLD AUTO: 7.04 THOUSAND/UL (ref 4.31–10.16)

## 2021-04-30 PROCEDURE — 82607 VITAMIN B-12: CPT

## 2021-04-30 PROCEDURE — 80048 BASIC METABOLIC PNL TOTAL CA: CPT

## 2021-04-30 PROCEDURE — 84443 ASSAY THYROID STIM HORMONE: CPT

## 2021-04-30 PROCEDURE — 83036 HEMOGLOBIN GLYCOSYLATED A1C: CPT

## 2021-04-30 PROCEDURE — 36415 COLL VENOUS BLD VENIPUNCTURE: CPT

## 2021-04-30 PROCEDURE — 85027 COMPLETE CBC AUTOMATED: CPT

## 2021-04-30 NOTE — RESULT ENCOUNTER NOTE
Call patient: Saran Caldera normal electrolytes, stable kidney function, A1c is 7 5, blood sugar control is acceptable for this patient, blood counts are normal

## 2021-06-14 ENCOUNTER — TRANSCRIBE ORDERS (OUTPATIENT)
Dept: INTERNAL MEDICINE CLINIC | Facility: CLINIC | Age: 86
End: 2021-06-14

## 2021-06-14 DIAGNOSIS — H91.93 UNSPECIFIED HEARING LOSS, BILATERAL: Primary | ICD-10-CM

## 2021-06-25 ENCOUNTER — VBI (OUTPATIENT)
Dept: ADMINISTRATIVE | Facility: OTHER | Age: 86
End: 2021-06-25

## 2022-06-16 ENCOUNTER — TELEPHONE (OUTPATIENT)
Dept: OTHER | Facility: OTHER | Age: 87
End: 2022-06-16

## 2022-06-17 NOTE — TELEPHONE ENCOUNTER
Called to cancel patients appointment for tomorrow with [address]  They already have pcp
